# Patient Record
Sex: FEMALE | Race: WHITE | Employment: OTHER | URBAN - METROPOLITAN AREA
[De-identification: names, ages, dates, MRNs, and addresses within clinical notes are randomized per-mention and may not be internally consistent; named-entity substitution may affect disease eponyms.]

---

## 2018-02-09 ENCOUNTER — TRANSFERRED RECORDS (OUTPATIENT)
Dept: HEALTH INFORMATION MANAGEMENT | Facility: CLINIC | Age: 58
End: 2018-02-09

## 2018-04-23 ENCOUNTER — TRANSFERRED RECORDS (OUTPATIENT)
Dept: HEALTH INFORMATION MANAGEMENT | Facility: CLINIC | Age: 58
End: 2018-04-23

## 2018-11-12 ENCOUNTER — RECORDS - HEALTHEAST (OUTPATIENT)
Dept: ADMINISTRATIVE | Facility: OTHER | Age: 58
End: 2018-11-12

## 2018-11-15 ENCOUNTER — HOSPITAL ENCOUNTER (OUTPATIENT)
Dept: ULTRASOUND IMAGING | Facility: HOSPITAL | Age: 58
Discharge: HOME | End: 2018-11-15
Attending: INTERNAL MEDICINE

## 2018-11-15 DIAGNOSIS — N18.30 CHRONIC KIDNEY DISEASE, STAGE III (MODERATE) (H): ICD-10-CM

## 2019-01-21 ENCOUNTER — MEDICAL CORRESPONDENCE (OUTPATIENT)
Dept: HEALTH INFORMATION MANAGEMENT | Facility: CLINIC | Age: 59
End: 2019-01-21

## 2019-01-23 NOTE — TELEPHONE ENCOUNTER
FUTURE VISIT INFORMATION      FUTURE VISIT INFORMATION:    Date: 2/12/19    Time: 1.40p    [place] [facility] [place]  REFERRAL INFORMATION:    Referring provider:  Dr. Ishan Melendrez    Referring providers clinic:  [facility]     Reason for visit/diagnosis  Possible Parkinsons w/ Tremors, Memory loss, issues with Limes disease    RECORDS REQUESTED FROM:       Clinic name Comments Records Status Imaging Status   [facility] Dr. Melendrez  Received    [facility]  Received    [facility]  Received    [facility]  MRI/MRA Head 1/31/19 Received PACS                   1/23/19: External referral from Dr. Melendrez at [facility]. Spoke with patient and I will be sending patient blank ROIs and she will fill those out/return.

## 2019-01-29 ENCOUNTER — DOCUMENTATION ONLY (OUTPATIENT)
Dept: CARE COORDINATION | Facility: CLINIC | Age: 59
End: 2019-01-29

## 2019-01-31 ENCOUNTER — TRANSFERRED RECORDS (OUTPATIENT)
Dept: HEALTH INFORMATION MANAGEMENT | Facility: CLINIC | Age: 59
End: 2019-01-31

## 2019-02-04 SDOH — HEALTH STABILITY: MENTAL HEALTH: HOW OFTEN DO YOU HAVE A DRINK CONTAINING ALCOHOL?: NEVER

## 2019-02-04 NOTE — PROGRESS NOTES
Summary and Recommendations:   IMPRESSION:  1. Parkinsonism  2. Cognitive impairment  3. Depression  4. Anxiety    Alina Zavala is a 58 year old woman who was referred for evaluation of Parkinsonism and cognitive impairment. Her exam is remarkable for cognitive impairment with prominent visuospatial and attention deficits on MoCA and mild left greater than right tremor, bradykinesia and rigidity. Her Demetrius Scan confirms clinical suspicion of Parkinsonism with bilateral striatal dopaminergic deficit. Early cognitive impairment raises concern for Lewy Body Dementia, although she does not have classic fluctuation or accompanying hallucinosis. We discussed further evaluation with neuropsychology testing, but she elected to think about this for now, given the stress of extended cognitive testing. Other possibilities include Parkinson disease with cognitive impairment.     We discussed various treatment options for cognition, motor, and mood symptoms. We elected to focus on cognition now and will continue to work on other areas during subsequent visits. We discussed the benefit of PT and exercise for movement.     PLAN:  - Start donepezil 5 mg AM.   - [facility] and [facility] in [place] for Parkinson disease  - Will discuss mood/anxiety at our next visit - may be better treated on serotonin specific reuptake inhibitor or SNRI  - Discussed various dopaminergic medications including L-dopa and dopamine agonists. Will wait to start a medication at this time.    Follow up in 1 month    Alaina Landaverde MD  Movement Disorders Fellow    Patient seen and discussed with Dr. Hearn    Patient seen and examined by me today. February 12, 2019  I agree with details above from today  February 12, 2019  Over 50% of this 100 minute visit was spent in patient care and care coordination  Ishan Hearn MD  _____________________________________________________________________  PATIENT: Alina Zavala  58 year  old female   : 1960  DAHLIA: 2019    REASON FOR VISIT: Consult requested by - pcp/other    HISTORY OF PRESENT ILLNESS:  Alina Zavala is a 58 year old referred by Dr. Melendrez ([facility]) for evaluation of Parkinsonism and memory loss.      She first noted intermittent tremor of the left pinkie finger four years ago. Around that time she had been undergoing treatment for Lyme disease and felt that tremor may have been related to other problems she was experiencing at the time. This year, she has noticed more trembling - now in both hands and occasionally in her face. The tremor may occur at rest or with movement and is worse on the left. Other motor symptoms include slight impairment in dexterity and handwriting, although she is not bothered by this. She has noticed some difficulty putting on and taking off a coat. She described spasms of the left leg, especially at night. She also has a restless sensation of the the left greater than right leg at night - some nights worse than others. The restlessness is relieved by movement.    She is most concerned with cognitive changes which started around the same time as tremor four years ago. She described problems with concentration, short term memory and spatial awareness. She limits her driving to local areas during the day. She has always struggled with depression and anxiety her entire life, but feels these issues have been more pronounced lately. She has only been tried on Wellbutrin. Sometimes she has insomnia. Denied dream enactment. No anosmia.    There have been changes in her walking. Her  has noticed that her stride has become shorter and she has slowed down. In the last few months she has been stumbling weekly. She has also noticed difficulty responding to other walkers when they abruptly stop or change dircections. It is harder to turn. She fatigues when walking. She has not fallen to the ground. She denied freezing of  gait.     Problems with constipation started 2017, improved with Miralax, but has been better recently without treatment. Noted urinary urgency but does not have to use a pad. Reported lightheadedness in the past, but this resolved when her PCP changed her antihypertensive medication change about 6-8 months ago. Denied dysphagia.     She was evaluated by Dr. Marie at [facility] two months ago. She ordered a DaTScan which was completed at [facility]. Mrs. Zavala's mother has essential tremor and the DatScan was intended to differentiate between ET and PD.     Other previous work up has included MRI brain and MRA head ([facility]). She was told these images were normal, but we do not have them for our review.       Outside records reviewed and revealed -- inserted.      Lyme disease treated - 2015 in [facility]      History obtained from patient and her     MEDICATIONS:    Medications     AM  PM   ASmanex    1   Bupropion wellbutrin SR 200mg 12 hr tablet 1  1   Gabapentin neurontin 100mg 1     Gabapentin neurontin 300mg   1   Hydrochlorothiazide hydrodiuril 12.5mg 1     Lorazepam ativan 1mg      Potassium chloride ER micro K 10 meq      Albuterol  As needed     Calcium 600 mg 1     Vitamin D 1     L Tyrosine 500 mg 1     Magnesium 250 mg 1     Pyrodoxine B-6 1                 14 Review of systems  are negative except for   Patient Active Problem List   Diagnosis     Asthma     Benign essential hypertension     Answers for HPI/ROS submitted by the patient on 2/11/2019   General Symptoms: Yes  Skin Symptoms: No  HENT Symptoms: Yes  EYE SYMPTOMS: No  HEART SYMPTOMS: No  LUNG SYMPTOMS: No  INTESTINAL SYMPTOMS: No  URINARY SYMPTOMS: Yes  GYNECOLOGIC SYMPTOMS: Yes  BREAST SYMPTOMS: No  SKELETAL SYMPTOMS: Yes  BLOOD SYMPTOMS: No  NERVOUS SYSTEM SYMPTOMS: Yes  MENTAL HEALTH SYMPTOMS: Yes  Fever: No  Loss of appetite: No  Weight loss: No  Weight gain: No  Fatigue: Yes  Night sweats:  Yes  Chills: Yes  Increased stress: Yes  Excessive hunger: No  Excessive thirst: No  Feeling hot or cold when others believe the temperature is normal: Yes  Loss of height: No  Post-operative complications: No  Surgical site pain: No  Hallucinations: No  Change in or Loss of Energy: No  Hyperactivity: No  Confusion: No  Ear pain: No  Ear discharge: No  Hearing loss: No  Tinnitus: Yes  Nosebleeds: No  Congestion: Yes  Sinus pain: No  Trouble swallowing: No   Voice hoarseness: No  Mouth sores: No  Sore throat: Yes  Tooth pain: Yes  Gum tenderness: No  Bleeding gums: No  Change in taste: No  Change in sense of smell: No  Dry mouth: Yes  Hearing aid used: No  Neck lump: No  Trouble holding urine or incontinence: Yes  Pain or burning: No  Trouble starting or stopping: No  Increased frequency of urination: No  Blood in urine: No  Decreased frequency of urination: No  Frequent nighttime urination: No  Flank pain: No  Difficulty emptying bladder: No  Back pain: Yes  Muscle aches: Yes  Neck pain: No  Swollen joints: No  Joint pain: No  Bone pain: No  Muscle cramps: Yes  Muscle weakness: Yes  Joint stiffness: Yes  Bone fracture: No  Trouble with coordination: No  Dizziness or trouble with balance: Yes  Fainting or black-out spells: No  Memory loss: Yes  Headache: No  Seizures: No  Speech problems: No  Tingling: No  Tremor: Yes  Weakness: Yes  Difficulty walking: No  Paralysis: No  Numbness: No  Bleeding or spotting between periods: No  Heavy or painful periods: No  Irregular periods: No  Vaginal discharge: No  Hot flashes: No  Vaginal dryness: No  Genital ulcers: No  Reduced libido: Yes  Painful intercourse: Yes  Difficulty with sexual arousal: No  Post-menopausal bleeding: No  Nervous or Anxious: Yes  Depression: Yes  Trouble sleeping: Yes  Trouble thinking or concentrating: Yes  Mood changes: Yes  Panic attacks: No      ALLERGIES: Prednisone; Penicillins; and Sulfa drugs      SURGERIES:   Past Surgical History:   Procedure  "Laterality Date     D & C       PAST MEDICAL HISTORY:  Past Medical History:   Diagnosis Date     Anxiety      Depression      Hyperlipidemia      Hypertension      Lyme disease      Trigeminal neuralgia      SOCIAL HISTORY:  Tobacco Use     Smoking status: Never Smoker     Smokeless tobacco: Never Used   Substance and Sexual Activity     Alcohol use: No     Frequency: Never   Other Topics Concern     Not on file   Social History Narrative    . Lives in [place]. Tejinder Zavala spouse. They have three children. Their names are Eros, Venkat, and Christie. She is a homemaker. Studied library science and theology. She was a  for over 16 years. 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 Adult onset.      Cerebrovascular Disease Father      Hypertension Father      Dementia No family hx of      Parkinsonism No family hx of      Current Outpatient Medications   Medication Sig Dispense Refill     acetaminophen (TYLENOL) 500 MG tablet Take 500-1,000 mg by mouth every 6 hours as needed for mild pain       albuterol (PROAIR HFA/PROVENTIL HFA/VENTOLIN HFA) 108 (90 Base) MCG/ACT inhaler Inhale 2 puffs into the lungs every 6 hours as needed       Ascorbic Acid (VITAMIN C) 500 MG CAPS Take 1 tablet by mouth daily       ASMANEX 120 METERED DOSES 220 MCG/INH inhaler Inhale 2 puffs into the lungs 2 times daily  1     buPROPion (WELLBUTRIN SR) 200 MG 12 hr tablet 200mg tab by mouth twice daily  0     calcium carbonate (CALCIUM CARBONATE) 600 MG tablet Take 1 tablet by mouth daily       Cholecalciferol (VITAMIN D3 " PO) Take 500 mcg by mouth daily       gabapentin (NEURONTIN) 100 MG capsule Take 100 mg by mouth daily  1     gabapentin (NEURONTIN) 300 MG capsule 300mg tab by mouth daily @  0     hydrochlorothiazide (HYDRODIURIL) 12.5 MG tablet 12.5mg tab by mouth daily  1     l-tyrosine 500 MG TABS Take 1,000 mg by mouth daily       magnesium 250 MG tablet Take 1 tablet by mouth daily       Pyridoxine HCl (VITAMIN B-6 PO) Take 100 mg by mouth daily       Ubiquinol (QUNOL COQ10/UBIQUINOL/RELL) 100 MG CAPS Take 2 tablets by mouth At Bedtime         PHYSICAL EXAM:  B/P: Data Unavailable, T: Data Unavailable, P: Data Unavailable, R: Data Unavailable 0 lbs 0 oz  Blood pressure 116/73, pulse 105, SpO2 100 %., There is no height or weight on file to calculate BMI.  NEUROLOGIC:  MENTAL STATUS: Fully alert, attentive and oriented. See MoCA below for details (24/30). She demonstrated prominent impairment in visuospatial and executive tasks (3/5) and attention (4/6). Registration 5/5 and delayed recall 3/5. She performed quite well in tests of language and abstraction. In fact in general conversation she comes across as having a good command of vocabulary.   SPEECH: Slight hypophonia  FACIAL EXPRESSION: Reduced blink rate and lower facial expression    CRANIAL NERVES: Visual fields intact. EOM full with smooth pursuit. No nystagmus. Normal saccades. Facial sensation intact/symmetric. Facial movements symmetric. Palate elevation symmetric, uvula midline. No dysarthria. Tongue protrusion midline.    MOTOR: See UPDRS for details  INVOLUNTARY MOVEMENTS - Intermittent left sided <1cm tremor in pronation/supination at rest. <1cm tremor on the right and 1-2 cm on posture and action on the left upper extremity. No facial tremor. No lower extremity tremor noted.   AGILITY -  Normal movements to slight bradykinesia on the right and mild bradykinesia on the left  TONE - Slight axial rigidity. Slight RUE rigidity, mild left sided rigidity.   STRENGTH  - 5/5 upper and lower extremities    REFLEXES: 3 and symmetric at bilateral triceps, biceps, brachioradialis, patella and Achilles. Spread with cross adductor. No clonus. Toes downgoing.  SENSORY: Intact/symmetric to light touch, temperature and vibratory sensation throughout upper and lower extremities.    COORDINATION: No dysmetria with FNF bilaterally  GAIT: Able to rise from chair with arms crossed over chest. Posture is slightly stooped. Normal base, shortened stride. Arm swing diminished bilaterally. Turns in 3 steps. Takes 2 steps with pull test (normal).     MoCA  Visuospatial/ Executive  3/5  Naming    3/3  Registration   Not scored, registered 5/5  Digit span   2/2  Vigilance   1/1  Calculation   1/3  Repetition   2/2  Fluency   1/1  Abstraction   2/2  Delayed recall   3/5  Orientation   6/6  Total    24/30      MDS-UPDRS Part III   0: Normal -- 1: Slight -- 2: Mild -- 3: Moderate -- 4: Severe     UPDRS Values 2/12/2019   Time: 3:03 PM   Medication Off   R Brain DBS: None   L Brain DBS: None   Speech 1   Facial Expression 1   Rigidity Neck 1   Rigidity RUE 1   Rigidity LUE 2   Rigidity RLE 0   Rigidity LLE 0   Finger Taps R 0   Finger Taps L 1   Hand Mvt R 1   Hand Mvt L 2   Pron-/Supinate R 0   Pron-/Supinate L 2   Toe Tap R 0   Toe Tap L 1   Leg Agility R 0   Leg Agility L 1   Arise From Chair 0   Gait 1   Gait Freezing 0   Postural Stability 0   Posture 1   Global Spont Mvt 1   Postural Tremor RUE 1   Postural Tremor LUE 2   Kinetic Tremor RUE 1   Kinetic Tremor LUE 2   Rest Tremor RUE 0   Rest Tremor LUE 1   Rest Tremor RLE 0   Rest Tremor LLE 0   Rest Tremor Lip/Jaw 0   Rest Tremor Constancy 1   Total Right 4   Total Left 14   Axial Total 6   Total 25       REVIEW OF DATA:  Reviewed Demetrius Scan - bilateral dopaminergic striatal deficit present    Patient Demographics  - 58 y.o. Female; born Julio. 10, 1960     Patient Address Communication Language Race / Ethnicity   [address] [place]  [address] [phone] (Home)  [phone]  [email or web address] English - Spoken (Preferred) White / Not  or      Allergies  Reconcile with Patient's Chart    Active Allergy Reactions Severity Noted Date Comments   Penicillins *Unknown   03/25/2018     Sulfa (Sulfonamide Antibiotics) *Unknown   03/25/2018       Current Medications  Reconcile with Patient's Chart    Prescription Sig. Disp. Refills Start Date End Date Status   LISINOPRIL ORAL   Take by mouth.         Active   BUPROPION HCL (WELLBUTRIN ORAL)   Take by mouth.         Active   SIMVASTATIN ORAL   Take by mouth.         Active   GABAPENTIN ORAL   Take by mouth.         Active     Active Problems    Not on file        Surgical History      Surgery Date Laterality Comments   DILATION AND CURETTAGE           Medical History      Medical History Date Comments   HTN (hypertension)       High cholesterol         Social History      Tobacco Use Types Packs/Day Years Used Date   Never Smoker           Smokeless Tobacco: Never Used           Tobacco Cessation: Counseling Given: No     Alcohol Use Drinks/Week oz/Week Comments   No           Sex Assigned at Birth Date Recorded   Not on file

## 2019-02-06 ENCOUNTER — ANCILLARY PROCEDURE (OUTPATIENT)
Dept: NUCLEAR MEDICINE | Facility: CLINIC | Age: 59
End: 2019-02-06
Payer: COMMERCIAL

## 2019-02-06 DIAGNOSIS — G20.C PARKINSONISM, UNSPECIFIED PARKINSONISM TYPE (H): ICD-10-CM

## 2019-02-06 PROCEDURE — 78607 NM BRAIN IMAGING TOMOGRAPHIC (SPECT) DATSCAN: CPT

## 2019-02-06 PROCEDURE — A9584 IODINE I-123 IOFLUPANE: HCPCS

## 2019-02-06 RX ORDER — GABAPENTIN 300 MG/1
300 CAPSULE ORAL AT BEDTIME
Refills: 0 | COMMUNITY
Start: 2019-02-06 | End: 2020-10-22

## 2019-02-06 RX ORDER — LORAZEPAM 1 MG/1
TABLET ORAL
Refills: 0 | COMMUNITY
Start: 2019-01-28 | End: 2019-02-06

## 2019-02-06 RX ORDER — LORAZEPAM 1 MG/1
TABLET ORAL
Refills: 0 | COMMUNITY
Start: 2019-02-06 | End: 2019-02-11

## 2019-02-06 RX ORDER — BUPROPION HYDROCHLORIDE 200 MG/1
TABLET, EXTENDED RELEASE ORAL
Refills: 0 | COMMUNITY
Start: 2019-02-06 | End: 2021-11-19

## 2019-02-06 RX ORDER — HYDROCHLOROTHIAZIDE 12.5 MG/1
TABLET ORAL
Refills: 1 | COMMUNITY
Start: 2019-02-06 | End: 2020-03-03

## 2019-02-06 RX ORDER — GABAPENTIN 100 MG/1
100 CAPSULE ORAL EVERY MORNING
Refills: 1 | COMMUNITY
Start: 2019-01-08 | End: 2020-10-22

## 2019-02-06 RX ORDER — SIMVASTATIN 10 MG
TABLET ORAL
Refills: 0 | COMMUNITY
Start: 2018-06-20 | End: 2019-02-06

## 2019-02-06 RX ORDER — POTASSIUM CHLORIDE 750 MG/1
CAPSULE, EXTENDED RELEASE ORAL
Refills: 11 | COMMUNITY
Start: 2019-02-06 | End: 2019-02-11

## 2019-02-06 RX ORDER — LISINOPRIL AND HYDROCHLOROTHIAZIDE 12.5; 2 MG/1; MG/1
TABLET ORAL
Refills: 0 | COMMUNITY
Start: 2018-03-17 | End: 2019-02-11

## 2019-02-06 RX ORDER — SIMVASTATIN 10 MG
TABLET ORAL
Refills: 0 | COMMUNITY
Start: 2019-02-06 | End: 2019-02-11

## 2019-02-06 RX ORDER — POTASSIUM CHLORIDE 750 MG/1
CAPSULE, EXTENDED RELEASE ORAL
Refills: 11 | COMMUNITY
Start: 2018-11-14 | End: 2019-02-06

## 2019-02-06 RX ORDER — HYDROCHLOROTHIAZIDE 12.5 MG/1
TABLET ORAL
Refills: 1 | COMMUNITY
Start: 2019-02-01 | End: 2019-02-06

## 2019-02-06 RX ORDER — GABAPENTIN 300 MG/1
CAPSULE ORAL
Refills: 0 | COMMUNITY
Start: 2019-01-23 | End: 2019-02-06

## 2019-02-06 RX ORDER — BUPROPION HYDROCHLORIDE 200 MG/1
TABLET, EXTENDED RELEASE ORAL
Refills: 0 | COMMUNITY
Start: 2019-01-07 | End: 2019-02-06

## 2019-02-08 NOTE — TELEPHONE ENCOUNTER
ROIs received from patient. Faxed to:  1) [facility]  2) [facility]  3) [facility]   4) [facility]

## 2019-02-11 ASSESSMENT — ENCOUNTER SYMPTOMS
HALLUCINATIONS: 0
ALTERED TEMPERATURE REGULATION: 1
DIFFICULTY URINATING: 0
SINUS CONGESTION: 1
STIFFNESS: 1
DEPRESSION: 1
MEMORY LOSS: 1
CHILLS: 1
MUSCLE WEAKNESS: 1
HOARSE VOICE: 0
LOSS OF CONSCIOUSNESS: 0
DYSURIA: 0
DIZZINESS: 1
BACK PAIN: 1
PANIC: 0
HEADACHES: 0
NECK MASS: 0
HEMATURIA: 0
ARTHRALGIAS: 0
DECREASED APPETITE: 0
WEIGHT GAIN: 0
TREMORS: 1
MYALGIAS: 1
DISTURBANCES IN COORDINATION: 0
POLYDIPSIA: 0
WEAKNESS: 1
SPEECH CHANGE: 0
INSOMNIA: 1
TASTE DISTURBANCE: 0
DECREASED LIBIDO: 1
FATIGUE: 1
FLANK PAIN: 0
HOT FLASHES: 0
NUMBNESS: 0
SMELL DISTURBANCE: 0
INCREASED ENERGY: 0
NIGHT SWEATS: 1
JOINT SWELLING: 0
TROUBLE SWALLOWING: 0
WEIGHT LOSS: 0
MUSCLE CRAMPS: 1
SINUS PAIN: 0
DECREASED CONCENTRATION: 1
POLYPHAGIA: 0
TINGLING: 0
NERVOUS/ANXIOUS: 1
SEIZURES: 0
SORE THROAT: 1
NECK PAIN: 0
FEVER: 0
PARALYSIS: 0

## 2019-02-11 NOTE — TELEPHONE ENCOUNTER
Records received from [facility] via fax - 26 pages.    Records received from [facility]. No office notes, just imaging reports from [facility] - 7 pages

## 2019-02-12 ENCOUNTER — OFFICE VISIT (OUTPATIENT)
Dept: NEUROLOGY | Facility: CLINIC | Age: 59
End: 2019-02-12
Payer: COMMERCIAL

## 2019-02-12 ENCOUNTER — PRE VISIT (OUTPATIENT)
Dept: NEUROLOGY | Facility: CLINIC | Age: 59
End: 2019-02-12

## 2019-02-12 VITALS — OXYGEN SATURATION: 100 % | DIASTOLIC BLOOD PRESSURE: 73 MMHG | HEART RATE: 105 BPM | SYSTOLIC BLOOD PRESSURE: 116 MMHG

## 2019-02-12 DIAGNOSIS — G20.A1 PARKINSON DISEASE (H): Primary | ICD-10-CM

## 2019-02-12 DIAGNOSIS — R41.89 COGNITIVE IMPAIRMENT: ICD-10-CM

## 2019-02-12 DIAGNOSIS — R25.1 TREMOR: ICD-10-CM

## 2019-02-12 DIAGNOSIS — F80.0 ARTICULATION DISORDER: ICD-10-CM

## 2019-02-12 RX ORDER — MULTIVIT-MIN/IRON/FOLIC ACID/K 18-600-40
1 CAPSULE ORAL DAILY
COMMUNITY
End: 2019-02-18

## 2019-02-12 RX ORDER — PHENOL 1.4 %
AEROSOL, SPRAY (ML) MUCOUS MEMBRANE
COMMUNITY

## 2019-02-12 RX ORDER — MULTIVITAMIN WITH IRON
1 TABLET ORAL DAILY
COMMUNITY
End: 2020-06-12

## 2019-02-12 RX ORDER — TYROSINE 500 MG
500 TABLET ORAL DAILY
COMMUNITY
End: 2020-10-22

## 2019-02-12 RX ORDER — ACETAMINOPHEN 500 MG
500-1000 TABLET ORAL EVERY 6 HOURS PRN
COMMUNITY
End: 2020-06-12

## 2019-02-12 RX ORDER — ALBUTEROL SULFATE 90 UG/1
2 AEROSOL, METERED RESPIRATORY (INHALATION) EVERY 6 HOURS PRN
COMMUNITY

## 2019-02-12 RX ORDER — DONEPEZIL HYDROCHLORIDE 5 MG/1
5 TABLET, FILM COATED ORAL AT BEDTIME
Qty: 90 TABLET | Refills: 3 | Status: SHIPPED | OUTPATIENT
Start: 2019-02-12 | End: 2019-02-25 | Stop reason: SINTOL

## 2019-02-12 ASSESSMENT — UNIFIED PARKINSONS DISEASE RATING SCALE (UPDRS)
AMPLITUDE_RLE: NORMAL: NO TREMOR.
FINGER_TAPPING_LEFT: SLIGHT: ANY OF THE FOLLOWING: A) THE REGULAR RHYTHM IS BROKEN WITH ONE WITH ONE OR TWO INTERRUPTIONS OR HESITATIONS OF THE MOVEMENT B) SLIGHT SLOWING C) THE AMPLITUDE DECREMENTS NEAR THE END OF THE 10 MOVEMENTS.
TOETAPPING_LEFT: SLIGHT: ANY OF THE FOLLOWING: A) THE REGULAR RHYTHM IS BROKEN WITH ONE WITH ONE OR TWO INTERRUPTIONS OR HESITATIONS OF THE MOVEMENT B) SLIGHT SLOWING C) THE AMPLITUDE DECREMENTS NEAR THE END OF THE 10 MOVEMENTS.
PRONATION_SUPINATION_RIGHT: NORMAL
PRONATION_SUPINATION_LEFT: MILD: ANY OF THE FOLLOWING: A) 3 TO 5 INTERRUPTIONS DURING TAPPING B) MILD SLOWING C) THE AMPLITUDE DECREMENTS MIDWAY IN THE 10-MOVEMENT SEQUENCE
POSTURAL_STABILITY: NORMAL:  RECOVERS WITH ONE OR TWO STEPS.
HANDMOVEMENTS_LEFT: MILD: ANY OF THE FOLLOWING: A) 3 TO 5 INTERRUPTIONS DURING TAPPING B) MILD SLOWING C) THE AMPLITUDE DECREMENTS MIDWAY IN THE 10-MOVEMENT SEQUENCE
RIGIDITY_NECK: SLIGHT: RIGIDITY ONLY DETECTED WITH ACTIVATION MANEUVER.
POSTURE: 1 SLIGHT.  NOT QUITE ERECT BUT COULD BE NORMAL FOR OLDER PERSONS.
SPONTANEITY_OF_MOVEMENT: 1: SLIGHT: SLIGHT GLOBAL SLOWNESS AND POVERTY OF SPONTANEOUS MOVEMENTS.
RIGIDITY_RLE: NORMAL
SPEECH: SLIGHT: LOSS OF MODULATION, DICTION OR VOLUME, BUT STILL ALL WORDS EASY TO UNDERSTAND.
LEG_AGILITY_LEFT: SLIGHT: ANY OF THE FOLLOWING: A) THE REGULAR RHYTHM IS BROKEN WITH ONE WITH ONE OR TWO INTERRUPTIONS OR HESITATIONS OF THE MOVEMENT B) SLIGHT SLOWING C) THE AMPLITUDE DECREMENTS NEAR THE END OF THE 10 MOVEMENTS.
TOTAL_SCORE: 4
AMPLITUDE_LIP_JAW: NORMAL: NO TREMOR.
ARISING_CHAIR: NORMAL: ABLE TO ARISE QUICKLY WITHOUT HESITATION.
AXIAL_SCORE: 6
RIGIDITY_RUE: SLIGHT: RIGIDITY ONLY DETECTED WITH ACTIVATION MANEUVER.
PARKINSONS_MEDS: OFF
TOTAL_SCORE_LEFT: 14
AMPLITUDE_LLE: NORMAL: NO TREMOR.
AMPLITUDE_RUE: NORMAL: NO TREMOR.
FACIAL_EXPRESSION: SLIGHT: MINIMAL MASKED FACIES MANIFESTED ONLY BY DECREASED FREQUENCY OF BLINKING.
FINGER_TAPPING_RIGHT: NORMAL
FREEZING_GAIT: NORMAL
TOTAL_SCORE: 25
LEG_AGILITY_RIGHT: NORMAL
HANDMOVEMENTS_RIGHT: SLIGHT: ANY OF THE FOLLOWING: A) THE REGULAR RHYTHM IS BROKEN WITH ONE WITH ONE OR TWO INTERRUPTIONS OR HESITATIONS OF THE MOVEMENT B) SLIGHT SLOWING C) THE AMPLITUDE DECREMENTS NEAR THE END OF THE 10 MOVEMENTS.
GAIT: SLIGHT: INDEPENDENT WALKING WITH MINOR GAIT IMPAIRMENT.
CONSTANCY_TREMOR_ATREST: SLIGHT: TREMOR AT REST IS PRESENT  25% OF THE ENTIRE EXAMINATION PERIOD.
TOETAPPING_RIGHT: NORMAL
RIGIDITY_LUE: MILD: RIGIDITY DETECTED WITHOUT THE ACTIVATION MANEUVER.  FULL RANGE OF MOTION IS EASILY ACHIEVED.
RIGIDITY_LLE: NORMAL
AMPLITUDE_LUE: SLIGHT: < 1 CM IN MAXIMAL AMPLITUDE.

## 2019-02-12 ASSESSMENT — PAIN SCALES - GENERAL: PAINLEVEL: MILD PAIN (3)

## 2019-02-12 NOTE — LETTER
2/12/2019       RE: Alina Zavala  [address] [place] [id number]     Dear Colleague,    Thank you for referring your patient, Alina Zavala, to the [facility] NEUROLOGY at [facility]. Please see a copy of my visit note below.      Summary and Recommendations:   IMPRESSION:  1. Parkinsonism  2. Cognitive impairment  3. Depression  4. Anxiety    Alina Zavala is a 58 year old woman who was referred for evaluation of Parkinsonism and cognitive impairment. Her exam is remarkable for cognitive impairment with prominent visuospatial and attention deficits on MoCA and mild left greater than right tremor, bradykinesia and rigidity. Her Demetrius Scan confirms clinical suspicion of Parkinsonism with bilateral striatal dopaminergic deficit. Early cognitive impairment raises concern for Lewy Body Dementia, although she does not have classic fluctuation or accompanying hallucinosis. We discussed further evaluation with neuropsychology testing, but she elected to think about this for now, given the stress of extended cognitive testing. Other possibilities include Parkinson disease with cognitive impairment.     We discussed various treatment options for cognition, motor, and mood symptoms. We elected to focus on cognition now and will continue to work on other areas during subsequent visits. We discussed the benefit of PT and exercise for movement.     PLAN:  - Start donepezil 5 mg AM.   - Big and loud therapy at [facility] in [place] for Parkinson disease  - Will discuss mood/anxiety at our next visit - may be better treated on serotonin specific reuptake inhibitor or SNRI  - Discussed various dopaminergic medications including L-dopa and dopamine agonists. Will wait to start a medication at this time.    Follow up in 1 month    Alaina Landaverde MD  Movement Disorders Fellow    Patient seen and discussed with Dr. Hearn    Patient seen and examined by me today. February 12, 2019  I  agree with details above from today  2019  Over 50% of this 100 minute visit was spent in patient care and care coordination  Ishan Hearn MD  _____________________________________________________________________  PATIENT: Alina Zavala  58 year old female   : 1960  DAHLIA: 2019    REASON FOR VISIT: Consult requested by - pcp/other    HISTORY OF PRESENT ILLNESS:  Alina Zavala is a 58 year old referred by Dr. Melendrez ([facility]) for evaluation of Parkinsonism and memory loss.      She first noted intermittent tremor of the left pinkie finger four years ago. Around that time she had been undergoing treatment for Lyme disease and felt that tremor may have been related to other problems she was experiencing at the time. This year, she has noticed more trembling - now in both hands and occasionally in her face. The tremor may occur at rest or with movement and is worse on the left. Other motor symptoms include slight impairment in dexterity and handwriting, although she is not bothered by this. She has noticed some difficulty putting on and taking off a coat. She described spasms of the left leg, especially at night. She also has a restless sensation of the the left greater than right leg at night - some nights worse than others. The restlessness is relieved by movement.    She is most concerned with cognitive changes which started around the same time as tremor four years ago. She described problems with concentration, short term memory and spatial awareness. She limits her driving to local areas during the day. She has always struggled with depression and anxiety her entire life, but feels these issues have been more pronounced lately. She has only been tried on Wellbutrin. Sometimes she has insomnia. Denied dream enactment. No anosmia.    There have been changes in her walking. Her  has noticed that her stride has become shorter and she has slowed  down. In the last few months she has been stumbling weekly. She has also noticed difficulty responding to other walkers when they abruptly stop or change dircections. It is harder to turn. She fatigues when walking. She has not fallen to the ground. She denied freezing of gait.     Problems with constipation started 2017, improved with Miralax, but has been better recently without treatment. Noted urinary urgency but does not have to use a pad. Reported lightheadedness in the past, but this resolved when her PCP changed her antihypertensive medication change about 6-8 months ago. Denied dysphagia.     She was evaluated by Dr. Marie at [facility] two months ago. She ordered a DaTScan which was completed at [facility]. Mrs. Zavala's mother has essential tremor and the DatScan was intended to differentiate between ET and PD.     Other previous work up has included MRI brain and MRA head ([facility]). She was told these images were normal, but we do not have them for our review.       Outside records reviewed and revealed -- inserted.      Lyme disease treated - 2015 in [facility]      History obtained from patient and her     MEDICATIONS:    Medications     AM  PM   ASmanex    1   Bupropion wellbutrin SR 200mg 12 hr tablet 1  1   Gabapentin neurontin 100mg 1     Gabapentin neurontin 300mg   1   Hydrochlorothiazide hydrodiuril 12.5mg 1     Lorazepam ativan 1mg      Potassium chloride ER micro K 10 meq      Albuterol  As needed     Calcium 600 mg 1     Vitamin D 1     L Tyrosine 500 mg 1     Magnesium 250 mg 1     Pyrodoxine B-6 1                 14 Review of systems  are negative except for   Patient Active Problem List   Diagnosis     Asthma     Benign essential hypertension     Answers for HPI/ROS submitted by the patient on 2/11/2019   General Symptoms: Yes  Skin Symptoms: No  HENT Symptoms: Yes  EYE SYMPTOMS: No  HEART SYMPTOMS: No  LUNG SYMPTOMS: No  INTESTINAL SYMPTOMS:  No  URINARY SYMPTOMS: Yes  GYNECOLOGIC SYMPTOMS: Yes  BREAST SYMPTOMS: No  SKELETAL SYMPTOMS: Yes  BLOOD SYMPTOMS: No  NERVOUS SYSTEM SYMPTOMS: Yes  MENTAL HEALTH SYMPTOMS: Yes  Fever: No  Loss of appetite: No  Weight loss: No  Weight gain: No  Fatigue: Yes  Night sweats: Yes  Chills: Yes  Increased stress: Yes  Excessive hunger: No  Excessive thirst: No  Feeling hot or cold when others believe the temperature is normal: Yes  Loss of height: No  Post-operative complications: No  Surgical site pain: No  Hallucinations: No  Change in or Loss of Energy: No  Hyperactivity: No  Confusion: No  Ear pain: No  Ear discharge: No  Hearing loss: No  Tinnitus: Yes  Nosebleeds: No  Congestion: Yes  Sinus pain: No  Trouble swallowing: No   Voice hoarseness: No  Mouth sores: No  Sore throat: Yes  Tooth pain: Yes  Gum tenderness: No  Bleeding gums: No  Change in taste: No  Change in sense of smell: No  Dry mouth: Yes  Hearing aid used: No  Neck lump: No  Trouble holding urine or incontinence: Yes  Pain or burning: No  Trouble starting or stopping: No  Increased frequency of urination: No  Blood in urine: No  Decreased frequency of urination: No  Frequent nighttime urination: No  Flank pain: No  Difficulty emptying bladder: No  Back pain: Yes  Muscle aches: Yes  Neck pain: No  Swollen joints: No  Joint pain: No  Bone pain: No  Muscle cramps: Yes  Muscle weakness: Yes  Joint stiffness: Yes  Bone fracture: No  Trouble with coordination: No  Dizziness or trouble with balance: Yes  Fainting or black-out spells: No  Memory loss: Yes  Headache: No  Seizures: No  Speech problems: No  Tingling: No  Tremor: Yes  Weakness: Yes  Difficulty walking: No  Paralysis: No  Numbness: No  Bleeding or spotting between periods: No  Heavy or painful periods: No  Irregular periods: No  Vaginal discharge: No  Hot flashes: No  Vaginal dryness: No  Genital ulcers: No  Reduced libido: Yes  Painful intercourse: Yes  Difficulty with sexual arousal:  "No  Post-menopausal bleeding: No  Nervous or Anxious: Yes  Depression: Yes  Trouble sleeping: Yes  Trouble thinking or concentrating: Yes  Mood changes: Yes  Panic attacks: No      ALLERGIES: Prednisone; Penicillins; and Sulfa drugs      SURGERIES:   Past Surgical History:   Procedure Laterality Date     D & C       PAST MEDICAL HISTORY:  Past Medical History:   Diagnosis Date     Anxiety      Depression      Hyperlipidemia      Hypertension      Lyme disease      Trigeminal neuralgia      SOCIAL HISTORY:  Tobacco Use     Smoking status: Never Smoker     Smokeless tobacco: Never Used   Substance and Sexual Activity     Alcohol use: No     Frequency: Never   Other Topics Concern     Not on file   Social History Narrative    . Lives in [place]. Tejinder Zavala spouse. They have three children. Their names are Eros, Venkat, and Christie. She is a homemaker. Studied library science and theology. She was a  for over 16 years. 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 Adult onset.      Cerebrovascular Disease Father      Hypertension Father      Dementia No family hx of      Parkinsonism No family hx of      Current Outpatient Medications   Medication Sig Dispense Refill     acetaminophen (TYLENOL) 500 MG tablet Take 500-1,000 mg by mouth every 6 hours as needed for mild pain       albuterol (PROAIR HFA/PROVENTIL HFA/VENTOLIN HFA) 108 (90 Base) MCG/ACT inhaler Inhale 2 puffs into the lungs every 6 hours as needed       Ascorbic Acid (VITAMIN C) 500 MG CAPS Take 1 tablet by mouth daily       ASMANEX 120 " METERED DOSES 220 MCG/INH inhaler Inhale 2 puffs into the lungs 2 times daily  1     buPROPion (WELLBUTRIN SR) 200 MG 12 hr tablet 200mg tab by mouth twice daily  0     calcium carbonate (CALCIUM CARBONATE) 600 MG tablet Take 1 tablet by mouth daily       Cholecalciferol (VITAMIN D3 PO) Take 500 mcg by mouth daily       gabapentin (NEURONTIN) 100 MG capsule Take 100 mg by mouth daily  1     gabapentin (NEURONTIN) 300 MG capsule 300mg tab by mouth daily @  0     hydrochlorothiazide (HYDRODIURIL) 12.5 MG tablet 12.5mg tab by mouth daily  1     l-tyrosine 500 MG TABS Take 1,000 mg by mouth daily       magnesium 250 MG tablet Take 1 tablet by mouth daily       Pyridoxine HCl (VITAMIN B-6 PO) Take 100 mg by mouth daily       Ubiquinol (QUNOL COQ10/UBIQUINOL/RELL) 100 MG CAPS Take 2 tablets by mouth At Bedtime         PHYSICAL EXAM:  B/P: Data Unavailable, T: Data Unavailable, P: Data Unavailable, R: Data Unavailable 0 lbs 0 oz  Blood pressure 116/73, pulse 105, SpO2 100 %., There is no height or weight on file to calculate BMI.  NEUROLOGIC:  MENTAL STATUS: Fully alert, attentive and oriented. See MoCA below for details (24/30). She demonstrated prominent impairment in visuospatial and executive tasks (3/5) and attention (4/6). Registration 5/5 and delayed recall 3/5. She performed quite well in tests of language and abstraction. In fact in general conversation she comes across as having a good command of vocabulary.   SPEECH: Slight hypophonia  FACIAL EXPRESSION: Reduced blink rate and lower facial expression    CRANIAL NERVES: Visual fields intact. EOM full with smooth pursuit. No nystagmus. Normal saccades. Facial sensation intact/symmetric. Facial movements symmetric. Palate elevation symmetric, uvula midline. No dysarthria. Tongue protrusion midline.    MOTOR: See UPDRS for details  INVOLUNTARY MOVEMENTS - Intermittent left sided <1cm tremor in pronation/supination at rest. <1cm tremor on the right and 1-2 cm on  posture and action on the left upper extremity. No facial tremor. No lower extremity tremor noted.   AGILITY -  Normal movements to slight bradykinesia on the right and mild bradykinesia on the left  TONE - Slight axial rigidity. Slight RUE rigidity, mild left sided rigidity.   STRENGTH - 5/5 upper and lower extremities    REFLEXES: 3 and symmetric at bilateral triceps, biceps, brachioradialis, patella and Achilles. Spread with cross adductor. No clonus. Toes downgoing.  SENSORY: Intact/symmetric to light touch, temperature and vibratory sensation throughout upper and lower extremities.    COORDINATION: No dysmetria with FNF bilaterally  GAIT: Able to rise from chair with arms crossed over chest. Posture is slightly stooped. Normal base, shortened stride. Arm swing diminished bilaterally. Turns in 3 steps. Takes 2 steps with pull test (normal).     MoCA  Visuospatial/ Executive  3/5  Naming    3/3  Registration   Not scored, registered 5/5  Digit span   2/2  Vigilance   1/1  Calculation   1/3  Repetition   2/2  Fluency   1/1  Abstraction   2/2  Delayed recall   3/5  Orientation   6/6  Total    24/30      MDS-UPDRS Part III   0: Normal -- 1: Slight -- 2: Mild -- 3: Moderate -- 4: Severe     UPDRS Values 2/12/2019   Time: 3:03 PM   Medication Off   R Brain DBS: None   L Brain DBS: None   Speech 1   Facial Expression 1   Rigidity Neck 1   Rigidity RUE 1   Rigidity LUE 2   Rigidity RLE 0   Rigidity LLE 0   Finger Taps R 0   Finger Taps L 1   Hand Mvt R 1   Hand Mvt L 2   Pron-/Supinate R 0   Pron-/Supinate L 2   Toe Tap R 0   Toe Tap L 1   Leg Agility R 0   Leg Agility L 1   Arise From Chair 0   Gait 1   Gait Freezing 0   Postural Stability 0   Posture 1   Global Spont Mvt 1   Postural Tremor RUE 1   Postural Tremor LUE 2   Kinetic Tremor RUE 1   Kinetic Tremor LUE 2   Rest Tremor RUE 0   Rest Tremor LUE 1   Rest Tremor RLE 0   Rest Tremor LLE 0   Rest Tremor Lip/Jaw 0   Rest Tremor Constancy 1   Total Right 4   Total  Left 14   Axial Total 6   Total 25       REVIEW OF DATA:  Reviewed Demetrius Scan - bilateral dopaminergic striatal deficit present    Patient Demographics  - 58 y.o. Female; born Julio. 10, 1960     Patient Address Communication Language Race / Ethnicity   [address] [place] [address] [phone] (Home)  [phone]  [email or web address] English - Spoken (Preferred) White / Not  or      Allergies  Reconcile with Patient's Chart    Active Allergy Reactions Severity Noted Date Comments   Penicillins *Unknown   03/25/2018     Sulfa (Sulfonamide Antibiotics) *Unknown   03/25/2018       Current Medications  Reconcile with Patient's Chart    Prescription Sig. Disp. Refills Start Date End Date Status   LISINOPRIL ORAL   Take by mouth.         Active   BUPROPION HCL (WELLBUTRIN ORAL)   Take by mouth.         Active   SIMVASTATIN ORAL   Take by mouth.         Active   GABAPENTIN ORAL   Take by mouth.         Active     Active Problems    Not on file        Surgical History      Surgery Date Laterality Comments   DILATION AND CURETTAGE           Medical History      Medical History Date Comments   HTN (hypertension)       High cholesterol         Social History      Tobacco Use Types Packs/Day Years Used Date   Never Smoker           Smokeless Tobacco: Never Used           Tobacco Cessation: Counseling Given: No     Alcohol Use Drinks/Week oz/Week Comments   No           Sex Assigned at Birth Date Recorded   Not on file         Ishan Hearn MD

## 2019-02-12 NOTE — NURSING NOTE
Chief Complaint   Patient presents with     Consult For     P NEW MOVEMENT DISORDER POSSIBLE PARKINSONS       Rossy Polk, EMT

## 2019-02-12 NOTE — PATIENT INSTRUCTIONS
We discussed that your exam and DatScan is suggestive of Parkinsonism. This is a very slowly progressing disease.    We would like to consider neuropsychology testing.     In the meantime you can start donepezil for memory and thinking. Take 5 mg in the morning. You can take it with food.       WEBSITES:  American Parkinson Disease Association:    https://www.apdaparkinson.org    [employer]:        [email or web address]    Our Movement Disorders Nurses:  Alina Woodard

## 2019-02-12 NOTE — Clinical Note
2/12/2019       RE: Alina Zavala  1971 [facility] [address]     Dear Colleague,    Thank you for referring your patient, Alina Zavala, to the [facility] NEUROLOGY at [facility]. Please see a copy of my visit note below.    No notes on file    Again, thank you for allowing me to participate in the care of your patient.      Sincerely,    Ishan Hearn MD

## 2019-02-13 ENCOUNTER — TELEPHONE (OUTPATIENT)
Dept: NEUROLOGY | Facility: CLINIC | Age: 59
End: 2019-02-13

## 2019-02-13 NOTE — TELEPHONE ENCOUNTER
Health Call Center    Phone Message    May a detailed message be left on voicemail: yes    Reason for Call: Medication Question or concern regarding medication   Prescription Clarification  Name of Medication: donepezil (ARICEPT) 5 MG tablet  Prescribing Provider: Dr Hearn   Pharmacy:    What on the order needs clarification? Alina calling because of a discrepancy between what she was told and the directions that are on the prescription for donepezil.  The directions say take in PM, but the appointment notes state take in the AM (which is what her  remembers being said)  If she is supposed to take it at night, when at night should it be taken?  After supper? Or just before bedtime?  The directions also state take with lots of liquid, which concerns her as she doesn't want to be up all night going to the bathroom.  Please call her back to clarify when she is to take this medication          Action Taken: Message routed to:  [facility]

## 2019-02-14 NOTE — TELEPHONE ENCOUNTER
donepezil (ARICEPT) 5 MG tablet 90 tablet 3 2/12/2019 2/12/2020 --   Sig - Route: Take 1 tablet (5 mg) by mouth At Bedtime - Oral     Called and advised Alina that per Dr. Landaverde she can take the above medication in the morning. Alina asked how much water she should drink with the above medication. I informed her that a full 8 oz glass would be sufficient to take.

## 2019-02-15 ENCOUNTER — HEALTH MAINTENANCE LETTER (OUTPATIENT)
Age: 59
End: 2019-02-15

## 2019-02-15 ENCOUNTER — ALLIED HEALTH/NURSE VISIT (OUTPATIENT)
Dept: PHARMACY | Facility: CLINIC | Age: 59
End: 2019-02-15
Payer: COMMERCIAL

## 2019-02-15 DIAGNOSIS — G20.C PARKINSONISM, UNSPECIFIED PARKINSONISM TYPE (H): Primary | ICD-10-CM

## 2019-02-15 DIAGNOSIS — F41.9 ANXIETY: ICD-10-CM

## 2019-02-15 DIAGNOSIS — M54.9 BACK PAIN, UNSPECIFIED BACK LOCATION, UNSPECIFIED BACK PAIN LATERALITY, UNSPECIFIED CHRONICITY: ICD-10-CM

## 2019-02-15 DIAGNOSIS — I10 BENIGN ESSENTIAL HYPERTENSION: ICD-10-CM

## 2019-02-15 DIAGNOSIS — J45.909 UNCOMPLICATED ASTHMA, UNSPECIFIED ASTHMA SEVERITY, UNSPECIFIED WHETHER PERSISTENT: ICD-10-CM

## 2019-02-15 DIAGNOSIS — Z78.9 TAKES DIETARY SUPPLEMENTS: ICD-10-CM

## 2019-02-15 DIAGNOSIS — M85.80 OSTEOPENIA, UNSPECIFIED LOCATION: ICD-10-CM

## 2019-02-15 DIAGNOSIS — F32.A DEPRESSION, UNSPECIFIED DEPRESSION TYPE: ICD-10-CM

## 2019-02-15 DIAGNOSIS — R41.89 COGNITIVE IMPAIRMENT: ICD-10-CM

## 2019-02-15 DIAGNOSIS — G50.0 TRIGEMINAL NEURALGIA: ICD-10-CM

## 2019-02-15 PROCEDURE — 99607 MTMS BY PHARM ADDL 15 MIN: CPT | Performed by: PHARMACIST

## 2019-02-15 PROCEDURE — 99605 MTMS BY PHARM NP 15 MIN: CPT | Performed by: PHARMACIST

## 2019-02-15 RX ORDER — CYCLOBENZAPRINE HCL 10 MG
10 TABLET ORAL PRN
COMMUNITY
End: 2022-05-25 | Stop reason: SINTOL

## 2019-02-15 NOTE — Clinical Note
Patient started taking donepezil in the morning because the directions on the AVS told her to do this but the prescription bottle says PM. Patient called clinic and Yamilet instructed her to take it in the AM. I would recommend PM and patient is confused. Did you intend for it to be in the AM versus PM?

## 2019-02-19 NOTE — PATIENT INSTRUCTIONS
Recommendations from today's MTM visit:                                                    MTM (medication therapy management) is a service provided by a clinical pharmacist designed to help you get the most of out of your medicines.     1. Will discuss with Dr. Landaverde/Dr. Hearn regarding appropriate timing of donepezil; I would recommend evening.  2. Move up second dose of bupropion to dinnertime.  3. Add a second dose of calcium/magnesium at dinner.   4. Stop the following supplements: Vitamin C, CoQ10, Vitamin B6    Next MTM visit: in one year or sooner if needed    To schedule another MTM appointment, please call the clinic directly or you may call the MTM scheduling line at [phone] or toll-free at [phone].     My Clinical Pharmacist's contact information:                                                      It was a pleasure talking with you today!  Please feel free to contact me with any questions or concerns you have.      Wendy Cárdenas, Pharm.D.  Medication Therapy Management Pharmacist  Phone: [phone]    You may receive a survey about the MTM services you received.  I would appreciate your feedback to help me serve you better in the future. Please fill it out and return it when you can. Your comments will be anonymous.

## 2019-02-22 ENCOUNTER — TELEPHONE (OUTPATIENT)
Dept: NEUROLOGY | Facility: CLINIC | Age: 59
End: 2019-02-22

## 2019-02-22 ENCOUNTER — TELEPHONE (OUTPATIENT)
Dept: PHARMACY | Facility: CLINIC | Age: 59
End: 2019-02-22

## 2019-02-22 NOTE — TELEPHONE ENCOUNTER
Assessment: Insomnia, sleeps about 2-3 hours a night since taking donepezil at nighttime, frequent urination (stated she got up to urinate 12 times last night) and urinates more frequently in the day time as well, loss of appetite (No appetite at all), worsened anxiety (states due to lack of sleep), more BMs in the morning (4-5 small BMs in the morning on average since taking the medication)     Sleepy, nauseous, and dizzy when she took it in the morning (took in the morning on the 16th and 17th then took it at bedtime per instructions from Wendy Cárdenas.)    Do you notice any pattern?    Appitite suppression 24/7, increased urination all day and night    Patient is taking:   Disp Refills Start End KAYLAH   donepezil (ARICEPT) 5 MG tablet 90 tablet 3 2/12/2019 2/12/2020 --   Sig - Route: Take 1 tablet (5 mg) by mouth At Bedtime - Oral   - Started on 2/16 (Saturday)    New Medications:   No  Are you taking your medications as directed?   Yes    Are you having any new symptoms such as,   Fever/chills  No   Recent illness  No   Dehydration  States she probably is    Eating OK  No   Hit your head  No   Confusion  No   Hallucinations  No   Urinating OK?  Excessively   Any discomfort while urinating No    I advised Alina that I will discuss with Dr. Hearn and Dr. Landaverde.

## 2019-02-22 NOTE — TELEPHONE ENCOUNTER
Patient called and left voicemail message for MTM pharmacist regarding side effects of a medication. She is requesting a call back.     Jacqueline Gomez.D.  Medication Therapy Management Pharmacist  Phone: [phone]

## 2019-02-22 NOTE — TELEPHONE ENCOUNTER
[facility]    Phone Message    May a detailed message be left on voicemail: yes    Reason for Call: Other: Pt's  Justin is calling stating the Donepezil is causing pt to have Insomnia, frequent urination, loss of appetite.  Justin would like a call back about this please     Action Taken: Message routed to:  [facility] Neuro

## 2019-02-22 NOTE — TELEPHONE ENCOUNTER
Upper thighs ache and this makes it hurt to sit down (happening for months), this has been occurring for months. She takes Flexeril PRN for back pain and wants to know if she can take it for this issue. I advised her to ask her primary care provider since he is prescribing this medication. Alina wants to know if Parkinson's disease is the potential cause of this symptom. I advised her that it typically pain is not but I will discuss this with the team and make them aware of it.    I advised Alina that per Dr. Hearn he would like her to stop taking the donepezil and let us know how she is doing sometime next week.    Alina stated she also remembered she had nose and throat drainage and thinks this may also be a side effect from the medication.    Alina would like to try another medication for her cognition. I advised her that we do not want to make more than one change at a time and that we should focus on stopping the donepezil first and seeing about these symptoms next week. Alina agreed this sounds best.

## 2019-02-22 NOTE — TELEPHONE ENCOUNTER
Ishan Hearn MD   You; Alaina Landaverde MD 35 minutes ago (3:43 PM)     Have her stop the donepezil today   And let us know how it is going

## 2019-02-22 NOTE — TELEPHONE ENCOUNTER
[facility]    Phone Message    May a detailed message be left on voicemail: yes    Reason for Call: Other: Alina calling with another possible side effect of nose and throat drainage.  She also states that her upper thighs ache and hurt making sitting difficult.  Can she take Flexerol or is there something else she should take?  Please call her back to discuss     Action Taken: Message routed to:  [facility]

## 2019-02-22 NOTE — TELEPHONE ENCOUNTER
[facility]    Phone Message    May a detailed message be left on voicemail: no    Reason for Call: Other: Pt's  called back upset they have not been called back regarding her symptoms/reaction to her donepezil medication. Pt reports the insomnia is practically intolerable. Please call them back ASAP to discuss.     Action Taken: Message routed to:  [facility]

## 2019-02-25 NOTE — TELEPHONE ENCOUNTER
"Called patient to check in on her symptoms with stopping donepezil. She states that she has been able to sleep more and her appetite is back. She felt \"spacey\" yesterday but denies any other symptoms. Patient asks about starting a sleep aid, her  bought Sominex (diphenhydramine) but she hasn't tried this yet. She also would like to try a different medication for cognition. I advised that she wait until she is seen in our clinic again on 3/13/19 for other medication changes. We may consider memantine or rivastigmine for cognition. I also encouraged her to avoid diphenhydramine but that melatonin might be an option for her if insomnia persists.    Routing to Dr. Hearn/Dr. Landaverde as STEPHANIE Cárdenas, Pharm.D.  Medication Therapy Management Pharmacist  Phone: [phone]  "

## 2019-03-08 ENCOUNTER — TELEPHONE (OUTPATIENT)
Dept: NEUROLOGY | Facility: CLINIC | Age: 59
End: 2019-03-08

## 2019-03-08 NOTE — TELEPHONE ENCOUNTER
Called patient to invite her to the 3/11 Intro to Parkinson's disease class. She cannot come on Monday but possibly in April (4/8 from 10-12 here at the [facility]). She will send a [employer] message letting me know if they can come then.

## 2019-03-27 ENCOUNTER — OFFICE VISIT (OUTPATIENT)
Dept: NEUROLOGY | Facility: CLINIC | Age: 59
End: 2019-03-27
Payer: COMMERCIAL

## 2019-03-27 VITALS
OXYGEN SATURATION: 99 % | SYSTOLIC BLOOD PRESSURE: 139 MMHG | HEART RATE: 87 BPM | DIASTOLIC BLOOD PRESSURE: 78 MMHG | WEIGHT: 133 LBS

## 2019-03-27 DIAGNOSIS — G20.C PARKINSONISM, UNSPECIFIED PARKINSONISM TYPE (H): Primary | ICD-10-CM

## 2019-03-27 DIAGNOSIS — R41.89 COGNITIVE IMPAIRMENT: ICD-10-CM

## 2019-03-27 ASSESSMENT — PAIN SCALES - GENERAL: PAINLEVEL: MODERATE PAIN (5)

## 2019-03-27 NOTE — LETTER
3/27/2019      RE: Alina Zavala  1971 [facility] [address]       [facility] Neurology  Movement Disorder Clinic    Alina Zavala  YOB: 1960  MRN: [id number]    REASON FOR VISIT: Follow up for Parkinsonism and cognitive impairment    HISTORY OF PRESENT ILLNESS:  Alina Zavala is a 58 year old woman with Parkinsonism and cognitive impairment who presents for follow up.  She was last seen in clinic 2/12/2019, at which time she was started on donepezil.  Unfortunately she was not able to tolerate donepezil due to aching in her thighs.  She was advised to stop donepezil.  Now that she has stopped the medication the aching has subsided.  It is not clear that donepezil provided any benefit for cognition.  In the interim, she was able to enroll and complete Big and Loud therapy in [place].  She enjoyed interactive nature of the therapy and that it held her accountable to work on her exercises.  She noticed an improvement in her voice, balance, and dexterity.  She is going to continue with maintenance therapy.  She is interested in other exercises such as Rock Steady boxing, Knock Out PD and swimming.    She is aware of medications to treat symptoms for Parkinson's disease, but she does not feel that slowness or stiffness currently warrant medications.  She is found Big and Loud therapy very helpful.      MEDICATIONS:  Outpatient Medications Marked as Taking for the 3/27/19 encounter (Office Visit) with  MOVEMENT DISORDER FELLOW   Medication Sig     acetaminophen (TYLENOL) 500 MG tablet Take 500-1,000 mg by mouth every 6 hours as needed for mild pain     albuterol (PROAIR HFA/PROVENTIL HFA/VENTOLIN HFA) 108 (90 Base) MCG/ACT inhaler Inhale 2 puffs into the lungs every 6 hours as needed     ASMANEX 120 METERED DOSES 220 MCG/INH inhaler Inhale 1 puff into the lungs At Bedtime      buPROPion (WELLBUTRIN SR) 200 MG 12 hr tablet 200mg tab by mouth twice daily at 9 am and 9 pm     calcium  carbonate (CALCIUM CARBONATE) 600 MG tablet Take 1 tablet by mouth 2 times daily (with meals)      Cholecalciferol (VITAMIN D3 PO) Take 50 mcg by mouth daily      cyclobenzaprine (FLEXERIL) 10 MG tablet Take 10 mg by mouth as needed (for back pain - very sparingly)     gabapentin (NEURONTIN) 100 MG capsule Take 100 mg by mouth every morning      gabapentin (NEURONTIN) 300 MG capsule Take 300 mg by mouth At Bedtime      hydrochlorothiazide (HYDRODIURIL) 12.5 MG tablet 12.5mg tab by mouth daily     l-tyrosine 500 MG TABS Take 1,000 mg by mouth daily     magnesium 250 MG tablet Take 1 tablet by mouth 2 times daily        ALLERGIES:  Prednisone; Donepezil; Penicillins; and Sulfa drugs     PAST MEDICAL HISTORY:  Medical history reviewed and updated in Epic, no new problems    REVIEW OF SYSTEMS:  12 point review of systems completed. Pertinent positives and negatives above and in HPI    PHYSICAL EXAM:  VITALS: /78 (BP Location: Right arm, Patient Position: Chair, Cuff Size: Adult Regular)   Pulse 87   Wt 60.3 kg (133 lb)   SpO2 99%   GENERAL: Cooperative, no acute distress  HEENT: Normocephalic and nontraumatic, sclera white, moist mucous membranes  CARDIAC: Regular rate and rhythm   RESPIRATORY: Nonlabored breathing       NEUROLOGIC:  MENTAL STATUS: Fully alert, attentive and oriented.  SPEECH: Normal rate, tone and volume.   FACIAL EXPRESSION: Reduced blinking. Normal lower facial expression.     CRANIAL NERVES: Visual fields intact. EOM full with smooth pursuit. No nystagmus. Normal saccades. Facial sensation intact/symmetric. Facial movements symmetric. Palate elevation symmetric, uvula midline. No dysarthria. Tongue protrusion midline.    MOTOR:   INVOLUNTARY MOVEMENTS - No rest tremor observed today but seen during previous exam in E. Slight postural tremor bilaterally. Action tremor mild bilaterally and slightly worse on the LUE.   AGILITY - Normal movements on the right. Slight bradykinesia on the left.    TONE - Slight axial rigidity. Slight right hemibody rigidity, mild on the left.     COORDINATION: No dysmetria with FNF  GAIT: Able to rise from chair with arms crossed over chest. Posture is upright. Normal base, normal stride. Arm swing slightly diminished. Turns in 2 steps. Normal pull test.       IMPRESSION:   Alina Zavala is a 58 year old woman with Parkinsonism and cognitive impairment. After completing Big and Loud therapy symptoms have improved with improved speech, walking speed, balance, and dexterity. She is working to continue the exercises and add other exercise to her routine, such as Knock Out Parkinson's (Boxing class at [employer]). After reading up on medications herself and discussing medications this visit, she would like to hold off on staring anything at this time since symptoms are mild.     We discussed that levodopa is the gold standard treatment. This is a symptomatic treatment to improve function and quality of life. The right time to start the medication is when she feels symptoms are bothersome. Starting levodopa earlier does not impact how long it will be effective or worsen underlying disease.     PLAN:  - Continue Big and Loud maintenance therapy, signed form  - Encouraged her to continue with exercise, preferably aerobic, to optimize brain health. Research also supports remaining cognitively engaged - which can mean volunteering, maintaining hobbies, doing puzzles. She should find the exercise and cognitive engagement/ hobbies she enjoys and will keep up week to week.   - Follow up in six months, sooner if there are concerns. She was provided with our clinic contact information.       Alaina Landaverde MD  Movement Disorders Fellow    Time spent with patient: Greater than 50% of this 40 minute visit was spent in counseling and coordination of care related to the issues detailed above.       MOVEMENT DISORDER FELLOW

## 2019-03-27 NOTE — PATIENT INSTRUCTIONS
Thank you for coming to the [facility] for your neurology visit.     [facility] [place]  Tuesdays, & Thursdays 1:30pm    [facility] [place]    [address] [place] [address]    Contact Flora at [phone] or email [email or web address]    American Parkinson Disease Association Local support/ classes:  [email or web address]

## 2019-03-27 NOTE — NURSING NOTE
Chief Complaint   Patient presents with     RECHECK     [facility] RETURN MOVEMENT DISORDER 1 MO       Rossy Polk, EMT

## 2019-03-27 NOTE — PROGRESS NOTES
[facility] Neurology  Movement Disorder Clinic    Alina Zavala  YOB: 1960  MRN: [id number]    REASON FOR VISIT: Follow up for Parkinsonism and cognitive impairment    HISTORY OF PRESENT ILLNESS:  Alina Zavala is a 58 year old woman with Parkinsonism and cognitive impairment who presents for follow up.  She was last seen in clinic 2/12/2019, at which time she was started on donepezil.  Unfortunately she was not able to tolerate donepezil due to aching in her thighs.  She was advised to stop donepezil.  Now that she has stopped the medication the aching has subsided.  It is not clear that donepezil provided any benefit for cognition.  In the interim, she was able to enroll and complete Big and Loud therapy in [place].  She enjoyed interactive nature of the therapy and that it held her accountable to work on her exercises.  She noticed an improvement in her voice, balance, and dexterity.  She is going to continue with maintenance therapy.  She is interested in other exercises such as Rock Steady boxing, Knock Out PD and swimming.    She is aware of medications to treat symptoms for Parkinson's disease, but she does not feel that slowness or stiffness currently warrant medications.  She is found Big and Loud therapy very helpful.      MEDICATIONS:  Outpatient Medications Marked as Taking for the 3/27/19 encounter (Office Visit) with  MOVEMENT DISORDER FELLOW   Medication Sig     acetaminophen (TYLENOL) 500 MG tablet Take 500-1,000 mg by mouth every 6 hours as needed for mild pain     albuterol (PROAIR HFA/PROVENTIL HFA/VENTOLIN HFA) 108 (90 Base) MCG/ACT inhaler Inhale 2 puffs into the lungs every 6 hours as needed     ASMANEX 120 METERED DOSES 220 MCG/INH inhaler Inhale 1 puff into the lungs At Bedtime      buPROPion (WELLBUTRIN SR) 200 MG 12 hr tablet 200mg tab by mouth twice daily at 9 am and 9 pm     calcium carbonate (CALCIUM CARBONATE) 600 MG tablet Take 1 tablet by mouth 2 times daily (with  meals)      Cholecalciferol (VITAMIN D3 PO) Take 50 mcg by mouth daily      cyclobenzaprine (FLEXERIL) 10 MG tablet Take 10 mg by mouth as needed (for back pain - very sparingly)     gabapentin (NEURONTIN) 100 MG capsule Take 100 mg by mouth every morning      gabapentin (NEURONTIN) 300 MG capsule Take 300 mg by mouth At Bedtime      hydrochlorothiazide (HYDRODIURIL) 12.5 MG tablet 12.5mg tab by mouth daily     l-tyrosine 500 MG TABS Take 1,000 mg by mouth daily     magnesium 250 MG tablet Take 1 tablet by mouth 2 times daily        ALLERGIES:  Prednisone; Donepezil; Penicillins; and Sulfa drugs     PAST MEDICAL HISTORY:  Medical history reviewed and updated in Epic, no new problems    REVIEW OF SYSTEMS:  12 point review of systems completed. Pertinent positives and negatives above and in HPI    PHYSICAL EXAM:  VITALS: /78 (BP Location: Right arm, Patient Position: Chair, Cuff Size: Adult Regular)   Pulse 87   Wt 60.3 kg (133 lb)   SpO2 99%   GENERAL: Cooperative, no acute distress  HEENT: Normocephalic and nontraumatic, sclera white, moist mucous membranes  CARDIAC: Regular rate and rhythm   RESPIRATORY: Nonlabored breathing       NEUROLOGIC:  MENTAL STATUS: Fully alert, attentive and oriented.  SPEECH: Normal rate, tone and volume.   FACIAL EXPRESSION: Reduced blinking. Normal lower facial expression.     CRANIAL NERVES: Visual fields intact. EOM full with smooth pursuit. No nystagmus. Normal saccades. Facial sensation intact/symmetric. Facial movements symmetric. Palate elevation symmetric, uvula midline. No dysarthria. Tongue protrusion midline.    MOTOR:   INVOLUNTARY MOVEMENTS - No rest tremor observed today but seen during previous exam in E. Slight postural tremor bilaterally. Action tremor mild bilaterally and slightly worse on the LUE.   AGILITY - Normal movements on the right. Slight bradykinesia on the left.   TONE - Slight axial rigidity. Slight right hemibody rigidity, mild on the left.      COORDINATION: No dysmetria with FNF  GAIT: Able to rise from chair with arms crossed over chest. Posture is upright. Normal base, normal stride. Arm swing slightly diminished. Turns in 2 steps. Normal pull test.       IMPRESSION:   Alina Zavala is a 58 year old woman with Parkinsonism and cognitive impairment. After completing Big and Loud therapy symptoms have improved with improved speech, walking speed, balance, and dexterity. She is working to continue the exercises and add other exercise to her routine, such as Knock Out Parkinson's (Boxing class at [employer]). After reading up on medications herself and discussing medications this visit, she would like to hold off on staring anything at this time since symptoms are mild.     We discussed that levodopa is the gold standard treatment. This is a symptomatic treatment to improve function and quality of life. The right time to start the medication is when she feels symptoms are bothersome. Starting levodopa earlier does not impact how long it will be effective or worsen underlying disease.     PLAN:  - Continue Big and Loud maintenance therapy, signed form  - Encouraged her to continue with exercise, preferably aerobic, to optimize brain health. Research also supports remaining cognitively engaged - which can mean volunteering, maintaining hobbies, doing puzzles. She should find the exercise and cognitive engagement/ hobbies she enjoys and will keep up week to week.   - Follow up in six months, sooner if there are concerns. She was provided with our clinic contact information.       Alaina Landaverde MD  Movement Disorders Fellow    Time spent with patient: Greater than 50% of this 40 minute visit was spent in counseling and coordination of care related to the issues detailed above.

## 2019-05-16 ENCOUNTER — TELEPHONE (OUTPATIENT)
Dept: NEUROLOGY | Facility: CLINIC | Age: 59
End: 2019-05-16

## 2019-05-16 NOTE — TELEPHONE ENCOUNTER
[facility] Call Center    Phone Message    May a detailed message be left on voicemail: yes    Reason for Call: Other: pt is calling to get Dr. dominguez on Mucuna Pruriens. She would like a call back to discuss.Please call pt back to nydia.     Action Taken: Message routed to:  [facility] neurology

## 2019-05-17 NOTE — TELEPHONE ENCOUNTER
Alina stated she had a question about extract from Mucuna Pruriens that contains naturally occurring dopamine. She would like to know Dr. Landaverde and Dr. Cárdenas's opinion regarding taking this extract. She would also like an opinion on restore gold as she has heard about it but does not know too much about this.    Would prefer a call back versus a Mychart message.

## 2019-05-20 NOTE — TELEPHONE ENCOUNTER
"Informed Alina of the recommendations below. Alina concluded that she will \"save her money and act accordingly.\" She stated she is trying to avoid going on medications but in reality she will probably be going on them in the future. She does not feel needs them at this time.    Per message from Dr. Cárdenas and Dr. Landaverde:    "

## 2019-06-12 ENCOUNTER — TELEPHONE (OUTPATIENT)
Dept: NEUROLOGY | Facility: CLINIC | Age: 59
End: 2019-06-12

## 2019-06-12 NOTE — TELEPHONE ENCOUNTER
[facility] Call Center    Phone Message    May a detailed message be left on voicemail: yes    Reason for Call: Other: Alina calling to request a call back. She would like to know if she can get medications precribed to her. Please call her back to discuss.      Action Taken: Message routed to:  [facility] p[facility] neuro

## 2019-06-12 NOTE — TELEPHONE ENCOUNTER
Situation:  Alina Zavala is a 59 year old female who receives support for Parkinson's disease. Alina calls the clinic today to discuss starting medications for Parkinson's disease.    Background:  Patient is taking:  Not currently taking any movement disorder medications.  -Was on Donepezil but had to come off due to side effects (aschng in thighs    Last saw Dr. Landaverde on 3/27/2019 - medications discussed    Assessment:  Symptoms she would like to treat: More shaky, body pain/stiffness/slowness, fatigue, trouble getting to sleep sometimes (she asked about meletonin versus a stronger medication), will have muscle spasms in her legs once in a while at night, anxiety gets higher quicker, legs feel wobbly when walking at first. Feels balance will be off a little bit when she has to stop suddenly.    -Courage Andrew every week big and loud is going well  -Has a counselor she see's  -Attends Parkinson's disease groups online  -She is hesitant to start medications. Some people have said once the disease progresses the medications do not work any more and people will have to go on stronger medications that cause more side effects.    Education:  1. I informed Alina that the gold standard medication used to treat Parkinson's disease is carbidopa/levodopa. This is best taken 1 hour before or after meals. Some patients feel nauseous on this and may need to take crackers/applesauce with it.    2. I educated Alina that everyone's Parkinson's disease is different for them. Although others have given her there opinions about the medications she likely will not have the same experiences as others, each person is different.    3. I educated Alina to keep moving and exercising. This is one thing that we know that helps slow the progression of Parkinson's disease.    4. I informed Alina that typically our providers do not prescribe a strong sleep medication without trying melatonin first depending on the  patient.    Recommendation/plan:  1. I advised Alina I will send a letter with a breathing exercise     2. I advised Alina I will discuss starting a Parkinson's disease medication with Dr. Landaverde as well as melatonin.    3. Letter placed in the mail to be mailed to Alina regarding a deep breathing exercise for Alina. I informed Alina to practice this exercise for 10 minutes twice a day.

## 2019-06-17 ENCOUNTER — TELEPHONE (OUTPATIENT)
Dept: NEUROLOGY | Facility: CLINIC | Age: 59
End: 2019-06-17

## 2019-06-17 DIAGNOSIS — G20.A1 PARKINSON DISEASE (H): Primary | ICD-10-CM

## 2019-06-17 RX ORDER — CARBIDOPA AND LEVODOPA 25; 100 MG/1; MG/1
TABLET ORAL
Qty: 90 TABLET | Refills: 11 | Status: SHIPPED | OUTPATIENT
Start: 2019-06-17 | End: 2019-08-08

## 2019-06-17 NOTE — TELEPHONE ENCOUNTER
I was able to get in contact with Ms. Zavala.     She was interested in hearing more about carbidopa/levodopa. We discussed that there is no right or  wrong time to start the medication. I asked her to consider how her symptoms are affecting her quality of life. She reported more tremor, stiffness, pain and slowness. She does feel that she is starting to be limited by her symptoms.     She has been reluctant to start carbidopa/levodopa for fear that she may develop side effects sooner.     We discussed that there is no clear evidence that side effects like dyskinesias occur sooner. It is probably the case that dyskinesias occur at the same time in disease despite when levodopa is started. There is no evidence that levodopa causes any toxicity or worsens Parkinson's disease.    She was interested in a trial of carbidopa/levodopa. We discussed side effects such as somnolence, nausea, lightheadedness and rarely hallucinations.     She was instructed to start carbidopa/levodopa 25/100 mg 1/2 tablet tid x1 week then increase to a full tablet. She will take it one hour before or after meals. Ok to take with a light snack if she has nausea.    I will ask the schedulers to make her a follow up appointment with Dr. Hearn in 2 months.     Alaina Landaverde MD  Movement Disorders Fellow

## 2019-06-17 NOTE — TELEPHONE ENCOUNTER
I attempted to call Ms. Zavala. We could certainly start carbidopa/levodopa 25/100 mg tid if she is amenable. I will try to reach her some time tomorrow.     Alaina Landaverde MD  Movement Disorders Fellow

## 2019-07-26 RX ORDER — DONEPEZIL HYDROCHLORIDE 5 MG/1
TABLET, FILM COATED ORAL
Refills: 3 | COMMUNITY
Start: 2019-02-12 | End: 2019-08-08

## 2019-07-26 NOTE — PROGRESS NOTES
Diagnosis/Summary/Recommendations:    PATIENT: Alina Zavala  59 year old female     : 1960    DAHLIA: 2019    MUSIC -     [facility] wal[facility] weekly class     Helped by the sinemet     Folate  (Folic Acid) 2.5 mg (2500mcg)  --- she is taking 400mcg   Pyridoxine (B6)  25 mg  - she is taking 2mg of this in her multivitamin  - she had been on 100mg of b6  Cyanocobalamin (B12) 2 mg === 2000mcg- she is taking 15mcg     ? Maybe increase b12       Medications     AM   PM   Acetaminophen tylenol 500mg      Albuterol proair hfa/proventil hfa/ventolin hfa 108 (90 base)mcg/act inhaler As beded     ASmanex 120 metered doses 220mcg/inh inhaler     1   b complex  1     Bupropion wellbutrin SR 200mg 12 hr tablet 1   1   Calcium carbonate 600mg      Carbidopa/levodopa Sinemet 25/100 1 1 1   Cholecalciferol vitamin D3 50mcg 2000 units  1     Cyclobenzaprine flexeril 10mg As needed     Donepezil aricept 5mg  Not taking     Gabapentin neurontin 100mg 1       Gabapentin neurontin 300mg     1   Hydrochlorothiazide hydrodiuril 12.5mg 1       Lorazepam ativan 1mg         nonformulary digest gold with ATPro      Potassium chloride ER micro K 10 meq  not taking       L Tyrosine 500 mg and 10mg vitamin B6 1       Magnesium 250 mg 1       Pyrodoxine B-6 100mg Not taking       Probiotic 1     Turmeric 300mg and dicalcium phosphate 65mg 1     Vitamin C 500mg  1     Zinc pilonate 30mg  1                  History obtained from patient    Autonomic - whole body sweating  128/85 and heart rate 86    Bowel -using miralax    Bladder issues    Has leg spasms    Gisselle -- Corine Encarnacion PT    CBD oil - discussion     [facility] -     Discussed stationary bike - 80-90 rpm and boxing    Melatonin -     Serotonin    Research focused ultrasound trials    NO change in regimen.     Return back             Coding statement:   Duration of  Services: patient care and care coordination was 25 minutes  Greater than 50% of this visit  was spent in counseling and coordination of care.     Ishan Hearn MD     ______________________________________    Last visit date and details:     DALHIA: February 12, 2019    DAHLIA: 3/27/2019      Answers for HPI/ROS submitted by the patient on 8/8/2019   General Symptoms: Yes  Skin Symptoms: Yes  HENT Symptoms: Yes  EYE SYMPTOMS: Yes  HEART SYMPTOMS: No  LUNG SYMPTOMS: Yes  INTESTINAL SYMPTOMS: Yes  URINARY SYMPTOMS: Yes  GYNECOLOGIC SYMPTOMS: Yes  BREAST SYMPTOMS: No  SKELETAL SYMPTOMS: Yes  BLOOD SYMPTOMS: No  NERVOUS SYSTEM SYMPTOMS: No  MENTAL HEALTH SYMPTOMS: Yes  Fever: No  Loss of appetite: No  Weight loss: No  Weight gain: No  Fatigue: Yes  Night sweats: No  Chills: No  Increased stress: No  Excessive thirst: No  Feeling hot or cold when others believe the temperature is normal: No  Loss of height: No  Post-operative complications: No  Surgical site pain: No  Hallucinations: No  Change in or Loss of Energy: No  Hyperactivity: No  Confusion: No  Changes in hair: No  Changes in moles/birth marks: No  Itching: No  Rashes: No  Changes in nails: No  Acne: No  Hair in places you don't want it: No  Change in facial hair: No  Warts: No  Non-healing sores: No  Scarring: No  Flaking of skin: No  Color changes of hands/feet in cold : No  Sun sensitivity: Yes  Skin thickening: No  Ear pain: No  Ear discharge: No  Hearing loss: No  Tinnitus: Yes  Nosebleeds: No  Congestion: Yes  Sinus pain: No  Trouble swallowing: No  Tooth pain: No  Gum tenderness: No  Bleeding gums: No  Change in taste: No  Change in sense of smell: No  Dry mouth: Yes  Hearing aid used: No  Neck lump: No  Eye pain: No  Vision loss: No  Dry eyes: No  Watery eyes: No  Eye bulging: No  Double vision: No  Flashing of lights: No  Spots: No  Cough: Yes  Sputum or phlegm: Yes  Coughing up blood: No  Difficulty breating or shortness of breath: No  Snoring: No  Wheezing: No  Difficulty breathing on exertion: No  Nighttime Cough: No  Heart burn or indigestion:  No  Nausea: No  Vomiting: No  Abdominal pain: No  Bloating: No  Constipation: No  Diarrhea: No  Blood in stool: No  Black stools: No  Rectal or Anal pain: No  Fecal incontinence: No  Yellowing of skin or eyes: No  Vomit with blood: No  Change in stools: No  Trouble holding urine or incontinence: No  Pain or burning: No  Trouble starting or stopping: No  Increased frequency of urination: No  Blood in urine: No  Decreased frequency of urination: No  Frequent nighttime urination: No  Flank pain: No  Back pain: Yes  Muscle aches: Yes  Neck pain: No  Swollen joints: No  Joint pain: No  Bone pain: No  Muscle cramps: Yes  Muscle weakness: No  Bleeding or spotting between periods: No  Heavy or painful periods: No  Irregular periods: No  Vaginal discharge: No  Hot flashes: No  Vaginal dryness: Yes  Genital ulcers: No  Reduced libido: Yes  Nervous or Anxious: Yes  Depression: Yes  Trouble sleeping: Yes  Trouble thinking or concentrating: No  Mood changes: No  Panic attacks: No        Alina Zavala  YOB: 1960  MRN: [id number]     REASON FOR VISIT: Follow up for Parkinsonism and cognitive impairment     HISTORY OF PRESENT ILLNESS:  Alina Zavala is a 58 year old woman with Parkinsonism and cognitive impairment who presents for follow up.  She was last seen in clinic 2/12/2019, at which time she was started on donepezil.  Unfortunately she was not able to tolerate donepezil due to aching in her thighs.  She was advised to stop donepezil.  Now that she has stopped the medication the aching has subsided.  It is not clear that donepezil provided any benefit for cognition.  In the interim, she was able to enroll and complete [facility] therapy in [place].  She enjoyed interactive nature of the therapy and that it held her accountable to work on her exercises.  She noticed an improvement in her voice, balance, and dexterity.  She is going to continue with maintenance therapy.  She is interested in other  exercises such as [employer] boxing, Knock Out PD and swimming.     She is aware of medications to treat symptoms for Parkinson's disease, but she does not feel that slowness or stiffness currently warrant medications.  She is found [facility] and [facility]oud therapy very helpful.        MEDICATIONS:       Outpatient Medications Marked as Taking for the 3/27/19 encounter (Office Visit) with  MOVEMENT DISORDER FELLOW   Medication Sig     acetaminophen (TYLENOL) 500 MG tablet Take 500-1,000 mg by mouth every 6 hours as needed for mild pain     albuterol (PROAIR HFA/PROVENTIL HFA/VENTOLIN HFA) 108 (90 Base) MCG/ACT inhaler Inhale 2 puffs into the lungs every 6 hours as needed     ASMANEX 120 METERED DOSES 220 MCG/INH inhaler Inhale 1 puff into the lungs At Bedtime      buPROPion (WELLBUTRIN SR) 200 MG 12 hr tablet 200mg tab by mouth twice daily at 9 am and 9 pm     calcium carbonate (CALCIUM CARBONATE) 600 MG tablet Take 1 tablet by mouth 2 times daily (with meals)      Cholecalciferol (VITAMIN D3 PO) Take 50 mcg by mouth daily      cyclobenzaprine (FLEXERIL) 10 MG tablet Take 10 mg by mouth as needed (for back pain - very sparingly)     gabapentin (NEURONTIN) 100 MG capsule Take 100 mg by mouth every morning      gabapentin (NEURONTIN) 300 MG capsule Take 300 mg by mouth At Bedtime      hydrochlorothiazide (HYDRODIURIL) 12.5 MG tablet 12.5mg tab by mouth daily     l-tyrosine 500 MG TABS Take 1,000 mg by mouth daily     magnesium 250 MG tablet Take 1 tablet by mouth 2 times daily          ALLERGIES:  Prednisone; Donepezil; Penicillins; and Sulfa drugs      PAST MEDICAL HISTORY:  Medical history reviewed and updated in Epic, no new problems     REVIEW OF SYSTEMS:  12 point review of systems completed. Pertinent positives and negatives above and in HPI     PHYSICAL EXAM:  VITALS: /78 (BP Location: Right arm, Patient Position: Chair, Cuff Size: Adult Regular)   Pulse 87   Wt 60.3 kg (133 lb)   SpO2 99%   GENERAL:  Cooperative, no acute distress  HEENT: Normocephalic and nontraumatic, sclera white, moist mucous membranes  CARDIAC: Regular rate and rhythm   RESPIRATORY: Nonlabored breathing        NEUROLOGIC:  MENTAL STATUS: Fully alert, attentive and oriented.  SPEECH: Normal rate, tone and volume.   FACIAL EXPRESSION: Reduced blinking. Normal lower facial expression.      CRANIAL NERVES: Visual fields intact. EOM full with smooth pursuit. No nystagmus. Normal saccades. Facial sensation intact/symmetric. Facial movements symmetric. Palate elevation symmetric, uvula midline. No dysarthria. Tongue protrusion midline.     MOTOR:   INVOLUNTARY MOVEMENTS - No rest tremor observed today but seen during previous exam in LUE. Slight postural tremor bilaterally. Action tremor mild bilaterally and slightly worse on the LUE.   AGILITY - Normal movements on the right. Slight bradykinesia on the left.   TONE - Slight axial rigidity. Slight right hemibody rigidity, mild on the left.      COORDINATION: No dysmetria with FNF  GAIT: Able to rise from chair with arms crossed over chest. Posture is upright. Normal base, normal stride. Arm swing slightly diminished. Turns in 2 steps. Normal pull test.         IMPRESSION:   Alina Zavala is a 58 year old woman with Parkinsonism and cognitive impairment. After completing [facility] therapy symptoms have improved with improved speech, walking speed, balance, and dexterity. She is working to continue the exercises and add other exercise to her routine, such as Knock Out Parkinson's (Boxing class at [employer]). After reading up on medications herself and discussing medications this visit, she would like to hold off on staring anything at this time since symptoms are mild.      We discussed that levodopa is the gold standard treatment. This is a symptomatic treatment to improve function and quality of life. The right time to start the medication is when she feels symptoms are bothersome.  Starting levodopa earlier does not impact how long it will be effective or worsen underlying disease.      PLAN:  - Continue [facility] and [facility]oud maintenance therapy, signed form  - Encouraged her to continue with exercise, preferably aerobic, to optimize brain health. Research also supports remaining cognitively engaged - which can mean volunteering, maintaining hobbies, doing puzzles. She should find the exercise and cognitive engagement/ hobbies she enjoys and will keep up week to week.   - Follow up in six months, sooner if there are concerns. She was provided with our clinic contact information.         Alaina Landaverde MD  Movement Disorders Fellow     Time spent with patient: Greater than 50% of this 40 minute visit was spent in counseling and coordination of care related to the issues detailed above.    IMPRESSION:  1. Parkinsonism  2. Cognitive impairment  3. Depression  4. Anxiety     Alina Zavala is a 58 year old woman who was referred for evaluation of Parkinsonism and cognitive impairment. Her exam is remarkable for cognitive impairment with prominent visuospatial and attention deficits on MoCA and mild left greater than right tremor, bradykinesia and rigidity. Her Demetrius Scan confirms clinical suspicion of Parkinsonism with bilateral striatal dopaminergic deficit. Early cognitive impairment raises concern for Lewy Body Dementia, although she does not have classic fluctuation or accompanying hallucinosis. We discussed further evaluation with neuropsychology testing, but she elected to think about this for now, given the stress of extended cognitive testing. Other possibilities include Parkinson disease with cognitive impairment.      We discussed various treatment options for cognition, motor, and mood symptoms. We elected to focus on cognition now and will continue to work on other areas during subsequent visits. We discussed the benefit of PT and exercise for movement.      PLAN:  - Start donepezil 5 mg AM.   -  Big and loud therapy at [facility] in [place] for Parkinson disease  - Will discuss mood/anxiety at our next visit - may be better treated on serotonin specific reuptake inhibitor or SNRI  - Discussed various dopaminergic medications including L-dopa and dopamine agonists. Will wait to start a medication at this time.     Follow up in 1 month     Alaina Landaverde MD  Movement Disorders Fellow     Patient seen and discussed with Dr. Hearn     Patient seen and examined by me today. February 12, 2019  I agree with details above from today  February 12, 2019  Over 50% of this 100 minute visit was spent in patient care and care coordination  Ishan Hearn MD       ______________________________________      Patient was asked about 14 Review of systems including changes in vision (dry eyes, double vision), hearing, heart, lungs, musculoskeletal, depression, anxiety, snoring, RBD, insomnia, urinary frequency, urinary urgency, constipation, swallowing problems, hematological, ID, allergies, skin problems: seborrhea, endocrinological: thyroid, diabetes, cholesterol; balance, weight changes, and other neurological problems and these were not significant at this time except for   Patient Active Problem List   Diagnosis     Asthma     Benign essential hypertension          Allergies   Allergen Reactions     Prednisone Anxiety, Palpitations and Shortness Of Breath     Donepezil      Insomnia, nausea, dizziness/somnolence      Penicillins      Other reaction(s): *Unknown  Other reaction(s): *Unknown     Sulfa Drugs      Other reaction(s): *Unknown  Other reaction(s): *Unknown     Past Surgical History:   Procedure Laterality Date     D & C       Past Medical History:   Diagnosis Date     Anxiety      Depression      Hyperlipidemia      Hypertension      Lyme disease      Trigeminal neuralgia      Social History     Socioeconomic History     Marital status:      Spouse name: Not on file     Number of children: Not on file      "Years of education: Not on file     Highest education level: Not on file   Occupational History     Not on file   Social Needs     Financial resource strain: Not on file     Food insecurity:     Worry: Not on file     Inability: Not on file     Transportation needs:     Medical: Not on file     Non-medical: Not on file   Tobacco Use     Smoking status: Never Smoker     Smokeless tobacco: Never Used   Substance and Sexual Activity     Alcohol use: No     Frequency: Never     Drug use: Not on file     Sexual activity: Not on file   Lifestyle     Physical activity:     Days per week: Not on file     Minutes per session: Not on file     Stress: Not on file   Relationships     Social connections:     Talks on phone: Not on file     Gets together: Not on file     Attends Muslim service: Not on file     Active member of club or organization: Not on file     Attends meetings of clubs or organizations: Not on file     Relationship status: Not on file     Intimate partner violence:     Fear of current or ex partner: Not on file     Emotionally abused: Not on file     Physically abused: Not on file     Forced sexual activity: Not on file   Other Topics Concern     Not on file   Social History Narrative    . Lives in [place]. Tejinder Zavala spouse. They have three children. Their names are Eros, Venkat, and Christie. She is a homemaker. Studied library science and theology. She was a  for over 16 years. 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  Medications and Vitals not listed above were documented in the cart and reviewed by me.     Current Outpatient Medications   Medication Sig Dispense Refill     acetaminophen (TYLENOL) 500 MG tablet Take 500-1,000 mg by mouth every 6 hours as needed for mild pain       albuterol (PROAIR HFA/PROVENTIL HFA/VENTOLIN HFA) 108 (90 Base) MCG/ACT inhaler Inhale 2 puffs into the lungs every 6 hours as needed       ASMANEX 120 METERED DOSES 220 MCG/INH inhaler Inhale 1 puff into the lungs At Bedtime   1     buPROPion (WELLBUTRIN SR) 200 MG 12 hr tablet 200mg tab by mouth twice daily at 9 am and 9 pm  0     calcium carbonate (CALCIUM CARBONATE) 600 MG tablet Take 1 tablet by mouth 2 times daily (with meals)        carbidopa-levodopa (SINEMET)  MG tablet Start 1/2 tablet three times daily for one week, then increase to one tablet three times daily 90 tablet 11     Cholecalciferol (VITAMIN D3 PO) Take 50 mcg by mouth daily        cyclobenzaprine (FLEXERIL) 10 MG tablet Take 10 mg by mouth as needed (for back pain - very sparingly)       donepezil (ARICEPT) 5 MG tablet   3     gabapentin (NEURONTIN) 100 MG capsule Take 100 mg by mouth every morning   1     gabapentin (NEURONTIN) 300 MG capsule Take 300 mg by mouth At Bedtime   0     hydrochlorothiazide (HYDRODIURIL) 12.5 MG tablet 12.5mg tab by mouth daily  1     l-tyrosine 500 MG TABS Take 1,000 mg by mouth daily       magnesium 250 MG tablet Take 1 tablet by mouth 2 times daily            Ishan Hearn MD

## 2019-08-08 ENCOUNTER — OFFICE VISIT (OUTPATIENT)
Dept: NEUROLOGY | Facility: CLINIC | Age: 59
End: 2019-08-08
Payer: COMMERCIAL

## 2019-08-08 VITALS
OXYGEN SATURATION: 100 % | WEIGHT: 136.3 LBS | DIASTOLIC BLOOD PRESSURE: 85 MMHG | SYSTOLIC BLOOD PRESSURE: 128 MMHG | HEART RATE: 86 BPM

## 2019-08-08 DIAGNOSIS — G20.A1 PARKINSON DISEASE (H): Primary | ICD-10-CM

## 2019-08-08 RX ORDER — CHOLECALCIFEROL (VITAMIN D3) 50 MCG
2000 TABLET ORAL DAILY
COMMUNITY
Start: 2019-08-08 | End: 2020-10-22

## 2019-08-08 RX ORDER — ASCORBIC ACID 500 MG
TABLET ORAL
COMMUNITY

## 2019-08-08 RX ORDER — POLYETHYLENE GLYCOL 3350 17 G/17G
POWDER, FOR SOLUTION ORAL
COMMUNITY
Start: 2019-08-08 | End: 2020-03-03

## 2019-08-08 RX ORDER — CARBIDOPA AND LEVODOPA 25; 100 MG/1; MG/1
TABLET ORAL
Qty: 270 TABLET | Refills: 11 | Status: SHIPPED | OUTPATIENT
Start: 2019-08-08 | End: 2020-01-09

## 2019-08-08 ASSESSMENT — ENCOUNTER SYMPTOMS
SINUS PAIN: 0
FLANK PAIN: 0
MUSCLE CRAMPS: 1
DEPRESSION: 1
ALTERED TEMPERATURE REGULATION: 0
SNORES LOUDLY: 0
TROUBLE SWALLOWING: 0
WEIGHT GAIN: 0
COUGH DISTURBING SLEEP: 0
MYALGIAS: 1
BLOATING: 0
EYE WATERING: 0
SINUS CONGESTION: 1
ABDOMINAL PAIN: 0
HEMATURIA: 0
PANIC: 0
CONSTIPATION: 0
BLOOD IN STOOL: 0
RECTAL PAIN: 0
HALLUCINATIONS: 0
ARTHRALGIAS: 0
INCREASED ENERGY: 0
WHEEZING: 0
SHORTNESS OF BREATH: 0
COUGH: 1
DOUBLE VISION: 0
SMELL DISTURBANCE: 0
DECREASED CONCENTRATION: 0
INSOMNIA: 1
FATIGUE: 1
NECK PAIN: 0
POLYDIPSIA: 0
WEIGHT LOSS: 0
EYE PAIN: 0
NERVOUS/ANXIOUS: 1
HEMOPTYSIS: 0
BOWEL INCONTINENCE: 0
CHILLS: 0
DECREASED APPETITE: 0
NECK MASS: 0
NAIL CHANGES: 0
DYSPNEA ON EXERTION: 0
SKIN CHANGES: 0
JOINT SWELLING: 0
NAUSEA: 0
JAUNDICE: 0
BACK PAIN: 1
DYSURIA: 0
NIGHT SWEATS: 0
HEARTBURN: 0
FEVER: 0
SPUTUM PRODUCTION: 1
MUSCLE WEAKNESS: 0
TASTE DISTURBANCE: 0
DECREASED LIBIDO: 1
VOMITING: 0
HOT FLASHES: 0
DIARRHEA: 0
POOR WOUND HEALING: 0

## 2019-08-08 ASSESSMENT — PAIN SCALES - GENERAL: PAINLEVEL: NO PAIN (0)

## 2019-08-08 NOTE — PATIENT INSTRUCTIONS
DAHLIA: aUGUST 8, 2019    MUSIC -     [facility] chris walton weekly class     Helped by the sinemet     Folate  (Folic Acid) 2.5 mg (2500mcg)  --- she is taking 400mcg   Pyridoxine (B6)  25 mg  - she is taking 2mg of this in her multivitamin  - she had been on 100mg of b6  Cyanocobalamin (B12) 2 mg === 2000mcg- she is taking 15mcg     ? Maybe increase b12       Medications     AM   PM   Acetaminophen tylenol 500mg      Albuterol proair hfa/proventil hfa/ventolin hfa 108 (90 base)mcg/act inhaler As beded     ASmanex 120 metered doses 220mcg/inh inhaler     1   b complex  1     Bupropion wellbutrin SR 200mg 12 hr tablet 1   1   Calcium carbonate 600mg      Carbidopa/levodopa Sinemet 25/100 1 1 1   Cholecalciferol vitamin D3 50mcg 2000 units  1     Cyclobenzaprine flexeril 10mg As needed     Donepezil aricept 5mg  Not taking     Gabapentin neurontin 100mg 1       Gabapentin neurontin 300mg     1   Hydrochlorothiazide hydrodiuril 12.5mg 1       Lorazepam ativan 1mg         nonformulary digest gold with ATPro      Potassium chloride ER micro K 10 meq  not taking       L Tyrosine 500 mg and 10mg vitamin B6 1       Magnesium 250 mg 1       Pyrodoxine B-6 100mg Not taking       Probiotic 1     Turmeric 300mg and dicalcium phosphate 65mg 1     Vitamin C 500mg  1     Zinc pilonate 30mg  1                  History obtained from patient    Autonomic - whole body sweating  128/85 and heart rate 86    Bowel -using miralax    Bladder issues    Has leg spasms    Gisselle -- Corine Encarnacion PT    CBD oil - discussion     [facility] -     Discussed stationary bike - 80-90 rpm and boxing    Melatonin -     Serotonin    Research focused ultrasound trials    NO change in regimen.     Return back

## 2019-08-08 NOTE — NURSING NOTE
Chief Complaint   Patient presents with     Parkinson     [facility] RETURN - FOLLOW UP       Scout Dotson, EMT

## 2019-08-08 NOTE — LETTER
2019       RE: Alina Zavala  [address] [facility] [id number]     Dear Colleague,    Thank you for referring your patient, Alina Zavala, to the [facility] NEUROLOGY at [facility]. Please see a copy of my visit note below.    Diagnosis/Summary/Recommendations:    PATIENT: Alina Zavala  59 year old female     : 1960    DAHLIA: 2019    MUSIC -     Courage chris toniaRopatec weekly class     Helped by the sinemet     Folate  (Folic Acid) 2.5 mg (2500mcg)  --- she is taking 400mcg   Pyridoxine (B6)  25 mg  - she is taking 2mg of this in her multivitamin  - she had been on 100mg of b6  Cyanocobalamin (B12) 2 mg === 2000mcg- she is taking 15mcg     ? Maybe increase b12       Medications     AM   PM   Acetaminophen tylenol 500mg      Albuterol proair hfa/proventil hfa/ventolin hfa 108 (90 base)mcg/act inhaler As beded     ASmanex  120 metered doses 220mcg/inh inhaler     1   b complex  1     Bupropion wellbutrin SR 200mg 12 hr tablet 1   1   Calcium carbonate 600mg      Carbidopa/levodopa Sinemet 25/100 1 1 1   Cholecalciferol vitamin D3 50mcg 2000 units  1     Cyclobenzaprine flexeril 10mg As needed     Donepezil aricept 5mg  Not taking     Gabapentin neurontin 100mg 1       Gabapentin neurontin 300mg     1   Hydrochlorothiazide hydrodiuril 12.5mg 1       Lorazepam ativan 1mg         nonformulary digest gold with ATPro      Potassium chloride ER micro K 10 meq  not taking       L Tyrosine 500 mg and 10mg vitamin B6 1       Magnesium 250 mg 1       Pyrodoxine B-6 100mg Not taking       Probiotic 1     Turmeric 300mg and dicalcium phosphate 65mg 1     Vitamin C 500mg  1     Zinc pilonate 30mg  1                  History obtained from patient    Autonomic - whole body sweating  128/85 and heart rate 86    Bowel -using miralax    Bladder issues    Has leg spasms    [place]iss[place] -- Corine Encarnacion PT    CBD oil - discussion     Joining the [facility] -     Discussed  stationary bike - 80-90 rpm and boxing    Melatonin -     Serotonin    Research focused ultrasound trials    NO change in regimen.     Return back             Coding statement:   Duration of  Services: patient care and care coordination was 25 minutes  Greater than 50% of this visit was spent in counseling and coordination of care.     Ishan Hearn MD     ______________________________________    Last visit date and details:     DAHLIA: February 12, 2019    DAHLIA: 3/27/2019      Answers for HPI/ROS submitted by the patient on 8/8/2019   General Symptoms: Yes  Skin Symptoms: Yes  HENT Symptoms: Yes  EYE SYMPTOMS: Yes  HEART SYMPTOMS: No  LUNG SYMPTOMS: Yes  INTESTINAL SYMPTOMS: Yes  URINARY SYMPTOMS: Yes  GYNECOLOGIC SYMPTOMS: Yes  BREAST SYMPTOMS: No  SKELETAL SYMPTOMS: Yes  BLOOD SYMPTOMS: No  NERVOUS SYSTEM SYMPTOMS: No  MENTAL HEALTH SYMPTOMS: Yes  Fever: No  Loss of appetite: No  Weight loss: No  Weight gain: No  Fatigue: Yes  Night sweats: No  Chills: No  Increased stress: No  Excessive thirst: No  Feeling hot or cold when others believe the temperature is normal: No  Loss of height: No  Post-operative complications: No  Surgical site pain: No  Hallucinations: No  Change in or Loss of Energy: No  Hyperactivity: No  Confusion: No  Changes in hair: No  Changes in moles/birth marks: No  Itching: No  Rashes: No  Changes in nails: No  Acne: No  Hair in places you don't want it: No  Change in facial hair: No  Warts: No  Non-healing sores: No  Scarring: No  Flaking of skin: No  Color changes of hands/feet in cold : No  Sun sensitivity: Yes  Skin thickening: No  Ear pain: No  Ear discharge: No  Hearing loss: No  Tinnitus: Yes  Nosebleeds: No  Congestion: Yes  Sinus pain: No  Trouble swallowing: No  Tooth pain: No  Gum tenderness: No  Bleeding gums: No  Change in taste: No  Change in sense of smell: No  Dry mouth: Yes  Hearing aid used: No  Neck lump: No  Eye pain: No  Vision loss: No  Dry eyes: No  Watery eyes: No  Eye  bulging: No  Double vision: No  Flashing of lights: No  Spots: No  Cough: Yes  Sputum or phlegm: Yes  Coughing up blood: No  Difficulty breating or shortness of breath: No  Snoring: No  Wheezing: No  Difficulty breathing on exertion: No  Nighttime Cough: No  Heart burn or indigestion: No  Nausea: No  Vomiting: No  Abdominal pain: No  Bloating: No  Constipation: No  Diarrhea: No  Blood in stool: No  Black stools: No  Rectal or Anal pain: No  Fecal incontinence: No  Yellowing of skin or eyes: No  Vomit with blood: No  Change in stools: No  Trouble holding urine or incontinence: No  Pain or burning: No  Trouble starting or stopping: No  Increased frequency of urination: No  Blood in urine: No  Decreased frequency of urination: No  Frequent nighttime urination: No  Flank pain: No  Back pain: Yes  Muscle aches: Yes  Neck pain: No  Swollen joints: No  Joint pain: No  Bone pain: No  Muscle cramps: Yes  Muscle weakness: No  Bleeding or spotting between periods: No  Heavy or painful periods: No  Irregular periods: No  Vaginal discharge: No  Hot flashes: No  Vaginal dryness: Yes  Genital ulcers: No  Reduced libido: Yes  Nervous or Anxious: Yes  Depression: Yes  Trouble sleeping: Yes  Trouble thinking or concentrating: No  Mood changes: No  Panic attacks: No        Alina Zavala  YOB: 1960  MRN: [id number]     REASON FOR VISIT: Follow up for Parkinsonism and cognitive impairment     HISTORY OF PRESENT ILLNESS:  Alina Zavala is a 58 year old woman with Parkinsonism and cognitive impairment who presents for follow up.  She was last seen in clinic 2/12/2019, at which time she was started on donepezil.  Unfortunately she was not able to tolerate donepezil due to aching in her thighs.  She was advised to stop donepezil.  Now that she has stopped the medication the aching has subsided.  It is not clear that donepezil provided any benefit for cognition.  In the interim, she was able to enroll and complete Big and  Loud therapy in [place].  She enjoyed interactive nature of the therapy and that it held her accountable to work on her exercises.  She noticed an improvement in her voice, balance, and dexterity.  She is going to continue with maintenance therapy.  She is interested in other exercises such as [employer] boxing, Knock Out PD and swimming.     She is aware of medications to treat symptoms for Parkinson's disease, but she does not feel that slowness or stiffness currently warrant medications.  She is found Big and Loud therapy very helpful.        MEDICATIONS:       Outpatient Medications Marked as Taking for the 3/27/19 encounter (Office Visit) with  MOVEMENT DISORDER FELLOW   Medication Sig     acetaminophen (TYLENOL) 500 MG tablet Take 500-1,000 mg by mouth every 6 hours as needed for mild pain     albuterol (PROAIR HFA/PROVENTIL HFA/VENTOLIN HFA) 108 (90 Base) MCG/ACT inhaler Inhale 2 puffs into the lungs every 6 hours as needed     ASMANEX 120 METERED DOSES 220 MCG/INH inhaler Inhale 1 puff into the lungs At Bedtime      buPROPion (WELLBUTRIN SR) 200 MG 12 hr tablet 200mg tab by mouth twice daily at 9 am and 9 pm     calcium carbonate (CALCIUM CARBONATE) 600 MG tablet Take 1 tablet by mouth 2 times daily (with meals)      Cholecalciferol (VITAMIN D3 PO) Take 50 mcg by mouth daily      cyclobenzaprine (FLEXERIL) 10 MG tablet Take 10 mg by mouth as needed (for back pain - very sparingly)     gabapentin (NEURONTIN) 100 MG capsule Take 100 mg by mouth every morning      gabapentin (NEURONTIN) 300 MG capsule Take 300 mg by mouth At Bedtime      hydrochlorothiazide (HYDRODIURIL) 12.5 MG tablet 12.5mg tab by mouth daily     l-tyrosine 500 MG TABS Take 1,000 mg by mouth daily     magnesium 250 MG tablet Take 1 tablet by mouth 2 times daily          ALLERGIES:  Prednisone; Donepezil; Penicillins; and Sulfa drugs      PAST MEDICAL HISTORY:  Medical history reviewed and updated in Epic, no new problems     REVIEW OF  SYSTEMS:  12 point review of systems completed. Pertinent positives and negatives above and in HPI     PHYSICAL EXAM:  VITALS: /78 (BP Location: Right arm, Patient Position: Chair, Cuff Size: Adult Regular)   Pulse 87   Wt 60.3 kg (133 lb)   SpO2 99%   GENERAL: Cooperative, no acute distress  HEENT: Normocephalic and nontraumatic, sclera white, moist mucous membranes  CARDIAC: Regular rate and rhythm   RESPIRATORY: Nonlabored breathing        NEUROLOGIC:  MENTAL STATUS: Fully alert, attentive and oriented.  SPEECH: Normal rate, tone and volume.   FACIAL EXPRESSION: Reduced blinking. Normal lower facial expression.      CRANIAL NERVES: Visual fields intact. EOM full with smooth pursuit. No nystagmus. Normal saccades. Facial sensation intact/symmetric. Facial movements symmetric. Palate elevation symmetric, uvula midline. No dysarthria. Tongue protrusion midline.     MOTOR:   INVOLUNTARY MOVEMENTS - No rest tremor observed today but seen during previous exam in LUE. Slight postural tremor bilaterally. Action tremor mild bilaterally and slightly worse on the LUE.   AGILITY - Normal movements on the right. Slight bradykinesia on the left.   TONE - Slight axial rigidity. Slight right hemibody rigidity, mild on the left.      COORDINATION: No dysmetria with FNF  GAIT: Able to rise from chair with arms crossed over chest. Posture is upright. Normal base, normal stride. Arm swing slightly diminished. Turns in 2 steps. Normal pull test.         IMPRESSION:   Alina Zavala is a 58 year old woman with Parkinsonism and cognitive impairment. After completing Big and Loud therapy symptoms have improved with improved speech, walking speed, balance, and dexterity. She is working to continue the exercises and add other exercise to her routine, such as Knock Out Parkinson's (Boxing class at [employer]). After reading up on medications herself and discussing medications this visit, she would like to hold off on  staring anything at this time since symptoms are mild.      We discussed that levodopa is the gold standard treatment. This is a symptomatic treatment to improve function and quality of life. The right time to start the medication is when she feels symptoms are bothersome. Starting levodopa earlier does not impact how long it will be effective or worsen underlying disease.      PLAN:  - Continue Big and Loud maintenance therapy, signed form  - Encouraged her to continue with exercise, preferably aerobic, to optimize brain health. Research also supports remaining cognitively engaged - which can mean volunteering, maintaining hobbies, doing puzzles. She should find the exercise and cognitive engagement/ hobbies she enjoys and will keep up week to week.   - Follow up in six months, sooner if there are concerns. She was provided with our clinic contact information.         Alaina Landaverde MD  Movement Disorders Fellow     Time spent with patient: Greater than 50% of this 40 minute visit was spent in counseling and coordination of care related to the issues detailed above.    IMPRESSION:  1. Parkinsonism  2. Cognitive impairment  3. Depression  4. Anxiety     Alina Zavala is a 58 year old woman who was referred for evaluation of Parkinsonism and cognitive impairment. Her exam is remarkable for cognitive impairment with prominent visuospatial and attention deficits on MoCA and mild left greater than right tremor, bradykinesia and rigidity. Her Demetrius Scan confirms clinical suspicion of Parkinsonism with bilateral striatal dopaminergic deficit. Early cognitive impairment raises concern for Lewy Body Dementia, although she does not have classic fluctuation or accompanying hallucinosis. We discussed further evaluation with neuropsychology testing, but she elected to think about this for now, given the stress of extended cognitive testing. Other possibilities include Parkinson disease with cognitive impairment.      We discussed  various treatment options for cognition, motor, and mood symptoms. We elected to focus on cognition now and will continue to work on other areas during subsequent visits. We discussed the benefit of PT and exercise for movement.      PLAN:  - Start donepezil 5 mg AM.   - Big and loud therapy at [facility] [place] for Parkinson disease  - Will discuss mood/anxiety at our next visit - may be better treated on serotonin specific reuptake inhibitor or SNRI  - Discussed various dopaminergic medications including L-dopa and dopamine agonists. Will wait to start a medication at this time.     Follow up in 1 month     Alaina Landaverde MD  Movement Disorders Fellow     Patient seen and discussed with Dr. Hearn     Patient seen and examined by me today. February 12, 2019  I agree with details above from today  February 12, 2019  Over 50% of this 100 minute visit was spent in patient care and care coordination  Ishan Hearn MD       ______________________________________      Patient was asked about 14 Review of systems including changes in vision (dry eyes, double vision), hearing, heart, lungs, musculoskeletal, depression, anxiety, snoring, RBD, insomnia, urinary frequency, urinary urgency, constipation, swallowing problems, hematological, ID, allergies, skin problems: seborrhea, endocrinological: thyroid, diabetes, cholesterol; balance, weight changes, and other neurological problems and these were not significant at this time except for   Patient Active Problem List   Diagnosis     Asthma     Benign essential hypertension          Allergies   Allergen Reactions     Prednisone Anxiety, Palpitations and Shortness Of Breath     Donepezil      Insomnia, nausea, dizziness/somnolence      Penicillins      Other reaction(s): *Unknown  Other reaction(s): *Unknown     Sulfa Drugs      Other reaction(s): *Unknown  Other reaction(s): *Unknown     Past Surgical History:   Procedure Laterality Date     D & C       Past Medical  "History:   Diagnosis Date     Anxiety      Depression      Hyperlipidemia      Hypertension      Lyme disease      Trigeminal neuralgia      Social History     Socioeconomic History     Marital status:      Spouse name: Not on file     Number of children: Not on file     Years of education: Not on file     Highest education level: Not on file   Occupational History     Not on file   Social Needs     Financial resource strain: Not on file     Food insecurity:     Worry: Not on file     Inability: Not on file     Transportation needs:     Medical: Not on file     Non-medical: Not on file   Tobacco Use     Smoking status: Never Smoker     Smokeless tobacco: Never Used   Substance and Sexual Activity     Alcohol use: No     Frequency: Never     Drug use: Not on file     Sexual activity: Not on file   Lifestyle     Physical activity:     Days per week: Not on file     Minutes per session: Not on file     Stress: Not on file   Relationships     Social connections:     Talks on phone: Not on file     Gets together: Not on file     Attends Evangelical service: Not on file     Active member of club or organization: Not on file     Attends meetings of clubs or organizations: Not on file     Relationship status: Not on file     Intimate partner violence:     Fear of current or ex partner: Not on file     Emotionally abused: Not on file     Physically abused: Not on file     Forced sexual activity: Not on file   Other Topics Concern     Not on file   Social History Narrative    . Lives in [place]. Tejinder Zavala spouse. They have three children. Their names are Eros, Venkat, and Christie. She is a homemaker. Studied library science and theology. She was a  for over 16 years. 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  Medications and Vitals not listed above were documented in the cart and reviewed by me.     Current Outpatient Medications   Medication Sig Dispense Refill     acetaminophen (TYLENOL) 500 MG tablet Take 500-1,000 mg by mouth every 6 hours as needed for mild pain       albuterol (PROAIR HFA/PROVENTIL HFA/VENTOLIN HFA) 108 (90 Base) MCG/ACT inhaler Inhale 2 puffs into the lungs every 6 hours as needed       ASMANEX 120 METERED DOSES 220 MCG/INH inhaler Inhale 1 puff into the lungs At Bedtime   1     buPROPion (WELLBUTRIN SR) 200 MG 12 hr tablet 200mg tab by mouth twice daily at 9 am and 9 pm  0     calcium carbonate (CALCIUM CARBONATE) 600 MG tablet Take 1 tablet by mouth 2 times daily (with meals)        carbidopa-levodopa (SINEMET)  MG tablet Start 1/2 tablet three times daily for one week, then increase to one tablet three times daily 90 tablet 11     Cholecalciferol (VITAMIN D3 PO) Take 50 mcg by mouth daily        cyclobenzaprine (FLEXERIL) 10 MG tablet Take 10 mg by mouth as needed (for back pain - very sparingly)       donepezil (ARICEPT) 5 MG tablet   3     gabapentin (NEURONTIN) 100 MG capsule Take 100 mg by mouth every morning   1     gabapentin (NEURONTIN) 300 MG capsule Take 300 mg by mouth At Bedtime   0     hydrochlorothiazide (HYDRODIURIL) 12.5 MG tablet 12.5mg tab by mouth daily  1     l-tyrosine 500 MG TABS Take 1,000 mg by mouth daily       magnesium 250 MG tablet Take 1 tablet by mouth 2 times daily            Ishan Hearn MD

## 2019-10-16 ENCOUNTER — TELEPHONE (OUTPATIENT)
Dept: NEUROLOGY | Facility: CLINIC | Age: 59
End: 2019-10-16

## 2019-10-16 NOTE — TELEPHONE ENCOUNTER
I informed Alina that there is not much research done on CBD oil and we cannot advise patients on this due to that. If she would like to try this instead of Wellbutrin she should discuss this with her PCP.

## 2019-10-16 NOTE — TELEPHONE ENCOUNTER
[facility] Call Center    Phone Message    May a detailed message be left on voicemail: yes    Reason for Call: Medication Question or concern regarding medication   Prescription Clarification  Name of Medication: buPROPion (WELLBUTRIN SR) 200 MG 12 hr tablet  Prescribing Provider: Dr. Hearn   What on the order needs clarification? Patient would like to try using CBD oil instead. Please call pt back to discuss.          Action Taken: Message routed to:  [facility] neuro

## 2019-11-07 ENCOUNTER — TELEPHONE (OUTPATIENT)
Dept: NEUROLOGY | Facility: CLINIC | Age: 59
End: 2019-11-07

## 2019-11-07 NOTE — TELEPHONE ENCOUNTER
[facility] Call Center    Phone Message    May a detailed message be left on voicemail: yes    Reason for Call: Pt called and stated that pt's pcp started pt on a generic med, Zoloft. Pt is concern that taking Zoloft with other meds will cause issues. Pt would like to speak with Dr. Hearn or a nurse due to a possible drug interaction. Please call back pt. Thanks.    Action Taken: Message routed to:  [facility] Neuro

## 2019-11-07 NOTE — TELEPHONE ENCOUNTER
Reviewed the patient's current medications. It would be fine for her to take sertraline (Zoloft) with her other medications. Cyclobenzaprine + sertraline can increase risk of QT prolongation, but the patient reportedly takes cyclobenzaprine very sparingly.     Wendy Cárdenas, Pharm.D.  Medication Therapy Management Pharmacist  Phone: [phone]

## 2019-11-08 NOTE — TELEPHONE ENCOUNTER
I informed Alina of Wendy Cárdenas's note below.  Alina states she takes 10 mg of Flexeril at night 50% of the time.    She does not recall what her dosage of Zoloft is but reports she is on the lowest dose (maybe 50 mg) and her PCP will increase it if needed.  Alina asked when her next appointment is.    Plan/recommendation:  1. If you start having palpitations, dizziness contact your primary care provider right away.    2. Tell your primary care provider how often you are taking Flexeril. Ask him about the risk of QT prolongation. You are on a low dose of zoloft and do not take flexeril everyday. The chance of this occurring could be very minimal.    3. Your next appointment is on 1/9/2020 at 2:40 PM

## 2019-12-13 ENCOUNTER — MYC MEDICAL ADVICE (OUTPATIENT)
Dept: NEUROLOGY | Facility: CLINIC | Age: 59
End: 2019-12-13

## 2019-12-13 DIAGNOSIS — G20.C PARKINSONISM, UNSPECIFIED PARKINSONISM TYPE (H): Primary | ICD-10-CM

## 2019-12-13 RX ORDER — PROPRANOLOL HYDROCHLORIDE 60 MG/1
TABLET ORAL
COMMUNITY
Start: 2019-12-13 | End: 2020-01-09

## 2019-12-13 NOTE — TELEPHONE ENCOUNTER
"Situation:  Alina Zavala is a 59 year old female who receives support for Parkinson's disease. Alina calls today with concerns for constipation and Diarrhea.    Background:    Patient is taking:  carbidopa-levodopa (SINEMET)  MG tablet 270 tablet 11 2019  No   Si/100 tablets by mouth 3 times per day @ 830am/9am, 4pm and midnight     polyethylene glycol (MIRALAX/GLYCOLAX) packet   2019  --   Si cap of miralax by mouth daily as needed   - Takes when needed due to new diarrhea issue    New Medications:   -Propranolol 60 mg daily - started 1 month ago  -Sertraline 50 mg daily started 2 months ago  Prescribed by her primary    Assessment:  She has been going between constipation and diarrhea for 3 weeks. She reports her first non-motor symptom was constipation. She read in an article that 15% of Parkinson's disease patients have both constipation and diarrhea. She has also noticed an incred in gas. The smell more pungent than usual when she has diarrhea/ gas. She dos have Abdominal discomfort on and off.  Color of stools: anywhere from light to medium brown brown.  Had a colonoscopy 2 years ago and this was okay.    She has also noticed increase dizziness when she stands up sometimes but also she will be walking and will get lightheaded randomly throughout the day. This has been going on for about 1 month. Her dizzy spells are not daily but occur 3+ times a week and will make her go \"whoa\"  She does not take her blood pressure at home. She reports the last time she saw her primary they did see some orthostatic hypotension.    Drinks on average 40-60 ounces of fluids a day (\"wild guess\"). She reports not being a big fan of water but tries to stay hydrated.    Her blood pressure at her last visit with Dr. Hearn on 2019 was 128/85    Education:  1. Propranolol as well as carbidopa/levodopa can lower your blood pressure. Propranolol can also lower your heart rate. This may be what's causing your " dizzy spells.  2. It is pretty rare that we see people go between constipation and diarrhea in the same day or a day or 2 apart.    Plan/recommendation:  1. It seems like this started when you were started on propranolol. Call your primary doctor and let him know that since you started this you have had increased issues with your bowels and dizziness. He will advise you further about this medication.  2. It would be a good idea to check your blood pressure at home. You can get a blood pressure cuff at [facility] or [facility] pharmacy.  3. Plan to follow-up with Dr. Hearn as usual on 1/9/2020.

## 2019-12-20 RX ORDER — PROPRANOLOL HCL 60 MG
60 CAPSULE, EXTENDED RELEASE 24HR ORAL DAILY
Refills: 2 | COMMUNITY
Start: 2019-12-09 | End: 2020-06-12

## 2019-12-20 NOTE — PROGRESS NOTES
"Diagnosis/Summary/Recommendations:    PATIENT: Alina Zavala  59 year old female     : 1960    DAHLIA: 2020    Gisselle -- Corine Encarnacion PT  Working with Zuleika at [facility]    She has been reading about a variety of trials.     [facility] \"[facility]\" about [facility].     Went to her PCP about left lateral thigh discomfort and has had can go up to her groin.   Her A1c was 5.1    She has not had the shingrix vaccine    Taking more gabapentin for leg pain  She is also taking flexeril     May go up on vitamin D3 and using 5000 unit per day or 10,000 per week    Aware of food and sinemet dosing.     Medications     830/9am 4pm midnight   Acetaminophen tylenol 500mg  1   1    Albuterol proair hfa/proventil hfa/ventolin hfa 108 (90 base)mcg/act inhaler As needed        ASmanex 120 metered doses 220mcg/inh inhaler     1 puff at bedtime   b complex  1       Bupropion wellbutrin SR 200mg 12 hr tablet 1   1   Calcium carbonate 600mg  1       Carbidopa/levodopa Sinemet 25/100 1 1 1   Cholecalciferol vitamin D3 50mcg 2000 units  1       Cyclobenzaprine flexeril 10mg As needed       Gabapentin neurontin 100mg 1       Gabapentin neurontin 300mg     1   Hydrochlorothiazide hydrodiuril 12.5mg Not taking       L Tyrosine 500 mg and 10mg vitamin B6 ? Not sure if taking       Magnesium 250 mg 1       Mometasone fuorate asmanex   At night   nonformulary digest gold May go off this     Polyethylene glycol miralax glycolazx As needed     Probiotic variable       Propranolol ER inderal LA 60 mg 24 hr capsule 1     Sertraline zoloft 50mg 1     Turmeric 300mg and dicalcium phosphate 65mg 1       Valacyclovir valtrex 1000mg 7 days      Vitamin C 500mg  1       Zinc pilonate 30mg  Variable use                    History obtained from patient    Plan  Manage left leg pain - m eralgia paresthetica /shingles/back problem  May consider mri and see general neurology/physiology in [place] [facility] and emg " studies    Consider shingrix vaccine    May want to increase the sinemet from 1 tab 3/day to 1.5 tabs 3/day    Had seen Wendy 2/2019    Return back       Coding statement:   Duration of  Services: patient care and care coordination was 25 minutes  Greater than 50% of this visit was spent in counseling and coordination of care.     Ishan Hearn MD     ______________________________________    Last visit date and details:      DAHLIA: aUGUST 8, 2019     MUSIC -      Courage chris stillgina weekly class      Helped by the sinemet      Folate  (Folic Acid)    2.5 mg (2500mcg)  --- she is taking 400mcg   Pyridoxine      (B6)                 25 mg  - she is taking 2mg of this in her multivitamin  - she had been on 100mg of b6  Cyanocobalamin       (B12)   2 mg === 2000mcg- she is taking 15mcg      ? Maybe increase b12         Medications     AM   PM   Acetaminophen tylenol 500mg         Albuterol proair hfa/proventil hfa/ventolin hfa 108 (90 base)mcg/act inhaler As beded       ASmanex 120 metered doses 220mcg/inh inhaler     1   b complex  1       Bupropion wellbutrin SR 200mg 12 hr tablet 1   1   Calcium carbonate 600mg         Carbidopa/levodopa Sinemet 25/100 1 1 1   Cholecalciferol vitamin D3 50mcg 2000 units  1       Cyclobenzaprine flexeril 10mg As needed       Donepezil aricept 5mg  Not taking       Gabapentin neurontin 100mg 1       Gabapentin neurontin 300mg     1   Hydrochlorothiazide hydrodiuril 12.5mg 1       Lorazepam ativan 1mg         nonformulary digest gold with ATPro         Potassium chloride ER micro K 10 meq  not taking       L Tyrosine 500 mg and 10mg vitamin B6 1       Magnesium 250 mg 1       Pyrodoxine B-6 100mg Not taking       Probiotic 1       Turmeric 300mg and dicalcium phosphate 65mg 1       Vitamin C 500mg  1       Zinc pilonate 30mg  1                    History obtained from patient     Autonomic - whole body sweating  128/85 and heart rate 86     Bowel -using miralax     Bladder  issues     Has leg spasms     Gisselle -- Corine Encarnacion PT     CBD oil - discussion      [facility] -      Discussed stationary bike - 80-90 rpm and boxing     Melatonin -      Serotonin     Research focused ultrasound trials     NO change in regimen.      Return back                 ______________________________________      Patient was asked about 14 Review of systems including changes in vision (dry eyes, double vision), hearing, heart, lungs, musculoskeletal, depression, anxiety, snoring, RBD, insomnia, urinary frequency, urinary urgency, constipation, swallowing problems, hematological, ID, allergies, skin problems: seborrhea, endocrinological: thyroid, diabetes, cholesterol; balance, weight changes, and other neurological problems and these were not significant at this time except for   Patient Active Problem List   Diagnosis     Asthma     Benign essential hypertension          Allergies   Allergen Reactions     Prednisone Anxiety, Palpitations and Shortness Of Breath     Donepezil      Insomnia, nausea, dizziness/somnolence      Penicillins      Other reaction(s): *Unknown  Other reaction(s): *Unknown     Sulfa Drugs      Other reaction(s): *Unknown  Other reaction(s): *Unknown     Past Surgical History:   Procedure Laterality Date     D & C       Past Medical History:   Diagnosis Date     Anxiety      Depression      Hyperlipidemia      Hypertension      Lyme disease      Trigeminal neuralgia      Social History     Socioeconomic History     Marital status:      Spouse name: Not on file     Number of children: Not on file     Years of education: Not on file     Highest education level: Not on file   Occupational History     Not on file   Social Needs     Financial resource strain: Not on file     Food insecurity:     Worry: Not on file     Inability: Not on file     Transportation needs:     Medical: Not on file     Non-medical: Not on file   Tobacco Use     Smoking status: Never Smoker      "Smokeless tobacco: Never Used   Substance and Sexual Activity     Alcohol use: No     Frequency: Never     Drug use: Not on file     Sexual activity: Not on file   Lifestyle     Physical activity:     Days per week: Not on file     Minutes per session: Not on file     Stress: Not on file   Relationships     Social connections:     Talks on phone: Not on file     Gets together: Not on file     Attends [employer] service: Not on file     Active member of club or organization: Not on file     Attends meetings of clubs or organizations: Not on file     Relationship status: Not on file     Intimate partner violence:     Fear of current or ex partner: Not on file     Emotionally abused: Not on file     Physically abused: Not on file     Forced sexual activity: Not on file   Other Topics Concern     Not on file   Social History Narrative    . Lives in [place]. Tejinder Zavala spouse. They have three children. Their names are Eros, Venkat, and Christie. She is a homemaker. Studied library science and theology. She was a  for over 16 years. 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  Medications and Vitals not listed above were documented in the cart and reviewed by me.     Current Outpatient Medications   Medication Sig Dispense Refill     acetaminophen (TYLENOL) 500 MG tablet Take 500-1,000 mg by mouth every 6 hours as needed for mild pain       " albuterol (PROAIR HFA/PROVENTIL HFA/VENTOLIN HFA) 108 (90 Base) MCG/ACT inhaler Inhale 2 puffs into the lungs every 6 hours as needed       ASMANEX 120 METERED DOSES 220 MCG/INH inhaler Inhale 1 puff into the lungs At Bedtime   1     B Complex-C (VITAMIN B COMPLEX W/VITAMIN C) TABS tablet Take 1 tablet by mouth daily       buPROPion (WELLBUTRIN SR) 200 MG 12 hr tablet 200mg tab by mouth twice daily at 9 am and 9 pm  0     calcium carbonate (CALCIUM CARBONATE) 600 MG tablet Take 1 tablet by mouth daily        carbidopa-levodopa (SINEMET)  MG tablet 25/100 tablets by mouth 3 times per day @ 830am/9am, 4pm and midnight 270 tablet 11     Cholecalciferol (VITAMIN D3) 2000 units TABS Take 2,000 Units by mouth daily       cyclobenzaprine (FLEXERIL) 10 MG tablet Take 10 mg by mouth as needed (for back pain - very sparingly)       gabapentin (NEURONTIN) 100 MG capsule Take 100 mg by mouth every morning   1     gabapentin (NEURONTIN) 300 MG capsule Take 300 mg by mouth At Bedtime   0     hydrochlorothiazide (HYDRODIURIL) 12.5 MG tablet 12.5mg tab by mouth daily  1     l-tyrosine 500 MG TABS Take 500 mg by mouth daily        magnesium 250 MG tablet Take 1 tablet by mouth daily        NONFORMULARY Digest Gold with ATPro  -Amylase  -Protease  -Glucoamylase  -ATPro  -Alpha Galactosidase  -Cellulase  -Lipase  -Lactase  -Beta Glucanase  -Maltase  -Xylanase  -Invertase  -Pectinase  -Hemicellulase       polyethylene glycol (MIRALAX/GLYCOLAX) packet 1 cap of miralax by mouth daily as needed       Probiotic Product (PROBIOTIC PO) Take 1 capsule by mouth daily       propranolol (INDERAL) 60 MG tablet Uncertain if 60mg short acting or long acting propranolol daily       propranolol ER (INDERAL LA) 60 MG 24 hr capsule TK 1 C PO QD  2     sertraline (ZOLOFT) 50 MG tablet Take 1 tablet (50 mg) by mouth daily       TURMERIC PO Take 300 mg by mouth daily       vitamin C (ASCORBIC ACID) 500 MG tablet Take 500 mg by mouth daily       ZINC  PICOLINATE PO Take 30 mg by mouth daily           Ishan Hearn MD

## 2020-01-09 ENCOUNTER — OFFICE VISIT (OUTPATIENT)
Dept: NEUROLOGY | Facility: CLINIC | Age: 60
End: 2020-01-09
Payer: COMMERCIAL

## 2020-01-09 VITALS
HEIGHT: 66 IN | SYSTOLIC BLOOD PRESSURE: 152 MMHG | HEART RATE: 67 BPM | WEIGHT: 143.2 LBS | RESPIRATION RATE: 16 BRPM | DIASTOLIC BLOOD PRESSURE: 82 MMHG | OXYGEN SATURATION: 100 % | BODY MASS INDEX: 23.01 KG/M2

## 2020-01-09 DIAGNOSIS — G20.A1 PARKINSON DISEASE (H): Primary | ICD-10-CM

## 2020-01-09 RX ORDER — CARBIDOPA AND LEVODOPA 25; 100 MG/1; MG/1
TABLET ORAL
Qty: 405 TABLET | Refills: 11 | Status: SHIPPED | OUTPATIENT
Start: 2020-01-09 | End: 2020-02-19

## 2020-01-09 RX ORDER — VALACYCLOVIR HYDROCHLORIDE 1 G/1
TABLET, FILM COATED ORAL
COMMUNITY
Start: 2020-01-06 | End: 2020-03-03

## 2020-01-09 ASSESSMENT — PATIENT HEALTH QUESTIONNAIRE - PHQ9: SUM OF ALL RESPONSES TO PHQ QUESTIONS 1-9: 6

## 2020-01-09 ASSESSMENT — PAIN SCALES - GENERAL: PAINLEVEL: EXTREME PAIN (9)

## 2020-01-09 ASSESSMENT — MIFFLIN-ST. JEOR: SCORE: 1233.36

## 2020-01-09 NOTE — NURSING NOTE
Chief Complaint   Patient presents with     RECHECK     [facility] RETURN MOVEMENT DISORDER 5 MO F/U     Nehal Robertson CMA

## 2020-01-09 NOTE — LETTER
"2020       RE: Alina Zavala   [facility] [address]     Dear Colleague,    Thank you for referring your patient, Alina Zavala, to the [facility] NEUROLOGY at [facility]. Please see a copy of my visit note below.    Diagnosis/Summary/Recommendations:    PATIENT: Alina Zavala  59 year old female     : 1960    DAHLIA: 2020    Gisselle -- Corine Encarnacion PT  Working with Zuleika at [employer]    She has been reading about a variety of trials.     [facility] and \"[facility]\" about going [facility].     Went to her PCP about left lateral thigh discomfort and has had can go up to her groin.   Her A1c was 5.1    She has not had the shingrix vaccine    Taking more gabapentin for leg pain  She is also taking flexeril     May go up on vitamin D3 and using 5000 unit per day or 10,000 per week    Aware of food and sinemet dosing.     Medications     830/9am 4pm midnight   Acetaminophen tylenol 500mg  1   1    Albuterol proair hfa/proventil hfa/ventolin hfa 108 (90 base)mcg/act inhaler As needed        ASmanex 120 metered doses 220mcg/inh inhaler     1 puff at bedtime   b complex  1       Bupropion wellbutrin SR 200mg 12 hr tablet 1   1   Calcium carbonate 600mg  1       Carbidopa/levodopa Sinemet 25/100 1 1 1   Cholecalciferol vitamin D3 50mcg 2000 units  1       Cyclobenzaprine flexeril 10mg As needed       Gabapentin neurontin 100mg 1       Gabapentin neurontin 300mg     1   Hydrochlorothiazide hydrodiuril 12.5mg Not taking       L Tyrosine 500 mg and 10mg vitamin B6 ? Not sure if taking       Magnesium 250 mg 1       Mometasone fuorate asmanex   At night   nonformulary digest gold May go off this     Polyethylene glycol miralax glycolazx As needed     Probiotic variable       Propranolol ER inderal LA 60 mg 24 hr capsule 1     Sertraline zoloft 50mg 1     Turmeric 300mg and dicalcium phosphate 65mg 1       Valacyclovir valtrex 1000mg 7 days    "   Vitamin C 500mg  1       Zinc pilonate 30mg  Variable use                    History obtained from patient    Plan  Manage left leg pain - m eralgia paresthetica /shingles/back problem  May consider mri and see general neurology/physiology in [facility] and emg studies    Consider shingrix vaccine    May want to increase the sinemet from 1 tab 3/day to 1.5 tabs 3/day    Had seen Wendy 2/2019    Return back       Coding statement:   Duration of  Services: patient care and care coordination was 25 minutes  Greater than 50% of this visit was spent in counseling and coordination of care.     Ishan Hearn MD     ______________________________________    Last visit date and details:      DAHLIA: aUGUST 8, 2019     MUSIC -      Courage chris walton weekly class      Helped by the sinemet      Folate  (Folic Acid)    2.5 mg (2500mcg)  --- she is taking 400mcg   Pyridoxine      (B6)                 25 mg  - she is taking 2mg of this in her multivitamin  - she had been on 100mg of b6  Cyanocobalamin       (B12)   2 mg === 2000mcg- she is taking 15mcg      ? Maybe increase b12         Medications     AM   PM   Acetaminophen tylenol 500mg         Albuterol proair hfa/proventil hfa/ventolin hfa 108 (90 base)mcg/act inhaler As beded       ASmanex 120 metered doses 220mcg/inh inhaler     1   b complex  1       Bupropion wellbutrin SR 200mg 12 hr tablet 1   1   Calcium carbonate 600mg         Carbidopa/levodopa Sinemet 25/100 1 1 1   Cholecalciferol vitamin D3 50mcg 2000 units  1       Cyclobenzaprine flexeril 10mg As needed       Donepezil aricept 5mg  Not taking       Gabapentin neurontin 100mg 1       Gabapentin neurontin 300mg     1   Hydrochlorothiazide hydrodiuril 12.5mg 1       Lorazepam ativan 1mg         nonformulary digest gold with ATPro         Potassium chloride ER micro K 10 meq  not taking       L Tyrosine 500 mg and 10mg vitamin B6 1       Magnesium 250 mg 1       Pyrodoxine B-6 100mg Not taking        Probiotic 1       Turmeric 300mg and dicalcium phosphate 65mg 1       Vitamin C 500mg  1       Zinc pilonate 30mg  1                    History obtained from patient     Autonomic - whole body sweating  128/85 and heart rate 86     Bowel -using miralax     Bladder issues     Has leg spasms     Gisselle -- Corine Encarnacion PT     CBD oil - discussion      [facility] -      Discussed stationary bike - 80-90 rpm and boxing     Melatonin -      Serotonin     Research focused ultrasound trials     NO change in regimen.      Return back                 ______________________________________      Patient was asked about 14 Review of systems including changes in vision (dry eyes, double vision), hearing, heart, lungs, musculoskeletal, depression, anxiety, snoring, RBD, insomnia, urinary frequency, urinary urgency, constipation, swallowing problems, hematological, ID, allergies, skin problems: seborrhea, endocrinological: thyroid, diabetes, cholesterol; balance, weight changes, and other neurological problems and these were not significant at this time except for   Patient Active Problem List   Diagnosis     Asthma     Benign essential hypertension          Allergies   Allergen Reactions     Prednisone Anxiety, Palpitations and Shortness Of Breath     Donepezil      Insomnia, nausea, dizziness/somnolence      Penicillins      Other reaction(s): *Unknown  Other reaction(s): *Unknown     Sulfa Drugs      Other reaction(s): *Unknown  Other reaction(s): *Unknown     Past Surgical History:   Procedure Laterality Date     D & C       Past Medical History:   Diagnosis Date     Anxiety      Depression      Hyperlipidemia      Hypertension      Lyme disease      Trigeminal neuralgia      Social History     Socioeconomic History     Marital status:      Spouse name: Not on file     Number of children: Not on file     Years of education: Not on file     Highest education level: Not on file   Occupational History     Not on  "file   Social Needs     Financial resource strain: Not on file     Food insecurity:     Worry: Not on file     Inability: Not on file     Transportation needs:     Medical: Not on file     Non-medical: Not on file   Tobacco Use     Smoking status: Never Smoker     Smokeless tobacco: Never Used   Substance and Sexual Activity     Alcohol use: No     Frequency: Never     Drug use: Not on file     Sexual activity: Not on file   Lifestyle     Physical activity:     Days per week: Not on file     Minutes per session: Not on file     Stress: Not on file   Relationships     Social connections:     Talks on phone: Not on file     Gets together: Not on file     Attends Ten[employer] service: Not on file     Active member of club or organization: Not on file     Attends meetings of clubs or organizations: Not on file     Relationship status: Not on file     Intimate partner violence:     Fear of current or ex partner: Not on file     Emotionally abused: Not on file     Physically abused: Not on file     Forced sexual activity: Not on file   Other Topics Concern     Not on file   Social History Narrative    . Lives in [place]. Tejinder Zavala spouse. They have three children. Their names are Eros, Venkat, and Christie. She is a homemaker. Studied library science and theology. She was a  for over 16 years. 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  Medications and Vitals not listed above were documented in the cart and reviewed by me.     Current Outpatient Medications   Medication Sig Dispense Refill     acetaminophen (TYLENOL) 500 MG tablet Take 500-1,000 mg by mouth every 6 hours as needed for mild pain       albuterol (PROAIR HFA/PROVENTIL HFA/VENTOLIN HFA) 108 (90 Base) MCG/ACT inhaler Inhale 2 puffs into the lungs every 6 hours as needed       ASMANEX 120 METERED DOSES 220 MCG/INH inhaler Inhale 1 puff into the lungs At Bedtime   1     B Complex-C (VITAMIN B COMPLEX W/VITAMIN C) TABS tablet Take 1 tablet by mouth daily       buPROPion (WELLBUTRIN SR) 200 MG 12 hr tablet 200mg tab by mouth twice daily at 9 am and 9 pm  0     calcium carbonate (CALCIUM CARBONATE) 600 MG tablet Take 1 tablet by mouth daily        carbidopa-levodopa (SINEMET)  MG tablet 25/100 tablets by mouth 3 times per day @ 830am/9am, 4pm and midnight 270 tablet 11     Cholecalciferol (VITAMIN D3) 2000 units TABS Take 2,000 Units by mouth daily       cyclobenzaprine (FLEXERIL) 10 MG tablet Take 10 mg by mouth as needed (for back pain - very sparingly)       gabapentin (NEURONTIN) 100 MG capsule Take 100 mg by mouth every morning   1     gabapentin (NEURONTIN) 300 MG capsule Take 300 mg by mouth At Bedtime   0     hydrochlorothiazide (HYDRODIURIL) 12.5 MG tablet 12.5mg tab by mouth daily  1     l-tyrosine 500 MG TABS Take 500 mg by mouth daily        magnesium 250 MG tablet Take 1 tablet by mouth daily        NONFORMULARY Digest Gold with ATPro  -Amylase  -Protease  -Glucoamylase  -ATPro  -Alpha Galactosidase  -Cellulase  -Lipase  -Lactase  -Beta Glucanase  -Maltase  -Xylanase  -Invertase  -Pectinase  -Hemicellulase       polyethylene glycol (MIRALAX/GLYCOLAX) packet 1 cap of miralax by mouth daily as needed       Probiotic Product (PROBIOTIC PO) Take 1 capsule by mouth daily       propranolol (INDERAL) 60 MG tablet Uncertain if 60mg short acting or long acting propranolol  daily       propranolol ER (INDERAL LA) 60 MG 24 hr capsule TK 1 C PO QD  2     sertraline (ZOLOFT) 50 MG tablet Take 1 tablet (50 mg) by mouth daily       TURMERIC PO Take 300 mg by mouth daily       vitamin C (ASCORBIC ACID) 500 MG tablet Take 500 mg by mouth daily       ZINC PICOLINATE PO Take 30 mg by mouth daily           Ishan Hearn MD

## 2020-01-09 NOTE — PATIENT INSTRUCTIONS
Medications     830/9am 4pm midnight   Acetaminophen tylenol 500mg  1   1    Albuterol proair hfa/proventil hfa/ventolin hfa 108 (90 base)mcg/act inhaler As needed        ASmanex 120 metered doses 220mcg/inh inhaler     1 puff at bedtime   b complex  1       Bupropion wellbutrin SR 200mg 12 hr tablet 1   1   Calcium carbonate 600mg  1       Carbidopa/levodopa Sinemet 25/100 1 1 1   Cholecalciferol vitamin D3 50mcg 2000 units  1       Cyclobenzaprine flexeril 10mg As needed       Gabapentin neurontin 100mg 1       Gabapentin neurontin 300mg     1   Hydrochlorothiazide hydrodiuril 12.5mg Not taking       L Tyrosine 500 mg and 10mg vitamin B6 ? Not sure if taking       Magnesium 250 mg 1       Mometasone fuorate asmanex   At night   nonformulary digest gold May go off this     Polyethylene glycol miralax glycolazx As needed     Probiotic variable       Propranolol ER inderal LA 60 mg 24 hr capsule 1     Sertraline zoloft 50mg 1     Turmeric 300mg and dicalcium phosphate 65mg 1       Valacyclovir valtrex 1000mg 7 days      Vitamin C 500mg  1       Zinc pilonate 30mg  Variable use                    History obtained from patient    Plan  Manage left leg pain - m eralgia paresthetica /shingles/back problem  May consider mri and see general neurology/physiology in [place] [facility] and emg studies    Consider shingrix vaccine    May want to increase the sinemet from 1 tab 3/day to 1.5 tabs 3/day    Had seen Wendy 2/2019    Return back

## 2020-01-14 ENCOUNTER — TRANSFERRED RECORDS (OUTPATIENT)
Dept: HEALTH INFORMATION MANAGEMENT | Facility: CLINIC | Age: 60
End: 2020-01-14

## 2020-02-19 ENCOUNTER — TELEPHONE (OUTPATIENT)
Dept: NEUROLOGY | Facility: CLINIC | Age: 60
End: 2020-02-19

## 2020-02-19 DIAGNOSIS — G20.A1 PARKINSON DISEASE (H): ICD-10-CM

## 2020-02-19 RX ORDER — CARBIDOPA AND LEVODOPA 25; 100 MG/1; MG/1
TABLET ORAL
Qty: 405 TABLET | Refills: 11 | COMMUNITY
End: 2020-10-22

## 2020-03-03 ENCOUNTER — ALLIED HEALTH/NURSE VISIT (OUTPATIENT)
Dept: PHARMACY | Facility: CLINIC | Age: 60
End: 2020-03-03
Payer: COMMERCIAL

## 2020-03-03 DIAGNOSIS — F41.9 ANXIETY: ICD-10-CM

## 2020-03-03 DIAGNOSIS — M54.9 BACK PAIN, UNSPECIFIED BACK LOCATION, UNSPECIFIED BACK PAIN LATERALITY, UNSPECIFIED CHRONICITY: ICD-10-CM

## 2020-03-03 DIAGNOSIS — K59.00 CONSTIPATION, UNSPECIFIED CONSTIPATION TYPE: ICD-10-CM

## 2020-03-03 DIAGNOSIS — J45.909 UNCOMPLICATED ASTHMA, UNSPECIFIED ASTHMA SEVERITY, UNSPECIFIED WHETHER PERSISTENT: ICD-10-CM

## 2020-03-03 DIAGNOSIS — F32.A DEPRESSION, UNSPECIFIED DEPRESSION TYPE: ICD-10-CM

## 2020-03-03 DIAGNOSIS — M85.80 OSTEOPENIA, UNSPECIFIED LOCATION: ICD-10-CM

## 2020-03-03 DIAGNOSIS — I10 BENIGN ESSENTIAL HYPERTENSION: ICD-10-CM

## 2020-03-03 DIAGNOSIS — G20.C PARKINSONISM, UNSPECIFIED PARKINSONISM TYPE (H): Primary | ICD-10-CM

## 2020-03-03 DIAGNOSIS — Z78.9 TAKES DIETARY SUPPLEMENTS: ICD-10-CM

## 2020-03-03 DIAGNOSIS — G50.0 TRIGEMINAL NEURALGIA: ICD-10-CM

## 2020-03-03 PROCEDURE — 99605 MTMS BY PHARM NP 15 MIN: CPT | Performed by: PHARMACIST

## 2020-03-03 PROCEDURE — 99607 MTMS BY PHARM ADDL 15 MIN: CPT | Performed by: PHARMACIST

## 2020-03-03 RX ORDER — UBIDECARENONE 100 MG
100 CAPSULE ORAL DAILY
COMMUNITY
End: 2020-10-22

## 2020-03-03 NOTE — PROGRESS NOTES
MTM ENCOUNTER  SUBJECTIVE/OBJECTIVE:                Alina Zavala is a 59 year old female called for a follow-up visit.  She was referred to me from Dr. Hearn.     Chief Complaint: Follow up from MT visit on 2/15/19   Tobacco:  reports that she has never smoked. She has never used smokeless tobacco.  Alcohol: not currently using    Medication Adherence/Access: no issues reported    Parkinson's Disease:  Current medications include: Carbidopa-levodopa  mg 1 tablet 4 times/day at 6-7 am, 12 pm, 6 pm, and 12 am (about 6 hours apart) and propranolol ER 60 mg daily (primarily for blood pressure). She is also now taking Smarty Pants Adult probiotic complete 2 chewable tablets daily for the past few weeks - she started it based on a study she read about Parkinson's disease and wants to know if this is a good idea for her to be on. Also wondering about ambraxol - whether it is available in the [place] because there was a study in Parkinson's as well. She has been on the higher dose of levodopa for the past 4 weeks because she was experiencing some wearing off and thinks it has been helping but she is wondering if she should change the timing at all. She is having some foot dystonia/muscle cramping/restless leg symptoms in bed at night when she is falling asleep most days but not every day. She has a weighted blanket. She also gets some pins and needles/nerve pain in legs/feet at night as well. Sometimes walking is painful - she is doing regular exercises to help with this. Tremor is currently minimal unless she misses a dose of levodopa. She is wondering if it is okay to take an afternoon nap when she is sleepy. Additionally, she is wondering if there is anything she should know about coronavirus and PD.     Anxiety/depression: Currently taking bupropion  mg twice daily and sertraline 50 mg daily. Sertraline was recently added for anxiety - it has reportedly helped a lot. She has been on bupropion for many years  "and is wondering if it is necessary still; she tried going off of it many years ago and had to go back on. She is also wondering about CBD oil and if there is interaction with antidepressants.      Trigeminal neuralgia: Currently taking 100 mg of gabapentin in the morning and 300 mg at night.  States this medication has been stable for her symptoms and no concerns.     Hypertension: Current medications include propranolol LA 60 mg daily.  States that this was recently switched from hydrochlorothiazide.  She reportedly had a BP of 124/67 in urgent care recently.     BP Readings from Last 3 Encounters:   01/09/20 (!) 152/82   08/08/19 128/85   03/27/19 139/78     Asthma: Current asthma medications: Asthmanex 1 puff once nightly; albuterol inhaler has been used very rarely - only once this winter.     Back pain: Currently taking acetaminophen 1000 mg twice daily.  She also takes cyclobenzaprine 10 mg PRN, about a few times per week. She feels more \"drugged\" when she takes it during the day - tries to only take it at night. She has been avoiding ibuprofen.     Osteopenia: Current therapy includes: calcium 600 mg daily and Vitamin D supplements: 2000 units (125 mcg). Pt is not experiencing side effects.  Pt is getting the following sources of dietary calcium: cheese  Last vitamin D level: unknown  DEXA History: April 2018, lowest T score was -1.2  Risk factors: post-menopausal    Supplements/vitamins: Currently taking CoQ10 100 mg daily, Vitamin B/vitamin C complex, l-tyrosine 500 mg daily, magnesium 250 mg daily, turmeric 300 mg daily, vitamin C 500 mg daily, zinc 30 mg as needed. She reads about these supplements a lot and would like to stay on them.    Constipation: Not taking Miralax any longer. Constipation has improved - now she is having some looser stools.     ASSESSMENT:                Medication Adherence: excellent, no issues identified    Parkinson's Disease:  Needs improvement. Patient may benefit from " taking her last dose of Levodopa earlier in the evening to help with her evening leg symptoms. It is possible that the 3rd dose is wearing off and causing the cramping/restless sensation. We discussed that the doses do not need to be perfectly 6 hours apart and that 4-5 hours may be more appropriate for her. Additionally, the patient may benefit from taking gabapentin earlier in the evening to help with restless leg and possible neuropathy symptoms. We also discussed that a short afternoon nap can be helpful for regaining energy. Regarding coronavirus (or any illness for that matter), I advised that the patient obtain the [facility] preparation kit.      Anxiety/depression:  Appears stable. Discussed that it may be reasonable for her to try going off bupropion in the future and that she could taper off of it over about 4 weeks or so. If she does choose to try CBD, we would need to monitor the efficacy of the antidepressants as they can be affected by the same enzymes that metabolize CBD.     Trigeminal neuralgia: Stable.      Hypertension: Appears stable. BP was improved per patient report recently.    Asthma: Stable.      Back pain: Needs improvement.  Because the patient seems to have some side effects of cyclobenzaprine I advised that she try taking just half a tablet (5 mg) rather than a full tablet when she needs a dose.     Osteopenia: Needs improvement.  Given the patient's minimal intake of calcium in her diet, she may benefit from taking 2 doses of calcium per day.    Supplements/vitamins: Stable.     Constipation: Resolved.      PLAN:                  1.  Try taking carbidopa-levodopa with 4 to 5 hours between doses (ie: 6 am, 11 am, 4 pm, and 8-9 pm).  Continue to take the medication 1 hour before protein meals or 2 hours after protein meals.  2.  Okay to take a short nap in the afternoon (ie: 20-30 minutes).   3.  Try taking just half a tablet of cyclobenzaprine (5 mg) and see if this is as  effective as a full tablet while minimizing side effects.   4. Try moving up the evening gabapentin to earlier in the evening (ie: 8-9 pm) as this may also help with the restless leg symptoms and nerve related pain.  5.  Increase calcium supplementation to 600 mg twice a day.  6. You can order an Aware in Care kit from the [employer] for hospital preparation: https://[email or web address]/Living-with-[email or web address]/Resources-and-Support/Patient-Safety-Kit     I spent 50 minutes with this patient today. All changes were made via collaborative practice agreement with Dr. Hearn. A copy of the visit note was provided to the patient's referring provider.     Will follow up in 1 year or sooner if needed.    The patient was sent via [employer] a summary of these recommendations.     Wendy Cárdenas, Pharm.D.  Medication Therapy Management Pharmacist  Phone: [phone]

## 2020-03-03 NOTE — PATIENT INSTRUCTIONS
Recommendations from today's MTM visit:                                                      1.  Try taking carbidopa-levodopa with 4 to 5 hours between doses (ie: 6 am, 11 am, 4 pm, and 8-9 pm).  Continue to take the medication 1 hour before protein meals or 2 hours after protein meals.  2.  Okay to take a short nap in the afternoon (ie: 20-30 minutes).   3.  Try taking just half a tablet of cyclobenzaprine (5 mg) and see if this is as effective as a full tablet while minimizing side effects.   4. Try moving up the evening gabapentin to earlier in the evening (ie: 8-9 pm) as this may also help with the restless leg symptoms and nerve related pain.  5.  Increase calcium supplementation to 600 mg twice a day.  6. You can order an Aware in Care kit from the [employer] for hospital preparation: [email or web address]/Living-[email or web address]/Resources-and-Support/Patient-Safety-Kit     It was great to speak with you today.  I value your experience and would be very thankful for your time with providing feedback on our clinic survey. You may receive a survey via email or text message in the next few days.     Next MTM visit: 1 year or sooner if needed    To schedule another MTM appointment, please call the clinic directly or you may call the MTM scheduling line at [phone] or toll-free at [phone].     My Clinical Pharmacist's contact information:                                                      It was a pleasure talking with you today!  Please feel free to contact me with any questions or concerns you have.      Wendy Cárdenas, Pharm.D.  Medication Therapy Management Pharmacist  Phone: [phone]

## 2020-03-11 ENCOUNTER — HEALTH MAINTENANCE LETTER (OUTPATIENT)
Age: 60
End: 2020-03-11

## 2020-03-27 ENCOUNTER — TELEPHONE (OUTPATIENT)
Dept: NEUROLOGY | Facility: CLINIC | Age: 60
End: 2020-03-27

## 2020-03-27 NOTE — TELEPHONE ENCOUNTER
[facility] Call Center    Phone Message    May a detailed message be left on voicemail: yes     Reason for Call: Other: pt calling because she has questions in regards to her blood pressure and the medicat ion that she is on. Pt states that when she stands up her blood pressure shoots down which is causing her to be dizzy and light headed. Please call the pt back to discuss.      Action Taken: Message routed to:  [facility] neurology    Travel Screening: Not Applicable

## 2020-03-30 NOTE — TELEPHONE ENCOUNTER
Situation:  Alina Zavala is a 59 year old female who receives support for Parkinson's disease. Alina calls today with concerns for orthostatic hypotension.    Background:    3/3/2020 - Office visit with Wendy Cárdenas, PharmD:  Carbidopa/levodopa  1 tab 4x daily to be taken every 4-5 hours    Patient is taking:  carbidopa-levodopa  MG PO tablet  405 tablet  11  2020   No    Sig - Route: Take 1 tablet by mouth 4 times daily  - Oral    Started this dosage 2020. Takes at 4-5 AM, 9-10 AM, 3-4 PM, 10-11 PM    propranolol ER (INDERAL LA) 60 MG 24 hr capsule   2  2019   --    Sig - Route: Take 60 mg by mouth daily  - Oral    -Takes 10 mg when her sitting systolic BP is over 140 and after an hour or 2 will take another 10 mg if it does not lower much. She is not taking the extended release capsules. She takes up to 40 mg in 1 day when her SITTING BP is high.    Assessment:  Alina stated she has a history of on and off orthostatic hypotension and being dizzy upon standing for years. Often in the afternoon/evening her blood pressures are higher, up to as high as 170's/90's.  She did not start taking her blood pressure until 10 days ago when her primary recommended she monitor it. She reports she will get dizzy and almost lose her vision and hearing after showering. She admitted to taking hot showers. When she gets on her tip toes to reach things she also has a sense of dizziness, vision, and hearing loss. She does sit or lie down when she gets symptomatic.  She reports not knowing when exactly to take her propranolol and how much. She knows her systolic being over 140 is high but is unsure when the diastolic is too high or low and if she should take propranolol for it. She denies falling. She discussed in detail that her morning pressures seem to be normal or low and her afternoon/evening pressures seem to be when they get high.    This morning:  Sitting /86  Standin/62 (waiting 1-2  minutes to take it after standing)    1. Does the blood pressure drop after meals? - She is not sure, she will start to monitor this.    Education:  Techniques to treat orthostatic hypotension:  ear compression stockings  Elevate legs when sitting   Increase fluids and salt  Eat small meals 5 times a day   Avoid hot shower.      When your systolic (bottom number) is below 90 this is considered low. Low blood pressure is typically not worrisome unless you are symptomatic. If the top number drops more than 20 mmHg when standing this is a sign of orthostatic hypotension.  Diastolic (bottom number) is considered high if over 90. Typically 80's,70's, and 60's are okay. If you see a drop of 10 or more mmHg in the diastolic when standing this shows there is an issue with orthostatic blood pressure.    Plan/recommendation:  1. Alina will ask her primary about cardiologists in her area regarding her blood pressure. She will let us know if she would like to be seen at the [facility] after speaking to him.    2. Alina will call her primary Dr. Costa and ask for parameters for when to take her propranolol.    3. Alina will start taking her blood pressure when lying down, before she gets up in the morning.    4. Alina will send her blood pressure record to us on [employer]. Once I receive this I will pass this information onto the team.    35 minute phone call

## 2020-04-29 ENCOUNTER — TRANSFERRED RECORDS (OUTPATIENT)
Dept: HEALTH INFORMATION MANAGEMENT | Facility: CLINIC | Age: 60
End: 2020-04-29

## 2020-05-05 ENCOUNTER — TRANSFERRED RECORDS (OUTPATIENT)
Dept: HEALTH INFORMATION MANAGEMENT | Facility: CLINIC | Age: 60
End: 2020-05-05

## 2020-05-05 LAB — EJECTION FRACTION: NORMAL %

## 2020-05-07 LAB
CREAT SERPL-MCNC: 13 MG/DL (ref 7–25)
GFR SERPL CREATININE-BSD FRML MDRD: 53 ML/MIN/1.73M2
GLUCOSE SERPL-MCNC: 69 MG/DL (ref 65–99)
POTASSIUM SERPL-SCNC: 3.8 MMOL/L (ref 3.5–5.3)

## 2020-05-25 RX ORDER — PROPRANOLOL HYDROCHLORIDE 10 MG/1
10 TABLET ORAL 3 TIMES DAILY
COMMUNITY
Start: 2020-04-09 | End: 2021-02-12

## 2020-05-25 RX ORDER — HYDROCHLOROTHIAZIDE 12.5 MG/1
TABLET ORAL
Refills: 1 | COMMUNITY
Start: 2019-08-02 | End: 2020-06-12

## 2020-05-26 NOTE — PROGRESS NOTES
"Alina Zavala is a 60 year old female who is being evaluated via a billable video visit.      The patient has been notified of following:     \"This video visit will be conducted via a call between you and your physician/provider. We have found that certain health care needs can be provided without the need for an in-person physical exam.  This service lets us provide the care you need with a video conversation.  If a prescription is necessary we can send it directly to your pharmacy.  If lab work is needed we can place an order for that and you can then stop by our lab to have the test done at a later time.    Video visits are billed at different rates depending on your insurance coverage.  Please reach out to your insurance provider with any questions.    If during the course of the call the physician/provider feels a video visit is not appropriate, you will not be charged for this service.\"    Patient has given verbal consent for Video visit? Yes    Will anyone else be joining your video visit? No        Video-Visit Details    Type of service:  Video Visit    Video Start Time: 0  Video End Time: 0    Originating Location (pt. Location): Home    Distant Location (provider location):  [facility] NEUROLOGY     Platform used for Video Visit: Other: tbd    Ishan Hearn MD          VIDEO VISIT    Date of Visit: June 12, 2020  Name: Alian Zavala  Date of Birth 1960  [facility] [address]  [phone] ()  [phone] (H)  [email or web address]  Has mychart  No proxy    Tejinder Zavala  [address]    Venkat Cooney and Marissa.     Assessment:  (G20) Parkinson disease (H)  (primary encounter diagnosis)  POTS  - propranolol 10mg 3/day  Left trigeminal neuralgia  Prior history of lyme disease  Sweating episodes, not able to take hot shower.       OSI therapy  [facility] Exercise groups - support group -  May be s tarting up at the end of summer  Has problems opening things  Doing her own loud " exercises  She does not have a cycling system    She is being careful about covid19    She has some ongoing leg spasms at bedtime or night time that is like restless legs and it may go away.     She has to walk around in a [place] to manage these symptoms. She has various coping mechanisms    Started on propranolol 10mg 3/day [facility]    POTS - was told to increase water intake and salt intake.     Left trigeminal neuralgia    Prior history of lyme disease.     Was told she has POTS    Had carotid ultrasound and echocardiogram that was good.     Has some dizziness and loses sight and hearing when standing and has periods when this is present  She has a chart of blood pressure readings. She has had drops in her blood pressure and her pulse usually would increase. This would correlate with her dizziness.     She has had some  Urinary urgency at times.     She has had constipation in the past and it is intermittent and has had intermittent diarrhea and has had variable bowel function.     Depression/anxiety stable    Medications     4-530am 10am 430-5pm 10pm   Acetaminophen tylenol 500mg   2   2   Albuterol proair hfa/proventil hfa/ventolin hfa 108 (90 base)mcg/act inhaler  As needed       ASmanex 120 metered doses 220mcg/inh inhaler      1 puff    b complex     1    Bupropion wellbutrin SR 200mg 12 hr tablet  1  1    Calcium carbonate 600mg     1    Carbidopa/levodopa Sinemet 25/100 1 1 1 1   Cholecalciferol vitamin D3 50mcg 2000 units   1      Coenzyme Q10 100mg    1   Cyclobenzaprine flexeril 10mg  As needed      Gabapentin neurontin 100mg  1      Gabapentin neurontin 300mg     1    L Tyrosine 500 mg and 10mg vitamin B6  1      Magnesium oxide antacid 500mg  1     Mometasone fumorate asmanex     See above   Probiotic  variable      Propranolol inderal 10mg  1 1 1   Sertraline zoloft 50mg  1      Turmeric 300mg and dicalcium phosphate 65mg  1      Vitamin C 500mg   1                       "     Plan:    They don't need orders for therapy for OSI    Proxy access granted to her      Continue to monitor tingling in feet. Make sure you have a routine fasting blood sugar for diabetes.    Talked about bowel issues and the use of senokot, miralax, questran, etc. If needed.     At this point will not change her parkinson medications.     Return in 6 months.       I have reviewed the note as documented above.  This accurately captures the substance of my conversation with the patient.  Patient contact time  25  minutes. Over 50% of this visit was spent in patient care and care coordination.     Visit time 4pm - 428pm    Ishan Hearn MD      ------------------------------------------------------------------------------------------------------------------------------------------------------------------------    Video-Visit Details    The patient has been notified of following:     \"After a review of the patient s situation, this visit was changed from an in-person visit to a video visit to reduce the risk of COVID-19 exposure.   The patient is being evaluated via a billable video visit.\"    \"This video visit will be conducted via a call between you and your physician/provider. We have found that certain health care needs can be provided without the need for an in-person physical exam.  This service lets us provide the care you need with a video conversation.  If a prescription is necessary we can send it directly to your pharmacy.  If lab work is needed we can place an order for that and you can then stop by our lab to have the test done at a later time.    If during the course of the call the physician/provider feels a video visit is not appropriate, you will not be charged for this service.\"     Patient has given verbal consent for Video visit? Yes    Patient would like the video invitation sent by:     Type of service:  Video Visit    Video Start Time:     Video End Time (time video stopped): "     Duration:  minutes - see above    Originating Location (pt. Location):     Distant Location (provider location):  [facility] NEUROLOGY     Mode of Communication:  Video Conference via [employer] Mobile App (and if not possible then doximity)      Ishan Hearn MD      --------------------------------------------------------------------------------------------------------------    Alina Zavala is a 59 year old female who is being evaluated via a billable video visit.      Charts reviewed  Consult from  Images reviewed        I have reviewed and updated the patient's Past Medical History, Social History, Family History and Medication List.    ALLERGIES  Prednisone; Donepezil; Penicillins; and Sulfa drugs    Lasts visit details if there was a last visit:         Medications                                                                                                                                                                                                              14 Review of systems  are negative except for   Patient Active Problem List   Diagnosis     Asthma     Benign essential hypertension        Allergies   Allergen Reactions     Prednisone Anxiety, Palpitations and Shortness Of Breath     Donepezil      Insomnia, nausea, dizziness/somnolence      Penicillins      Unknown     Sulfa Drugs      Unknown     Past Surgical History:   Procedure Laterality Date     D & C       Past Medical History:   Diagnosis Date     Anxiety      Depression      Hyperlipidemia      Hypertension      Lyme disease      Trigeminal neuralgia      Social History     Socioeconomic History     Marital status:      Spouse name: Not on file     Number of children: Not on file     Years of education: Not on file     Highest education level: Not on file   Occupational History     Not on file   Social Needs     Financial resource strain: Not on file     Food insecurity     Worry: Not on file     Inability: Not on file      "Transportation needs     Medical: Not on file     Non-medical: Not on file   Tobacco Use     Smoking status: Never Smoker     Smokeless tobacco: Never Used   Substance and Sexual Activity     Alcohol use: No     Frequency: Never     Drug use: Not on file     Sexual activity: Not on file   Lifestyle     Physical activity     Days per week: Not on file     Minutes per session: Not on file     Stress: Not on file   Relationships     Social connections     Talks on phone: Not on file     Gets together: Not on file     Attends Anglican service: Not on file     Active member of club or organization: Not on file     Attends meetings of clubs or organizations: Not on file     Relationship status: Not on file     Intimate partner violence     Fear of current or ex partner: Not on file     Emotionally abused: Not on file     Physically abused: Not on file     Forced sexual activity: Not on file   Other Topics Concern     Not on file   Social History Narrative    . Lives in [place]. Tejinder Zavala spouse. They have three children. Their names are Eros, Venkat, and Christie. She is a homemaker. Studied library science and theology. She was a  for over 16 years. 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 Adult onset.      Cerebrovascular Disease Father      Hypertension Father      Dementia No family hx of      Parkinsonism No family hx of      Current Outpatient Medications   Medication Sig Dispense Refill     acetaminophen (TYLENOL) 500 MG tablet Take 500-1,000 mg " by mouth every 6 hours as needed for mild pain       albuterol (PROAIR HFA/PROVENTIL HFA/VENTOLIN HFA) 108 (90 Base) MCG/ACT inhaler Inhale 2 puffs into the lungs every 6 hours as needed       ASMANEX 120 METERED DOSES 220 MCG/INH inhaler Inhale 1 puff into the lungs At Bedtime   1     B Complex-C (VITAMIN B COMPLEX W/VITAMIN C) TABS tablet Take 1 tablet by mouth daily       buPROPion (WELLBUTRIN SR) 200 MG 12 hr tablet 200mg tab by mouth twice daily at 9 am and 9 pm  0     calcium carbonate (CALCIUM CARBONATE) 600 MG tablet Take 1 tablet by mouth daily        carbidopa-levodopa  MG PO tablet Take 1 tablet by mouth 4 times daily  405 tablet 11     Cholecalciferol (VITAMIN D3) 2000 units TABS Take 2,000 Units by mouth daily       co-enzyme Q-10 100 MG CAPS capsule Take 100 mg by mouth daily       cyclobenzaprine (FLEXERIL) 10 MG tablet Take 10 mg by mouth as needed (for back pain - very sparingly)       gabapentin (NEURONTIN) 100 MG capsule Take 100 mg by mouth every morning   1     gabapentin (NEURONTIN) 300 MG capsule Take 300 mg by mouth At Bedtime   0     hydrochlorothiazide (HYDRODIURIL) 12.5 MG tablet TK 1 T PO QD  1     l-tyrosine 500 MG TABS Take 500 mg by mouth daily        magnesium 250 MG tablet Take 1 tablet by mouth daily        Probiotic Product (PROBIOTIC PO) Take 2 capsules by mouth daily (Smarty Pants Adult Probiotic Complete)       propranolol (INDERAL) 10 MG tablet        propranolol ER (INDERAL LA) 60 MG 24 hr capsule Take 60 mg by mouth daily   2     sertraline (ZOLOFT) 50 MG tablet Take 1 tablet (50 mg) by mouth daily       TURMERIC PO Take 300 mg by mouth daily       vitamin C (ASCORBIC ACID) 500 MG tablet Take 500 mg by mouth daily       ZINC PICOLINATE PO 30mg tab by mouth Variably taking this

## 2020-06-12 ENCOUNTER — VIRTUAL VISIT (OUTPATIENT)
Dept: NEUROLOGY | Facility: CLINIC | Age: 60
End: 2020-06-12
Payer: COMMERCIAL

## 2020-06-12 DIAGNOSIS — G20.A1 PARKINSON DISEASE (H): Primary | ICD-10-CM

## 2020-06-12 PROBLEM — M51.369 DDD (DEGENERATIVE DISC DISEASE), LUMBAR: Status: ACTIVE | Noted: 2020-04-29

## 2020-06-12 PROBLEM — M85.80 OSTEOPENIA: Status: ACTIVE | Noted: 2020-04-29

## 2020-06-12 PROBLEM — I95.9 HYPOTENSION: Status: ACTIVE | Noted: 2020-04-29

## 2020-06-12 PROBLEM — F32.A DEPRESSION: Status: ACTIVE | Noted: 2020-04-29

## 2020-06-12 PROBLEM — G90.A POTS (POSTURAL ORTHOSTATIC TACHYCARDIA SYNDROME): Status: ACTIVE | Noted: 2020-04-29

## 2020-06-12 PROBLEM — F41.9 ANXIETY: Status: ACTIVE | Noted: 2020-04-29

## 2020-06-12 PROBLEM — R09.89 LABILE BLOOD PRESSURE: Status: ACTIVE | Noted: 2020-04-29

## 2020-06-12 PROBLEM — R42 DIZZINESS: Status: ACTIVE | Noted: 2020-04-29

## 2020-06-12 PROBLEM — R55 NEAR SYNCOPE: Status: ACTIVE | Noted: 2020-04-29

## 2020-06-12 PROBLEM — D12.6 ADENOMA OF COLON: Status: ACTIVE | Noted: 2020-04-29

## 2020-06-12 PROBLEM — G50.0 TRIGEMINAL NEURALGIA: Status: ACTIVE | Noted: 2020-04-29

## 2020-06-12 RX ORDER — ACETAMINOPHEN 500 MG
1000 TABLET ORAL 2 TIMES DAILY
COMMUNITY
Start: 2020-06-12 | End: 2020-10-22

## 2020-06-12 RX ORDER — ASCORBIC ACID 125 MG
1 TABLET,CHEWABLE ORAL
COMMUNITY
End: 2020-10-22

## 2020-06-12 RX ORDER — CALCIUM CARBONATE/VITAMIN D3 600 MG-10
TABLET ORAL
COMMUNITY
End: 2020-06-12

## 2020-06-12 NOTE — LETTER
"[facility]  [facility] NEUROLOGY  [address] [id number] [phone]            2020          Dear Alex Proxy Patient,    We received a request to activate you as a proxy for another patient of [facility] or [facility]. In order to do so, we need to activate your [employer] account as well.    Your access code is: [unfilled]       Please access the [employer] website:  -  Eagle: [email or web address]  -  My Single [email or web address].    Below the ID and password fields, select the \"Sign Up Now\" as New User.  You will be prompted to enter the access code listed above as well as additional personal information.  Please follow the directions carefully when creating your username and password.    Once your account is activated, you can access the proxy accounts under \"Shared Medical Records\".    If you allow your access code to , or if you have any questions please call [employer] support at [phone] during normal clinic hours.     Sincerely,        [employer] Customer Service    "

## 2020-06-12 NOTE — PATIENT INSTRUCTIONS
:  (G20) Parkinson disease (H)  (primary encounter diagnosis)  POTS  - propranolol 10mg 3/day  Left trigeminal neuralgia  Prior history of lyme disease  Sweating episodes, not able to take hot shower.       OSI therapy  [employer] groups - support group -  May be s tarting up at the end of summer  Has problems opening things  Doing her own loud exercises  She does not have a cycling system    She is being careful about covid19    She has some ongoing leg spasms at bedtime or night time that is like restless legs and it may go away.     She has to walk around in a [place] to manage these symptoms. She has various coping mechanisms    Started on propranolol 10mg 3/day [facility]    POTS - was told to increase water intake and salt intake.     Left trigeminal neuralgia    Prior history of lyme disease.     Was told she has POTS    Had carotid ultrasound and echocardiogram that was good.     Has some dizziness and loses sight and hearing when standing and has periods when this is present  She has a chart of blood pressure readings. She has had drops in her blood pressure and her pulse usually would increase. This would correlate with her dizziness.     She has had some  Urinary urgency at times.     She has had constipation in the past and it is intermittent and has had intermittent diarrhea and has had variable bowel function.     Depression/anxiety stable    Medications     4-530am 10am 430-5pm 10pm   Acetaminophen tylenol 500mg   2   2   Albuterol proair hfa/proventil hfa/ventolin hfa 108 (90 base)mcg/act inhaler  As needed       ASmanex 120 metered doses 220mcg/inh inhaler      1 puff    b complex     1    Bupropion wellbutrin SR 200mg 12 hr tablet  1  1    Calcium carbonate 600mg     1    Carbidopa/levodopa Sinemet 25/100 1 1 1 1   Cholecalciferol vitamin D3 50mcg 2000 units   1      Coenzyme Q10 100mg    1   Cyclobenzaprine flexeril 10mg  As needed      Gabapentin neurontin 100mg   1      Gabapentin neurontin 300mg     1    L Tyrosine 500 mg and 10mg vitamin B6  1      Magnesium oxide antacid 500mg  1     Mometasone fumorate asmanex     See above   Probiotic  variable      Propranolol inderal 10mg  1 1 1   Sertraline zoloft 50mg  1      Turmeric 300mg and dicalcium phosphate 65mg  1      Vitamin C 500mg   1                           Plan:    They don't need orders for therapy for OSI    Proxy access granted to her      Continue to monitor tingling in feet. Make sure you have a routine fasting blood sugar for diabetes.    Talked about bowel issues and the use of senokot, miralax, questran, etc. If needed.     At this point will not change her parkinson medications.     Return in 6 months.

## 2020-06-12 NOTE — LETTER
"6/12/2020       RE: Alina Zavala  1971 [facility] [address]     Dear Colleague,    Thank you for referring your patient, Alina Zavala, to the [facility]UROLOG[facility] at [facility]. Please see a copy of my visit note below.    Alina Zavala is a 60 year old female who is being evaluated via a billable video visit.      The patient has been notified of following:     \"This video visit will be conducted via a call between you and your physician/provider. We have found that certain health care needs can be provided without the need for an in-person physical exam.  This service lets us provide the care you need with a video conversation.  If a prescription is necessary we can send it directly to your pharmacy.  If lab work is needed we can place an order for that and you can then stop by our lab to have the test done at a later time.    Video visits are billed at different rates depending on your insurance coverage.  Please reach out to your insurance provider with any questions.    If during the course of the call the physician/provider feels a video visit is not appropriate, you will not be charged for this service.\"    Patient has given verbal consent for Video visit? Yes    Will anyone else be joining your video visit? No        Video-Visit Details    Type of service:  Video Visit    Video Start Time: 0  Video End Time: 0    Originating Location (pt. Location): Home    Distant Location (provider location):  [facility] NEUROLOGY     Platform used for Video Visit: Other: angel Hearn MD          VIDEO VISIT    Date of Visit: June 12, 2020  Name: Alina Zavala  Date of Birth 1960  [facility] [address]  [phone] ()  [phone] (H)  [email or web address]  Has mychart  No proxy    Tejinder Zavala  [address]    Venkat Cooney and Marissa.     Assessment:  (G20) Parkinson disease (H)  (primary encounter diagnosis)  POTS  - propranolol 10mg " 3/day  Left trigeminal neuralgia  Prior history of lyme disease  Sweating episodes, not able to take hot shower.       OSI therapy  [facility]  [facility] Exercise groups - support group -  May be s tarting up at the end of summer  Has problems opening things  Doing her own loud exercises  She does not have a cycling system    She is being careful about covid19    She has some ongoing leg spasms at bedtime or night time that is like restless legs and it may go away.     She has to walk around in a [place] to manage these symptoms. She has various coping mechanisms    Started on propranolol 10mg 3/day dr ross [facility] cardiology    POTS - was told to increase water intake and salt intake.     Left trigeminal neuralgia    Prior history of lyme disease.     Was told she has POTS    Had carotid ultrasound and echocardiogram that was good.     Has some dizziness and loses sight and hearing when standing and has periods when this is present  She has a chart of blood pressure readings. She has had drops in her blood pressure and her pulse usually would increase. This would correlate with her dizziness.     She has had some  Urinary urgency at times.     She has had constipation in the past and it is intermittent and has had intermittent diarrhea and has had variable bowel function.     Depression/anxiety stable    Medications     4-530am 10am 430-5pm 10pm   Acetaminophen tylenol 500mg   2   2   Albuterol proair hfa/proventil hfa/ventolin hfa 108 (90 base)mcg/act inhaler  As needed       ASmanex 120 metered doses 220mcg/inh inhaler      1 puff    b complex     1    Bupropion wellbutrin SR 200mg 12 hr tablet  1  1    Calcium carbonate 600mg     1    Carbidopa/levodopa Sinemet 25/100 1 1 1 1   Cholecalciferol vitamin D3 50mcg 2000 units   1      Coenzyme Q10 100mg    1   Cyclobenzaprine flexeril 10mg  As needed      Gabapentin neurontin 100mg  1      Gabapentin neurontin 300mg     1    L Tyrosine 500 mg and 10mg vitamin B6  1     "  Magnesium oxide antacid 500mg  1     Mometasone fumorate asmanex     See above   Probiotic  variable      Propranolol inderal 10mg  1 1 1   Sertraline zoloft 50mg  1      Turmeric 300mg and dicalcium phosphate 65mg  1      Vitamin C 500mg   1                           Plan:    They don't need orders for therapy for OSI    Proxy access granted to her      Continue to monitor tingling in feet. Make sure you have a routine fasting blood sugar for diabetes.    Talked about bowel issues and the use of senokot, miralax, questran, etc. If needed.     At this point will not change her parkinson medications.     Return in 6 months.       I have reviewed the note as documented above.  This accurately captures the substance of my conversation with the patient.  Patient contact time  25  minutes. Over 50% of this visit was spent in patient care and care coordination.     Visit time 4pm - 428pm    Ishan Hearn MD      ------------------------------------------------------------------------------------------------------------------------------------------------------------------------    Video-Visit Details    The patient has been notified of following:     \"After a review of the patient s situation, this visit was changed from an in-person visit to a video visit to reduce the risk of COVID-19 exposure.   The patient is being evaluated via a billable video visit.\"    \"This video visit will be conducted via a call between you and your physician/provider. We have found that certain health care needs can be provided without the need for an in-person physical exam.  This service lets us provide the care you need with a video conversation.  If a prescription is necessary we can send it directly to your pharmacy.  If lab work is needed we can place an order for that and you can then stop by our lab to have the test done at a later time.    If during the course of the call the physician/provider feels a video visit is not " "appropriate, you will not be charged for this service.\"     Patient has given verbal consent for Video visit? Yes    Patient would like the video invitation sent by:     Type of service:  Video Visit    Video Start Time:     Video End Time (time video stopped):     Duration:  minutes - see above    Originating Location (pt. Location):     Distant Location (provider location):  [facility] NEUROLOGY     Mode of Communication:  Video Conference via [employer] (and if not possible then doximity)      Ishan Hearn MD      --------------------------------------------------------------------------------------------------------------    Alina Zavala is a 59 year old female who is being evaluated via a billable video visit.      Charts reviewed  Consult from  Images reviewed        I have reviewed and updated the patient's Past Medical History, Social History, Family History and Medication List.    ALLERGIES  Prednisone; Donepezil; Penicillins; and Sulfa drugs    Lasts visit details if there was a last visit:         Medications                                                                                                                                                                                                              14 Review of systems  are negative except for   Patient Active Problem List   Diagnosis     Asthma     Benign essential hypertension        Allergies   Allergen Reactions     Prednisone Anxiety, Palpitations and Shortness Of Breath     Donepezil      Insomnia, nausea, dizziness/somnolence      Penicillins      Unknown     Sulfa Drugs      Unknown     Past Surgical History:   Procedure Laterality Date     D & C       Past Medical History:   Diagnosis Date     Anxiety      Depression      Hyperlipidemia      Hypertension      Lyme disease      Trigeminal neuralgia      Social History     Socioeconomic History     Marital status:      Spouse name: Not on file     Number of children: Not on file " "    Years of education: Not on file     Highest education level: Not on file   Occupational History     Not on file   Social Needs     Financial resource strain: Not on file     Food insecurity     Worry: Not on file     Inability: Not on file     Transportation needs     Medical: Not on file     Non-medical: Not on file   Tobacco Use     Smoking status: Never Smoker     Smokeless tobacco: Never Used   Substance and Sexual Activity     Alcohol use: No     Frequency: Never     Drug use: Not on file     Sexual activity: Not on file   Lifestyle     Physical activity     Days per week: Not on file     Minutes per session: Not on file     Stress: Not on file   Relationships     Social connections     Talks on phone: Not on file     Gets together: Not on file     Attends [employer] service: Not on file     Active member of club or organization: Not on file     Attends meetings of clubs or organizations: Not on file     Relationship status: Not on file     Intimate partner violence     Fear of current or ex partner: Not on file     Emotionally abused: Not on file     Physically abused: Not on file     Forced sexual activity: Not on file   Other Topics Concern     Not on file   Social History Narrative    . Lives in [place]. Tejinder Zavala spouse. They have three children. Their names are Eros, Venkat, and Christie. She is a homemaker. Studied library science and theology. She was a  for over 16 years. 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 Adult onset.      " Cerebrovascular Disease Father      Hypertension Father      Dementia No family hx of      Parkinsonism No family hx of      Current Outpatient Medications   Medication Sig Dispense Refill     acetaminophen (TYLENOL) 500 MG tablet Take 500-1,000 mg by mouth every 6 hours as needed for mild pain       albuterol (PROAIR HFA/PROVENTIL HFA/VENTOLIN HFA) 108 (90 Base) MCG/ACT inhaler Inhale 2 puffs into the lungs every 6 hours as needed       ASMANEX 120 METERED DOSES 220 MCG/INH inhaler Inhale 1 puff into the lungs At Bedtime   1     B Complex-C (VITAMIN B COMPLEX W/VITAMIN C) TABS tablet Take 1 tablet by mouth daily       buPROPion (WELLBUTRIN SR) 200 MG 12 hr tablet 200mg tab by mouth twice daily at 9 am and 9 pm  0     calcium carbonate (CALCIUM CARBONATE) 600 MG tablet Take 1 tablet by mouth daily        carbidopa-levodopa  MG PO tablet Take 1 tablet by mouth 4 times daily  405 tablet 11     Cholecalciferol (VITAMIN D3) 2000 units TABS Take 2,000 Units by mouth daily       co-enzyme Q-10 100 MG CAPS capsule Take 100 mg by mouth daily       cyclobenzaprine (FLEXERIL) 10 MG tablet Take 10 mg by mouth as needed (for back pain - very sparingly)       gabapentin (NEURONTIN) 100 MG capsule Take 100 mg by mouth every morning   1     gabapentin (NEURONTIN) 300 MG capsule Take 300 mg by mouth At Bedtime   0     hydrochlorothiazide (HYDRODIURIL) 12.5 MG tablet TK 1 T PO QD  1     l-tyrosine 500 MG TABS Take 500 mg by mouth daily        magnesium 250 MG tablet Take 1 tablet by mouth daily        Probiotic Product (PROBIOTIC PO) Take 2 capsules by mouth daily (Smarty Pants Adult Probiotic Complete)       propranolol (INDERAL) 10 MG tablet        propranolol ER (INDERAL LA) 60 MG 24 hr capsule Take 60 mg by mouth daily   2     sertraline (ZOLOFT) 50 MG tablet Take 1 tablet (50 mg) by mouth daily       TURMERIC PO Take 300 mg by mouth daily       vitamin C (ASCORBIC ACID) 500 MG tablet Take 500 mg by mouth daily       ZINC  PICOLINATE PO 30mg tab by mouth Variably taking this         Again, thank you for allowing me to participate in the care of your patient.      Sincerely,    Ishan Hearn MD

## 2020-09-23 ENCOUNTER — TRANSFERRED RECORDS (OUTPATIENT)
Dept: HEALTH INFORMATION MANAGEMENT | Facility: CLINIC | Age: 60
End: 2020-09-23

## 2020-09-23 LAB
ALT SERPL-CCNC: 1.19 MIU/L (ref 0.4–4.5)
AST SERPL-CCNC: 14 U/L (ref 10–35)
CREAT SERPL-MCNC: 1.12 MG/DL (ref 0.5–0.99)
GFR SERPL CREATININE-BSD FRML MDRD: 53 ML/MIN/1.73M2
GLUCOSE SERPL-MCNC: 79 MG/DL (ref 65–99)
POTASSIUM SERPL-SCNC: 4.2 MMOL/L (ref 3.5–5.3)
TSH SERPL-ACNC: 1.19 MIU/L (ref 0.4–4.5)

## 2020-09-29 ENCOUNTER — TELEPHONE (OUTPATIENT)
Dept: NEUROLOGY | Facility: CLINIC | Age: 60
End: 2020-09-29

## 2020-09-29 NOTE — TELEPHONE ENCOUNTER
[facility]    Phone Message    May a detailed message be left on voicemail: yes     Reason for Call: Other: Alina calling to request a call back to discuss her blood pressure being really low. She is not taking propranolol as prescribed due to her blood pressure being so low.  When she does take it it is usually take it at night, however she did not take any yesterday. Please call her at your earliest convenience to discuss.     Action Taken: Message routed to:  [facility] ([facility]    Travel Screening: Not Applicable

## 2020-09-30 ENCOUNTER — TELEPHONE (OUTPATIENT)
Dept: NEUROLOGY | Facility: CLINIC | Age: 60
End: 2020-09-30

## 2020-09-30 NOTE — TELEPHONE ENCOUNTER
Pt saw a cardiologist 3 months ago in [place]. Her cardiologist described her symptoms as POTS. She was encouraged to drink more water, eat more salt, wear compression socks. Compression socks did not work well for her. She had PCP appointment last week and her blood pressure was okay.     Her BP will go low when she stands sometimes 40 points. More recently she has noticed it will stay low even when active. Her BP readings over last few days have been 70-90/40-60's. She will get very dizzy, have dull headaches sometimes.     She has been taking the propranolol 3xday for blood pressure and tremor management. Over past few days she has stopped taking the propranolol. She noticed yesterday after not taking propranolol for 36 hours her BP was 127/80's and she took one dose. She took it again this morning. I recommended she call her cardiologist.     She is wondering if Dr. Hearn has any recommendations for her. Should she be seen sooner than 12/15? I will update Dr. Hearn and see what he advises for pt.      Eleni CHINO

## 2020-09-30 NOTE — TELEPHONE ENCOUNTER
I called Alina to let her know Dr. Hearn also recommends she follow up with Cardiology. She said they had just called her back and she has a follow up next week.     Eleni CHINO

## 2020-10-08 ENCOUNTER — TRANSFERRED RECORDS (OUTPATIENT)
Dept: HEALTH INFORMATION MANAGEMENT | Facility: CLINIC | Age: 60
End: 2020-10-08

## 2020-10-20 ENCOUNTER — TELEPHONE (OUTPATIENT)
Dept: NEUROLOGY | Facility: CLINIC | Age: 60
End: 2020-10-20

## 2020-10-20 RX ORDER — SERTRALINE HYDROCHLORIDE 100 MG/1
TABLET, FILM COATED ORAL
COMMUNITY
Start: 2020-09-23 | End: 2020-10-20

## 2020-10-20 RX ORDER — SERTRALINE HYDROCHLORIDE 100 MG/1
TABLET, FILM COATED ORAL
COMMUNITY
Start: 2020-10-20 | End: 2021-11-19

## 2020-10-20 RX ORDER — FLUDROCORTISONE ACETATE 0.1 MG/1
TABLET ORAL
COMMUNITY
Start: 2020-10-08 | End: 2020-10-20

## 2020-10-20 RX ORDER — FLUDROCORTISONE ACETATE 0.1 MG/1
TABLET ORAL
COMMUNITY
Start: 2020-10-20 | End: 2020-10-22

## 2020-10-20 RX ORDER — PROPRANOLOL HCL 60 MG
CAPSULE, EXTENDED RELEASE 24HR ORAL
COMMUNITY
Start: 2020-02-29 | End: 2020-10-20

## 2020-10-20 RX ORDER — BUPROPION HYDROCHLORIDE 300 MG/1
TABLET ORAL
COMMUNITY
Start: 2020-10-20 | End: 2021-01-29

## 2020-10-20 RX ORDER — PROPRANOLOL HCL 60 MG
CAPSULE, EXTENDED RELEASE 24HR ORAL
COMMUNITY
Start: 2020-10-20 | End: 2020-10-22

## 2020-10-20 RX ORDER — BUPROPION HYDROCHLORIDE 300 MG/1
TABLET ORAL
COMMUNITY
Start: 2020-09-23 | End: 2020-10-20

## 2020-10-20 NOTE — TELEPHONE ENCOUNTER
Health Call Center    Phone Message    May a detailed message be left on voicemail: yes     Reason for Call: Symptoms or Concerns     If patient has red-flag symptoms, warm transfer to triage line    Current symptom or concern: pt is starting to get headachey, moods are going down, more fatigued, feeling more inside tremors than outside tremors, feet are moving aound.    Symptoms have been present for:  Not sure  Has patient previously been seen for this? Yes    By : Dr. Ishan Hearn    Date: 6/12/20    Are there any new or worsening symptoms? Yes: pt has upcoming appt on 12/15/20. Pt was hoping to be seen much sooner. Pt is asking if she should medicate differently. Please call pt back. Thank you.      Action Taken: Message routed to:  [facility] ([facility]):  Neurology    Travel Screening: Not Applicable                                                                       Dr. Smith notified that patient's blood pressure continues to be elevated despite Hydralazine given but is at his pre-operative baseline.  MD states it is okay to discharge patient home but to inform him of symptoms of elevated blood pressure and to follow up with his primary physician regarding this.  Patient instructed to go to nearest Emergency Room if he experienced shortness of breath, chest pain, changes in vision, severe headache, dizziness, or fatigue.  Instructions written on discharge instructions.

## 2020-10-20 NOTE — TELEPHONE ENCOUNTER
Health Call Center    Phone Message    May a detailed message be left on voicemail: yes     Reason for Call: Other: pt requesting Nurse Alina Marti to call pt back, please call # [phone].  pt is asking for assistance regarding the [facility] Hernandez in [place], [employer].  Does the provider need to writer an order or  Script to approve this for the pt?  Please have Nurse Fuller call this pt., thank you.    Action Taken: Message routed to:  [facility] ([facility]):  Neurology    Travel Screening: Not Applicable

## 2020-10-20 NOTE — PROGRESS NOTES
See other note    Date of Visit: October 22, 2020  Name: Alina Zavala  Date of Birth 1960  [address] [place] [id number]  [phone] (M)  [phone] (H)  [email or web address]  Has mychart  No proxy     eTjinder Zavala  [phone]     Eros, Venkat, and Christie.      Assessment:  (G20) Parkinson disease (H)  (primary encounter diagnosis)  Carbidopa/levodopa Sinemet 25/100 4/day at 3:30 - 5:30 am, 10 am, 4-5 pm, and 10-11 pm.    POTS    - propranolol 10mg 3/day; water and salt intake  Propranolol ER Inderal LA 60mg 24 hr capsule  Fludrocortisone florinef 0.1mg    Urinary urgency    Constipation and diarrhea  Magnesium oxide  Probiotic    Depression/anxiety   Bupropion Wellbutrin SR 200mg 12 hr tablet  Bupropion wellbutrin XL 300mg 24 hr tablet  Sertraline zoloft 50mg  Sertraline zoloft 100mg    Left trigeminal neuralgia  Prior history of lyme disease  Sweating episodes, not able to take hot shower.     Breathing  Albuterol proair  Asmanex  Mometasone asmanex    Endo  Calcium carbonate  Cholecalciferol vitamin D3 2000 units    Gabapentin neurontin 100mg  Gabapentin neurontin 300mg     Turmeric 300mg  Vitamin C ascorbic acid 500mg    Medications     330-530am 10am 4-5pm 10-11pm   Acetaminophen tylenol 500mg    2    2   Albuterol proair hfa/proventil hfa/ventolin hfa 108 (90 base)mcg/act inhaler   As needed        ASmanex 120 metered doses 220mcg/inh inhaler        1 puff    b complex        1    Bupropion wellbutrin XL 300mg 24 hr tablet       Bupropion wellbutrin SR 200mg 12 hr tablet   1   1    Calcium carbonate 600mg       1    Carbidopa/levodopa Sinemet 25/100 1 1 1 1   Cholecalciferol vitamin D3 50mcg 2000 units    1       Coenzyme Q10 100mg       1   Cyclobenzaprine flexeril 10mg   As needed       Gabapentin neurontin 100mg   1       Gabapentin neurontin 300mg       1    L Tyrosine 500 mg and 10mg vitamin B6   1       Magnesium oxide antacid 500mg   1       Mometasone fumorate  "asmanex       See above   Probiotic   variable       Propranolol inderal 10mg   1 1 1   Sertraline zoloft 50mg or 100mg   1       Turmeric 300mg and dicalcium phosphate 65mg   1       Vitamin C 500mg    1                                      Plan:      I have reviewed the note as documented above.  This accurately captures the substance of my conversation with the patient.  Patient contact time  0  minutes. Over 50% of this visit was spent in patient care and care coordination.     Ishan Hearn MD      ------------------------------------------------------------------------------------------------------------------------------------------------------------------------    Video-Visit Details    The patient has been notified of following:     \"After a review of the patient s situation, this visit was changed from an in-person visit to a video visit to reduce the risk of COVID-19 exposure.   The patient is being evaluated via a billable video visit.\"    \"This video visit will be conducted via a call between you and your physician/provider. We have found that certain health care needs can be provided without the need for an in-person physical exam.  This service lets us provide the care you need with a video conversation.  If a prescription is necessary we can send it directly to your pharmacy.  If lab work is needed we can place an order for that and you can then stop by our lab to have the test done at a later time.    If during the course of the call the physician/provider feels a video visit is not appropriate, you will not be charged for this service.\"     Patient has given verbal consent for Video visit? Yes    Patient would like the video invitation sent by:     Type of service:  Video Visit    Video Start Time:     Video End Time (time video stopped):     Duration:  minutes - see above    Originating Location (pt. Location):     Distant Location (provider location):  [facility]"     Mode of Communication:  Video Conference via [email or web address] (and if not possible then doximity)      Ishan Hearn MD      --------------------------------------------------------------------------------------------------------------    Alina Zavala is a 60 year old female who is being evaluated via a billable video visit.      Charts reviewed  Consult from  Images reviewed        I have reviewed and updated the patient's Past Medical History, Social History, Family History and Medication List.    ALLERGIES  Prednisone, Donepezil, Penicillins, and Sulfa drugs    Lasts visit details if there was a last visit:         Medications                                                                                                                                                                                                              14 Review of systems  are negative except for   Patient Active Problem List   Diagnosis     Asthma     Benign essential hypertension     Adenoma of colon     Anxiety     DDD (degenerative disc disease), lumbar     Depression     Dizziness     Labile blood pressure     Hypotension     Near syncope     Osteopenia     Parkinson disease (H)     POTS (postural orthostatic tachycardia syndrome)     Trigeminal neuralgia        Allergies   Allergen Reactions     Prednisone Anxiety, Palpitations and Shortness Of Breath     Donepezil      Insomnia, nausea, dizziness/somnolence      Penicillins      Unknown     Sulfa Drugs      Unknown     Past Surgical History:   Procedure Laterality Date     D & C       Past Medical History:   Diagnosis Date     Anxiety      Depression      Hyperlipidemia      Hypertension      Lyme disease      Trigeminal neuralgia      Social History     Socioeconomic History     Marital status:      Spouse name: Not on file     Number of children: Not on file     Years of education: Not on file     Highest education level: Not on file   Occupational History     Not on  "file   Social Needs     Financial resource strain: Not on file     Food insecurity     Worry: Not on file     Inability: Not on file     Transportation needs     Medical: Not on file     Non-medical: Not on file   Tobacco Use     Smoking status: Never Smoker     Smokeless tobacco: Never Used   Substance and Sexual Activity     Alcohol use: No     Frequency: Never     Drug use: Not on file     Sexual activity: Not on file   Lifestyle     Physical activity     Days per week: Not on file     Minutes per session: Not on file     Stress: Not on file   Relationships     Social connections     Talks on phone: Not on file     Gets together: Not on file     Attends [employer] service: Not on file     Active member of club or organization: Not on file     Attends meetings of clubs or organizations: Not on file     Relationship status: Not on file     Intimate partner violence     Fear of current or ex partner: Not on file     Emotionally abused: Not on file     Physically abused: Not on file     Forced sexual activity: Not on file   Other Topics Concern     Not on file   Social History Narrative    . Lives in [place]. Tejinder Zavala spouse. They have three children. Their names are Eros, Venkat, and Christie. She is a homemaker. Studied library science and theology. She was a  for over 16 years. 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 Adult onset.      Cerebrovascular Disease Father      Hypertension Father      Dementia No family hx of      Parkinsonism No family " hx of      Current Outpatient Medications   Medication Sig Dispense Refill     acetaminophen (TYLENOL) 500 MG tablet Take 2 tablets (1,000 mg) by mouth 2 times daily       albuterol (PROAIR HFA/PROVENTIL HFA/VENTOLIN HFA) 108 (90 Base) MCG/ACT inhaler Inhale 2 puffs into the lungs every 6 hours as needed       ASMANEX 120 METERED DOSES 220 MCG/INH inhaler Inhale 1 puff into the lungs At Bedtime   1     B Complex-C (VITAMIN B COMPLEX W/VITAMIN C) TABS tablet Take 1 tablet by mouth daily       buPROPion (WELLBUTRIN SR) 200 MG 12 hr tablet 200mg tab by mouth twice daily at 9 am and 9 pm  0     calcium carbonate (CALCIUM CARBONATE) 600 MG tablet Take 1 tablet by mouth At Bedtime        carbidopa-levodopa  MG PO tablet 25/100 tab by mouth 4/day @ 4-530am, 10am, 430-5pm, and 10pm 405 tablet 11     Cholecalciferol (VITAMIN D3) 2000 units TABS Take 2,000 Units by mouth daily       co-enzyme Q-10 100 MG CAPS capsule Take 100 mg by mouth daily       cyclobenzaprine (FLEXERIL) 10 MG tablet Take 10 mg by mouth as needed (for back pain - very sparingly)       gabapentin (NEURONTIN) 100 MG capsule Take 100 mg by mouth every morning   1     gabapentin (NEURONTIN) 300 MG capsule Take 300 mg by mouth At Bedtime   0     l-tyrosine 500 MG TABS Take 500 mg by mouth daily        Magnesium Oxide, Antacid, 500 MG CAPS Take 1 tablet by mouth       mometasone (ASMANEX, 120 METERED DOSES,) 220 MCG/INH inhaler Inhale 1 puff into the lungs daily        Probiotic Product (PROBIOTIC PO) Take 2 capsules by mouth daily (Smart[employer] Pants Adult Probiotic Complete)       propranolol (INDERAL) 10 MG tablet Take 10 mg by mouth 3 times daily        sertraline (ZOLOFT) 50 MG tablet Take 1 tablet (50 mg) by mouth daily       TURMERIC PO Take 300 mg by mouth daily as needed        vitamin C (ASCORBIC ACID) 500 MG tablet Take 500 mg by mouth daily

## 2020-10-20 NOTE — TELEPHONE ENCOUNTER
"Last office visit note from Dr. Hearn 6/12/20. I see she is taking Wellbutrin at 10 am and 10 pm. Has been on it since she was age  43. 10 years ago, tried to get off but could not cope.  She has not yet had a fasting blood sugar.     9/30/20 note Eleni iWlson RN   Her BP will go low when she stands sometimes 40 points. More recently she has noticed it will stay low even when active. Her BP readings over last few days have been 70-90/40-60's. She will get very dizzy, have dull headaches sometimes.   --------------------------------------------  She has been headachy recently,  says she is \"cognitively\" down. Least of her worries.     Her most pressing concern is what she describes as \"cramping\" of her right or left leg into both feet. Happens at any time but more at night when she is going to lay down for bed 10-11 pm. She has coping methods for this - shower, rubbing, shower, walk, flexeril.    She takes Carbidopa/levodopa  mg 1 tab at 3:30 - 5:30 am, 10 am, 4-5 pm, and 10-11 pm.    Dr. Wilson Do = cardiologist  - Elio  She recently began taking Florinef 0.1 mg qam     Recommended fluid intake and challenged her to reframe many self defeating thoughts. \"Your body believes whatever you say\".    I explained that Dr. Hearn will want to discuss her symptoms in relation to when she takes her carbidopa/levodopa so we know if she is experiencing wearing off symptoms. I asked her to pay close attention so she can accurately report this on Thursday 10/22 when I scheduled her for return video.    Patient verbalized understanding and agreement with plan of care.     24 minute call       "

## 2020-10-21 DIAGNOSIS — G20.A1 PARKINSON DISEASE (H): Primary | ICD-10-CM

## 2020-10-21 DIAGNOSIS — F80.0 ARTICULATION DISORDER: ICD-10-CM

## 2020-10-22 ENCOUNTER — OFFICE VISIT (OUTPATIENT)
Dept: NEUROLOGY | Facility: CLINIC | Age: 60
End: 2020-10-22

## 2020-10-22 ENCOUNTER — VIRTUAL VISIT (OUTPATIENT)
Dept: NEUROLOGY | Facility: CLINIC | Age: 60
End: 2020-10-22
Payer: COMMERCIAL

## 2020-10-22 DIAGNOSIS — G20.A1 PARKINSON DISEASE (H): Primary | ICD-10-CM

## 2020-10-22 PROCEDURE — 99207 PR NO CHARGE LOS: CPT | Performed by: PSYCHIATRY & NEUROLOGY

## 2020-10-22 PROCEDURE — 99214 OFFICE O/P EST MOD 30 MIN: CPT | Mod: 95 | Performed by: PSYCHIATRY & NEUROLOGY

## 2020-10-22 RX ORDER — CHOLECALCIFEROL (VITAMIN D3) 50 MCG
TABLET ORAL
COMMUNITY
Start: 2020-10-22 | End: 2021-01-29

## 2020-10-22 RX ORDER — UBIDECARENONE 100 MG
CAPSULE ORAL
COMMUNITY
Start: 2020-10-22

## 2020-10-22 RX ORDER — ASCORBIC ACID 125 MG
1 TABLET,CHEWABLE ORAL 2 TIMES DAILY
COMMUNITY
Start: 2020-10-22 | End: 2021-08-17

## 2020-10-22 RX ORDER — CARBIDOPA AND LEVODOPA 25; 100 MG/1; MG/1
TABLET ORAL
Qty: 540 TABLET | Refills: 11 | Status: SHIPPED | OUTPATIENT
Start: 2020-10-22 | End: 2021-04-29

## 2020-10-22 RX ORDER — ACETAMINOPHEN 500 MG
TABLET ORAL
COMMUNITY
Start: 2020-10-22

## 2020-10-22 RX ORDER — FLUDROCORTISONE ACETATE 0.1 MG/1
TABLET ORAL
COMMUNITY
Start: 2020-10-22 | End: 2021-02-12

## 2020-10-22 RX ORDER — TYROSINE 500 MG
TABLET ORAL
COMMUNITY
Start: 2020-10-22 | End: 2021-01-29

## 2020-10-22 RX ORDER — SERTRALINE HYDROCHLORIDE 25 MG/1
25 TABLET, FILM COATED ORAL DAILY
COMMUNITY
End: 2020-10-22

## 2020-10-22 RX ORDER — GABAPENTIN 100 MG/1
CAPSULE ORAL
Qty: 90 CAPSULE | Refills: 3 | COMMUNITY
Start: 2020-10-22 | End: 2021-01-29

## 2020-10-22 RX ORDER — GABAPENTIN 300 MG/1
CAPSULE ORAL
Qty: 90 CAPSULE | Refills: 3 | COMMUNITY
Start: 2020-10-22 | End: 2021-01-29

## 2020-10-22 NOTE — LETTER
10/22/2020       RE: Alina Zavala  1971 [facility] [address]     Dear Colleague,    Thank you for referring your patient, Alina Zavala, to the [facility] NEUROLOG[facility] [place] at [facility]. Please see a copy of my visit note below.        VIDEO VISIT - amwell and had to use doximity as video did not work on amwell  She has an iphone.     Date of Visit: October 22, 2020  Name: Alina Zavala  Date of Birth 1960  [address] [place] [id number]  [phone] (M)  [phone] (H)  [email or web address]  Has mychart  No proxy     Tejinder Zavala  [phone]     Venkat Cooney, and Christie.       Assessment:  (G20) Parkinson disease (H)  (primary encounter diagnosis)  Carbidopa/levodopa Sinemet 25/100 4/day at 3:30 - 5:30 am, 10 am, 4-5 pm, and 10-11 pm.    Has some wearing off during the night 330/4am    She has some RLS symptoms - rubs behind her knees and gets up and walks around  Using flexeril. [place]ian spring soap in socks in the past. Ice packs on the backs of her knees  Usually has one or the other leg.     Trigeminal neuralgia and RLS  Gabapentin neurontin 100mg @10am   Gabapentin neurontin 300mg @11pm     POTS    - propranolol 10mg 3/day; water and salt intake  Fludrocortisone florinef 0.1mg     Urinary urgency    Respiratory issues  Albuterol proair  Asmanex  Mometasone asmanex    Constipation and diarrhea  Magnesium oxide  Probiotic 2 capsules     Depression/anxiety - varies depending on motor fluctuations with headache, etc.   Bupropion Wellbutrin SR 200mg 12 hr tablet; will switch to Bupropion wellbutrin XL 300mg 24 hr tablet  Sertraline zoloft 50mgx 2 tabs and will switch to Sertraline zoloft 100mg     Left trigeminal neuralgia  Prior history of lyme disease  Sweating episodes, not able to take hot shower.      Endo  Calcium carbonate  Cholecalciferol vitamin D3 2000 units     Turmeric 300mg - varies  Vitamin C ascorbic  acid 500mg daily     Medications     330-530am 10am 4-5pm 10-11pm   Acetaminophen tylenol 500mg    2    2   Albuterol proair hfa/proventil hfa/ventolin hfa 108 (90 base)mcg/act inhaler   As needed        ASmanex 120 metered doses 220mcg/inh inhaler        1 puff    b complex   1        Bupropion wellbutrin XL 300mg 24 hr tablet Not yet switched          Bupropion wellbutrin SR 200mg 12 hr tablet   1   1    Calcium carbonate 600mg       1    Carbidopa/levodopa Sinemet 25/100 1 1 1 1   Cholecalciferol vitamin D3 50mcg 2000 units    1       Coenzyme Q10 100mg       1   Cyclobenzaprine flexeril 10mg   As needed       Fludrocortisone florinef 0.1mg  1     Gabapentin neurontin 100mg   1       Gabapentin neurontin 300mg       1    L Tyrosine 500 mg and 10mg vitamin B6   1       Magnesium oxide antacid 500mg   1   1    Mometasone fumorate asmanex       See above   Probiotic   2       Propranolol inderal 10mg   1 1 1   Propranolol ER Inderal LA 60mg 24 hr capsule Not taking      Sertraline zoloft 50mg  2     Sertraline zoloft 100mg   1       Turmeric 300mg and dicalcium phosphate 65mg   As needed       Vitamin C 500mg    1                                  Plan:  Big and Loud Therapy ordered.     For RLS - may consider taking the 300mg gabapentin dose sooner before 11pm if RLS symptoms begin around 10pm    Other options are to adjust the sinemet up from 1 tab 4/day to 1.5 tabs 4/day  She will try this.     She is on gabapentin (neurontin) 100mg @ 10am and may consider 100mg @4/5pm, and moving time of 300mg earlier @ 8-10pm    May need return to see Garett Cárdenas     Return to see me 1/8/2021 appointment is already set with me.     Talked about sierra Cárdenas support group.  She will consider bicycling/new step regimen  She was doing big and loud exercises.  Play piano daily  Does word search daily    I have reviewed the note as documented above.  This accurately captures the substance of my conversation with the  "patient.  Patient contact time  25  minutes. Over 50% of this visit was spent in patient care and care coordination.     Time of visit: 210pm-235pm    Ishan Hearn MD      ------------------------------------------------------------------------------------------------------------------------------------------------------------------------    Video-Visit Details    The patient has been notified of following:     \"After a review of the patient s situation, this visit was changed from an in-person visit to a video visit to reduce the risk of COVID-19 exposure.   The patient is being evaluated via a billable video visit.\"    \"This video visit will be conducted via a call between you and your physician/provider. We have found that certain health care needs can be provided without the need for an in-person physical exam.  This service lets us provide the care you need with a video conversation.  If a prescription is necessary we can send it directly to your pharmacy.  If lab work is needed we can place an order for that and you can then stop by our lab to have the test done at a later time.    If during the course of the call the physician/provider feels a video visit is not appropriate, you will not be charged for this service.\"     Patient has given verbal consent for Video visit? Yes    Patient would like the video invitation sent by:     Type of service:  Video Visit    Video Start Time:     Video End Time (time video stopped):     Duration:  minutes - see above    Originating Location (pt. Location):     Distant Location (provider location):  [facility] NEUROLOGY [facility]     Mode of Communication:  Video Conference via [employer] (and if not possible then [facility])      Ishan Hearn MD      --------------------------------------------------------------------------------------------------------------    Alina Awadrita is a 60 year old female who is being evaluated via a billable video visit.  "     Charts reviewed  Consult from  Images reviewed        I have reviewed and updated the patient's Past Medical History, Social History, Family History and Medication List.    ALLERGIES  Prednisone, Donepezil, Penicillins, and Sulfa drugs    Lasts visit details if there was a last visit:         Medications                                                                                                                                                                                                              14 Review of systems  are negative except for   Patient Active Problem List   Diagnosis     Asthma     Benign essential hypertension     Adenoma of colon     Anxiety     DDD (degenerative disc disease), lumbar     Depression     Dizziness     Labile blood pressure     Hypotension     Near syncope     Osteopenia     Parkinson disease (H)     POTS (postural orthostatic tachycardia syndrome)     Trigeminal neuralgia        Allergies   Allergen Reactions     Prednisone Anxiety, Palpitations and Shortness Of Breath     Donepezil      Insomnia, nausea, dizziness/somnolence      Penicillins      Unknown     Sulfa Drugs      Unknown     Past Surgical History:   Procedure Laterality Date     D & C       Past Medical History:   Diagnosis Date     Anxiety      Depression      Hyperlipidemia      Hypertension      Lyme disease      Trigeminal neuralgia      Social History     Socioeconomic History     Marital status:      Spouse name: Not on file     Number of children: Not on file     Years of education: Not on file     Highest education level: Not on file   Occupational History     Not on file   Social Needs     Financial resource strain: Not on file     Food insecurity     Worry: Not on file     Inability: Not on file     Transportation needs     Medical: Not on file     Non-medical: Not on file   Tobacco Use     Smoking status: Never Smoker     Smokeless tobacco: Never Used   Substance and Sexual Activity      "Alcohol use: No     Frequency: Never     Drug use: Not on file     Sexual activity: Not on file   Lifestyle     Physical activity     Days per week: Not on file     Minutes per session: Not on file     Stress: Not on file   Relationships     Social connections     Talks on phone: Not on file     Gets together: Not on file     Attends Hindu service: Not on file     Active member of club or organization: Not on file     Attends meetings of clubs or organizations: Not on file     Relationship status: Not on file     Intimate partner violence     Fear of current or ex partner: Not on file     Emotionally abused: Not on file     Physically abused: Not on file     Forced sexual activity: Not on file   Other Topics Concern     Not on file   Social History Narrative    . Lives in [place]. Tejinder Zavala spouse. They have three children. Their names are Eros, Venkat, and Christie. She is a homemaker. Studied library science and theology. She was a  for over 16 years. 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 Adult onset.      Cerebrovascular Disease Father      Hypertension Father      Dementia No family hx of      Parkinsonism No family hx of      Current Outpatient Medications   Medication Sig Dispense Refill     acetaminophen (TYLENOL) 500 MG tablet Take 2 tablets (1,000 mg) by mouth 2 times daily       albuterol (PROAIR HFA/PROVENTIL HFA/VENTOLIN HFA) 108 (90 Base) MCG/ACT inhaler Inhale 2 puffs into the lungs every 6 hours as needed       ASMANEX 120 " METERED DOSES 220 MCG/INH inhaler Inhale 1 puff into the lungs At Bedtime   1     B Complex-C (VITAMIN B COMPLEX W/VITAMIN C) TABS tablet Take 1 tablet by mouth daily       buPROPion (WELLBUTRIN SR) 200 MG 12 hr tablet 200mg tab by mouth twice daily at 9 am and 9 pm  0     buPROPion (WELLBUTRIN XL) 300 MG 24 hr tablet 300mg tab by mouth daily       calcium carbonate (CALCIUM CARBONATE) 600 MG tablet Take 1 tablet by mouth At Bedtime        carbidopa-levodopa  MG PO tablet 25/100 tab by mouth 4/day @ 4-530am, 10am, 430-5pm, and 10pm 405 tablet 11     Cholecalciferol (VITAMIN D3) 2000 units TABS Take 2,000 Units by mouth daily       co-enzyme Q-10 100 MG CAPS capsule Take 100 mg by mouth daily       cyclobenzaprine (FLEXERIL) 10 MG tablet Take 10 mg by mouth as needed (for back pain - very sparingly)       fludrocortisone (FLORINEF) 0.1 MG tablet 0.1mg tab by mouth daily       gabapentin (NEURONTIN) 100 MG capsule Take 100 mg by mouth every morning   1     gabapentin (NEURONTIN) 300 MG capsule Take 300 mg by mouth At Bedtime   0     l-tyrosine 500 MG TABS Take 500 mg by mouth daily        Magnesium Oxide, Antacid, 500 MG CAPS Take 1 tablet by mouth       mometasone (ASMANEX, 120 METERED DOSES,) 220 MCG/INH inhaler Inhale 1 puff into the lungs daily        Probiotic Product (PROBIOTIC PO) Take 2 capsules by mouth daily (Smarty Pants Adult Probiotic Complete)       propranolol (INDERAL) 10 MG tablet Take 10 mg by mouth 3 times daily        propranolol ER (INDERAL LA) 60 MG 24 hr capsule 60mg long acting propranolol by mouth daily       sertraline (ZOLOFT) 100 MG tablet 100mg tab by mouth daily       sertraline (ZOLOFT) 50 MG tablet Take 1 tablet (50 mg) by mouth daily       TURMERIC PO Take 300 mg by mouth daily as needed        vitamin C (ASCORBIC ACID) 500 MG tablet Take 500 mg by mouth daily                         Alina Zavala is a 60 year old female who is being evaluated via a billable video visit.   "    The patient has been notified of following:     \"This video visit will be conducted via a call between you and your physician/provider. We have found that certain health care needs can be provided without the need for an in-person physical exam.  This service lets us provide the care you need with a video conversation.  If a prescription is necessary we can send it directly to your pharmacy.  If lab work is needed we can place an order for that and you can then stop by our lab to have the test done at a later time.    Video visits are billed at different rates depending on your insurance coverage.  Please reach out to your insurance provider with any questions.    If during the course of the call the physician/provider feels a video visit is not appropriate, you will not be charged for this service.\"    Patient has given verbal consent for Video visit? yes  How would you like to obtain your AVS? mychart  If you are dropped from the video visit, the video invite should be resent to: cell  Will anyone else be joining your video visit? no        Video-Visit Details    Type of service:  Video Visit        Distant Location (provider location):  [facility] NEUROLOGY [facility]IN[facility] [place]     Platform used for Video Visit: danika Crespo, EMT            Again, thank you for allowing me to participate in the care of your patient.      Sincerely,    Ishan Hearn MD      "

## 2020-10-22 NOTE — PROGRESS NOTES
VIDEO VISIT - danika and had to use doximity as video did not work on danika  She has an iphone.     Date of Visit: October 22, 2020  Name: Alina Zavala  Date of Birth 1960  1971 [facility] [address]  [phone] (M)  [phone] (H)  [email or web address]  Has mychart  No proxy     Tejinder Zavala  [phone]     Venkat Cooney, and Christie.       Assessment:  (G20) Parkinson disease (H)  (primary encounter diagnosis)  Carbidopa/levodopa Sinemet 25/100 4/day at 3:30 - 5:30 am, 10 am, 4-5 pm, and 10-11 pm.    Has some wearing off during the night 330/4am    She has some RLS symptoms - rubs behind her knees and gets up and walks around  Using flexeril. Jamaican spring soap in socks in the past. Ice packs on the backs of her knees  Usually has one or the other leg.     Trigeminal neuralgia and RLS  Gabapentin neurontin 100mg @10am   Gabapentin neurontin 300mg @11pm     POTS    - propranolol 10mg 3/day; water and salt intake  Fludrocortisone florinef 0.1mg     Urinary urgency    Respiratory issues  Albuterol proair  Asmanex  Mometasone asmanex    Constipation and diarrhea  Magnesium oxide  Probiotic 2 capsules     Depression/anxiety - varies depending on motor fluctuations with headache, etc.   Bupropion Wellbutrin SR 200mg 12 hr tablet; will switch to Bupropion wellbutrin XL 300mg 24 hr tablet  Sertraline zoloft 50mgx 2 tabs and will switch to Sertraline zoloft 100mg     Left trigeminal neuralgia  Prior history of lyme disease  Sweating episodes, not able to take hot shower.      Endo  Calcium carbonate  Cholecalciferol vitamin D3 2000 units     Turmeric 300mg - varies  Vitamin C ascorbic acid 500mg daily     Medications     330-530am 10am 4-5pm 10-11pm   Acetaminophen tylenol 500mg    2    2   Albuterol proair hfa/proventil hfa/ventolin hfa 108 (90 base)mcg/act inhaler   As needed        ASmanex 120 metered doses 220mcg/inh inhaler        1 puff    b complex   1        Bupropion  wellbutrin XL 300mg 24 hr tablet Not yet switched          Bupropion wellbutrin SR 200mg 12 hr tablet   1   1    Calcium carbonate 600mg       1    Carbidopa/levodopa Sinemet 25/100 1 1 1 1   Cholecalciferol vitamin D3 50mcg 2000 units    1       Coenzyme Q10 100mg       1   Cyclobenzaprine flexeril 10mg   As needed       Fludrocortisone florinef 0.1mg  1     Gabapentin neurontin 100mg   1       Gabapentin neurontin 300mg       1    L Tyrosine 500 mg and 10mg vitamin B6   1       Magnesium oxide antacid 500mg   1   1    Mometasone fumorate asmanex       See above   Probiotic   2       Propranolol inderal 10mg   1 1 1   Propranolol ER Inderal LA 60mg 24 hr capsule Not taking      Sertraline zoloft 50mg  2     Sertraline zoloft 100mg   1       Turmeric 300mg and dicalcium phosphate 65mg   As needed       Vitamin C 500mg    1                                  Plan:  Big and Loud Therapy ordered.     For RLS - may consider taking the 300mg gabapentin dose sooner before 11pm if RLS symptoms begin around 10pm    Other options are to adjust the sinemet up from 1 tab 4/day to 1.5 tabs 4/day  She will try this.     She is on gabapentin (neurontin) 100mg @ 10am and may consider 100mg @4/5pm, and moving time of 300mg earlier @ 8-10pm    May need return to see Garett Cárdenas     Return to see me 1/8/2021 appointment is already set with me.     Talked about sierra Cárdenas support group.  She will consider bicycling/new step regimen  She was doing big and loud exercises.  Play piano daily  Does word search daily    I have reviewed the note as documented above.  This accurately captures the substance of my conversation with the patient.  Patient contact time  25  minutes. Over 50% of this visit was spent in patient care and care coordination.     Time of visit: 210pm-235pm    Ishan Hearn  "MD      ------------------------------------------------------------------------------------------------------------------------------------------------------------------------    Video-Visit Details    The patient has been notified of following:     \"After a review of the patient s situation, this visit was changed from an in-person visit to a video visit to reduce the risk of COVID-19 exposure.   The patient is being evaluated via a billable video visit.\"    \"This video visit will be conducted via a call between you and your physician/provider. We have found that certain health care needs can be provided without the need for an in-person physical exam.  This service lets us provide the care you need with a video conversation.  If a prescription is necessary we can send it directly to your pharmacy.  If lab work is needed we can place an order for that and you can then stop by our lab to have the test done at a later time.    If during the course of the call the physician/provider feels a video visit is not appropriate, you will not be charged for this service.\"     Patient has given verbal consent for Video visit? Yes    Patient would like the video invitation sent by:     Type of service:  Video Visit    Video Start Time:     Video End Time (time video stopped):     Duration:  minutes - see above    Originating Location (pt. Location):     Distant Location (provider location):  [facility] NEUROLOGY [facility]     Mode of Communication:  Video Conference via [employer] (and if not possible then doximity)      Ishan Hearn MD      --------------------------------------------------------------------------------------------------------------    Alina Zavlaa is a 60 year old female who is being evaluated via a billable video visit.      Charts reviewed  Consult from  Images reviewed        I have reviewed and updated the patient's Past Medical History, Social History, Family History and Medication " List.    ALLERGIES  Prednisone, Donepezil, Penicillins, and Sulfa drugs    Lasts visit details if there was a last visit:         Medications                                                                                                                                                                                                              14 Review of systems  are negative except for   Patient Active Problem List   Diagnosis     Asthma     Benign essential hypertension     Adenoma of colon     Anxiety     DDD (degenerative disc disease), lumbar     Depression     Dizziness     Labile blood pressure     Hypotension     Near syncope     Osteopenia     Parkinson disease (H)     POTS (postural orthostatic tachycardia syndrome)     Trigeminal neuralgia        Allergies   Allergen Reactions     Prednisone Anxiety, Palpitations and Shortness Of Breath     Donepezil      Insomnia, nausea, dizziness/somnolence      Penicillins      Unknown     Sulfa Drugs      Unknown     Past Surgical History:   Procedure Laterality Date     D & C       Past Medical History:   Diagnosis Date     Anxiety      Depression      Hyperlipidemia      Hypertension      Lyme disease      Trigeminal neuralgia      Social History     Socioeconomic History     Marital status:      Spouse name: Not on file     Number of children: Not on file     Years of education: Not on file     Highest education level: Not on file   Occupational History     Not on file   Social Needs     Financial resource strain: Not on file     Food insecurity     Worry: Not on file     Inability: Not on file     Transportation needs     Medical: Not on file     Non-medical: Not on file   Tobacco Use     Smoking status: Never Smoker     Smokeless tobacco: Never Used   Substance and Sexual Activity     Alcohol use: No     Frequency: Never     Drug use: Not on file     Sexual activity: Not on file   Lifestyle     Physical activity     Days per week: Not on file      "Minutes per session: Not on file     Stress: Not on file   Relationships     Social connections     Talks on phone: Not on file     Gets together: Not on file     Attends Mandaeism service: Not on file     Active member of club or organization: Not on file     Attends meetings of clubs or organizations: Not on file     Relationship status: Not on file     Intimate partner violence     Fear of current or ex partner: Not on file     Emotionally abused: Not on file     Physically abused: Not on file     Forced sexual activity: Not on file   Other Topics Concern     Not on file   Social History Narrative    . Lives in [place]. Tejinder Zavala spouse. They have three children. Their names are Eros, Venkat, and Christie. She is a homemaker. Studied library science and theology. She was a  for over 16 years. 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 Adult onset.      Cerebrovascular Disease Father      Hypertension Father      Dementia No family hx of      Parkinsonism No family hx of      Current Outpatient Medications   Medication Sig Dispense Refill     acetaminophen (TYLENOL) 500 MG tablet Take 2 tablets (1,000 mg) by mouth 2 times daily       albuterol (PROAIR HFA/PROVENTIL HFA/VENTOLIN HFA) 108 (90 Base) MCG/ACT inhaler Inhale 2 puffs into the lungs every 6 hours as needed       ASMANEX 120 METERED DOSES 220 MCG/INH inhaler Inhale 1 puff into the lungs At Bedtime   1     B Complex-C (VITAMIN B COMPLEX W/VITAMIN C) TABS tablet Take 1 tablet by mouth daily   "     buPROPion (WELLBUTRIN SR) 200 MG 12 hr tablet 200mg tab by mouth twice daily at 9 am and 9 pm  0     buPROPion (WELLBUTRIN XL) 300 MG 24 hr tablet 300mg tab by mouth daily       calcium carbonate (CALCIUM CARBONATE) 600 MG tablet Take 1 tablet by mouth At Bedtime        carbidopa-levodopa  MG PO tablet 25/100 tab by mouth 4/day @ 4-530am, 10am, 430-5pm, and 10pm 405 tablet 11     Cholecalciferol (VITAMIN D3) 2000 units TABS Take 2,000 Units by mouth daily       co-enzyme Q-10 100 MG CAPS capsule Take 100 mg by mouth daily       cyclobenzaprine (FLEXERIL) 10 MG tablet Take 10 mg by mouth as needed (for back pain - very sparingly)       fludrocortisone (FLORINEF) 0.1 MG tablet 0.1mg tab by mouth daily       gabapentin (NEURONTIN) 100 MG capsule Take 100 mg by mouth every morning   1     gabapentin (NEURONTIN) 300 MG capsule Take 300 mg by mouth At Bedtime   0     l-tyrosine 500 MG TABS Take 500 mg by mouth daily        Magnesium Oxide, Antacid, 500 MG CAPS Take 1 tablet by mouth       mometasone (ASMANEX, 120 METERED DOSES,) 220 MCG/INH inhaler Inhale 1 puff into the lungs daily        Probiotic Product (PROBIOTIC PO) Take 2 capsules by mouth daily (Smarty Pants Adult Probiotic Complete)       propranolol (INDERAL) 10 MG tablet Take 10 mg by mouth 3 times daily        propranolol ER (INDERAL LA) 60 MG 24 hr capsule 60mg long acting propranolol by mouth daily       sertraline (ZOLOFT) 100 MG tablet 100mg tab by mouth daily       sertraline (ZOLOFT) 50 MG tablet Take 1 tablet (50 mg) by mouth daily       TURMERIC PO Take 300 mg by mouth daily as needed        vitamin C (ASCORBIC ACID) 500 MG tablet Take 500 mg by mouth daily

## 2020-10-22 NOTE — PATIENT INSTRUCTIONS
Assessment:  (G20) Parkinson disease (H)  (primary encounter diagnosis)  Carbidopa/levodopa Sinemet 25/100 4/day at 3:30 - 5:30 am, 10 am, 4-5 pm, and 10-11 pm.    Has some wearing off during the night 330/4am    She has some RLS symptoms - rubs behind her knees and gets up and walks around  Using flexeril. Isis spring soap in socks in the past. Ice packs on the backs of her knees  Usually has one or the other leg.     Trigeminal neuralgia and RLS  Gabapentin neurontin 100mg @10am   Gabapentin neurontin 300mg @11pm     POTS    - propranolol 10mg 3/day; water and salt intake  Fludrocortisone florinef 0.1mg     Urinary urgency    Respiratory issues  Albuterol proair  Asmanex  Mometasone asmanex    Constipation and diarrhea  Magnesium oxide  Probiotic 2 capsules     Depression/anxiety - varies depending on motor fluctuations with headache, etc.   Bupropion Wellbutrin SR 200mg 12 hr tablet; will switch to Bupropion wellbutrin XL 300mg 24 hr tablet  Sertraline zoloft 50mgx 2 tabs and will switch to Sertraline zoloft 100mg     Left trigeminal neuralgia  Prior history of lyme disease  Sweating episodes, not able to take hot shower.      Endo  Calcium carbonate  Cholecalciferol vitamin D3 2000 units     Turmeric 300mg - varies  Vitamin C ascorbic acid 500mg daily     Medications     330-530am 10am 4-5pm 10-11pm   Acetaminophen tylenol 500mg    2    2   Albuterol proair hfa/proventil hfa/ventolin hfa 108 (90 base)mcg/act inhaler   As needed        ASmanex 120 metered doses 220mcg/inh inhaler        1 puff    b complex   1        Bupropion wellbutrin XL 300mg 24 hr tablet Not yet switched          Bupropion wellbutrin SR 200mg 12 hr tablet   1   1    Calcium carbonate 600mg       1    Carbidopa/levodopa Sinemet 25/100 1 1 1 1   Cholecalciferol vitamin D3 50mcg 2000 units    1       Coenzyme Q10 100mg       1   Cyclobenzaprine flexeril 10mg   As needed       Fludrocortisone florinef 0.1mg  1     Gabapentin neurontin 100mg   1        Gabapentin neurontin 300mg       1    L Tyrosine 500 mg and 10mg vitamin B6   1       Magnesium oxide antacid 500mg   1   1    Mometasone fumorate asmanex       See above   Probiotic   2       Propranolol inderal 10mg   1 1 1   Propranolol ER Inderal LA 60mg 24 hr capsule Not taking      Sertraline zoloft 50mg  2     Sertraline zoloft 100mg   1       Turmeric 300mg and dicalcium phosphate 65mg   As needed       Vitamin C 500mg    1                                  Plan:  Big and Loud Therapy ordered.     For RLS - may consider taking the 300mg gabapentin dose sooner before 11pm if RLS symptoms begin around 10pm    Other options are to adjust the sinemet up from 1 tab 4/day to 1.5 tabs 4/day  She will try this.     She is on gabapentin (neurontin) 100mg @ 10am and may consider 100mg @4/5pm, and moving time of 300mg earlier @ 8-10pm    May need return to see Garett Cárdenas     Return to see me 1/8/2021 appointment is already set with me.     Talked about iserra Cárdenas support group.  She will consider bicycling/new step regimen  She was doing big and loud exercises.  Play piano daily  Does word search daily

## 2020-10-22 NOTE — PROGRESS NOTES
"Alina Zavala is a 60 year old female who is being evaluated via a billable video visit.      The patient has been notified of following:     \"This video visit will be conducted via a call between you and your physician/provider. We have found that certain health care needs can be provided without the need for an in-person physical exam.  This service lets us provide the care you need with a video conversation.  If a prescription is necessary we can send it directly to your pharmacy.  If lab work is needed we can place an order for that and you can then stop by our lab to have the test done at a later time.    Video visits are billed at different rates depending on your insurance coverage.  Please reach out to your insurance provider with any questions.    If during the course of the call the physician/provider feels a video visit is not appropriate, you will not be charged for this service.\"    Patient has given verbal consent for Video visit? yes  How would you like to obtain your AVS? mychart  If you are dropped from the video visit, the video invite should be resent to: cell  Will anyone else be joining your video visit? no        Video-Visit Details    Type of service:  Video Visit        Distant Location (provider location):  [facility] NEUROLOGY CLINIC [place]     Platform used for Video Visit: danika Crespo, EMT        "

## 2020-10-23 ENCOUNTER — TRANSFERRED RECORDS (OUTPATIENT)
Dept: HEALTH INFORMATION MANAGEMENT | Facility: CLINIC | Age: 60
End: 2020-10-23

## 2021-01-03 ENCOUNTER — HEALTH MAINTENANCE LETTER (OUTPATIENT)
Age: 61
End: 2021-01-03

## 2021-01-28 ENCOUNTER — DOCUMENTATION ONLY (OUTPATIENT)
Dept: CARE COORDINATION | Facility: CLINIC | Age: 61
End: 2021-01-28

## 2021-01-28 RX ORDER — MIDODRINE HYDROCHLORIDE 2.5 MG/1
TABLET ORAL
COMMUNITY
Start: 2021-01-25 | End: 2021-11-19

## 2021-01-28 RX ORDER — PROPRANOLOL HYDROCHLORIDE 10 MG/1
5 TABLET ORAL 2 TIMES DAILY
COMMUNITY
Start: 2021-01-21 | End: 2021-04-29

## 2021-01-29 ENCOUNTER — OFFICE VISIT (OUTPATIENT)
Dept: NEUROLOGY | Facility: CLINIC | Age: 61
End: 2021-01-29

## 2021-01-29 VITALS — WEIGHT: 150.7 LBS | OXYGEN SATURATION: 97 % | BODY MASS INDEX: 24.7 KG/M2 | RESPIRATION RATE: 16 BRPM

## 2021-01-29 DIAGNOSIS — G90.3 NEUROGENIC ORTHOSTATIC HYPOTENSION (H): ICD-10-CM

## 2021-01-29 DIAGNOSIS — F41.9 ANXIETY: ICD-10-CM

## 2021-01-29 DIAGNOSIS — G20.A1 PARKINSON DISEASE (H): Primary | ICD-10-CM

## 2021-01-29 PROCEDURE — 99215 OFFICE O/P EST HI 40 MIN: CPT | Performed by: PSYCHIATRY & NEUROLOGY

## 2021-01-29 RX ORDER — GABAPENTIN 100 MG/1
CAPSULE ORAL
Qty: 90 CAPSULE | Refills: 3 | COMMUNITY
Start: 2021-01-29 | End: 2021-11-19

## 2021-01-29 RX ORDER — GABAPENTIN 300 MG/1
CAPSULE ORAL
Qty: 90 CAPSULE | Refills: 3 | COMMUNITY
Start: 2021-01-29 | End: 2021-11-19

## 2021-01-29 RX ORDER — MIDODRINE HYDROCHLORIDE 2.5 MG/1
2.5 TABLET ORAL 2 TIMES DAILY
COMMUNITY
End: 2021-01-29

## 2021-01-29 NOTE — PATIENT INSTRUCTIONS
Assessment:  G20) Parkinson disease (H)  (primary encounter diagnosis)  Carbidopa/levodopa Sinemet 25/100 1.5 tabs 4/day at 3:30 - 5:30 am, 10 am, 4-5 pm, and 10-11 pm.  She has some dyskinesias  She has some wearing off.   She has foot and leg spasms that respond to sinemet  Spasms behind her knees are troublesome     Big and Loud Therapy ordered in the past  Improved in her evaluation at [facility]    Cognitive Function  Has to focus on one thing at a time  Has a ustep   Does piano playing daily  Word finding daily  Was able to make a roast dinner and peeling potatoes - trying to keep this skill  Reading less articles on parkinson - too much  Sometimes look at support group information.     For RLS -  Gabapentin neurontin 100mg @10am and sometimes @10/11pm extra dose   Gabapentin neurontin 300mg @10/11pm     may consider taking second dose of gabapentin earlier than 10/11pm    POTS    Has had low blood pressure 80/40 and 60/40  She thinks that the propranolol helps he tremor but worried   Fludrocortisone florinef 0.1mg - still taking - was going to go off.   Midodrine 2.5mg 2/day - has not yet started   Propranolol 10mg 2/day - on this now  Propranolol 10mg 3/day - was on this in the past.   water and salt intake  Propranolol ER Inderal LA 60mg 24 hr capsule - not taking      Urinary urgency     Sleep   Gets up to take her first dose of medication @330/530am and then goes back to bed; possibly till 930am    Constipation and diarrhea  Magnesium oxide 500mg twice daily  Probiotic twice daily      Depression/anxiety   Bupropion Wellbutrin SR 200mg 12 hr tablet twice daily  Sertraline zoloft 100mg daily  Work stress - spouse unemployed - masters   Starting a new business  Adult son living with them  Adult daughter moving  Back to the [facility]  covid19 stress  Parents are not vaccinated.     Left trigeminal neuralgia  Prior history of lyme disease  Sweating episodes, not able to take hot  shower.      Breathing  Albuterol proair as needed   Asmanex at night   Mometasone asmanex - as above     Endo  Calcium carbonate at night   Cholecalciferol vitamin D3 1000 units     Acetaminophen tylenol 500mg scheduled.   Coenzyme Q10 daily   Cyclobenzaprine flexeril as needed 1/2 tab  Turmeric 300mg  Vitamin C ascorbic acid 500mg      Medications     330-530am 10am 4-5pm 10-11pm   Acetaminophen tylenol 500mg    2    2   Albuterol proair hfa/proventil hfa/ventolin hfa 108 (90 base)mcg/act inhaler   As needed        Asmanex 120 metered doses 220mcg/inh inhaler        1 puff    b complex        1    Bupropion wellbutrin XL 300mg 24 hr tablet  not using         Bupropion wellbutrin SR 200mg 12 hr tablet   1   1    Calcium carbonate 600mg       1    Carbidopa/levodopa Sinemet 25/100 1.5 1.5 1.5 1.5   Cholecalciferol vitamin D3 25mcg 1000 units    1       Coenzyme Q10 100mg       1   Cyclobenzaprine flexeril 10mg   As needed       Fludrocortisone florinef 0.1mg  1     Gabapentin neurontin 100mg   1       Gabapentin neurontin 300mg       1    L Tyrosine 500 mg and 10mg vitamin B6   Not taking       Magnesium oxide antacid 500mg   1   1    Midodrine proamatine 2.5mg 1 1     Mometasone fumorate asmanex See above      See above   Probiotic 1     1    Propranolol inderal 10mg  1  1   Propranolol inderal 10mg   Was on this     Sertraline zoloft 100mg  1     Sertraline zoloft 50mg Not using      Turmeric 300mg and dicalcium phosphate 65mg   Not taking       Vitamin C 500mg    1                                    Plan:    Ongoing blood pressure issues - should be transitioning from florinef to proamatine (MIDODRINE)  May consider cutting her fludrocortisone from 1 tab to 1/2 tablet  She may start her midodrine 2.5mg @930/10a and 4/5pm  Will have to  Have ongoing blood pressure monitoring.  Discussed having pharmacist Wendy Cárdenas help review this as well has have her new cardiologist updated on this note  Dr. Mera  Mayelin    Discussed covid19 vaccination    Return visit     Discussion about wearing off and management  1. Probably cannot be on a mao b inhibitor like rasagiline because of her antidepressant regimen  2. Consider add on dopamine agonist but may affect blood pressure more than just fiddling with sinemet  3. Sinemet timing options or use of comt inhibitor or rytary.   4. Amantadine (symmetrel) consideration    Stress  Psychology referral - that can be virtual   She is getting support so may not need this. But here are some numbers I needed    [facility]  [address] [place] [address]      Veronika Phillips, Ph.D., LP  [phone]    Adeline Noe,Ph.D.,LP  [phone] (inpatient)    Ilya Yo,Ph.D.,  [phone]    Katy Martinez, Ph.D.  [phone]    Vanessa Munoz, Ph.D.,   [phone]    Nixon Wilkinson, Ph.D., [facility]  [phone]    Юлия Spencer,Ph.D.,   [phone]

## 2021-01-29 NOTE — LETTER
2021       RE: Alina Zavala   [facility] [address]     Dear Colleague,    Thank you for referring your patient, Alina Zavala, to the [facility] NEUROLOGY CLINIC [place] at [facility]. Please see a copy of my visit note below.      Diagnosis/Summary/Recommendations:    PATIENT: Alina Zavala  60 year old female     : 1960    DAHLIA:  [facility] [address]   [email or web address]  [phone] (M)   [phone] (H)   Tejinder Miller  [phone]     Venkat Cooney, and Christie.      PCP is Dr. HESHAM Melendrez  CArdiologist Dr. Reyna Dick  Eye doctor  Dentist  chiropractor      Assessment:  G20) Parkinson disease (H)  (primary encounter diagnosis)  Carbidopa/levodopa Sinemet 25/100 1.5 tabs 4/day at 3:30 - 5:30 am, 10 am, 4-5 pm, and 10-11 pm.  She has some dyskinesias  She has some wearing off.   She has foot and leg spasms that respond to sinemet  Spasms behind her knees are troublesome     Big and Loud Therapy ordered in the past  Improved in her evaluation at [facility]    Cognitive Function  Has to focus on one thing at a time  Has a ustep   Does piano playing daily  Word finding daily  Was able to make a roast dinner and peeling potatoes - trying to keep this skill  Reading less articles on parkinson - too much  Sometimes look at support group information.     For RLS -  Gabapentin neurontin 100mg @10am and sometimes @10/11pm extra dose   Gabapentin neurontin 300mg @10/11pm     may consider taking second dose of gabapentin earlier than 1011pm    POTS    Has had low blood pressure 80/40 and 60/40  She thinks that the propranolol helps he tremor but worried   Fludrocortisone florinef 0.1mg - still taking - was going to go off.   Midodrine 2.5mg 2/day - has not yet started   Propranolol 10mg 2/day - on this now  Propranolol 10mg 3/day - was on this in the past.   water and salt  intake  Propranolol ER Inderal LA 60mg 24 hr capsule - not taking      Urinary urgency     Sleep   Gets up to take her first dose of medication @330/530am and then goes back to bed; possibly till 930am    Constipation and diarrhea  Magnesium oxide 500mg twice daily  Probiotic twice daily      Depression/anxiety   Bupropion Wellbutrin SR 200mg 12 hr tablet twice daily  Sertraline zoloft 100mg daily  Work stress - spouse unemployed - masters   Starting a new business  Adult son living with them  Adult daughter moving  Back to the [place] [id number] stress  Parents are not vaccinated.     Left trigeminal neuralgia  Prior history of lyme disease  Sweating episodes, not able to take hot shower.      Breathing  Albuterol proair as needed   Asmanex at night   Mometasone asmanex - as above     Endo  Calcium carbonate at night   Cholecalciferol vitamin D3 1000 units     Acetaminophen tylenol 500mg scheduled.   Coenzyme Q10 daily   Cyclobenzaprine flexeril as needed 1/2 tab  Turmeric 300mg  Vitamin C ascorbic acid 500mg      Medications     330-530am 10am 4-5pm 10-11pm   Acetaminophen tylenol 500mg    2    2   Albuterol proair hfa/proventil hfa/ventolin hfa 108 (90 base)mcg/act inhaler   As needed        Asmanex 120 metered doses 220mcg/inh inhaler        1 puff    b complex        1    Bupropion wellbutrin XL 300mg 24 hr tablet  not using         Bupropion wellbutrin SR 200mg 12 hr tablet   1   1    Calcium carbonate 600mg       1    Carbidopa/levodopa Sinemet 25/100 1.5 1.5 1.5 1.5   Cholecalciferol vitamin D3 25mcg 1000 units    1       Coenzyme Q10 100mg       1   Cyclobenzaprine flexeril 10mg   As needed       Fludrocortisone florinef 0.1mg  1     Gabapentin neurontin 100mg   1       Gabapentin neurontin 300mg       1    L Tyrosine 500 mg and 10mg vitamin B6   Not taking       Magnesium oxide antacid 500mg   1   1    Midodrine proamatine 2.5mg 1 1     Mometasone fumorate asmanex See above      See above    Probiotic 1     1    Propranolol inderal 10mg  1  1   Propranolol inderal 10mg   Was on this     Sertraline zoloft 100mg  1     Sertraline zoloft 50mg Not using      Turmeric 300mg and dicalcium phosphate 65mg   Not taking       Vitamin C 500mg    1                                    Plan:    Ongoing blood pressure issues - should be transitioning from florinef to proamatine (MIDODRINE)  May consider cutting her fludrocortisone from 1 tab to 1/2 tablet  She may start her midodrine 2.5mg @930/10a and 4/5pm  Will have to  Have ongoing blood pressure monitoring.  Discussed having pharmacist Wendy Cárdenas help review this as well has have her new cardiologist updated on this note  Dr. Samaria Dick    Discussed covid19 vaccination    Return visit     Discussion about wearing off and management  1. Probably cannot be on a mao b inhibitor like rasagiline because of her antidepressant regimen  2. Consider add on dopamine agonist but may affect blood pressure more than just fiddling with sinemet  3. Sinemet timing options or use of comt inhibitor or rytary.   4. Amantadine (symmetrel) consideration    Stress  Psychology referral - that can be virtual   She is getting support so may not need this. But here are some numbers I needed    [facility]  [address] [place] [address]      Veronika Phillips, Ph.D.,   [phone]    Adeline Noe,Ph.D.,LP  [phone] (inpatient)    Ilya Yo,Ph.D.,LP  [phone]    Katy Martinez, Ph.D.  [phone]    Vanessa Munoz, Ph.D., LP  [phone]    Nixon Wilkinson, Ph.D., ABPP, LP  [phone]    Юлия Spencer,Ph.D., LP  [phone]        Coding statement:   Medical Decision Making:  #  Chronic progressive medical conditions addressed  Yes BP  Review and/or interpretation of unique test or documentation from a provider outside of neurology  No    Independent historian provided additional details  Yes = spouseI  Prescription drug  management and review of potential side effects and/or monitoring for side effects  Yes    Health impacted by social determinants of health  No     I have reviewed the note as documented above.  This accurately captures the substance of my conversation with the patient and total time spent preparing for visit, executing visit and completing visit on the day of the visit:  40 minutes.      Ishan Hearn MD     ______________________________________    Last visit date and details:             ______________________________________      Patient was asked about 14 Review of systems including changes in vision (dry eyes, double vision), hearing, heart, lungs, musculoskeletal, depression, anxiety, snoring, RBD, insomnia, urinary frequency, urinary urgency, constipation, swallowing problems, hematological, ID, allergies, skin problems: seborrhea, endocrinological: thyroid, diabetes, cholesterol; balance, weight changes, and other neurological problems and these were not significant at this time except for   Patient Active Problem List   Diagnosis     Asthma     Benign essential hypertension     Adenoma of colon     Anxiety     DDD (degenerative disc disease), lumbar     Depression     Dizziness     Labile blood pressure     Hypotension     Near syncope     Osteopenia     Parkinson disease (H)     POTS (postural orthostatic tachycardia syndrome)     Trigeminal neuralgia          Allergies   Allergen Reactions     Prednisone Anxiety, Palpitations and Shortness Of Breath     Donepezil      Insomnia, nausea, dizziness/somnolence      Penicillins      Unknown     Sulfa Drugs      Unknown     Past Surgical History:   Procedure Laterality Date     D & C       Past Medical History:   Diagnosis Date     Anxiety      Depression      Hyperlipidemia      Hypertension      Lyme disease      Trigeminal neuralgia      Social History     Socioeconomic History     Marital status:      Spouse name: Not on file     Number of children:  "Not on file     Years of education: Not on file     Highest education level: Not on file   Occupational History     Not on file   Social Needs     Financial resource strain: Not on file     Food insecurity     Worry: Not on file     Inability: Not on file     Transportation needs     Medical: Not on file     Non-medical: Not on file   Tobacco Use     Smoking status: Never Smoker     Smokeless tobacco: Never Used   Substance and Sexual Activity     Alcohol use: No     Frequency: Never     Drug use: Not on file     Sexual activity: Not on file   Lifestyle     Physical activity     Days per week: Not on file     Minutes per session: Not on file     Stress: Not on file   Relationships     Social connections     Talks on phone: Not on file     Gets together: Not on file     Attends Uatsdin service: Not on file     Active member of club or organization: Not on file     Attends meetings of clubs or organizations: Not on file     Relationship status: Not on file     Intimate partner violence     Fear of current or ex partner: Not on file     Emotionally abused: Not on file     Physically abused: Not on file     Forced sexual activity: Not on file   Other Topics Concern     Not on file   Social History Narrative    . Lives in [place]. Tejinder Zavala spouse. They have three children. Their names are Eros, Venkat, and Christie. She is a homemaker. Studied library science and theology. She was a  for over 16 years. 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  Medications and Vitals not listed above were documented in the cart and reviewed by me.     Current Outpatient Medications   Medication Sig Dispense Refill     acetaminophen (TYLENOL) 500 MG tablet 2 x 500mg tab by mouth twice daily @ 10am and 11pm  = 4/day       albuterol (PROAIR HFA/PROVENTIL HFA/VENTOLIN HFA) 108 (90 Base) MCG/ACT inhaler Inhale 2 puffs into the lungs every 6 hours as needed       ASMANEX 120 METERED DOSES 220 MCG/INH inhaler Inhale 1 puff into the lungs At Bedtime   1     B Complex-C (VITAMIN B COMPLEX W/VITAMIN C) TABS tablet Take 1 tablet by mouth daily       buPROPion (WELLBUTRIN SR) 200 MG 12 hr tablet 200mg tab by mouth twice daily at 9 am and 9 pm  0     buPROPion (WELLBUTRIN XL) 300 MG 24 hr tablet 300mg tab by mouth daily       calcium carbonate (CALCIUM CARBONATE) 600 MG tablet Take 1 tablet by mouth At Bedtime        carbidopa-levodopa (SINEMET)  MG tablet May increase to 1.5 x 25/100 tab by mouth 4/day @ 4-530am, 10am, 430-5pm, and 10pm = 6/day 540 tablet 11     Cholecalciferol (VITAMIN D3) 50 MCG (2000 UT) TABS 2000 units by mouth daily @ 10am       co-enzyme Q-10 100 MG CAPS capsule 100mg capsule by mouth every evening at 11pm       cyclobenzaprine (FLEXERIL) 10 MG tablet Take 10 mg by mouth as needed (for back pain - very sparingly)       fludrocortisone (FLORINEF) 0.1 MG tablet 0.1mg tab by mouth daily @ 10am       gabapentin (NEURONTIN) 100 MG capsule 100mg capsule by mouth daily @ 10 AM 90 capsule 3     gabapentin (NEURONTIN) 300 MG capsule 300mg capsule by mouth nightly @ 11pm 90 capsule 3     l-tyrosine 500 MG TABS L-tyrosine 500mg by mouth daily       Magnesium Oxide, Antacid, 500 MG CAPS Take 1 tablet by mouth 2 times daily @10am and 11pm       mometasone (ASMANEX, 120 METERED DOSES,) 220 MCG/INH inhaler Inhale 1 puff into the lungs At Bedtime        Probiotic Product (PROBIOTIC PO) Take 2 capsules by mouth daily (Smarty Pants Adult Probiotic Complete)       propranolol (INDERAL) 10 MG tablet Take 10 mg by mouth 3 times daily        sertraline (ZOLOFT) 100 MG tablet 100mg tab by mouth daily       sertraline (ZOLOFT) 50 MG tablet 2 x 50mg tab by mouth daily       Sharps Container (SHARPS-A-GATOR LOCKING BRACKET) MISC Per Dr. Do wear compression stockings daily. Level of 20-30 mmHg thigh high. Take off one hour 2-3 times daily.       Sharps Container (SHARPS-A-GATOR LOCKING BRACKET) MISC As directed. Per Dr. Do use compression stockings 20-30 mmHg. Remove for one hour 2-3 times daily       TURMERIC PO Take 300 mg by mouth daily as needed        vitamin C (ASCORBIC ACID) 500 MG tablet Take 500 mg by mouth daily           Medications                                                                                                                                                  Ishan Hearn MD

## 2021-01-29 NOTE — NURSING NOTE
Chief Complaint   Patient presents with     RECHECK     [facility] RETURN MOVEMENT DISORDER     Jair Daniels

## 2021-02-12 ENCOUNTER — VIRTUAL VISIT (OUTPATIENT)
Dept: PHARMACY | Facility: CLINIC | Age: 61
End: 2021-02-12
Payer: COMMERCIAL

## 2021-02-12 ENCOUNTER — TELEPHONE (OUTPATIENT)
Dept: PHARMACY | Facility: CLINIC | Age: 61
End: 2021-02-12

## 2021-02-12 DIAGNOSIS — F41.9 ANXIETY: ICD-10-CM

## 2021-02-12 DIAGNOSIS — F32.A DEPRESSION, UNSPECIFIED DEPRESSION TYPE: ICD-10-CM

## 2021-02-12 DIAGNOSIS — Z78.9 TAKES DIETARY SUPPLEMENTS: ICD-10-CM

## 2021-02-12 DIAGNOSIS — I95.1 ORTHOSTATIC HYPOTENSION: ICD-10-CM

## 2021-02-12 DIAGNOSIS — G20.C PARKINSONISM, UNSPECIFIED PARKINSONISM TYPE (H): Primary | ICD-10-CM

## 2021-02-12 DIAGNOSIS — J45.909 UNCOMPLICATED ASTHMA, UNSPECIFIED ASTHMA SEVERITY, UNSPECIFIED WHETHER PERSISTENT: ICD-10-CM

## 2021-02-12 DIAGNOSIS — G50.0 TRIGEMINAL NEURALGIA: ICD-10-CM

## 2021-02-12 PROCEDURE — 99607 MTMS BY PHARM ADDL 15 MIN: CPT | Mod: TEL | Performed by: PHARMACIST

## 2021-02-12 PROCEDURE — 99605 MTMS BY PHARM NP 15 MIN: CPT | Mod: TEL | Performed by: PHARMACIST

## 2021-02-12 NOTE — PROGRESS NOTES
Medication Therapy Management (MTM) Encounter    ASSESSMENT:                            Medication Adherence/Access: No issues identified    Parkinson's Disease: Discussed with the patient regarding the importance of taking levodopa on a scheduled regimen and that we will keep her times the same for now with 4 times per day dosing but if she continues to have wearing off towards the end of the dosing may need to switch to a 5 times per day regimen.  For today, we will focus on management of her orthostatic hypotension with a few changes in the timing of her blood pressure medications.    Orthostatic hypotension: We discussed that the patient's blood pressure is likely to be lowest at the beginning of the day and after meals so we will shift the timing of her midodrine to morning and midday.  Propranolol will be given later in the day and will be continued at a low dose for her tremor.    Anxiety/depression: stable    Trigeminal neuralgia: Stable    Asthma: Stable    Vitamins/supplements: Stable    PLAN:                            1. I think you will start to notice more consistency in your blood pressure and how you feel if you can take your medications on a more regular schedule. I have proposed a new regimen below and have included meal times in the chart for you to follow. Please let me know if any of these times do not seem reasonable in your day-to-day schedule and I can help shift the times to make it work better for you. Please set an alarm on your phone for the times during the day when you will be taking your medication.   2. We will keep the dose of carbidopa-levodopa the same for now but discussed that next time if this isn't working well and you're still having wearing off towards the end of the dose we may dose carbidopa-levodopa 5 times instead of 4 times/day.  3. Keep monitoring your blood pressure. We are going to have you take midodrine 30-60 minutes before your first two meals of the day to help  with drop in blood pressure with meals. We may need to add a 3rd dose of midodrine before dinner if blood pressure is still low at that time but we want to avoid taking midodrine within 4 hours of bedtime. Please also do not nap within 1-2 hours of taking midodrine to avoid supine hypertension.       9 am (wake up) 9:30 am- 10 am (breakfast) 12 pm  12:30-1 pm (lunch) 3 pm 5:30-6:30 pm (dinner) 9 pm 11 pm (bedtime) ~ 3 am   Carbidopa-  levodopa    mg 1.5    1.5  1.5  1.5   Midodrine 2.5 mg 1  1         Propranolol 10 mg   Half-tab     Half-tab     Gabapentin 100 mg  1          Gabapentin 300 mg       1     Tylenol 500 mg  2     2     Bupropion  mg   1     1     Sertraline 100 mg  1          Vitamin B complex  1          Calcium Carbonate 500 mg        1    Vitamin D3  1000 units  1          CoQ10 100 mg         1    Magnesium oxide 500 mg   1      1    Vitamin C 500 mg   1          Probiotic   1     1     Asmanex inhaler        1 puff        Follow-up: 3/22/21 at 1 pm - I will call you!    SUBJECTIVE/OBJECTIVE:                          Alina Zavala is a 60 year old female called for a follow-up visit. She was referred to me from Dr. Hearn.  Today's visit is a follow-up MTM visit from 3/3/20     Reason for visit: carbidopa-levodopa wearing off and orthostatic hypotension issues     Allergies/ADRs: Reviewed in chart  Tobacco: She reports that she has never smoked. She has never used smokeless tobacco.  Alcohol: not currently using  Past Medical History: Reviewed in chart    Medication Adherence/Access: patient admits she is not on a very regimented schedule for taking her medications. She does try to use an iPhone alarm for medication administration times but but would like guidance on what times would be best for her.     Parkinson's Disease:  Current medications include: propranolol 5 mg twice daily and carbidopa-levodopa  mg 1.5 tablets 4 times/day, at about 10-11 am, 4-5 pm, 10-10:30 pm,  "3:30-4:30 am. Patient has found that the medication is most effective for her when it is spaced about 6 hours apart. When the medication wears off she starts to experience numbness on bottom of her lips/bottom of toes, cramping in her feet and behind her knees (\"this drives me crazy!\"). Patient endorses numbness but no pain in her legs/back. The muscle cramping can come on 4.5 hours after a dose at times and rarely will the medication last a full 6 hours. Propranolol was started for tremor initially and she has noticed more tremor with the dose of propranolol decreased over time. She does Big and Loud exercises but if her blood pressure is low she can't complete the exercises. Patient admits her sleep schedule varies a lot and she would like to be more disciplined about going to bedtime at 11 pm.     Meal times are as follows:  Breakfast - 9:30-10 am  Lunch - 12:45-1 pm   Dinner - 5:30-6 pm     Orthostatic hypotension: Currently taking midodrine 2.5 mg twice daily (morning and late afternoon). She has stopped fludrocortisone. She is seeing a cardiologist for the blood pressure issues which have gotten worse. Propranolol has been decreased and seems blood pressure is somewhat improved now. Typically systolic blood pressure is 80s-100 and diastolic is 50s-70s. Lowest she's had was 60/43 - cannot function. If systolic blood pressure is in the low 90s she will feel dizzy and she feels better with anything higher. Historically her blood pressure is highest in the evenings but not always. Pulse is generally around 95. When she is feeling dizzy she will eat salty foods, drink fluids, and lie down. if she wakes up and blood pressure is low she will wait to take propranolol and midodrine. She does note that blood pressure seems to drop after meals, especially with lunch and dinner.    Anxiety/depression: Currently taking bupropion  mg twice daily and sertraline 100 mg daily (morning). no concerns noted today - feels " mood is stable.    PHQ 1/9/2020   PHQ-9 Total Score 6   Q9: Thoughts of better off dead/self-harm past 2 weeks Not at all     Trigeminal neuralgia: Currently taking 100 mg of gabapentin in the morning and 300 mg at night (one hour before bedtime).  States this medication has been stable for her symptoms and no concerns.    Asthma: Current asthma medications: Asthmanex 1 puff once nightly; albuterol inhaler has been used very rarely and no concerns today.    Vitamins/supplements: As follows. No concerns today.    Vitamin B complex    Calcium carbonate     Vitamin D3    Coq10    Magnesium oxide    Vitamin C    Probiotic     Today's Vitals: There were no vitals taken for this visit.  ----------------    I spent 55 minutes with this patient today. All changes were made via collaborative practice agreement with Dr. Hearn. A copy of the visit note was provided to the patient's referring provider.    The patient was sent via [employer] a summary of these recommendations.     Wendy Cárdenas, Pharm.D.  Medication Therapy Management Pharmacist  Phone: [phone]    Telemedicine Visit Details  Type of service:  Telephone visit  Start Time: 12:41 PM  End Time: 1:36 PM  Originating Location (patient location): Home  Distant Location (provider location):  [facility] [place]      Medication Therapy Recommendations  Hypotension    Current Medication: midodrine (PROAMATINE) 2.5 MG tablet   Rationale: Does not understand instructions - Adherence - Adherence   Recommendation: Provide Education - midodrine 2.5 MG tablet - take 1 tablet at 9 am and 12 pm   Status: Patient Agreed - Adherence/Education

## 2021-02-12 NOTE — TELEPHONE ENCOUNTER
[facility] Call Center    Phone Message    May a detailed message be left on voicemail: yes     Reason for Call: Other: Patient returning a call from Wendy Avi for appointment today. Patient states that she hung up on accident and keeps getting Wendy's voicemail. Patient states that her  usually helps with the video visit but is not able to help so patient is wanting to do a telephone visit instead.     Please advise and call patient back at your earliest convenience     Action Taken: Other: [facility] NEUROLOGY    Travel Screening: Not Applicable

## 2021-02-12 NOTE — TELEPHONE ENCOUNTER
Visit in progress.    Wendy Cárdenas, Pharm.D.  Medication Therapy Management Pharmacist  Phone: [phone]

## 2021-02-12 NOTE — PATIENT INSTRUCTIONS
Recommendations from today's MTM visit:                                                      1. I think you will start to notice more consistency in your blood pressure and how you feel if you can take your medications on a more regular schedule. I have proposed a new regimen below and have included meal times in the chart for you to follow. Please let me know if any of these times do not seem reasonable in your day-to-day schedule and I can help shift the times to make it work better for you. Please set an alarm on your phone for the times during the day when you will be taking your medication.     2. We will keep the dose of carbidopa-levodopa the same for now but discussed that next time if this isn't working well and you're still having wearing off towards the end of the dose we may dose carbidopa-levodopa 5 times instead of 4 times/day.    3. Keep monitoring your blood pressure. We are going to have you take midodrine 30-60 minutes before your first two meals of the day to help with drop in blood pressure with meals. We may need to add a 3rd dose of midodrine before dinner if blood pressure is still low at that time but we want to avoid taking midodrine within 4 hours of bedtime. Please also do not nap within 1-2 hours of taking midodrine to avoid supine hypertension.       9 am (wake up) 9:30 am- 10 am (breakfast) 12 pm  12:30-1 pm (lunch) 3 pm 5:30-6:30 pm (dinner) 9 pm 11 pm (bedtime) ~ 3 am   Carbidopa-  levodopa    mg 1.5    1.5  1.5  1.5   Midodrine 2.5 mg 1  1         Propranolol 10 mg   Half-tab     Half-tab     Gabapentin 100 mg  1          Gabapentin 300 mg       1     Tylenol 500 mg  2     2     Bupropion  mg   1     1     Sertraline 100 mg  1          Vitamin B complex  1          Calcium Carbonate 500 mg        1    Vitamin D3  1000 units  1          CoQ10 100 mg         1    Magnesium oxide 500 mg   1      1    Vitamin C 500 mg   1          Probiotic   1     1     Asmanex inhaler         1 susie          It was great to speak with you today.  I value your experience and would be very thankful for your time with providing feedback on our clinic survey. You may receive a survey via email or text message in the next few days.     Next MTM visit: 3/22/21 at 1 pm - I will call you!    To schedule another MTM appointment, please call the clinic directly or you may call the MTM scheduling line at [phone] or toll-free at [phone].     My Clinical Pharmacist's contact information:                                                      It was a pleasure talking with you today!  Please feel free to contact me with any questions or concerns you have.      Wendy Cárdenas, Pharm.D.  Medication Therapy Management Pharmacist  Phone: [phone]

## 2021-03-22 ENCOUNTER — VIRTUAL VISIT (OUTPATIENT)
Dept: PHARMACY | Facility: CLINIC | Age: 61
End: 2021-03-22
Payer: COMMERCIAL

## 2021-03-22 DIAGNOSIS — I95.1 ORTHOSTATIC HYPOTENSION: ICD-10-CM

## 2021-03-22 DIAGNOSIS — G20.C PARKINSONISM, UNSPECIFIED PARKINSONISM TYPE (H): Primary | ICD-10-CM

## 2021-03-22 PROCEDURE — 99607 MTMS BY PHARM ADDL 15 MIN: CPT | Mod: TEL | Performed by: PHARMACIST

## 2021-03-22 PROCEDURE — 99606 MTMS BY PHARM EST 15 MIN: CPT | Mod: TEL | Performed by: PHARMACIST

## 2021-03-22 NOTE — PROGRESS NOTES
Medication Therapy Management (MTM) Encounter    ASSESSMENT:                            Medication Adherence/Access: Advised use of pill boxes to help med adherence and to follow the chart provided by me    Parkinson's Disease:   It is unclear how the patient's timing of medication is affecting her low blood pressure so I am not comfortable with increasing the carbidopa-levodopa at this time.  We need to focus on having some improvement in the patient's medication and mealtime schedules before we can adjust the dose of her PD medications to better manage her tremor.    Orthostatic hypotension: Patient is doing a wonderful job of monitoring her vital signs but it is difficult to assess how the medications are affecting her blood pressure throughout the day given the randomness of her times of taking medications.  We will have her try to maintain a more regular schedule of medication administration and mealtimes and we will revisit the blood pressure issue at her next visit.    PLAN:                            1. You are doing excellent with your blood pressure monitoring with the chart your  made.   2. In order for us to adjust the dosage of your Parkinson's disease medications or blood pressure medications, it is very important that you start following the chart we created with your medication schedule/meal times (see below).     9 am (wake up) 9:30 am- 10 am (breakfast) 12 pm  12:30-1 pm (lunch) 3 pm 5:30-6:30 pm (dinner) 9 pm 11 pm (bedtime) ~ 3 am   Carbidopa-  levodopa    mg 1.5       1.5   1.5   1.5   Midodrine 2.5 mg 1   1               Propranolol 10 mg    Half-tab         Half-tab       Gabapentin 100 mg   1                 Gabapentin 300 mg             1       Tylenol 500 mg   2         2       Bupropion  mg    1         1       Sertraline 100 mg   1                 Vitamin B complex   1                 Calcium Carbonate 500 mg               1     Vitamin D3  1000 units   1                  CoQ10 100 mg                1     Magnesium oxide 500 mg    1           1     Vitamin C 500 mg    1                 Probiotic    1         1       Asmanex inhaler               1 puff           Follow-up: 4/12/21 at 1 pm - I will call you!    SUBJECTIVE/OBJECTIVE:                          Alina Zavala is a 60 year old female called for a follow-up visit. She was referred to me from Dr. Hearn.  Today's visit is a follow-up MT visit from 2/12/21     Reason for visit: follow up on blood pressure issues.    Allergies/ADRs: Reviewed in chart  Tobacco: She reports that she has never smoked. She has never used smokeless tobacco.  Alcohol: not currently using  Past Medical History: Reviewed in chart    Medication Adherence/Access: patient admits she has not implemented the new medication schedule/meal schedule that I made for her last month. She mis-placed the chart. Her  did create her a chart for tracking her vital signs which she has been doing consistently but she has not been taking medications consistently.     Parkinson's Disease:  Current medications include: propranolol 5 mg twice daily and carbidopa-levodopa  mg 1.5 tablets 4 times/day, at about 10-11 am, 4-5 pm, 10-10:30 pm, 3:30-4:30 am. She has not really changed the timing of carbidopa-levodopa per our last discussion. She does find that her tremor is worse with the lower dose of propranolol but would prefer to not increase the dose as it causes low blood pressure. She is wondering if carbidopa-levodopa needs to be increased.    Orthostatic hypotension: Currently taking midodrine 2.5 mg twice daily (morning and late afternoon). She has not been taking midodrine before meals as previously discussed. She has been tracking blood pressure and pulse which fluctuates - along with the timing of her medication times. She does notice that her blood pressure is lowest after meals. When her blood pressure is low she feels light-headed/dizzy, dull  headache, slurring her words, feels tired.     Today 12:34 pm: 71/52, 104  Yesterday mid-day 120/85, 84    Yesterday 6:48 pm 141/89    Previous blood pressure readings/pulse over the last month:  1:01 pm: 103/69, 69  2:20 pm: 75/51, 105  10:20 am: 74/52, 101 (after eating)  6:22 pm: 138/90, 82  12 pm: 103/69, 107  4:15 pm: 143/90, 83  4:40 pm: 120/74, 99   10:05 pm: 112/97, 80  12:15 pm: 98/75, 102  1:50 pm: 139/86, 74   2:06 pm: 79/57, 114    Today's Vitals: There were no vitals taken for this visit.  ----------------    I spent 27 minutes with this patient today. All changes were made via collaborative practice agreement with Dr. Hearn. A copy of the visit note was provided to the patient's referring provider.    The patient was sent via [employer] a summary of these recommendations.     Wendy Cárdenas, Pharm.D.  Medication Therapy Management Pharmacist  Phone: [phone]    Telemedicine Visit Details  Type of service:  Telephone visit  Start Time: 1:02 PM  End Time: 1:29 PM  Originating Location (patient location): Home  Distant Location (provider location):  [facility] [place] [facility]      Medication Therapy Recommendations  Hypotension    Current Medication: midodrine (PROAMATINE) 2.5 MG tablet   Rationale: Does not understand instructions - Adherence - Adherence   Recommendation: Provide Education - midodrine 2.5 MG tablet - take midodrine 30 minutes before meals   Status: Patient Agreed - Adherence/Education

## 2021-03-22 NOTE — PATIENT INSTRUCTIONS
Recommendations from today's MTM visit:                                                      1. You are doing excellent with your blood pressure monitoring with the chart your  made.   2. In order for us to adjust the dosage of your Parkinson's disease medications or blood pressure medications, it is very important that you start following the chart we created with your medication schedule/meal times (see below).     9 am (wake up) 9:30 am- 10 am (breakfast) 12 pm  12:30-1 pm (lunch) 3 pm 5:30-6:30 pm (dinner) 9 pm 11 pm (bedtime) ~ 3 am   Carbidopa-  levodopa    mg 1.5       1.5   1.5   1.5   Midodrine 2.5 mg 1   1               Propranolol 10 mg    Half-tab         Half-tab       Gabapentin 100 mg   1                 Gabapentin 300 mg             1       Tylenol 500 mg   2         2       Bupropion  mg    1         1       Sertraline 100 mg   1                 Vitamin B complex   1                 Calcium Carbonate 500 mg               1     Vitamin D3  1000 units   1                 CoQ10 100 mg                1     Magnesium oxide 500 mg    1           1     Vitamin C 500 mg    1                 Probiotic    1         1       Asmanex inhaler               1 puff         It was great to speak with you today.  I value your experience and would be very thankful for your time with providing feedback on our clinic survey. You may receive a survey via email or text message in the next few days.     Next MTM visit: 4/12/21 at 1 pm - I will call you!    To schedule another MTM appointment, please call the clinic directly or you may call the MTM scheduling line at [phone] or toll-free at [phone].     My Clinical Pharmacist's contact information:                                                      It was a pleasure talking with you today!  Please feel free to contact me with any questions or concerns you have.      Wendy Cárdenas, Pharm.D.  Medication Therapy Management Pharmacist  Phone:  [phone]

## 2021-04-12 ENCOUNTER — VIRTUAL VISIT (OUTPATIENT)
Dept: PHARMACY | Facility: CLINIC | Age: 61
End: 2021-04-12
Payer: COMMERCIAL

## 2021-04-12 DIAGNOSIS — G20.C PARKINSONISM, UNSPECIFIED PARKINSONISM TYPE (H): Primary | ICD-10-CM

## 2021-04-12 DIAGNOSIS — I95.1 ORTHOSTATIC HYPOTENSION: ICD-10-CM

## 2021-04-12 PROCEDURE — 99607 MTMS BY PHARM ADDL 15 MIN: CPT | Mod: TEL | Performed by: PHARMACIST

## 2021-04-12 PROCEDURE — 99606 MTMS BY PHARM EST 15 MIN: CPT | Mod: TEL | Performed by: PHARMACIST

## 2021-04-12 NOTE — Clinical Note
Another dietician referral request... she was wondering if she could meet with someone to learn how to eat smaller meals for her hypotension issues after eating. I found a dietician in our health-system near her in [place] so I gave her the name but can you place the referral?

## 2021-04-12 NOTE — PROGRESS NOTES
Medication Therapy Management (MTM) Encounter    ASSESSMENT:                            Medication Adherence/Access: See below for considerations    Parkinson's Disease: again encouraged patient to follow the medication chart as best as possible for improved efficacy of her Parkinson's disease medications. If midodrine can better regulate her blood pressure, we may be able to consider increasing propranolol or carbidopa-levodopa in the future.      Orthostatic hypotension: seems to be improving; again encouraged patient to take midodrine 30 minutes BEFORE meals to help with the post-meal decrease in blood pressure that predictably occurs for her. Patient is interested in eating smaller meals throughout the day to improve her blood pressure fluctuations and is requesting a referral to a local dietician.     PLAN:                            1. No change to the dosage of your medications, but please try to take midodrine about 30 minutes BEFORE meals to prevent the drop in blood pressure that seems to occur after you eat.  2. If you are interested in seeing a dietician to discuss how to eat smaller meals more frequently throughout the day, there is one near you in [place]: Meena Gooden , MS, RD, LD in [place], [place]. Dr. Hearn will place a referral for you to meet with her, if you'd like.  3. Future visit: may consider increasing propranolol or carbidopa-levodopa if blood pressure is improved.     Current medication schedule:    9 am (wake up) 9:30 am- 10 am (breakfast) 12 pm  12:30-1 pm (lunch) 3 pm 5 pm 5:30-6:30 pm (dinner) 9 pm 11 pm (bedtime) ~ 3 am   Carbidopa-  levodopa    mg 1.5       1.5    1.5   1.5   Midodrine 2.5 mg 2   1     1           Propranolol 10 mg    Half-tab          Half-  tab       Gabapentin 100 mg   1                  Gabapentin 300 mg              1       Tylenol 500 mg   2          2       Bupropion  mg    1          1       Sertraline 100 mg   1                  Vitamin B  complex   1                  Calcium Carbonate 500 mg                1     Vitamin D3  1000 units   1                  CoQ10 100 mg                 1     Magnesium oxide 500 mg    1            1     Vitamin C 500 mg    1                  Probiotic    1          1       Asmanex inhaler                1 puff        Follow-up: 5/17/21 at 3:30 pm - by phone; hopefully your  can join as well!    SUBJECTIVE/OBJECTIVE:                          Alina Zavala is a 60 year old female called for a follow-up visit. She was referred to me from Dr. Hearn.  Today's visit is a follow-up MTM visit from 3/22/21     Reason for visit: discuss Parkinson's disease and hypotension     Allergies/ADRs: Reviewed in chart  Tobacco: She reports that she has never smoked. She has never used smokeless tobacco.  Alcohol: not currently using  Past Medical History: Reviewed in chart - patient also mentioned she had a tooth extracted this week and is still recovering    Medication Adherence/Access: Patient states she is trying to be more regimented with her medication schedule and meal schedule and she is using the chart I made her during our last visit.     Parkinson's Disease:  Current medications include: propranolol 5 mg twice daily and carbidopa-levodopa  mg 1.5 tablets 4 times/day. Patient states that her tremor has been worse lately since propranolol was decreased. She also mentioned she is noticing more dyskinesia lately. Although she is trying to be more regimented with her medication regimen, patient admits that she sometimes sleeps late which throws her whole day off.     Orthostatic hypotension: Currently taking midodrine 5 mg at 9 am, 2.5 mg at 12 pm, and 2.5 mg at 5 pm. Patient states that since cardiology increased midodrine her blood pressure has been better and she feels better. However, she has not been regular about taking midodrine before/during meals and typically takes midodrine after a meal which is when her  blood pressure tends to drop; she is wondering if she could meet with a dietician to discuss how to eat smaller meals throughout the day as cardiology recommended this to her. She is taking her blood pressure multiple times per day and still has lowest blood pressure after meals - may be 70s systolic, 50s diastolic. Higher blood pressure readings lately have been 110s systolic, 70s diastolic and pulse seems to range from 80s-occasionally 100.     21 blood pressure lo pm midodrine dose  2:43 pm 74/54, 79  3:53 pm 69/51, 75  6 pm midodrine dose  6:10 pm 98/79, 71  6:27 pm 79/67, 73      Today's Vitals: There were no vitals taken for this visit.  ----------------    I spent 32 minutes with this patient today. All changes were made via collaborative practice agreement with Dr. Hearn. A copy of the visit note was provided to the patient's referring provider.    The patient was sent via [employer] a summary of these recommendations.     Wendy Cárdenas, Pharm.D.  Medication Therapy Management Pharmacist  Phone: [phone]    Telemedicine Visit Details  Type of service:  Telephone visit  Start Time: 1:01 PM  End Time: 1:33 PM  Originating Location (patient location): Home  Distant Location (provider location):  [facility]      Medication Therapy Recommendations  Hypotension    Current Medication: midodrine (PROAMATINE) 2.5 MG tablet   Rationale: Does not understand instructions - Adherence - Adherence   Recommendation: Provide Education - midodrine 2.5 MG tablet - take 30 minutes before meals   Status: Patient Agreed - Adherence/Education

## 2021-04-13 DIAGNOSIS — G20.A1 PARKINSON DISEASE (H): Primary | ICD-10-CM

## 2021-04-13 NOTE — PATIENT INSTRUCTIONS
Recommendations from today's MTM visit:                                                      1. No change to the dosage of your medications, but please try to take midodrine about 30 minutes BEFORE meals to prevent the drop in blood pressure that seems to occur after you eat.  2. If you are interested in seeing a dietician to discuss how to eat smaller meals more frequently throughout the day, there is one near you in [place]: Meena Gooden , MS, RD, LD in [place], [place]. Dr. Hearn will place a referral for you to meet with her, if you'd like.  3. Future visit: may consider increasing propranolol or carbidopa-levodopa if blood pressure is improved.     Current medication schedule:    9 am (wake up) 9:30 am- 10 am (breakfast) 12 pm  12:30-1 pm (lunch) 3 pm 5 pm 5:30-6:30 pm (dinner) 9 pm 11 pm (bedtime) ~ 3 am   Carbidopa-  levodopa    mg 1.5       1.5    1.5   1.5   Midodrine 2.5 mg 2   1     1           Propranolol 10 mg    Half-tab          Half-  tab       Gabapentin 100 mg   1                  Gabapentin 300 mg              1       Tylenol 500 mg   2          2       Bupropion  mg    1          1       Sertraline 100 mg   1                  Vitamin B complex   1                  Calcium Carbonate 500 mg                1     Vitamin D3  1000 units   1                  CoQ10 100 mg                 1     Magnesium oxide 500 mg    1            1     Vitamin C 500 mg    1                  Probiotic    1          1       Asmanex inhaler                1 puff        Next MTM visit: 5/17/21 at 3:30 pm - by phone; hopefully your  can join as well!    It was great to speak with you today.  I value your experience and would be very thankful for your time with providing feedback on our clinic survey. You may receive a survey via email or text message in the next few days.     To schedule another MTM appointment, please call the clinic directly or you may call the MTM scheduling line at [phone] or  toll-free at [phone].     My Clinical Pharmacist's contact information:                                                      Please feel free to contact me with any questions or concerns you have.      Wendy Cárdenas, Pharm.D.  Medication Therapy Management Pharmacist  Phone: [phone]

## 2021-04-14 NOTE — PROGRESS NOTES
VIDEO VISIT    Date of Visit: April 29, 2021  Name: Alina Zavala  Date of Birth 1960  1971 [facility] [address]   [email or web address]  [phone] (M) -iphone  [phone] (H) -not an iphone  Tejinder Miller  [phone]     Eros, Venkat, and Christie.       PCP is Dr. HESHAM Melendrez  CArdiologist Dr. Reyna Dick  Eye doctor  Dentist  Chiropractor    valentín Melendrez     Assessment:  (G20) Parkinson disease (H)  (primary encounter diagnosis)  Carbidopa/levodopa Sinemet 25/100 1.5 tabs 4/day at 3:30 - 5:30 am, 10 am, 4-5 pm, and 10-11 pm.  She has some dyskinesias  She has some wearing off. At 4-5 hours gets tingling of her toes/fingers  Legs - cramping, clawing  More common in later afternoon.   Can be painful  Ice helps    -- most likely she has dystonic cramping that is off related     Gait/Med related complications/Falls     Exercise/Therapy     For RLS -  Gabapentin neurontin 100mg @10am and sometimes @8/11pm extra dose   Gabapentin neurontin 300mg @8/11pm     may consider taking second dose of gabapentin earlier than 10/11pm    Cognitive/Driving   No issues  Driving short  Distances and not on freeway.    Mood  Counselling?  Bupropion wellbutrin SR 200mg 12 hr tablet twice daily  Sertraline zoloft 100mg  Fatigue  Overwhelmed at time  Had social event that was fun but stressful    Hallucinations/delusions    Sleep   Gets up to take her first dose of medication @330/530am and then goes back to bed; possibly till 930am  Variable time of wakening up.      Bladder   Urinary urgency  Variable nocturia depending on hydration, etc.   Discussed timing of water intake    GI/Constipation/GERD   Magnesium oxide 500mg twice daily  Probiotic twice daily smarty pants  Doing well    ENDO   Calcium carbonate at night   Cholecalciferol vitamin D3 1000 units    Cardio/heart  POTS    Has had low blood pressure 80/40 and 60/40  She thinks that the propranolol helps her tremor but worried    Fludrocortisone florinef 0.1mg - not taking  Midodrine 2.5mg = 2@9am, 1 @ 12pm and 1 @ 5pm  Propranolol 10mg 1/2 of 10mg twice daily at 930/10am and 8/9pm    water and salt intake    Dr. Reyna Dick    Vision   No vision changes    Heme   Not on aspirin    Other:    Left trigeminal neuralgia  Prior history of lyme disease  Sweating episodes, not able to take hot shower.     Breathing  Albuterol proair as needed   Asmanex 120 metered doses 220mcg/inh inhaler at night   Mometasone asmanex - as above    Acetaminophen tylenol 500mg scheduled.   b complex   Coenzyme Q10 daily   Cyclobenzaprine flexeril as needed 1/2 tab  Gabapentin neurontin 100mg mid day and at night as needed  Gabapentin neurontin 300mg at night   Vitamin C ascorbic acid 500mg       Medications     330-530am 10am 4-5pm 10-11pm   Acetaminophen tylenol 500mg    2    2   Albuterol proair hfa/proventil hfa/ventolin hfa 108 (90 base)mcg/act inhaler   As needed        Asmanex 120 metered doses 220mcg/inh inhaler        1 puff    b complex        1    Bupropion wellbutrin SR 200mg 12 hr tablet   1   1    Calcium carbonate 600mg       1    Carbidopa/levodopa Sinemet 25/100 1.5 1.5 1.5 1.5   Cholecalciferol vitamin D3 25mcg 1000 units    1       Coenzyme Q10 100mg       1   Cyclobenzaprine flexeril 10mg   As needed       Fludrocortisone florinef 0.1mg   Not using       Gabapentin neurontin 100mg   1   1 as needed    Gabapentin neurontin 300mg       1    Magnesium oxide antacid 500mg   1   1 (variable)   Midodrine proamatine 2.5mg 2 1 1      Mometasone fumorate asmanex See above      At night   Probiotic smarty pants 1      1    Propranolol inderal 10mg   1/2   1/2   Sertraline zoloft 100mg   1       Vitamin C 500mg    1                                  Plan:    Would not use rasagiline (azilect) or selegiline (Eldepryl) as an add on due to risk of drug interaction with the antidepressants    Consider as needed sinemet for off periods.     Consider  amantadine but would need renal (kidney) function results before starting amantadine as it is cleared by the kidney so would need this information before starting it at 100mg once or twice daily. A rx was put in chart but not sent to her pharmacy. Discussed risks of medication - primarily the splotchy skin but all sorts of issues can occur with this medication.     AMANTADINE (SYMMETREL )  Description and Uses  Amantadine is a medication used to prevent viral illnesses such as the flu and to help lessen the symptoms of Parkinson.s disease (PD). Amantadine is considered a symptomatic treatment of PD.  At the beginning stages of PD, Amantadine may slightly improve slowness, stiffness and tremor. As PD advances some individuals develop abnormal involuntary wriggling movements (dyskinesias) that are brought out by certain antiparkinsonian medications such as levodopa/carbidopa (Sinemet ). Amantadine appears to lessen these dyskinesias. It is unclear if Amantadine slows or prevents the development of Parkinson.s disease by affecting the survival of dopamine producing brain cells.    Dosage  ?Amantadine can be given in a capsule, tablet or liquid form.  100 mg capsules are administered 1 to 3 times per day; however, larger or smaller doses can be used depending on the patient.    Expectations/Effects  You can expect mild improvement of Parkinson.s symptoms in the first few years  of the condition.? Reduction in dyskinesias if used later in the course of the condition.  Amantadine may be ineffective for dyskinesias in up to one-third of patients.    Side Effects  ?Inability to fall or remain asleep (insomnia)  ?Sleepiness during the day  ?Difficulty concentrating   Red or purple skin blotches, often on the legs called  livedo reticularis  ?Swelling of the feet  ?Anxiety  ?Dizziness  ?Urinary retention or frequency  ?Hallucinations  Blurred vision (due to corneal edema)  ?If you have any concerns at all, please call your  "doctor.  Do not stop your medication without talking to your doctor  or a neurology resident on call.    Medical Decision Making:  #  Chronic progressive medical conditions addressed  Blood pressure  Review and/or interpretation of unique test or documentation from a provider outside of neurology no   Independent historian provided additional details  no   Prescription drug management and review of potential side effects and/or monitoring for side effects  yes   Health impacted by social determinants of health  no    I have reviewed the note as documented above.  This accurately captures the substance of my conversation with the patient and total time spent preparing for visit, executing visit and completing visit on the day of the visit:  30 minutes.     Start time of video:   End of video: 144pm    Ishan Hearn MD      ------------------------------------------------------------------------------------------------------------------------------------------------------------------------    Video-Visit Details    The patient has been notified of following:     \"After a review of the patient s situation, this visit was changed from an in-person visit to a video visit to reduce the risk of COVID-19 exposure.   The patient is being evaluated via a billable video visit.\"    \"This video visit will be conducted via a call between you and your physician/provider. We have found that certain health care needs can be provided without the need for an in-person physical exam.  This service lets us provide the care you need with a video conversation.  If a prescription is necessary we can send it directly to your pharmacy.  If lab work is needed we can place an order for that and you can then stop by our lab to have the test done at a later time.    If during the course of the call the physician/provider feels a video visit is not appropriate, you will not be charged for this service.\"     Patient has given verbal consent for Video " visit? Yes    Patient would like the video invitation sent by:     Type of service:  Video Visit    Video Start Time:     Video End Time (time video stopped):     Duration:  minutes - see above    Originating Location (pt. Location):     Distant Location (provider location):  [facility] [place]     Mode of Communication:  Video Conference via [email or web address] (and if not possible then doximity)      Ishan Hearn MD      --------------------------------------------------------------------------------------------------------------    Alina Zavala is a 60 year old female who is being evaluated via a billable video visit.      Charts reviewed  Consult from  Images reviewed        I have reviewed and updated the patient's Past Medical History, Social History, Family History and Medication List.    ALLERGIES  Prednisone, Donepezil, Penicillins, Prednisone, and Sulfa drugs    Lasts visit details if there was a last visit:       14 Review of systems  are negative except for   Patient Active Problem List   Diagnosis     Asthma     Benign essential hypertension     Adenoma of colon     Anxiety     DDD (degenerative disc disease), lumbar     Depression     Dizziness     Labile blood pressure     Hypotension     Near syncope     Osteopenia     Parkinson disease (H)     POTS (postural orthostatic tachycardia syndrome)     Trigeminal neuralgia        Allergies   Allergen Reactions     Prednisone Anxiety, Palpitations and Shortness Of Breath     Donepezil      Insomnia, nausea, dizziness/somnolence      Penicillins      Unknown     Prednisone      Other reaction(s): *Unknown     Sulfa Drugs      Unknown     Past Surgical History:   Procedure Laterality Date     D & C       Past Medical History:   Diagnosis Date     Anxiety      Depression      Hyperlipidemia      Hypertension      Lyme disease      Trigeminal neuralgia      Social History     Socioeconomic History     Marital status:      Spouse  "name: Not on file     Number of children: Not on file     Years of education: Not on file     Highest education level: Not on file   Occupational History     Not on file   Social Needs     Financial resource strain: Not on file     Food insecurity     Worry: Not on file     Inability: Not on file     Transportation needs     Medical: Not on file     Non-medical: Not on file   Tobacco Use     Smoking status: Never Smoker     Smokeless tobacco: Never Used   Substance and Sexual Activity     Alcohol use: No     Frequency: Never     Drug use: Not on file     Sexual activity: Not on file   Lifestyle     Physical activity     Days per week: Not on file     Minutes per session: Not on file     Stress: Not on file   Relationships     Social connections     Talks on phone: Not on file     Gets together: Not on file     Attends Sabianism service: Not on file     Active member of club or organization: Not on file     Attends meetings of clubs or organizations: Not on file     Relationship status: Not on file     Intimate partner violence     Fear of current or ex partner: Not on file     Emotionally abused: Not on file     Physically abused: Not on file     Forced sexual activity: Not on file   Other Topics Concern     Not on file   Social History Narrative    . Lives in [place]. Tejinder Zavala spouse. They have three children. Their names are Eros, Venkat, and Christie. She is a homemaker. Studied library science and theology. She was a  for over 16 years. 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 Adult onset.      Cerebrovascular Disease Father      Hypertension Father      Dementia No family hx of      Parkinsonism No family hx of      Current Outpatient Medications   Medication Sig Dispense Refill     acetaminophen (TYLENOL) 500 MG tablet 2 x 500mg tab by mouth twice daily @ 10am and 11pm  = 4/day       albuterol (PROAIR HFA/PROVENTIL HFA/VENTOLIN HFA) 108 (90 Base) MCG/ACT inhaler Inhale 2 puffs into the lungs every 6 hours as needed       B Complex-C (VITAMIN B COMPLEX W/VITAMIN C) TABS tablet Take 1 tablet by mouth daily       buPROPion (WELLBUTRIN SR) 200 MG 12 hr tablet 200mg tab by mouth twice daily at 9 am and 9 pm  0     calcium carbonate (CALCIUM CARBONATE) 600 MG tablet Take 1 tablet by mouth At Bedtime        carbidopa-levodopa (SINEMET)  MG tablet May increase to 1.5 x 25/100 tab by mouth 4/day @ 4-530am, 10am, 430-5pm, and 10pm = 6/day 540 tablet 11     cholecalciferol 25 MCG (1000 UT) TABS 1000 units (25 mcg) by mouth daily       co-enzyme Q-10 100 MG CAPS capsule 100mg capsule by mouth every evening at 11pm       cyclobenzaprine (FLEXERIL) 10 MG tablet Take 10 mg by mouth as needed (for back pain - very sparingly)       gabapentin (NEURONTIN) 100 MG capsule 100mg capsule by mouth daily @ 10 AM and second dose as needed at night 90 capsule 3     gabapentin (NEURONTIN) 300 MG capsule 300mg capsule by mouth nightly @ 8-11pm 90 capsule 3     Magnesium Oxide, Antacid, 500 MG CAPS Take 1 tablet by mouth 2 times daily @10am and 11pm       midodrine (PROAMATINE) 2.5 MG tablet Take 2 tablets (5 mg) at 9 am, 1 tablet (2.5 mg) at 12 pm, and 1 tablet (2.5 mg) at 5 pm       mometasone (ASMANEX, 120 METERED DOSES,) 220 MCG/INH inhaler Inhale 1 puff into the lungs At Bedtime       NONFORMULARY Per Dr. Do wear compression stockings daily. Level of 20-30 mmHg thigh high. Take off one hour 2-3 times daily.       probiotic CAPS 1 capsule by mouth twice daily (Della  Pants Adult Probiotic Complete)       propranolol (INDERAL) 10 MG tablet Take 5 mg by mouth 2 times daily        sertraline (ZOLOFT) 100 MG tablet 100mg tab by mouth daily       vitamin C (ASCORBIC ACID) 500 MG tablet Take 500 mg by mouth daily           Medications

## 2021-04-25 ENCOUNTER — HEALTH MAINTENANCE LETTER (OUTPATIENT)
Age: 61
End: 2021-04-25

## 2021-04-27 RX ORDER — PROPRANOLOL HYDROCHLORIDE 10 MG/1
TABLET ORAL
COMMUNITY
Start: 2021-04-26 | End: 2021-04-29

## 2021-04-29 ENCOUNTER — VIRTUAL VISIT (OUTPATIENT)
Dept: NEUROLOGY | Facility: CLINIC | Age: 61
End: 2021-04-29
Payer: COMMERCIAL

## 2021-04-29 DIAGNOSIS — G20.A1 PARKINSON DISEASE (H): Primary | ICD-10-CM

## 2021-04-29 PROCEDURE — 99214 OFFICE O/P EST MOD 30 MIN: CPT | Mod: 95 | Performed by: PSYCHIATRY & NEUROLOGY

## 2021-04-29 RX ORDER — AMANTADINE HYDROCHLORIDE 100 MG/1
CAPSULE, GELATIN COATED ORAL
Qty: 60 CAPSULE | Refills: 11 | COMMUNITY
End: 2021-11-19

## 2021-04-29 RX ORDER — PROPRANOLOL HYDROCHLORIDE 10 MG/1
TABLET ORAL
COMMUNITY
Start: 2021-04-29 | End: 2021-11-19

## 2021-04-29 RX ORDER — CARBIDOPA AND LEVODOPA 25; 100 MG/1; MG/1
TABLET ORAL
Qty: 540 TABLET | Refills: 11 | COMMUNITY
Start: 2021-04-29 | End: 2021-04-29

## 2021-04-29 RX ORDER — CARBIDOPA AND LEVODOPA 25; 100 MG/1; MG/1
TABLET ORAL
Qty: 630 TABLET | Refills: 11 | Status: SHIPPED | OUTPATIENT
Start: 2021-04-29 | End: 2021-11-19

## 2021-04-29 NOTE — PROGRESS NOTES
Alina is a 60 year old who is being evaluated via a billable video visit.      Unable to contact    How would you like to obtain your AVS?   If the video visit is dropped, the invitation should be resent by:   Will anyone else be joining your video visit?       Video Start Time:   Video-Visit Details    Type of service:  Video Visit    Video End Time:    Originating Location (pt. Location):     Distant Location (provider location):  [facility] [place]     Platform used for Video Visit:       Chief Complaint   Patient presents with     Parkinson      Checo Lyles

## 2021-04-29 NOTE — LETTER
4/29/2021       RE: Alina Zavala  1971 [facility] [place] [address]     Dear Colleague,    Thank you for referring your patient, Alina Zavala, to the [facility] NEUROLOGY [facility]INIC [place] at [facility]. Please see a copy of my visit note below.      VIDEO VISIT    Date of Visit: April 29, 2021  Name: Alina Zavala  Date of Birth 1960  1971 [facility] [address]   [email or web address]  [phone] (M) -iphone  [phone] (H) -not an iphone  Tejinder Miller  [phone]     Venkat Cooney, and Christie.       PCP is Dr. HESHAM Melendrez  CArdiologist Dr. Reyna Dick  Eye doctor  Dentist  Chiropractor    valentín Melendrez     Assessment:  (G20) Parkinson disease (H)  (primary encounter diagnosis)  Carbidopa/levodopa Sinemet 25/100 1.5 tabs 4/day at 3:30 - 5:30 am, 10 am, 4-5 pm, and 10-11 pm.  She has some dyskinesias  She has some wearing off. At 4-5 hours gets tingling of her toes/fingers  Legs - cramping, clawing  More common in later afternoon.   Can be painful  Ice helps    -- most likely she has dystonic cramping that is off related     Gait/Med related complications/Falls     Exercise/Therapy     For RLS -  Gabapentin neurontin 100mg @10am and sometimes @8/11pm extra dose   Gabapentin neurontin 300mg @8/11pm     may consider taking second dose of gabapentin earlier than 10/11pm    Cognitive/Driving   No issues  Driving short  Distances and not on freeway.    Mood  Counselling?  Bupropion wellbutrin SR 200mg 12 hr tablet twice daily  Sertraline zoloft 100mg  Fatigue  Overwhelmed at time  Had social event that was fun but stressful    Hallucinations/delusions    Sleep   Gets up to take her first dose of medication @330/530am and then goes back to bed; possibly till 930am  Variable time of wakening up.      Bladder   Urinary urgency  Variable nocturia depending on hydration, etc.   Discussed timing of water  intake    GI/Constipation/GERD   Magnesium oxide 500mg twice daily  Probiotic twice daily smarty pants  Doing well    ENDO   Calcium carbonate at night   Cholecalciferol vitamin D3 1000 units    Cardio/heart  POTS    Has had low blood pressure 80/40 and 60/40  She thinks that the propranolol helps her tremor but worried   Fludrocortisone florinef 0.1mg - not taking  Midodrine 2.5mg = 2@9am, 1 @ 12pm and 1 @ 5pm  Propranolol 10mg 1/2 of 10mg twice daily at 930/10am and 8/9pm    water and salt intake    Dr. Reyna Dick    Vision   No vision changes    Heme   Not on aspirin    Other:    Left trigeminal neuralgia  Prior history of lyme disease  Sweating episodes, not able to take hot shower.     Breathing  Albuterol proair as needed   Asmanex 120 metered doses 220mcg/inh inhaler at night   Mometasone asmanex - as above    Acetaminophen tylenol 500mg scheduled.   b complex   Coenzyme Q10 daily   Cyclobenzaprine flexeril as needed 1/2 tab  Gabapentin neurontin 100mg mid day and at night as needed  Gabapentin neurontin 300mg at night   Vitamin C ascorbic acid 500mg       Medications     330-530am 10am 4-5pm 10-11pm   Acetaminophen tylenol 500mg    2    2   Albuterol proair hfa/proventil hfa/ventolin hfa 108 (90 base)mcg/act inhaler   As needed        Asmanex 120 metered doses 220mcg/inh inhaler        1 puff    b complex        1    Bupropion wellbutrin SR 200mg 12 hr tablet   1   1    Calcium carbonate 600mg       1    Carbidopa/levodopa Sinemet 25/100 1.5 1.5 1.5 1.5   Cholecalciferol vitamin D3 25mcg 1000 units    1       Coenzyme Q10 100mg       1   Cyclobenzaprine flexeril 10mg   As needed       Fludrocortisone florinef 0.1mg   Not using       Gabapentin neurontin 100mg   1   1 as needed    Gabapentin neurontin 300mg       1    Magnesium oxide antacid 500mg   1   1 (variable)   Midodrine proamatine 2.5mg 2 1 1      Mometasone fumorate asmanex See above      At night   Probiotic smarty pants 1      1    Propranolol  inderal 10mg   1/2   1/2   Sertraline zoloft 100mg   1       Vitamin C 500mg    1                                  Plan:    Would not use rasagiline (azilect) or selegiline (Eldepryl) as an add on due to risk of drug interaction with the antidepressants    Consider as needed sinemet for off periods.     Consider amantadine but would need renal (kidney) function results before starting amantadine as it is cleared by the kidney so would need this information before starting it at 100mg once or twice daily. A rx was put in chart but not sent to her pharmacy. Discussed risks of medication - primarily the splotchy skin but all sorts of issues can occur with this medication.     AMANTADINE (SYMMETREL )  Description and Uses  Amantadine is a medication used to prevent viral illnesses such as the flu and to help lessen the symptoms of Parkinson.s disease (PD). Amantadine is considered a symptomatic treatment of PD.  At the beginning stages of PD, Amantadine may slightly improve slowness, stiffness and tremor. As PD advances some individuals develop abnormal involuntary wriggling movements (dyskinesias) that are brought out by certain antiparkinsonian medications such as levodopa/carbidopa (Sinemet ). Amantadine appears to lessen these dyskinesias. It is unclear if Amantadine slows or prevents the development of Parkinson.s disease by affecting the survival of dopamine producing brain cells.    Dosage  ?Amantadine can be given in a capsule, tablet or liquid form.  100 mg capsules are administered 1 to 3 times per day; however, larger or smaller doses can be used depending on the patient.    Expectations/Effects  You can expect mild improvement of Parkinson.s symptoms in the first few years  of the condition.? Reduction in dyskinesias if used later in the course of the condition.  Amantadine may be ineffective for dyskinesias in up to one-third of patients.    Side Effects  ?Inability to fall or remain asleep  "(insomnia)  ?Sleepiness during the day  ?Difficulty concentrating   Red or purple skin blotches, often on the legs called  livedo reticularis  ?Swelling of the feet  ?Anxiety  ?Dizziness  ?Urinary retention or frequency  ?Hallucinations  Blurred vision (due to corneal edema)  ?If you have any concerns at all, please call your doctor.  Do not stop your medication without talking to your doctor  or a neurology resident on call.    Medical Decision Making:  #  Chronic progressive medical conditions addressed  Blood pressure  Review and/or interpretation of unique test or documentation from a provider outside of neurology no   Independent historian provided additional details  no   Prescription drug management and review of potential side effects and/or monitoring for side effects  yes   Health impacted by social determinants of health  no    I have reviewed the note as documented above.  This accurately captures the substance of my conversation with the patient and total time spent preparing for visit, executing visit and completing visit on the day of the visit:  30 minutes.     Start time of video:   End of video: 144pm    Ishan Hearn MD      ------------------------------------------------------------------------------------------------------------------------------------------------------------------------    Video-Visit Details    The patient has been notified of following:     \"After a review of the patient s situation, this visit was changed from an in-person visit to a video visit to reduce the risk of COVID-19 exposure.   The patient is being evaluated via a billable video visit.\"    \"This video visit will be conducted via a call between you and your physician/provider. We have found that certain health care needs can be provided without the need for an in-person physical exam.  This service lets us provide the care you need with a video conversation.  If a prescription is necessary we can send it directly to " "your pharmacy.  If lab work is needed we can place an order for that and you can then stop by our lab to have the test done at a later time.    If during the course of the call the physician/provider feels a video visit is not appropriate, you will not be charged for this service.\"     Patient has given verbal consent for Video visit? Yes    Patient would like the video invitation sent by:     Type of service:  Video Visit    Video Start Time:     Video End Time (time video stopped):     Duration:  minutes - see above    Originating Location (pt. Location):     Distant Location (provider location):  [facility] NEUROLOGY [facility] [place]     Mode of Communication:  Video Conference via [facility] (and if not possible then doximity)      Ishan Hearn MD      --------------------------------------------------------------------------------------------------------------    Alina Zavala is a 60 year old female who is being evaluated via a billable video visit.      Charts reviewed  Consult from  Images reviewed        I have reviewed and updated the patient's Past Medical History, Social History, Family History and Medication List.    ALLERGIES  Prednisone, Donepezil, Penicillins, Prednisone, and Sulfa drugs    Lasts visit details if there was a last visit:       14 Review of systems  are negative except for   Patient Active Problem List   Diagnosis     Asthma     Benign essential hypertension     Adenoma of colon     Anxiety     DDD (degenerative disc disease), lumbar     Depression     Dizziness     Labile blood pressure     Hypotension     Near syncope     Osteopenia     Parkinson disease (H)     POTS (postural orthostatic tachycardia syndrome)     Trigeminal neuralgia        Allergies   Allergen Reactions     Prednisone Anxiety, Palpitations and Shortness Of Breath     Donepezil      Insomnia, nausea, dizziness/somnolence      Penicillins      Unknown     Prednisone      Other reaction(s): *Unknown     " Sulfa Drugs      Unknown     Past Surgical History:   Procedure Laterality Date     D & C       Past Medical History:   Diagnosis Date     Anxiety      Depression      Hyperlipidemia      Hypertension      Lyme disease      Trigeminal neuralgia      Social History     Socioeconomic History     Marital status:      Spouse name: Not on file     Number of children: Not on file     Years of education: Not on file     Highest education level: Not on file   Occupational History     Not on file   Social Needs     Financial resource strain: Not on file     Food insecurity     Worry: Not on file     Inability: Not on file     Transportation needs     Medical: Not on file     Non-medical: Not on file   Tobacco Use     Smoking status: Never Smoker     Smokeless tobacco: Never Used   Substance and Sexual Activity     Alcohol use: No     Frequency: Never     Drug use: Not on file     Sexual activity: Not on file   Lifestyle     Physical activity     Days per week: Not on file     Minutes per session: Not on file     Stress: Not on file   Relationships     Social connections     Talks on phone: Not on file     Gets together: Not on file     Attends Pentec[employer]al service: Not on file     Active member of club or organization: Not on file     Attends meetings of clubs or organizations: Not on file     Relationship status: Not on file     Intimate partner violence     Fear of current or ex partner: Not on file     Emotionally abused: Not on file     Physically abused: Not on file     Forced sexual activity: Not on file   Other Topics Concern     Not on file   Social History Narrative    . Lives in [place]. Tejinder Zavala spouse. They have three children. Their names are Eros, Venkat, and Christie. She is a homemaker. Studied library science and theology. She was a  for over 16 years. 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 Adult onset.      Cerebrovascular Disease Father      Hypertension Father      Dementia No family hx of      Parkinsonism No family hx of      Current Outpatient Medications   Medication Sig Dispense Refill     acetaminophen (TYLENOL) 500 MG tablet 2 x 500mg tab by mouth twice daily @ 10am and 11pm  = 4/day       albuterol (PROAIR HFA/PROVENTIL HFA/VENTOLIN HFA) 108 (90 Base) MCG/ACT inhaler Inhale 2 puffs into the lungs every 6 hours as needed       B Complex-C (VITAMIN B COMPLEX W/VITAMIN C) TABS tablet Take 1 tablet by mouth daily       buPROPion (WELLBUTRIN SR) 200 MG 12 hr tablet 200mg tab by mouth twice daily at 9 am and 9 pm  0     calcium carbonate (CALCIUM CARBONATE) 600 MG tablet Take 1 tablet by mouth At Bedtime        carbidopa-levodopa (SINEMET)  MG tablet May increase to 1.5 x 25/100 tab by mouth 4/day @ 4-530am, 10am, 430-5pm, and 10pm = 6/day 540 tablet 11     cholecalciferol 25 MCG (1000 UT) TABS 1000 units (25 mcg) by mouth daily       co-enzyme Q-10 100 MG CAPS capsule 100mg capsule by mouth every evening at 11pm       cyclobenzaprine (FLEXERIL) 10 MG tablet Take 10 mg by mouth as needed (for back pain - very sparingly)       gabapentin (NEURONTIN) 100 MG capsule 100mg capsule by mouth daily @ 10 AM and second dose as needed at night 90 capsule 3     gabapentin (NEURONTIN) 300 MG capsule 300mg capsule by mouth nightly @ 8-11pm 90 capsule 3     Magnesium Oxide, Antacid, 500 MG CAPS Take 1 tablet by mouth 2 times daily @10am and 11pm       midodrine (PROAMATINE) 2.5 MG tablet Take 2 tablets (5 mg) at 9 am, 1 tablet (2.5 mg) at 12 pm, " and 1 tablet (2.5 mg) at 5 pm       mometasone (ASMANEX, 120 METERED DOSES,) 220 MCG/INH inhaler Inhale 1 puff into the lungs At Bedtime       NONFORMULARY Per Dr. Do wear compression stockings daily. Level of 20-30 mmHg thigh high. Take off one hour 2-3 times daily.       probiotic CAPS 1 capsule by mouth twice daily (Smarty Pants Adult Probiotic Complete)       propranolol (INDERAL) 10 MG tablet Take 5 mg by mouth 2 times daily        sertraline (ZOLOFT) 100 MG tablet 100mg tab by mouth daily       vitamin C (ASCORBIC ACID) 500 MG tablet Take 500 mg by mouth daily           Medications                                                                                                                                                                                                                    Alina is a 60 year old who is being evaluated via a billable video visit.      Unable to contact    How would you like to obtain your AVS?   If the video visit is dropped, the invitation should be resent by:   Will anyone else be joining your video visit?       Video Start Time:   Video-Visit Details    Type of service:  Video Visit    Video End Time:    Originating Location (pt. Location):     Distant Location (provider location):  [facility] NEUROLOGY [facility] [place]     Platform used for Video Visit:       Chief Complaint   Patient presents with     Parkinson      Checo Llyes      Again, thank you for allowing me to participate in the care of your patient.      Sincerely,    Ishan Hearn MD

## 2021-04-29 NOTE — PATIENT INSTRUCTIONS
Assessment:  (G20) Parkinson disease (H)  (primary encounter diagnosis)  Carbidopa/levodopa Sinemet 25/100 1.5 tabs 4/day at 3:30 - 5:30 am, 10 am, 4-5 pm, and 10-11 pm.  She has some dyskinesias  She has some wearing off. At 4-5 hours gets tingling of her toes/fingers  Legs - cramping, clawing  More common in later afternoon.   Can be painful  Ice helps    -- most likely she has dystonic cramping that is off related     Gait/Med related complications/Falls     Exercise/Therapy     For RLS -  Gabapentin neurontin 100mg @10am and sometimes @8/11pm extra dose   Gabapentin neurontin 300mg @8/11pm     may consider taking second dose of gabapentin earlier than 10/11pm    Cognitive/Driving   No issues  Driving short  Distances and not on freeway.    Mood  Counselling?  Bupropion wellbutrin SR 200mg 12 hr tablet twice daily  Sertraline zoloft 100mg  Fatigue  Overwhelmed at time  Had social event that was fun but stressful    Hallucinations/delusions    Sleep   Gets up to take her first dose of medication @330/530am and then goes back to bed; possibly till 930am  Variable time of wakening up.      Bladder   Urinary urgency  Variable nocturia depending on hydration, etc.   Discussed timing of water intake    GI/Constipation/GERD   Magnesium oxide 500mg twice daily  Probiotic twice daily smarty pants  Doing well    ENDO   Calcium carbonate at night   Cholecalciferol vitamin D3 1000 units    Cardio/heart  POTS    Has had low blood pressure 80/40 and 60/40  She thinks that the propranolol helps her tremor but worried   Fludrocortisone florinef 0.1mg - not taking  Midodrine 2.5mg = 2@9am, 1 @ 12pm and 1 @ 5pm  Propranolol 10mg 1/2 of 10mg twice daily at 930/10am and 8/9pm    water and salt intake    Dr. Reyna Dick    Vision   No vision changes    Heme   Not on aspirin    Other:    Left trigeminal neuralgia  Prior history of lyme disease  Sweating episodes, not able to take hot shower.     Breathing  Albuterol proair as  needed   Asmanex 120 metered doses 220mcg/inh inhaler at night   Mometasone asmanex - as above    Acetaminophen tylenol 500mg scheduled.   b complex   Coenzyme Q10 daily   Cyclobenzaprine flexeril as needed 1/2 tab  Gabapentin neurontin 100mg mid day and at night as needed  Gabapentin neurontin 300mg at night   Vitamin C ascorbic acid 500mg       Medications     330-530am 10am 4-5pm 10-11pm   Acetaminophen tylenol 500mg    2    2   Albuterol proair hfa/proventil hfa/ventolin hfa 108 (90 base)mcg/act inhaler   As needed        Asmanex 120 metered doses 220mcg/inh inhaler        1 puff    b complex        1    Bupropion wellbutrin SR 200mg 12 hr tablet   1   1    Calcium carbonate 600mg       1    Carbidopa/levodopa Sinemet 25/100 1.5 1.5 1.5 1.5   Cholecalciferol vitamin D3 25mcg 1000 units    1       Coenzyme Q10 100mg       1   Cyclobenzaprine flexeril 10mg   As needed       Fludrocortisone florinef 0.1mg   Not using       Gabapentin neurontin 100mg   1   1 as needed    Gabapentin neurontin 300mg       1    Magnesium oxide antacid 500mg   1   1 (variable)   Midodrine proamatine 2.5mg 2 1 1      Mometasone fumorate asmanex See above      At night   Probiotic smarty pants 1      1    Propranolol inderal 10mg   1/2   1/2   Sertraline zoloft 100mg   1       Vitamin C 500mg    1                                  Plan:    Would not use rasagiline (azilect) or selegiline (Eldepryl) as an add on due to risk of drug interaction with the antidepressants    Consider as needed sinemet for off periods.     Consider amantadine but would need renal (kidney) function results before starting amantadine as it is cleared by the kidney so would need this information before starting it at 100mg once or twice daily. A rx was put in chart but not sent to her pharmacy. Discussed risks of medication - primarily the splotchy skin but all sorts of issues can occur with this medication.     AMANTADINE (SYMMETREL )  Description and  Uses  Amantadine is a medication used to prevent viral illnesses such as the flu and to help lessen the symptoms of Parkinson.s disease (PD). Amantadine is considered a symptomatic treatment of PD.  At the beginning stages of PD, Amantadine may slightly improve slowness, stiffness and tremor. As PD advances some individuals develop abnormal involuntary wriggling movements (dyskinesias) that are brought out by certain antiparkinsonian medications such as levodopa/carbidopa (Sinemet ). Amantadine appears to lessen these dyskinesias. It is unclear if Amantadine slows or prevents the development of Parkinson.s disease by affecting the survival of dopamine producing brain cells.    Dosage   Amantadine can be given in a capsule, tablet or liquid form.  100 mg capsules are administered 1 to 3 times per day; however, larger or smaller doses can be used depending on the patient.    Expectations/Effects  You can expect mild improvement of Parkinson.s symptoms in the first few years  of the condition.  Reduction in dyskinesias if used later in the course of the condition.  Amantadine may be ineffective for dyskinesias in up to one-third of patients.    Side Effects   Inability to fall or remain asleep (insomnia)   Sleepiness during the day   Difficulty concentrating   Red or purple skin blotches, often on the legs called  livedo reticularis   Swelling of the feet   Anxiety   Dizziness   Urinary retention or frequency   Hallucinations  Blurred vision (due to corneal edema)   If you have any concerns at all, please call your doctor.  Do not stop your medication without talking to your doctor  or a neurology resident on call.

## 2021-05-10 ENCOUNTER — TELEPHONE (OUTPATIENT)
Dept: NEUROLOGY | Facility: CLINIC | Age: 61
End: 2021-05-10

## 2021-05-10 NOTE — TELEPHONE ENCOUNTER
Patient was called and fax number was given to patient to give to clinic to request her records to be sent to neurology clinic

## 2021-05-12 ENCOUNTER — DOCUMENTATION ONLY (OUTPATIENT)
Dept: NEUROLOGY | Facility: CLINIC | Age: 61
End: 2021-05-12

## 2021-05-12 NOTE — PROGRESS NOTES
Received records from [facility] family practice  Records Date of service: 12/19-5/21  Copy has been sent to scanning and emailed to provider email

## 2021-05-17 ENCOUNTER — VIRTUAL VISIT (OUTPATIENT)
Dept: PHARMACY | Facility: CLINIC | Age: 61
End: 2021-05-17
Payer: COMMERCIAL

## 2021-05-17 DIAGNOSIS — I95.1 ORTHOSTATIC HYPOTENSION: ICD-10-CM

## 2021-05-17 DIAGNOSIS — G20.C PARKINSONISM, UNSPECIFIED PARKINSONISM TYPE (H): Primary | ICD-10-CM

## 2021-05-17 PROCEDURE — 99606 MTMS BY PHARM EST 15 MIN: CPT | Performed by: PHARMACIST

## 2021-05-17 PROCEDURE — 99607 MTMS BY PHARM ADDL 15 MIN: CPT | Performed by: PHARMACIST

## 2021-05-17 NOTE — PATIENT INSTRUCTIONS
Recommendations from today's MTM visit:                                                      1. Try to take midodrine 30 minutes BEFORE meals.  2. We discussed electrolyte-rich drinks - I personally like Nuun brand tablets.  3. Here is the medication schedule:     9 am (wake up) 9:30 am- 10 am (breakfast) 12 pm  12:30-1 pm (lunch) 3 pm 5 pm 5:30-6:30 pm (dinner) 9 pm 11 pm (bedtime) ~ 3 am   Carbidopa-  levodopa    mg 1.5       1.5     1.5   1.5   Midodrine 2.5 mg 2   1     1           Propranolol 10 mg    Half-tab           Half-  tab       Gabapentin 100 mg   1                   Gabapentin 300 mg               1       Tylenol 500 mg   2           2       Bupropion  mg    1           1       Sertraline 100 mg   1                   Vitamin B complex   1                   Calcium Carbonate 500 mg                 1     Vitamin D3  1000 units   1                   CoQ10 100 mg                  1     Magnesium oxide 500 mg    1             1     Vitamin C 500 mg    1                   Probiotic    1           1       Asmanex inhaler                 1 puff           Follow-up: Return in 5 weeks (on 6/21/2021) for Medication Therapy Management (telephone visit at 3:30 pm).    It was great to speak with you today.  I value your experience and would be very thankful for your time with providing feedback on our clinic survey. You may receive a survey via email or text message in the next few days.     To schedule another MTM appointment, please call the clinic directly or you may call the MTM scheduling line at [phone] or toll-free at [phone].     My Clinical Pharmacist's contact information:                                                      Please feel free to contact me with any questions or concerns you have.      Wendy Cárdenas, Pharm.D.  Medication Therapy Management Pharmacist  Phone: [phone]

## 2021-05-17 NOTE — PROGRESS NOTES
"Medication Therapy Management (MTM) Encounter    ASSESSMENT:                            Medication Adherence/Access: No issues identified    Parkinson's Disease:  appears stable    Orthostatic hypotension: discussed electrolyte replacement to improve low blood pressures, especially during hypotensive episodes. Also, reinforced that midodrine should be taken 30 minutes before meals to decrease the likelihood of hypotensive episodes after meals.    PLAN:                            1. Try to take midodrine 30 minutes BEFORE meals.  2. We discussed electrolyte-rich drinks - I personally like Nuun brand tablets.  3. No change to medication regimen today.    Follow-up: Return in 5 weeks (on 6/21/2021) for Medication Therapy Management (telephone visit at 3:30 pm).    SUBJECTIVE/OBJECTIVE:                          Alina Zavala is a 60 year old female called for a follow-up visit. She was referred to me from Dr. Hearn. Patient was accompanied by , Tejinder. Today's visit is a follow-up MTM visit from 4/12/21     Reason for visit: follow up on Parkinson's disease and orthostatic hypotension.    Allergies/ADRs: Reviewed in chart  Tobacco: She reports that she has never smoked. She has never used smokeless tobacco.  Alcohol: not currently using  Past Medical History: Reviewed in chart    Medication Adherence/Access: no issues reported    Parkinson's Disease:  Current medications include: propranolol 5 mg twice daily and carbidopa-levodopa  mg 1.5 tablets 4 times/day (about 6 hours apart). Carbidopa-levodopa seems to wear off at 4-5 hours but at times can last 6-7 hours. She may get some cramping at night in her foot and leg but this has been tolerable. She thinks she may have some dyskinesia but it is not bothersome.     Orthostatic hypotension: Currently taking midodrine 5 mg in the morning, 2.5 mg mid-day, and 2.5 mg in the evening. She reports that her blood pressure has been \"much better.\"  The last 3 days she " had some low blood pressures but otherwise typically ranging in 80s-100s/50s-70s mm Hg. When her blood pressure does get low, she feels she can recover more quickly from an episode.  states that he is encouraging her to increase her salt intake and he is wondering if there is anything he can put in a fridge by her bed to help when her blood pressure gets too low.     Yesterday 4:35 am: 79/55 and pulse 84  Yesterday 2:01 pm (after lunch): 68/50 and pulse 85 -   Yesterday 3 pm: 89/63 and pulse 83  (midodrine given at 9:48 am, 2 pm, and 8 pm - not given before meals as instructed)    Today's Vitals: There were no vitals taken for this visit.  ----------------    I spent 38 minutes with this patient today. All changes were made via collaborative practice agreement with Dr. Hearn. A copy of the visit note was provided to the patient's referring provider.    The patient was sent via [employer] a summary of these recommendations.     Wendy Cárdenas, Pharm.D.  Medication Therapy Management Pharmacist  Phone: [phone]    Telemedicine Visit Details  Type of service:  Telephone visit  Start Time: 3:32 PM  End Time: 4:10 PM  Originating Location (patient location): Home  Distant Location (provider location):  [facility]        Medication Therapy Recommendations  Hypotension    Current Medication: midodrine (PROAMATINE) 2.5 MG tablet   Rationale: Does not understand instructions - Adherence - Adherence   Recommendation: Provide Education - midodrine 2.5 MG tablet - take 30 minutes before meals   Status: Patient Agreed - Adherence/Education

## 2021-05-24 ENCOUNTER — RECORDS - HEALTHEAST (OUTPATIENT)
Dept: ADMINISTRATIVE | Facility: CLINIC | Age: 61
End: 2021-05-24

## 2021-05-26 ENCOUNTER — RECORDS - HEALTHEAST (OUTPATIENT)
Dept: ADMINISTRATIVE | Facility: CLINIC | Age: 61
End: 2021-05-26

## 2021-05-30 ENCOUNTER — RECORDS - HEALTHEAST (OUTPATIENT)
Dept: ADMINISTRATIVE | Facility: CLINIC | Age: 61
End: 2021-05-30

## 2021-06-21 ENCOUNTER — VIRTUAL VISIT (OUTPATIENT)
Dept: PHARMACY | Facility: CLINIC | Age: 61
End: 2021-06-21
Payer: COMMERCIAL

## 2021-06-21 DIAGNOSIS — I95.1 ORTHOSTATIC HYPOTENSION: ICD-10-CM

## 2021-06-21 DIAGNOSIS — G20.C PARKINSONISM, UNSPECIFIED PARKINSONISM TYPE (H): Primary | ICD-10-CM

## 2021-06-21 PROCEDURE — 99606 MTMS BY PHARM EST 15 MIN: CPT | Performed by: PHARMACIST

## 2021-06-21 NOTE — PATIENT INSTRUCTIONS
Recommendations from today's MTM visit:                                                      1. No medication changes recommended at this time    2. Continue to follow established medication schedule as best as you can    Follow-up: Return in about 1 year (around 6/21/2022) for Medication Therapy Management.    It was great to speak with you today.  I value your experience and would be very thankful for your time with providing feedback on our clinic survey. You may receive a survey via email or text message in the next few days.     To schedule another MTM appointment, please call the clinic directly or you may call the MTM scheduling line at [phone] or toll-free at [phone].     My Clinical Pharmacist's contact information:                                                      Please feel free to contact me with any questions or concerns you have.      Wendy Cárdenas, Pharm.D.  Medication Therapy Management Pharmacist  Phone: [phone]

## 2021-06-21 NOTE — PROGRESS NOTES
"Medication Therapy Management (MTM) Encounter    ASSESSMENT:                            Medication Adherence/Access: improving     Parkinson's Disease:  stable     Orthostatic hypotension: improving     PLAN:                            1. No medication changes recommended at this time  2. Continue to follow established medication schedule as best as you can    Follow-up: Return in about 1 year (around 6/21/2022) for Medication Therapy Management.    SUBJECTIVE/OBJECTIVE:                          Alina Zavala is a 61 year old female called for a follow-up visit. She was referred to me from Dr. Hearn.  Today's visit is a follow-up MTM visit from 5/17/21     Reason for visit: follow up on blood pressure.    Allergies/ADRs: Reviewed in chart  Tobacco: She reports that she has never smoked. She has never used smokeless tobacco.  Alcohol: not currently using  Activity: taking care of her new puppy  Past Medical History: Reviewed in chart    Medication Adherence/Access: trying to stick to the medication schedule I gave her     Parkinson's Disease:  Current medications include: propranolol 5 mg twice daily and carbidopa-levodopa  mg 1.5 tablets 4 times/day (about 6 hours apart). No new concerns today. Reports her medications are \"going well.\" Her cardiologist suggesting stopping the morning dose of propranolol but patient has chosen not to stop it as her tremor is much worse without propranolol.      Orthostatic hypotension: Currently taking midodrine 5 mg in the morning, 2.5 mg mid-day, and 2.5 mg in the evening. She is working on taking the medication more consistently, 30 minutes before meals. Blood pressure is reportedly \"better\" but still up and down. Blood pressure readings ranging from 80s-100s/50s-70s most of the time with a few blood pressure readings <60 systolic and a few readings >100 systolic. Pulse is typically 70s-100. Overall she feels she can manage her blood pressure fluctuations more and she can " recover more quickly.     Today's Vitals: There were no vitals taken for this visit.  ----------------    I spent 15 minutes with this patient today. All changes were made via collaborative practice agreement with Dr. Hearn. A copy of the visit note was provided to the patient's referring provider.    The patient was sent via [employer] a summary of these recommendations.     Wendy Cárdenas, Pharm.D.  Medication Therapy Management Pharmacist  Phone: [phone]    Telemedicine Visit Details  Type of service:  Telephone visit  Start Time: 3:35 PM  End Time: 3:50 PM  Originating Location (patient location): Home  Distant Location (provider location):  [facility]

## 2021-07-13 ENCOUNTER — RECORDS - HEALTHEAST (OUTPATIENT)
Dept: ADMINISTRATIVE | Facility: CLINIC | Age: 61
End: 2021-07-13

## 2021-08-10 NOTE — PROGRESS NOTES
VIDEO VISIT    Date of Visit: August 17, 2021  Name: Alina Zavala  Date of Birth 1960  [address] [facility] [address]   [email or web address]  [phone] (M) -iphone  [phone] (H) -not an iphone  Tejinder HARP  [phone]     Eros, Venkat, and Christie.       PCP is Dr. HESHAM Melendrez  CArdiologist Dr. Reyna Dick  Eye doctor  Dentist  Chiropractor     [email or web address]    Assessment:  (G20) Parkinson disease (H)  (primary encounter diagnosis)    Carbidopa/levodopa Sinemet 25/100 1.5 tabs 4/day at 3:30 - 5:30 am, 10 am, 4-5 pm, and 10-11 pm.  She has some wearing off - has problems getting out of chairs  The medications last  She has some dyskinesias  She has some wearing off. At 4-5 hours gets tingling of her toes/fingers  Legs - cramping, clawing - behind her knees  More common in later afternoon.   Has to lay down in the car if troublesome cramping.  Can be painful  Ice helps     Gait/Med related complications/Falls   No falls     Exercise/Therapy   She is outside with her dog a few times a day  She plays with a plastic bottle with her new puppy   Filled up the [employer] pool.      They have a puppy Taty 5 months old 44 lbs - yellow lab and red wright lab    For RLS -  Gabapentin neurontin 100mg @10am and sometimes @8/11pm extra dose   Gabapentin neurontin 300mg @8/11pm     may consider taking second dose of gabapentin earlier than 10/11pm     Cognitive/Driving   No issues  Driving short  Distances and not on freeway.     Mood  Counselling?  Bupropion wellbutrin SR 200mg 12 hr tablet twice daily  Sertraline zoloft 100mg  Fatigue  Overwhelmed at time  Had social event that was fun but stressful     Hallucinations/delusions     Sleep   Gets up to take her first dose of medication @330/530am and then goes back to bed; possibly till 930am  Variable time of wakening up.       Bladder   Urinary urgency  Variable nocturia depending on hydration, etc.   Discussed  timing of water intake    GI/Constipation/GERD   Magnesium oxide 500mg twice daily  Probiotic twice daily smarty pants  Doing well     ENDO   Calcium carbonate at night   Cholecalciferol vitamin D3 1000 units     Cardio/heart  POTS    Has had low blood pressure 80/40 and 60/40  She thinks that the propranolol helps her tremor but worried   Fludrocortisone florinef 0.1mg - not taking  Midodrine 2.5mg = 2@9am, 1 @ 12pm and 1 @ 5pm  Propranolol 10mg 1/2 of 10mg twice daily at 930/10am and 8/9pm    Cardiology  Kimber,pan Constantino, Marlena Dick, Reyan - cardiologist    water and salt intake     Dr. Reyna Dick     Vision   No vision changes     Heme   Not on aspirin     Other:     Left trigeminal neuralgia  Prior history of lyme disease  Sweating episodes, not able to take hot shower.      Breathing  Albuterol proair as needed   Asmanex 120 metered doses 220mcg/inh inhaler at night   Mometasone asmanex - as above     Acetaminophen tylenol 500mg scheduled.   b complex   Coenzyme Q10 daily   Cyclobenzaprine flexeril as needed 1/2 tab  Gabapentin neurontin 100mg mid day and at night as needed  Gabapentin neurontin 300mg at night   Vitamin C ascorbic acid 500mg         Medications     4/430a 10am 4p 10p   Acetaminophen tylenol 500mg    2    2   Albuterol proair hfa/proventil hfa/ventolin hfa 108 (90 base)mcg/act inhaler   As needed        Amantadine symmetrel 100mg  Not started      b complex        1    Bupropion wellbutrin SR 200mg 12 hr tablet   1   1    Calcium carbonate 600mg       1    Carbidopa/levodopaSinemet 25/100 1.5 1.5 1.5 1.5   Cholecalciferol vitamin D3 25mcg 1000 units    1       Coenzyme Q10 100mg       1   Cyclobenzaprine flexeril 10mg   As needed       Gabapentin neurontin 100mg   1   1   Gabapentin neurontin 300mg       1    Magnesium oxide antacid 500mg   1      Midodrine proamatine 2.5mg 2 1 1      Mometasone fumorate asmanex 220 mcg/inh       At night   Probiotic smarty pants 1      1   "  Propranolol inderal 10mg   1/2   1/2   Sertraline zoloft 100mg   1       Vitamin C 500mg    1                             Plan:    OPTIONS for wearing off/cramping options.     Simplest is to take extra 1/2 tablet when more active  - booster dose.     Other options are below:    Because she is taking sertraline (zoloft) and wellbutrin (bupropion) she cannot take selegiline (eldepryl) or rasagiline (Azilect). Not sure if can take safinamide (Xadago) - its another monoamine oxidase inhibitor and may have an interaction with antidepressants and is very expensive.    She has not been on amantadine (symmetrel)  100mg once or twice daily     She has not been on a dopamine agonist which do carry a risk of impulse control issues (gambling, sh opping, eating, etc.) but would opt for a low dose with the sinemet if we used one.     We also consider rytary 4 capsules of 95mg dose by mouth 3 times per day     Medical Decision Making:  #  Chronic progressive medical conditions addressed  Parkinson and   Review and/or interpretation of unique test or documentation from a provider outside of neurology no   Independent historian provided additional details  Yes - spouse   Prescription drug management and review of potential side effects and/or monitoring for side effects  yes   Health impacted by social determinants of health  no    I have reviewed the note as documented above.  This accurately captures the substance of my conversation with the patient and total time spent preparing for visit, executing visit and completing visit on the day of the visit:  30 minutes.     Start of video: 4pm  End of video 427pm    Ishan Hearn MD      ------------------------------------------------------------------------------------------------------------------------------------------------------------------------    Video-Visit Details    The patient has been notified of following:     \"After a review of the patient s situation, this visit was " "changed from an in-person visit to a video visit to reduce the risk of COVID-19 exposure.   The patient is being evaluated via a billable video visit.\"    \"This video visit will be conducted via a call between you and your physician/provider. We have found that certain health care needs can be provided without the need for an in-person physical exam.  This service lets us provide the care you need with a video conversation.  If a prescription is necessary we can send it directly to your pharmacy.  If lab work is needed we can place an order for that and you can then stop by our lab to have the test done at a later time.    If during the course of the call the physician/provider feels a video visit is not appropriate, you will not be charged for this service.\"     Patient has given verbal consent for Video visit? Yes    Patient would like the video invitation sent by:     Type of service:  Video Visit    Video Start Time:     Video End Time (time video stopped):     Duration:  minutes - see above    Originating Location (pt. Location):     Distant Location (provider location):  [facility] [place]     Mode of Communication:  Video Conference via [employer] (and if not possible then doximity)      Ishan Hearn MD      --------------------------------------------------------------------------------------------------------------    Alina Zavala is a 61 year old female who is being evaluated via a billable video visit.      Charts reviewed  Consult from  Images reviewed        I have reviewed and updated the patient's Past Medical History, Social History, Family History and Medication List.    ALLERGIES  Prednisone, Donepezil, Penicillins, Prednisone, and Sulfa drugs    Lasts visit details if there was a last visit:       14 Review of systems  are negative except for   Patient Active Problem List   Diagnosis     Asthma     Benign essential hypertension     Adenoma of colon     Anxiety     " "DDD (degenerative disc disease), lumbar     Depression     Dizziness     Labile blood pressure     Hypotension     Near syncope     Osteopenia     Parkinson disease (H)     POTS (postural orthostatic tachycardia syndrome)     Trigeminal neuralgia        Allergies   Allergen Reactions     Prednisone Anxiety, Palpitations and Shortness Of Breath     Donepezil      Insomnia, nausea, dizziness/somnolence      Penicillins      Unknown     Prednisone      Other reaction(s): *Unknown     Sulfa Drugs      Unknown     Past Surgical History:   Procedure Laterality Date     D & C       Past Medical History:   Diagnosis Date     Anxiety      Depression      Hyperlipidemia      Hypertension      Lyme disease      Trigeminal neuralgia      Social History     Socioeconomic History     Marital status:      Spouse name: Not on file     Number of children: Not on file     Years of education: Not on file     Highest education level: Not on file   Occupational History     Not on file   Tobacco Use     Smoking status: Never Smoker     Smokeless tobacco: Never Used   Substance and Sexual Activity     Alcohol use: No     Drug use: Not on file     Sexual activity: Not on file   Other Topics Concern     Not on file   Social History Narrative    . Lives in [place]. Tejinder Zavala spouse. They have three children. Their names are Eros, Venkat, and Christie. She is a homemaker. Studied library science and theology. She was a  for over 16 years. 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[place], focusing [place] - can see on [employer] \"[place].\"        [email or web address]     Social Determinants of Health     Financial Resource Strain:      Difficulty of Paying Living Expenses:    Food Insecurity:      Worried About Running Out of Food in the Last Year:      Ran Out of Food in " the Last Year:    Transportation Needs:      Lack of Transportation (Medical):      Lack of Transportation (Non-Medical):    Physical Activity:      Days of Exercise per Week:      Minutes of Exercise per Session:    Stress:      Feeling of Stress :    Social Connections:      Frequency of Communication with Friends and Family:      Frequency of Social Gatherings with Friends and Family:      Attends [employer] Services:      Active Member of Clubs or Organizations:      Attends Club or Organization Meetings:      Marital Status:    Intimate Partner Violence:      Fear of Current or Ex-Partner:      Emotionally Abused:      Physically Abused:      Sexually Abused:      Family History   Problem Relation Age of Onset     Tremor Mother      Polymyalgia rheumatica Mother      Arthritis Mother      Hypertension Mother      Muscular Dystrophy Other         Multiple uncles and one aunt. Adult onset.      Cerebrovascular Disease Father      Hypertension Father      Dementia No family hx of      Parkinsonism No family hx of      Current Outpatient Medications   Medication Sig Dispense Refill     acetaminophen (TYLENOL) 500 MG tablet 2 x 500mg tab by mouth twice daily @ 10am and 11pm  = 4/day       albuterol (PROAIR HFA/PROVENTIL HFA/VENTOLIN HFA) 108 (90 Base) MCG/ACT inhaler Inhale 2 puffs into the lungs every 6 hours as needed       amantadine (SYMMETREL) 100 MG capsule Take 1 capsule (100 mg) by mouth 2 times daily 60 capsule 11     B Complex-C (VITAMIN B COMPLEX W/VITAMIN C) TABS tablet Take 1 tablet by mouth daily       buPROPion (WELLBUTRIN SR) 200 MG 12 hr tablet 200mg tab by mouth twice daily at 9 am and 9 pm  0     calcium carbonate (CALCIUM CARBONATE) 600 MG tablet Take 1 tablet by mouth At Bedtime        carbidopa-levodopa (SINEMET)  MG tablet 1.5 x 25/100 tab by mouth 4/day @ 4-530am, 10am, 430-5pm, and 10pm and 1 as needed = 7/day 630 tablet 11     cholecalciferol 25 MCG (1000 UT) TABS 1000 units (25 mcg)  by mouth daily       co-enzyme Q-10 100 MG CAPS capsule 100mg capsule by mouth every evening at 11pm       cyclobenzaprine (FLEXERIL) 10 MG tablet Take 10 mg by mouth as needed (for back pain - very sparingly)       gabapentin (NEURONTIN) 100 MG capsule 100mg capsule by mouth daily @ 10 AM and second dose as needed at night 90 capsule 3     gabapentin (NEURONTIN) 300 MG capsule 300mg capsule by mouth nightly @ 8-11pm 90 capsule 3     Magnesium Oxide, Antacid, 500 MG CAPS Take 1 tablet by mouth 2 times daily @10am and 11pm       midodrine (PROAMATINE) 2.5 MG tablet Take 2 tablets (5 mg) at 9 am, 1 tablet (2.5 mg) at 12 pm, and 1 tablet (2.5 mg) at 5 pm       mometasone (ASMANEX, 120 METERED DOSES,) 220 MCG/INH inhaler Inhale 1 puff into the lungs At Bedtime       NONFORMULARY Per Dr. Do wear compression stockings daily. Level of 20-30 mmHg thigh high. Take off one hour 2-3 times daily.       probiotic CAPS 1 capsule by mouth twice daily (Smarty Pants Adult Probiotic Complete)       propranolol (INDERAL) 10 MG tablet 1/2 of 10mg tab by mouth twice daily @ 930/10am and 8/9pm       sertraline (ZOLOFT) 100 MG tablet 100mg tab by mouth daily       vitamin C (ASCORBIC ACID) 500 MG tablet Take 500 mg by mouth daily           Medications

## 2021-08-17 ENCOUNTER — VIRTUAL VISIT (OUTPATIENT)
Dept: NEUROLOGY | Facility: CLINIC | Age: 61
End: 2021-08-17
Payer: COMMERCIAL

## 2021-08-17 DIAGNOSIS — G20.A1 PARKINSON DISEASE (H): Primary | ICD-10-CM

## 2021-08-17 PROCEDURE — 99214 OFFICE O/P EST MOD 30 MIN: CPT | Mod: 95 | Performed by: PSYCHIATRY & NEUROLOGY

## 2021-08-17 RX ORDER — ASCORBIC ACID 125 MG
TABLET,CHEWABLE ORAL
COMMUNITY
Start: 2021-08-17 | End: 2023-02-17

## 2021-08-17 NOTE — PATIENT INSTRUCTIONS
(G20) Parkinson disease (H)  (primary encounter diagnosis)    Carbidopa/levodopa Sinemet 25/100 1.5 tabs 4/day at 3:30 - 5:30 am, 10 am, 4-5 pm, and 10-11 pm.  She has some wearing off - has problems getting out of chairs  The medications last  She has some dyskinesias  She has some wearing off. At 4-5 hours gets tingling of her toes/fingers  Legs - cramping, clawing - behind her knees  More common in later afternoon.   Has to lay down in the car if troublesome cramping.  Can be painful  Ice helps     Gait/Med related complications/Falls   No falls     Exercise/Therapy   She is outside with her dog a few times a day  She plays with a plastic bottle with her new puppy   Filled up the [employer] pool.      They have a puppy Taty 5 months old 44 lbs - yellow lab and red wright lab    For RLS -  Gabapentin neurontin 100mg @10am and sometimes @8/11pm extra dose   Gabapentin neurontin 300mg @8/11pm     may consider taking second dose of gabapentin earlier than 10/11pm     Cognitive/Driving   No issues  Driving short  Distances and not on freeway.     Mood  Counselling?  Bupropion wellbutrin SR 200mg 12 hr tablet twice daily  Sertraline zoloft 100mg  Fatigue  Overwhelmed at time  Had social event that was fun but stressful     Hallucinations/delusions     Sleep   Gets up to take her first dose of medication @330/530am and then goes back to bed; possibly till 930am  Variable time of wakening up.       Bladder   Urinary urgency  Variable nocturia depending on hydration, etc.   Discussed timing of water intake    GI/Constipation/GERD   Magnesium oxide 500mg twice daily  Probiotic twice daily smarty pants  Doing well     ENDO   Calcium carbonate at night   Cholecalciferol vitamin D3 1000 units     Cardio/heart  POTS    Has had low blood pressure 80/40 and 60/40  She thinks that the propranolol helps her tremor but worried   Fludrocortisone florinef 0.1mg - not taking  Midodrine 2.5mg = 2@9am, 1 @ 12pm and 1 @ 5pm  Propranolol  10mg 1/2 of 10mg twice daily at 930/10am and 8/9pm    Cardiology  Kimber,pan Contsantino, Marlena Dick, Reyna - cardiologist    water and salt intake     Dr. Reyna Dick     Vision   No vision changes     Heme   Not on aspirin     Other:     Left trigeminal neuralgia  Prior history of lyme disease  Sweating episodes, not able to take hot shower.      Breathing  Albuterol proair as needed   Asmanex 120 metered doses 220mcg/inh inhaler at night   Mometasone asmanex - as above     Acetaminophen tylenol 500mg scheduled.   b complex   Coenzyme Q10 daily   Cyclobenzaprine flexeril as needed 1/2 tab  Gabapentin neurontin 100mg mid day and at night as needed  Gabapentin neurontin 300mg at night   Vitamin C ascorbic acid 500mg         Medications     4/430a 10am 4p 10p   Acetaminophen tylenol 500mg    2    2   Albuterol proair hfa/proventil hfa/ventolin hfa 108 (90 base)mcg/act inhaler   As needed        Amantadine symmetrel 100mg  Not started      b complex        1    Bupropion wellbutrin SR 200mg 12 hr tablet   1   1    Calcium carbonate 600mg       1    Carbidopa/levodopaSinemet 25/100 1.5 1.5 1.5 1.5   Cholecalciferol vitamin D3 25mcg 1000 units    1       Coenzyme Q10 100mg       1   Cyclobenzaprine flexeril 10mg   As needed       Gabapentin neurontin 100mg   1   1   Gabapentin neurontin 300mg       1    Magnesium oxide antacid 500mg   1      Midodrine proamatine 2.5mg 2 1 1      Mometasone fumorate asmanex 220 mcg/inh       At night   Probiotic smarty pants 1      1    Propranolol inderal 10mg   1/2   1/2   Sertraline zoloft 100mg   1       Vitamin C 500mg    1                             Plan:    OPTIONS for wearing off/cramping options.     Simplest is to take extra 1/2 tablet when more active  - booster dose.     Other options are below:    Because she is taking sertraline (zoloft) and wellbutrin (bupropion) she cannot take selegiline (eldepryl) or rasagiline (Azilect). Not sure if can take safinamide (Xadago) -  its another monoamine oxidase inhibitor and may have an interaction with antidepressants and is very expensive.    She has not been on amantadine (symmetrel)  100mg once or twice daily     She has not been on a dopamine agonist which do carry a risk of impulse control issues (gambling, sh opping, eating, etc.) but would opt for a low dose with the sinemet if we used one.     We also consider rytary 4 capsules of 95mg dose by mouth 3 times per day

## 2021-08-17 NOTE — PROGRESS NOTES
Alina is a 61 year old who is being evaluated via a billable video visit.      How would you like to obtain your AVS? MyChart  If the video visit is dropped, the invitation should be resent by: Send to e-mail at: [email or web address]  Will anyone else be joining your video visit? No      Video Start Time:   Video-Visit Details    Type of service:  Video Visit    Video End Time:    Originating Location (pt. Location): Home    Distant Location (provider location):  [facility] NEUROLOGY CLINIC [place]     Platform used for Video Visit: [employer]

## 2021-08-17 NOTE — LETTER
8/17/2021       RE: Alina Zavala  1971 [facility] [place] [place] [address]     Dear Colleague,    Thank you for referring your patient, Alina Zavala, to the [facility]UROLOG[facility] [place] at [facility]. Please see a copy of my visit note below.      VIDEO VISIT    Date of Visit: August 17, 2021  Name: Alina Zavala  Date of Birth 1960  1971 [facility] [address]   [email or web address]  [phone] (M) -iphone  [phone] (H) -not an iphone  Tejinder HARP  [phone]     Venkat Cooney, and Christie.       PCP is Dr. HESHAM Melendrez  CArdiologist Dr. Reyna Dick  Eye doctor  Dentist  Chiropractor     [email or web address]    Assessment:  (G20) Parkinson disease (H)  (primary encounter diagnosis)    Carbidopa/levodopa Sinemet 25/100 1.5 tabs 4/day at 3:30 - 5:30 am, 10 am, 4-5 pm, and 10-11 pm.  She has some wearing off - has problems getting out of chairs  The medications last  She has some dyskinesias  She has some wearing off. At 4-5 hours gets tingling of her toes/fingers  Legs - cramping, clawing - behind her knees  More common in later afternoon.   Has to lay down in the car if troublesome cramping.  Can be painful  Ice helps     Gait/Med related complications/Falls   No falls     Exercise/Therapy   She is outside with her dog a few times a day  She plays with a plastic bottle with her new puppy   Filled up the [employer] pool.      They have a puppy Taty 5 months old 44 lbs - yellow lab and red wright lab    For RLS -  Gabapentin neurontin 100mg @10am and sometimes @8/11pm extra dose   Gabapentin neurontin 300mg @8/11pm     may consider taking second dose of gabapentin earlier than 10/11pm     Cognitive/Driving   No issues  Driving short  Distances and not on freeway.     Mood  Counselling?  Bupropion wellbutrin SR 200mg 12 hr tablet twice daily  Sertraline zoloft 100mg  Fatigue  Overwhelmed at  time  Had social event that was fun but stressful     Hallucinations/delusions     Sleep   Gets up to take her first dose of medication @330/530am and then goes back to bed; possibly till 930am  Variable time of wakening up.       Bladder   Urinary urgency  Variable nocturia depending on hydration, etc.   Discussed timing of water intake    GI/Constipation/GERD   Magnesium oxide 500mg twice daily  Probiotic twice daily smarty pants  Doing well     ENDO   Calcium carbonate at night   Cholecalciferol vitamin D3 1000 units     Cardio/heart  POTS    Has had low blood pressure 80/40 and 60/40  She thinks that the propranolol helps her tremor but worried   Fludrocortisone florinef 0.1mg - not taking  Midodrine 2.5mg = 2@9am, 1 @ 12pm and 1 @ 5pm  Propranolol 10mg 1/2 of 10mg twice daily at 930/10am and 8/9pm    Cardiology  Kimber,pan Constantino, Marlena Dick, Reyna - cardiologist    water and salt intake     Dr. eRyna Dick     Vision   No vision changes     Heme   Not on aspirin     Other:     Left trigeminal neuralgia  Prior history of lyme disease  Sweating episodes, not able to take hot shower.      Breathing  Albuterol proair as needed   Asmanex 120 metered doses 220mcg/inh inhaler at night   Mometasone asmanex - as above     Acetaminophen tylenol 500mg scheduled.   b complex   Coenzyme Q10 daily   Cyclobenzaprine flexeril as needed 1/2 tab  Gabapentin neurontin 100mg mid day and at night as needed  Gabapentin neurontin 300mg at night   Vitamin C ascorbic acid 500mg         Medications     4/430a 10am 4p 10p   Acetaminophen tylenol 500mg    2    2   Albuterol proair hfa/proventil hfa/ventolin hfa 108 (90 base)mcg/act inhaler   As needed        Amantadine symmetrel 100mg  Not started      b complex        1    Bupropion wellbutrin SR 200mg 12 hr tablet   1   1    Calcium carbonate 600mg       1    Carbidopa/levodopaSinemet 25/100 1.5 1.5 1.5 1.5   Cholecalciferol vitamin D3 25mcg 1000 units    1       Coenzyme Q10  100mg       1   Cyclobenzaprine flexeril 10mg   As needed       Gabapentin neurontin 100mg   1   1   Gabapentin neurontin 300mg       1    Magnesium oxide antacid 500mg   1      Midodrine proamatine 2.5mg 2 1 1      Mometasone fumorate asmanex 220 mcg/inh       At night   Probiotic smarty pants 1      1    Propranolol inderal 10mg   1/2   1/2   Sertraline zoloft 100mg   1       Vitamin C 500mg    1                             Plan:    OPTIONS for wearing off/cramping options.     Simplest is to take extra 1/2 tablet when more active  - booster dose.     Other options are below:    Because she is taking sertraline (zoloft) and wellbutrin (bupropion) she cannot take selegiline (eldepryl) or rasagiline (Azilect). Not sure if can take safinamide (Xadago) - its another monoamine oxidase inhibitor and may have an interaction with antidepressants and is very expensive.    She has not been on amantadine (symmetrel)  100mg once or twice daily     She has not been on a dopamine agonist which do carry a risk of impulse control issues (gambling, sh opping, eating, etc.) but would opt for a low dose with the sinemet if we used one.     We also consider rytary 4 capsules of 95mg dose by mouth 3 times per day     Medical Decision Making:  #  Chronic progressive medical conditions addressed  Parkinson and   Review and/or interpretation of unique test or documentation from a provider outside of neurology no   Independent historian provided additional details  Yes - spouse   Prescription drug management and review of potential side effects and/or monitoring for side effects  yes   Health impacted by social determinants of health  no    I have reviewed the note as documented above.  This accurately captures the substance of my conversation with the patient and total time spent preparing for visit, executing visit and completing visit on the day of the visit:  30 minutes.     Start of video: 4pm  End of video 427pm    Ishan Hearn  "MD      ------------------------------------------------------------------------------------------------------------------------------------------------------------------------    Video-Visit Details    The patient has been notified of following:     \"After a review of the patient s situation, this visit was changed from an in-person visit to a video visit to reduce the risk of COVID-19 exposure.   The patient is being evaluated via a billable video visit.\"    \"This video visit will be conducted via a call between you and your physician/provider. We have found that certain health care needs can be provided without the need for an in-person physical exam.  This service lets us provide the care you need with a video conversation.  If a prescription is necessary we can send it directly to your pharmacy.  If lab work is needed we can place an order for that and you can then stop by our lab to have the test done at a later time.    If during the course of the call the physician/provider feels a video visit is not appropriate, you will not be charged for this service.\"     Patient has given verbal consent for Video visit? Yes    Patient would like the video invitation sent by:     Type of service:  Video Visit    Video Start Time:     Video End Time (time video stopped):     Duration:  minutes - see above    Originating Location (pt. Location):     Distant Location (provider location):  [facility] NEUROLOGY [facility]     Mode of Communication:  Video Conference via [employer] (and if not possible then doximity)      Ishan Hearn MD      --------------------------------------------------------------------------------------------------------------    Alina Zavala is a 61 year old female who is being evaluated via a billable video visit.      Charts reviewed  Consult from  Images reviewed        I have reviewed and updated the patient's Past Medical History, Social History, Family History and Medication " List.    ALLERGIES  Prednisone, Donepezil, Penicillins, Prednisone, and Sulfa drugs    Lasts visit details if there was a last visit:       14 Review of systems  are negative except for   Patient Active Problem List   Diagnosis     Asthma     Benign essential hypertension     Adenoma of colon     Anxiety     DDD (degenerative disc disease), lumbar     Depression     Dizziness     Labile blood pressure     Hypotension     Near syncope     Osteopenia     Parkinson disease (H)     POTS (postural orthostatic tachycardia syndrome)     Trigeminal neuralgia        Allergies   Allergen Reactions     Prednisone Anxiety, Palpitations and Shortness Of Breath     Donepezil      Insomnia, nausea, dizziness/somnolence      Penicillins      Unknown     Prednisone      Other reaction(s): *Unknown     Sulfa Drugs      Unknown     Past Surgical History:   Procedure Laterality Date     D & C       Past Medical History:   Diagnosis Date     Anxiety      Depression      Hyperlipidemia      Hypertension      Lyme disease      Trigeminal neuralgia      Social History     Socioeconomic History     Marital status:      Spouse name: Not on file     Number of children: Not on file     Years of education: Not on file     Highest education level: Not on file   Occupational History     Not on file   Tobacco Use     Smoking status: Never Smoker     Smokeless tobacco: Never Used   Substance and Sexual Activity     Alcohol use: No     Drug use: Not on file     Sexual activity: Not on file   Other Topics Concern     Not on file   Social History Narrative    . Lives in [place]. Tejinder Zavala spouse. They have three children. Their names are Eros, Venkat, and Christie. She is a homemaker. Studied library science and theology. She was a  for over 16 years. 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[place], focusing on castro -  "can see on [employer] \"Castro and Backroads.\"        [email or web address]     Social Determinants of Health     Financial Resource Strain:      Difficulty of Paying Living Expenses:    Food Insecurity:      Worried About Running Out of Food in the Last Year:      Ran Out of Food in the Last Year:    Transportation Needs:      Lack of Transportation (Medical):      Lack of Transportation (Non-Medical):    Physical Activity:      Days of Exercise per Week:      Minutes of Exercise per Session:    Stress:      Feeling of Stress :    Social Connections:      Frequency of Communication with Friends and Family:      Frequency of Social Gatherings with Friends and Family:      Attends [employer] Services:      Active Member of Clubs or Organizations:      Attends Club or Organization Meetings:      Marital Status:    Intimate Partner Violence:      Fear of Current or Ex-Partner:      Emotionally Abused:      Physically Abused:      Sexually Abused:      Family History   Problem Relation Age of Onset     Tremor Mother      Polymyalgia rheumatica Mother      Arthritis Mother      Hypertension Mother      Muscular Dystrophy Other         Multiple uncles and one aunt. Adult onset.      Cerebrovascular Disease Father      Hypertension Father      Dementia No family hx of      Parkinsonism No family hx of      Current Outpatient Medications   Medication Sig Dispense Refill     acetaminophen (TYLENOL) 500 MG tablet 2 x 500mg tab by mouth twice daily @ 10am and 11pm  = 4/day       albuterol (PROAIR HFA/PROVENTIL HFA/VENTOLIN HFA) 108 (90 Base) MCG/ACT inhaler Inhale 2 puffs into the lungs every 6 hours as needed       amantadine (SYMMETREL) 100 MG capsule Take 1 capsule (100 mg) by mouth 2 times daily 60 capsule 11     B Complex-C (VITAMIN B COMPLEX W/VITAMIN C) TABS tablet Take 1 tablet by mouth daily       buPROPion (WELLBUTRIN SR) 200 MG 12 hr tablet 200mg tab by mouth twice daily at 9 am and 9 pm  0 "     calcium carbonate (CALCIUM CARBONATE) 600 MG tablet Take 1 tablet by mouth At Bedtime        carbidopa-levodopa (SINEMET)  MG tablet 1.5 x 25/100 tab by mouth 4/day @ 4-530am, 10am, 430-5pm, and 10pm and 1 as needed = 7/day 630 tablet 11     cholecalciferol 25 MCG (1000 UT) TABS 1000 units (25 mcg) by mouth daily       co-enzyme Q-10 100 MG CAPS capsule 100mg capsule by mouth every evening at 11pm       cyclobenzaprine (FLEXERIL) 10 MG tablet Take 10 mg by mouth as needed (for back pain - very sparingly)       gabapentin (NEURONTIN) 100 MG capsule 100mg capsule by mouth daily @ 10 AM and second dose as needed at night 90 capsule 3     gabapentin (NEURONTIN) 300 MG capsule 300mg capsule by mouth nightly @ 8-11pm 90 capsule 3     Magnesium Oxide, Antacid, 500 MG CAPS Take 1 tablet by mouth 2 times daily @10am and 11pm       midodrine (PROAMATINE) 2.5 MG tablet Take 2 tablets (5 mg) at 9 am, 1 tablet (2.5 mg) at 12 pm, and 1 tablet (2.5 mg) at 5 pm       mometasone (ASMANEX, 120 METERED DOSES,) 220 MCG/INH inhaler Inhale 1 puff into the lungs At Bedtime       NONFORMULARY Per Dr. Do wear compression stockings daily. Level of 20-30 mmHg thigh high. Take off one hour 2-3 times daily.       probiotic CAPS 1 capsule by mouth twice daily (Smarty Pants Adult Probiotic Complete)       propranolol (INDERAL) 10 MG tablet 1/2 of 10mg tab by mouth twice daily @ 930/10am and 8/9pm       sertraline (ZOLOFT) 100 MG tablet 100mg tab by mouth daily       vitamin C (ASCORBIC ACID) 500 MG tablet Take 500 mg by mouth daily           Medications                                                                                                                                                                                                                    Alina is a 61 year old who is being evaluated via a billable video visit.      How would you like to obtain your AVS? MyChart  If the video visit is dropped, the invitation  should be resent by: Send to e-mail at: [email or web address]  Will anyone else be joining your video visit? No      Video Start Time:   Video-Visit Details    Type of service:  Video Visit    Video End Time:    Originating Location (pt. Location): Home    Distant Location (provider location):  [facility]     Platform used for Video Visit: [employer]        Again, thank you for allowing me to participate in the care of your patient.      Sincerely,    Ishan Hearn MD

## 2021-08-20 ENCOUNTER — VIRTUAL VISIT (OUTPATIENT)
Dept: PHARMACY | Facility: CLINIC | Age: 61
End: 2021-08-20
Attending: PSYCHIATRY & NEUROLOGY
Payer: COMMERCIAL

## 2021-08-20 DIAGNOSIS — I95.1 ORTHOSTATIC HYPOTENSION: ICD-10-CM

## 2021-08-20 DIAGNOSIS — G20.C PARKINSONISM, UNSPECIFIED PARKINSONISM TYPE (H): Primary | ICD-10-CM

## 2021-08-20 PROCEDURE — 99606 MTMS BY PHARM EST 15 MIN: CPT | Performed by: PHARMACIST

## 2021-08-20 PROCEDURE — 99607 MTMS BY PHARM ADDL 15 MIN: CPT | Performed by: PHARMACIST

## 2021-08-20 NOTE — PROGRESS NOTES
Alina is a 61 year old who is being evaluated via a billable video visit.      How would you like to obtain your AVS? MyChart  If the video visit is dropped, the invitation should be resent by: [phone]      Video Start Time: 2:32 PM  Video-Visit Details    Type of service:  Video Visit    Video End Time:3:01 PM    Originating Location (pt. Location): Home    Distant Location (provider location):  [facility] NEUROLOGY CLINIC     Platform used for Video Visit: [employer]

## 2021-08-20 NOTE — PROGRESS NOTES
Medication Therapy Management (MTM) Encounter    ASSESSMENT:                            Medication Adherence/Access: No issues identified    Parkinson's Disease:  discussed the use of the PRN carbidopa-levodopa for muscle cramping and she agrees with trying this     Orthostatic hypotension: seems to be improving overall. Discussed that we will not be able to prevent all hypotensive episodes but as long as she can manage the episodes when they occur and she isn't passing out or falling, we will continue the plan as-is.    PLAN:                            1. As discussed with Dr. Hearn, you may take up to 2 half-tablet doses of carbidopa-levodopa as needed for breakthrough Parkinson's disease symptoms like muscle cramping  2  2. Your blood pressure seems to be improved overall! Keep up the great work. If you find that you're having more very low blood pressures, please contact our office.     Follow-up: Return in about 1 year (around 8/20/2022) for Medication Therapy Management.      SUBJECTIVE/OBJECTIVE:                          Alina Zavala is a 61 year old female contacted via secure video for a follow-up visit. She was referred to me from Dr. Hearn.  Today's visit is a follow-up MTM visit from 6/21/21     Reason for visit: follow up on medications.    Allergies/ADRs: Reviewed in chart  Past Medical History: Reviewed in chart  Tobacco: She reports that she has never smoked. She has never used smokeless tobacco.  Alcohol: not currently using    Medication Adherence/Access: no issues reported    Parkinson's Disease:  Current medications include: propranolol 5 mg twice daily and carbidopa-levodopa  mg 1.5 tablets 4 times/day (about 6 hours apart) at about 4 am, 10 am, 4 pm, 10 pm. per recent visit with Dr. Hearn, she may try taking extra half-tablet of carbidopa-levodopa, especially in mid-late afternoon and then maybe at bedtime. This is to help with muscle cramping that occurs unpredictably. She does tend to  "have more cramping when she is on long car rides and may try taking the medication then.      Orthostatic hypotension: Currently taking midodrine 5 mg in the morning, 2.5 mg mid-day, and 2.5 mg in the evening. Blood pressure readings ranging from 80s-110s/50s-80s most of the time with a few low blood pressure readings with systolic in the 70s and highest was 165/105 and this was an anomaly. Pulse is typically upper 60s-80s. Overall she feels she can manage her blood pressure fluctuations more and she can recover more quickly. She can tell if she is \"crashing\" and is cognizant if she just ate; if she gets more active her blood pressure decreases.     Vitals for 8/19/21:  11 pm - 86/62  10:46 pm - 99/62, 78  4:15 pm - 111/66, 69  2 pm - 124/86, 69  8:59 am - 87/69, 81   8:47 am - 114/81, 81  4:30 am - 95/65, 75    Today's Vitals: There were no vitals taken for this visit.  ----------------    I spent 29 minutes with this patient today. All changes were made via collaborative practice agreement with Dr. Hearn. A copy of the visit note was provided to the patient's primary care provider.    The patient was sent via [employer] a summary of these recommendations.     Wendy Cárdenas, Pharm.D.  Medication Therapy Management Pharmacist  Phone: [phone]    Telemedicine Visit Details  Type of service:  Video Conference via [employer]  Start Time: 2:32 PM  End Time: 3:01 PM  Originating Location (patient location): Home  Distant Location (provider location):  [facility] NEUROLOGY CLINIC     Medication Therapy Recommendations  No medication therapy recommendations to display       "

## 2021-08-23 NOTE — TELEPHONE ENCOUNTER
[facility] Call Center    Phone Message    May a detailed message be left on voicemail: yes     Reason for Call: Other: Alina calling to request a call back to Dr. Melendrez at [facility] to discuss what records are needed to be faxed over. Their number is [phone].     Action Taken: Message routed to:  [facility] ([facility]    Travel Screening: Not Applicable                                                                       none

## 2021-08-24 NOTE — PATIENT INSTRUCTIONS
Recommendations from today's MTM visit:                                                      1. As discussed with Dr. Hearn, you may take up to 2 half-tablet doses of carbidopa-levodopa as needed for breakthrough Parkinson's disease symptoms like muscle cramping    2. Your blood pressure seems to be improved overall! Keep up the great work. If you find that you're having more very low blood pressures, please contact our office.     Follow-up: Return in about 1 year (around 8/20/2022) for Medication Therapy Management.    It was great to speak with you today.  I value your experience and would be very thankful for your time with providing feedback on our clinic survey. You may receive a survey via email or text message in the next few days.     To schedule another MTM appointment, please call the clinic directly or you may call the MTM scheduling line at [phone] or toll-free at [phone].     My Clinical Pharmacist's contact information:                                                      Please feel free to contact me with any questions or concerns you have.      Wendy Cárdenas, Pharm.D.  Medication Therapy Management Pharmacist  [facility]

## 2021-10-10 ENCOUNTER — HEALTH MAINTENANCE LETTER (OUTPATIENT)
Age: 61
End: 2021-10-10

## 2021-11-11 ENCOUNTER — TELEPHONE (OUTPATIENT)
Dept: NEUROLOGY | Facility: CLINIC | Age: 61
End: 2021-11-11
Payer: COMMERCIAL

## 2021-11-11 NOTE — TELEPHONE ENCOUNTER
[facility] Call Center    Phone Message    May a detailed message be left on voicemail: yes     Reason for Call: Other: Patient is calling to see if her appointment on 11/19/21 at 330 with Dr. Hearn can be converted to a in clinic visit- Please review and advise.      Ok to LVM.     Action Taken: Other: NEUROLOGY    Travel Screening: Not Applicable

## 2021-11-11 NOTE — TELEPHONE ENCOUNTER
She has had three virtuals in a row and just thought she should be seen in person. Writer explained that Dr. Hearn does not have in person clinic on Friday afternoons.    Son confirmed covid positive on Monday.  Alina's 1st test yesterday was negative.

## 2021-11-18 RX ORDER — MIDODRINE HYDROCHLORIDE 5 MG/1
5 TABLET ORAL
COMMUNITY
Start: 2021-11-15 | End: 2021-11-19

## 2021-11-19 ENCOUNTER — VIRTUAL VISIT (OUTPATIENT)
Dept: NEUROLOGY | Facility: CLINIC | Age: 61
End: 2021-11-19
Payer: COMMERCIAL

## 2021-11-19 DIAGNOSIS — G20.A1 PARKINSON DISEASE (H): Primary | ICD-10-CM

## 2021-11-19 PROCEDURE — 99215 OFFICE O/P EST HI 40 MIN: CPT | Mod: 95 | Performed by: PSYCHIATRY & NEUROLOGY

## 2021-11-19 RX ORDER — BUPROPION HYDROCHLORIDE 200 MG/1
TABLET, EXTENDED RELEASE ORAL
Refills: 0 | COMMUNITY
Start: 2021-11-19

## 2021-11-19 RX ORDER — MIDODRINE HYDROCHLORIDE 5 MG/1
TABLET ORAL
Qty: 270 TABLET | Refills: 3 | COMMUNITY
Start: 2021-11-19 | End: 2022-03-21

## 2021-11-19 RX ORDER — SERTRALINE HYDROCHLORIDE 100 MG/1
TABLET, FILM COATED ORAL
Qty: 90 TABLET | Refills: 3 | COMMUNITY
Start: 2021-11-19

## 2021-11-19 RX ORDER — CARBIDOPA AND LEVODOPA 25; 100 MG/1; MG/1
TABLET ORAL
Qty: 630 TABLET | Refills: 11 | COMMUNITY
Start: 2021-11-19 | End: 2022-04-22

## 2021-11-19 RX ORDER — PROPRANOLOL HYDROCHLORIDE 10 MG/1
TABLET ORAL
Qty: 180 TABLET | Refills: 3 | COMMUNITY
Start: 2021-11-19

## 2021-11-19 RX ORDER — MIDODRINE HYDROCHLORIDE 2.5 MG/1
TABLET ORAL
Qty: 540 TABLET | Refills: 3 | COMMUNITY
Start: 2021-11-19 | End: 2021-11-19

## 2021-11-19 RX ORDER — GABAPENTIN 300 MG/1
300 CAPSULE ORAL EVERY EVENING
Qty: 90 CAPSULE | Refills: 3 | COMMUNITY
Start: 2021-11-19 | End: 2024-08-29

## 2021-11-19 RX ORDER — GABAPENTIN 100 MG/1
CAPSULE ORAL
Qty: 180 CAPSULE | Refills: 3 | COMMUNITY
Start: 2021-11-19 | End: 2024-08-29

## 2021-11-19 NOTE — LETTER
2021       RE: Alina Zavala   [facility] [place] [address]     Dear Colleague,    Thank you for referring your patient, Alina Zavala, to the [facility] NEUROLOGY CLINIC [place] [facility]. Please see a copy of my visit note below.        Diagnosis/Summary/Recommendations:    PATIENT: Alina Zavala  61 year old female     : 1960    DAHLIA:  [facility] [address]   [email or web address]  [phone] (M) -iphone  [phone] (H) -not an iphone  Tejinder HARP  [phone]     Venkat Cooney and Marissa.       PCP is Dr. HESHAM Melendrez  CArdiologist Dr. Reyna Dick  Eye doctor  Dentist  Chiropractor     [email or web address]       Other options are below:     Because she is taking sertraline (zoloft) and wellbutrin (bupropion) she cannot take selegiline (eldepryl) or rasagiline (Azilect). Not sure if can take safinamide (Xadago) - its another monoamine oxidase inhibitor and may have an interaction with antidepressants and is very expensive.     She has not been on amantadine (symmetrel)  100mg once or twice daily      She has not been on a dopamine agonist which do carry a risk of impulse control issues (gambling, sh opping, eating, etc.) but would opt for a low dose with the sinemet if we used one.      We also consider rytary 4 capsules of 95mg dose by mouth 3 times per day     Assessment:    (G20) Parkinson disease (H)  (primary encounter diagnosis)    Carbidopa/levodopa Sinemet 25/100 1.5 tabs 4/day at 4am, 10a, 4/5p, 10pm  Review of diagnosis    Parkinson    Avoidance of dopamine blockers   Not taking    Motor complication review   Wearing off  She has some dyskinesias  She has some wearing off. At 4-5 hours gets tingling of her toes/fingers  Legs - cramping, clawing - behind her knees  Wearing off afternoon; has to be attentive to afternoon dosing    Review of  Impulse control disorders   denies    Review of surgical or medication options   reviewed    Gait/Balance/Falls   Missed a step carrying something in both hands    Exercise/Therapy performed/offered      Getting up at 4 or 5am and have to let dogs in and out and is run down   Taty - yellow lab and red [employer] 65 #   Cat   Other dog     Doing big and loud every day    For RLS -  Gabapentin neurontin 100mg @10am and sometimes @8/11pm extra dose   Gabapentin neurontin 300mg @8/11pm     may consider taking second dose of gabapentin earlier than 10/11pm    Cognitive/Driving   Short distances only; not of freeway  Cognitive problems - recalling names takes longer  Problems with spelling    Mood   Counselling - working with counsellor   Bupropion wellbutrin SR 200mg 12 hr tablet twice daily  Sertraline zoloft 100mg daily    fatigue  Emotional   Missing family for thanksgiving  Mother with c diff and is 85 and frail   with covid     Problems handling the cold  Needs to get full spectrum light     Hallucinations/delusions     Sleep   Gets up to take her first dose of medication @330/530am and then goes back to bed; possibly till 930am  Variable time of wakening up.      Sleep deprived with dogs and affects pill cycle     Bladder   Urinary urgency  Variable nocturia depending on hydration, etc.   Discussed timing of water intake    GI/Constipation/GERD   Magnesium oxide 500mg twice daily at 10am and 10pm  Probiotic twice daily smarty pants at 10am and 10pm   Doing well     ENDO   Calcium carbonate at night at 10pm  Cholecalciferol vitamin D3 1000 units at 10am     Cardio/heart  POTS    Blood pressure is better with medication change  She thinks that the propranolol helps her tremor but worried   Fludrocortisone florinef 0.1mg - not taking  Midodrine proamatine  2.5mg = 2 tabs 3/day = switched to 5mg tab  Midodrine proamatine 5mg 3/day   Propranolol 10mg 1/2 of 10mg twice daily at 10am and  10pm     Cardiology  Kimber,pan Constantino, Reyna Saucedo - cardiologist     water and salt intake     Dr. Reyna Dick     Vision   No vision changes  Less contrast sensitivity  Tree vs person     Heme   Not on aspirin     Other:     Left trigeminal neuralgia  Prior history of lyme disease  Sweating episodes, not able to take hot shower.      Breathing  Albuterol proair as needed   Asmanex 120 metered doses 220mcg/inh inhaler at night   Mometasone asmanex - as above     Acetaminophen tylenol 500mg scheduled.   b complex   Coenzyme Q10 daily   Cyclobenzaprine flexeril as needed 1/2 tab  Gabapentin neurontin 100mg twice daily   Gabapentin neurontin 300mg at night   Vitamin C ascorbic acid 500mg      has had covid19  She tested negative  He is getting an antiviral  Son and daughter had it  She has had both vaccinations.  She is wondering about covid booster      Medications     4/430a 9a 10am 1p 4p 6p 8p 10p   Acetaminophen tylenol 500mg     2       2   Albuterol proair hfa/proventil hfa/ventolin hfa 108 (90 base) mcg/act inhaler    prn           b complex            1    Bupropion wellbutrin SR 200mg 12 hr tablet    1      1    Calcium carbonate 600mg           1    Carbidopa/levodopa Sinemet 25/100 1.5  1.5  1.5   1.5   Cholecalciferol vitamin D3 25mcg 1000 units     1          Coenzyme Q10 100mg           1   Cyclobenzaprine flexeril 10mg    prn          Gabapentin neurontin 100mg    1      1   Gabapentin neurontin 300mg           1    Magnesium oxide antacid 500mg    1          Midodrine proamatine 5mg  1  1  1     Mometasone fumorate asmanex 220 mcg/inh           dose   Probiotic smarty pants 1          1    Propranolol inderal 10mg    1/2     1/2    Sertraline zoloft 100mg    1          Vitamin C 500mg     1                                               Plan:    Return visit with Wendy Caro to review medications and issues    Table reflects her medication schedule above    Not sure about  medication changes yet.     Return in 6 months        Coding statement:   Medical Decision Making:  #  Chronic progressive medical conditions addressed  Parkinson, fatigue,   Review and/or interpretation of unique test or documentation from a provider outside of neurology no   Independent historian provided additional details  yes  Prescription drug management and review of potential side effects and/or monitoring for side effects  yes   Health impacted by social determinants of health  no    I have reviewed the note as documented above.  This accurately captures the substance of my conversation with the patient and total time spent preparing for visit, executing visit and completing visit on the day of the visit:  40 minutes.     Video time face to face: 348pm - 430pm     Ishan Hearn MD     ______________________________________    Last visit date and details:             ______________________________________      Patient was asked about 14 Review of systems including changes in vision (dry eyes, double vision), hearing, heart, lungs, musculoskeletal, depression, anxiety, snoring, RBD, insomnia, urinary frequency, urinary urgency, constipation, swallowing problems, hematological, ID, allergies, skin problems: seborrhea, endocrinological: thyroid, diabetes, cholesterol; balance, weight changes, and other neurological problems and these were not significant at this time except for   Patient Active Problem List   Diagnosis     Asthma     Benign essential hypertension     Adenoma of colon     Anxiety     DDD (degenerative disc disease), lumbar     Depression     Dizziness     Labile blood pressure     Hypotension     Near syncope     Osteopenia     Parkinson disease (H)     POTS (postural orthostatic tachycardia syndrome)     Trigeminal neuralgia          Allergies   Allergen Reactions     Prednisone Anxiety, Palpitations and Shortness Of Breath     Donepezil      Insomnia, nausea, dizziness/somnolence      Penicillins  "     Unknown     Prednisone      Other reaction(s): *Unknown     Sulfa Drugs      Unknown     Past Surgical History:   Procedure Laterality Date     D & C       Past Medical History:   Diagnosis Date     Anxiety      Depression      Hyperlipidemia      Hypertension      Lyme disease      Trigeminal neuralgia      Social History     Socioeconomic History     Marital status:      Spouse name: Not on file     Number of children: Not on file     Years of education: Not on file     Highest education level: Not on file   Occupational History     Not on file   Tobacco Use     Smoking status: Never Smoker     Smokeless tobacco: Never Used   Substance and Sexual Activity     Alcohol use: No     Drug use: Not on file     Sexual activity: Not on file   Other Topics Concern     Not on file   Social History Narrative    . Lives in [place]. Tejinder Zavala spouse. They have three children. Their names are Eros, Venkat, and Christie. She is a homemaker. Studied library science and theology. She was a  for over 16 years. 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[place], focusing on castro - can see on [employer] \"Castro and Backroads.\"        [email or web address]     Social Determinants of Health     Financial Resource Strain:      Difficulty of Paying Living Expenses:    Food Insecurity:      Worried About Running Out of Food in the Last Year:      Ran Out of Food in the Last Year:    Transportation Needs:      Lack of Transportation (Medical):      Lack of Transportation (Non-Medical):    Physical Activity:      Days of Exercise per Week:      Minutes of Exercise per Session:    Stress:      Feeling of Stress :    Social Connections:      Frequency of Communication with Friends and Family:      Frequency of Social Gatherings with Friends and Family:      Attends [employer] " Services:      Active Member of Clubs or Organizations:      Attends Club or Organization Meetings:      Marital Status:    Intimate Partner Violence:      Fear of Current or Ex-Partner:      Emotionally Abused:      Physically Abused:      Sexually Abused:        Drug and lactation database from the [place] National Library of Medicine:  http://toxnet.nlm.nih.gov/cgi-bin/sis/htmlgen?LACT      B/P: Data Unavailable, T: Data Unavailable, P: Data Unavailable, R: Data Unavailable 0 lbs 0 oz  There were no vitals taken for this visit., There is no height or weight on file to calculate BMI.  Medications and Vitals not listed above were documented in the cart and reviewed by me.     Current Outpatient Medications   Medication Sig Dispense Refill     acetaminophen (TYLENOL) 500 MG tablet 2 x 500mg tab by mouth twice daily @ 10am and 11pm  = 4/day       albuterol (PROAIR HFA/PROVENTIL HFA/VENTOLIN HFA) 108 (90 Base) MCG/ACT inhaler Inhale 2 puffs into the lungs every 6 hours as needed       amantadine (SYMMETREL) 100 MG capsule Take 1 capsule (100 mg) by mouth 2 times daily 60 capsule 11     B Complex-C (VITAMIN B COMPLEX W/VITAMIN C) TABS tablet Take 1 tablet by mouth daily       buPROPion (WELLBUTRIN SR) 200 MG 12 hr tablet 200mg tab by mouth twice daily at 9 am and 9 pm  0     calcium carbonate (CALCIUM CARBONATE) 600 MG tablet Take 1 tablet by mouth At Bedtime        carbidopa-levodopa (SINEMET)  MG tablet 1.5 x 25/100 tab by mouth 4/day @ 4-530am, 10am, 430-5pm, and 10pm and 1 as needed = 7/day 630 tablet 11     cholecalciferol 25 MCG (1000 UT) TABS 1000 units (25 mcg) by mouth daily       co-enzyme Q-10 100 MG CAPS capsule 100mg capsule by mouth every evening at 11pm       cyclobenzaprine (FLEXERIL) 10 MG tablet Take 10 mg by mouth as needed (for back pain - very sparingly)       gabapentin (NEURONTIN) 100 MG capsule 100mg capsule by mouth daily @ 10 AM and second dose as needed at night 90 capsule 3      gabapentin (NEURONTIN) 300 MG capsule 300mg capsule by mouth nightly @ 8-11pm 90 capsule 3     Magnesium Oxide, Antacid, 500 MG CAPS 500mg tab by mouth daily       midodrine (PROAMATINE) 2.5 MG tablet Take 2 tablets (5 mg) at 9 am, 1 tablet (2.5 mg) at 12 pm, and 1 tablet (2.5 mg) at 5 pm       mometasone (ASMANEX, 120 METERED DOSES,) 220 MCG/INH inhaler Inhale 1 puff into the lungs At Bedtime       NONFORMULARY Per Dr. Do wear compression stockings daily. Level of 20-30 mmHg thigh high. Take off one hour 2-3 times daily.       probiotic CAPS 1 capsule by mouth twice daily (Smarty Pants Adult Probiotic Complete)       propranolol (INDERAL) 10 MG tablet 1/2 of 10mg tab by mouth twice daily @ 930/10am and 8/9pm       sertraline (ZOLOFT) 100 MG tablet 100mg tab by mouth daily       vitamin C (ASCORBIC ACID) 500 MG tablet Take 500 mg by mouth daily           Medications                                                                                                                                                  Ishan Hearn MD

## 2021-11-19 NOTE — PATIENT INSTRUCTIONS
Assessment:    (G20) Parkinson disease (H)  (primary encounter diagnosis)    Carbidopa/levodopa Sinemet 25/100 1.5 tabs 4/day at 4am, 10a, 4/5p, 10pm  Review of diagnosis    Parkinson    Avoidance of dopamine blockers   Not taking    Motor complication review   Wearing off  She has some dyskinesias  She has some wearing off. At 4-5 hours gets tingling of her toes/fingers  Legs - cramping, clawing - behind her knees  Wearing off afternoon; has to be attentive to afternoon dosing    Review of Impulse control disorders   denies    Review of surgical or medication options   reviewed    Gait/Balance/Falls   Missed a step carrying something in both hands    Exercise/Therapy performed/offered      Getting up at 4 or 5am and have to let dogs in and out and is run down   Taty - yellow lab and [employer] 65 #   Cat   Other dog     Doing big and loud every day    For RLS -  Gabapentin neurontin 100mg @10am and sometimes @8/11pm extra dose   Gabapentin neurontin 300mg @8/11pm     may consider taking second dose of gabapentin earlier than 10/11pm    Cognitive/Driving   Short distances only; not of freeway  Cognitive problems - recalling names takes longer  Problems with spelling    Mood   Counselling - working with counsellor   Bupropion wellbutrin SR 200mg 12 hr tablet twice daily  Sertraline zoloft 100mg daily    fatigue  Emotional   Missing family for thanksgiving  Mother with c diff and is 85 and frail   with covid     Problems handling the cold  Needs to get full spectrum light     Hallucinations/delusions     Sleep   Gets up to take her first dose of medication @330/530am and then goes back to bed; possibly till 930am  Variable time of wakening up.      Sleep deprived with dogs and affects pill cycle     Bladder   Urinary urgency  Variable nocturia depending on hydration, etc.   Discussed timing of water intake    GI/Constipation/GERD   Magnesium oxide 500mg twice daily at 10am and 10pm  Probiotic twice  daily smarty pants at 10am and 10pm   Doing well     ENDO   Calcium carbonate at night at 10pm  Cholecalciferol vitamin D3 1000 units at 10am     Cardio/heart  POTS    Blood pressure is better with medication change  She thinks that the propranolol helps her tremor but worried   Fludrocortisone florinef 0.1mg - not taking  Midodrine proamatine  2.5mg = 2 tabs 3/day = switched to 5mg tab  Midodrine proamatine 5mg 3/day   Propranolol 10mg 1/2 of 10mg twice daily at 10am and 10pm     Cardiology  Kimber,pan Constantino, Marlena Dick, Reyna - cardiologist     water and salt intake     Dr. Reyna Dick     Vision   No vision changes  Less contrast sensitivity  Tree vs person     Heme   Not on aspirin     Other:     Left trigeminal neuralgia  Prior history of lyme disease  Sweating episodes, not able to take hot shower.      Breathing  Albuterol proair as needed   Asmanex 120 metered doses 220mcg/inh inhaler at night   Mometasone asmanex - as above     Acetaminophen tylenol 500mg scheduled.   b complex   Coenzyme Q10 daily   Cyclobenzaprine flexeril as needed 1/2 tab  Gabapentin neurontin 100mg twice daily   Gabapentin neurontin 300mg at night   Vitamin C ascorbic acid 500mg      has had covid19  She tested negative  He is getting an antiviral  Son and daughter had it  She has had both vaccinations.  She is wondering about covid booster      Medications     4/430a 9a 10am 1p 4p 6p 8p 10p   Acetaminophen tylenol 500mg     2       2   Albuterol proair hfa/proventil hfa/ventolin hfa 108 (90 base) mcg/act inhaler    prn           b complex            1    Bupropion wellbutrin SR 200mg 12 hr tablet    1      1    Calcium carbonate 600mg           1    Carbidopa/levodopa Sinemet 25/100 1.5  1.5  1.5   1.5   Cholecalciferol vitamin D3 25mcg 1000 units     1          Coenzyme Q10 100mg           1   Cyclobenzaprine flexeril 10mg    prn          Gabapentin neurontin 100mg    1      1   Gabapentin neurontin 300mg           1     Magnesium oxide antacid 500mg    1          Midodrine proamatine 5mg  1  1  1     Mometasone fumorate asmanex 220 mcg/inh           dose   Probiotic smarty pants 1          1    Propranolol inderal 10mg    1/2     1/2    Sertraline zoloft 100mg    1          Vitamin C 500mg     1                                               Plan:      Return visit with Wendy Caro to review medications and issues    Table reflects her medication schedule above    Not sure about medication changes yet.     Return in 6 months

## 2021-11-19 NOTE — PROGRESS NOTES
Alina is a 61 year old who is being evaluated via a billable video visit.      How would you like to obtain your AVS? [employer]  If the video visit is dropped, the invitation should be resent by: Other e-mail: [employer]  Will anyone else be joining your video visit? No      Video Start Time:   Video-Visit Details    Type of service:  Video Visit    Video End Time:    Originating Location (pt. Location):     Distant Location (provider location):  [facility] NEUROLOGY [facility]     Platform used for Video Visit:

## 2021-11-23 ENCOUNTER — VIRTUAL VISIT (OUTPATIENT)
Dept: PHARMACY | Facility: CLINIC | Age: 61
End: 2021-11-23
Attending: PSYCHIATRY & NEUROLOGY
Payer: COMMERCIAL

## 2021-11-23 DIAGNOSIS — I95.1 ORTHOSTATIC HYPOTENSION: ICD-10-CM

## 2021-11-23 DIAGNOSIS — F32.A DEPRESSION, UNSPECIFIED DEPRESSION TYPE: ICD-10-CM

## 2021-11-23 DIAGNOSIS — F41.9 ANXIETY: ICD-10-CM

## 2021-11-23 DIAGNOSIS — G20.C PARKINSONISM, UNSPECIFIED PARKINSONISM TYPE (H): Primary | ICD-10-CM

## 2021-11-23 PROCEDURE — 99606 MTMS BY PHARM EST 15 MIN: CPT | Performed by: PHARMACIST

## 2021-11-23 PROCEDURE — 99607 MTMS BY PHARM ADDL 15 MIN: CPT | Performed by: PHARMACIST

## 2021-11-23 NOTE — PROGRESS NOTES
Medication Therapy Management (MTM) Encounter    ASSESSMENT:                            Medication Adherence/Access: No issues identified    Parkinson's Disease:  improving- will continue monitoring blood pressure with higher dose of carbidopa-levodopa     Orthostatic hypotension: improved    Depression/anxiety: will obtain LAKESHIA-7 and PHQ-9 for current objective measure of anxiety/depression      PLAN:                            1. Continue midodrine 5 mg 3 times/day and propranolol 5 mg 2 times/day     2. Attached are 2 questionnaires for you to fill out regarding anxiety and depression    3. Okay to try the increased dose of carbidopa-levodopa: 1.5 tabs - 2 tabs - 2 tabs - 1.5 tabs but keep tabs on your blood pressure to make sure this doesn't lower your blood pressure    Follow-up: 1/25/22 at 11:30 am by phone      SUBJECTIVE/OBJECTIVE:                          Alina Zavala is a 61 year old female called for a follow-up visit. She was referred to me from Dr. Hearn.  Today's visit is a follow-up MTM visit from 8/20/21     Reason for visit: follow up on medications.    Allergies/ADRs: Reviewed in chart  Past Medical History: Reviewed in chart  Tobacco: She reports that she has never smoked. She has never used smokeless tobacco.  Alcohol: not currently using    Medication Adherence/Access: no issues reported    Parkinson's Disease:  Current medications include: propranolol 5 mg twice daily and carbidopa-levodopa  mg 1.5 tablets at 4 am, 2 tablets at 10 am, 2 tablets at 4 pm, and 1.5 tablets at 10 pm. Patient states that the higher dose of carbidopa-levodopa has been working well, no new concerns reported.     Orthostatic hypotension: Currently taking midodrine 5 mg 3 times/day. This is a dose increase recently. Since increasing the dose of midodrine her systolic blood pressure has been more consistently in the 100-110 range. When she stands her blood pressure is usually 70s/40s-50s. Today at the clinic her  blood pressure was 168/85 which is high for her.     Depression/anxiety:  Current medications include: sertraline 100 mg daily and bupropion  mg twice daily. Patient states she has been working with Annie Coffey (therapist) for years - very helpful. She does not have a psychiatrist. She is planning to get a SAD lamp for this winter. Patient is wondering how we can measure her depression/anxiety.   PHQ-9 SCORE 1/9/2020   PHQ-9 Total Score 6       Today's Vitals: There were no vitals taken for this visit.  ----------------    I spent 30 minutes with this patient today. All changes were made via collaborative practice agreement with Dr. Hearn. A copy of the visit note was provided to the patient's referring provider.    The patient was sent via [employer] a summary of these recommendations.     Wendy Caro, Pharm.D.  Medication Therapy Management Pharmacist  [facility]    Telemedicine Visit Details  Type of service:  Telephone visit  Start Time: 11:30 AM  End Time: 12:00 PM  Originating Location (patient location): Home  Distant Location (provider location):  [facility] NEUROLOGY CLINIC     Medication Therapy Recommendations  No medication therapy recommendations to display

## 2021-11-23 NOTE — PATIENT INSTRUCTIONS
Recommendations from today's MTM visit:                                                      1. Continue midodrine 5 mg 3 times/day and propranolol 5 mg 2 times/day     2. Attached are 2 questionnaires for you to fill out regarding anxiety and depression    3. Okay to try the increased dose of carbidopa-levodopa: 1.5 tabs - 2 tabs - 2 tabs - 1.5 tabs but keep tabs on your blood pressure to make sure this doesn't lower your blood pressure    Follow-up: 1/25/22 at 11:30 am by phone    It was great to speak with you today.  I value your experience and would be very thankful for your time with providing feedback on our clinic survey. You may receive a survey via email or text message in the next few days.     To schedule another MTM appointment, please call the clinic directly or you may call the MTM scheduling line at [phone] or toll-free at [phone].     My Clinical Pharmacist's contact information:                                                      Please feel free to contact me with any questions or concerns you have.      Wendy Caro, Pharm.D.  Medication Therapy Management Pharmacist  [facility]

## 2021-11-26 ENCOUNTER — PATIENT OUTREACH (OUTPATIENT)
Dept: NEUROLOGY | Facility: CLINIC | Age: 61
End: 2021-11-26
Payer: COMMERCIAL

## 2021-11-26 NOTE — PROGRESS NOTES
"Situation:  Alina Zavala is a 61 year old female who receives support for Parkinson's disease. Dr. Hearn requested I call Alina to check in with her on her mood.    Background:  - has COVID19  -Elderly parents  -Chronic illness  -Has a counselor  -Buproprion  mg BID  -Sertraline 100 mg daily    Assessment:  She is has had 3 tests and all negative for COVID. Her husbands last quarantine day is today. She is currently shopping at [employer] but insisted she wanted to talk now rather than later. It is hard for her to determine her current mood. She knows she has anxiety and depression and situational stresses. Her son was suppose to live with her temporarily and has been there for 15 months with no sign of moving. This is stressful for her and her , she finds her son is self focused. She was not able to see her parents in person for Thanksgiving which was hard but she was able to see them over video.She meets with her counselor Annie who she has see's weekly. She has seen her for 20 years and feels she is very helpful. She has been helping with her issues with her son.    Exercise: Does big and loud therapy daily. She has a Parkinson's disease yoga CD that she has not \"got around to doing it\"  Socialization: Talks to friends and keeps in touch, feels she could reach out and have more get together's, sister calls her almost daily  Self-soothe - She use to play Piano and do word finding puzzles but has not done this in a while. She is not sure why. She denies her Parkinson's disease or other symptoms interfere ing with these activities. She loved to play erasto music on the piano.    We discussed exhaustion/fatigue being apart of Parkinson's disease and other aggravating factors such as stress and depression. Routine exercise/your yoga CD may help.  Low blood pressure - advised to try drinking a V8 in the morning to help keep her blood pressure up during the day.    Plan/recommendation:  1. Alina " will add yoga to her exercise regimen. We discussed adding it in by first switching off every other day between big and loud exercises and Yoga if time is a constraint. We always say we will do things tomorrow but taking action today is the best thing you can do for yourself when setting goals. Set small goals first.  2. Alina will continue to be social with friends and family. She will reach out to friends more via phone call or text to start with. COVID is currently high so get together's may not be a thing that can be done or planned. Encouraged her to start with small social goals first. She will buy a SAD light as well.  3. Alina will find time to play the Piano and do word findng puzzles as she enjoys these activities. Encouraged her to do these as a great treatment for her mood.    Focus on Goals to Change Behavior  The first step is to make plans and set goals for activities. Emotions can take control when we feel depressed or anxious. Instead, let your behavior --your activities--guide you. Think strategically about increasing your involvement in meaningful activities --avoiding being busy for the sake of it. Goals should be small and realistic.    26 minute phone call

## 2021-11-26 NOTE — PROGRESS NOTES
[facility] Call Center    Phone Message    May a detailed message be left on voicemail: yes     Reason for Call: Other: patient returning call. no clue why the call was made to the patient as no notation made in chart as to the reason. please call patient back.     Action Taken: Message routed to:  [facility]    Travel Screening: Not Applicable

## 2022-01-25 ENCOUNTER — TELEPHONE (OUTPATIENT)
Dept: PHARMACY | Facility: CLINIC | Age: 62
End: 2022-01-25
Payer: COMMERCIAL

## 2022-01-25 NOTE — TELEPHONE ENCOUNTER
Received voicemail from , Justin, stating that patient is very ill today and they need to cancel the appt with me today    Wendy Caro, Pharm.D.  Medication Therapy Management Pharmacist  [facility]

## 2022-03-14 ENCOUNTER — TELEPHONE (OUTPATIENT)
Dept: NEUROLOGY | Facility: CLINIC | Age: 62
End: 2022-03-14
Payer: COMMERCIAL

## 2022-03-14 DIAGNOSIS — F80.0 ARTICULATION DISORDER: ICD-10-CM

## 2022-03-14 DIAGNOSIS — U09.9 POST-COVID SYNDROME: Primary | ICD-10-CM

## 2022-03-14 DIAGNOSIS — U07.1 COVID-19: Primary | ICD-10-CM

## 2022-03-14 DIAGNOSIS — G20.A1 PARKINSON DISEASE (H): ICD-10-CM

## 2022-03-14 RX ORDER — DEXAMETHASONE 4 MG/1
TABLET ORAL
COMMUNITY
Start: 2022-03-09 | End: 2022-03-21

## 2022-03-14 NOTE — TELEPHONE ENCOUNTER
[facility] Call Center    Phone Message    May a detailed message be left on voicemail: yes     Reason for Call: Other: Alina calling to request a referral to [facility] in [place] for the big and loud tune up program. She is also requesting a referral to the post covid clinic due to feeling a lot of fatigue since she had covid in 2/2022. Please call Alina to confirm this has been sent.     Action Taken: Message routed to:  [facility] NEUROLOGY    Travel Screening: Not Applicable

## 2022-03-14 NOTE — TELEPHONE ENCOUNTER
"She would also like a referral to the post covid clinic to be evaluated. She reports on and off she will have cramping and muscle spasms that travel from her hip down to her toes. The back of her knee will feel light, her toes will curl over. It switches between her left and right side, it is never both at the same time. This usually happens mid-afternoon but can happen at night or other times of the day. She thinks it is usually when her carbidopa/levodopa is wearing off. Massage, stretching her leg outwards, walking, and flexeril helps Sometimes at night she has pins and needles on the bottom of her feet, its always in one foot or the other also. Notes thinking issues with flexeril and gets sleepy after taking gabapentin. Taking gabapentin for trigeminal neuralgia. She wakes up usually at 2,3, and/or 4 AM and will take her as needed carbidopa/levodopa.    She had questions about other medications for Parkinsons that do not cause compulsive behaviors, she asks about \"the inhaler\". Discussed this is a risk with all Parkinson's medications due to dopamine being involved. Always talk with your doctor about medication changes and behaviors that you think may be starting. Willian discussed carbidopa/levodopa CR, Chele Galdamez, and that she can meet with Wendy Caro to discuss further.    BIG and LOUD, and post [facility] symptoms therapy orders faxed to [facility].      45 minute call  "

## 2022-03-21 ENCOUNTER — VIRTUAL VISIT (OUTPATIENT)
Dept: PHARMACY | Facility: CLINIC | Age: 62
End: 2022-03-21
Payer: COMMERCIAL

## 2022-03-21 DIAGNOSIS — I95.1 ORTHOSTATIC HYPOTENSION: ICD-10-CM

## 2022-03-21 DIAGNOSIS — G62.9 NEUROPATHY: ICD-10-CM

## 2022-03-21 DIAGNOSIS — G20.C PARKINSONISM, UNSPECIFIED PARKINSONISM TYPE (H): Primary | ICD-10-CM

## 2022-03-21 DIAGNOSIS — F41.9 ANXIETY: ICD-10-CM

## 2022-03-21 DIAGNOSIS — U09.9 COVID-19 LONG HAULER: ICD-10-CM

## 2022-03-21 DIAGNOSIS — F32.A DEPRESSION, UNSPECIFIED DEPRESSION TYPE: ICD-10-CM

## 2022-03-21 DIAGNOSIS — M62.838 MUSCLE SPASM: ICD-10-CM

## 2022-03-21 DIAGNOSIS — J45.909 UNCOMPLICATED ASTHMA, UNSPECIFIED ASTHMA SEVERITY, UNSPECIFIED WHETHER PERSISTENT: ICD-10-CM

## 2022-03-21 PROCEDURE — 99607 MTMS BY PHARM ADDL 15 MIN: CPT | Performed by: PHARMACIST

## 2022-03-21 PROCEDURE — 99605 MTMS BY PHARM NP 15 MIN: CPT | Performed by: PHARMACIST

## 2022-03-21 NOTE — Clinical Note
She is having more side effects to Flexeril. I wonder if baclofen would be a better option for her. You are seeing her on Thurs this week but let me know what you think

## 2022-03-21 NOTE — PROGRESS NOTES
"Medication Therapy Management (MTM) Encounter    ASSESSMENT:                            Medication Adherence/Access: No issues identified    Parkinson's Disease:  agree with plan to do Big and Loud program and hold off on medication changes    Covid19 long-term symptoms: patient referred to post-covid clinic    Muscle spasms/neuropathy: patient seems quite sensation to Flexeril and gabapentin. Could consider baclofen as alternative to Flexeril as it is usually better tolerated- will discuss with Dr. Hearn.     Depression/anxiety:  Appears stable     Asthma: stable     Orthostatic hypotension: stable     PLAN:                            1. Consider using Baclofen instead of Flexeril for muscle spasms- I'll discuss with Dr. Hearn.     2. Dr. Hearn placed a referral to the post-covid clinic. You can schedule by calling [phone].     3. Referral have been placed by Dr. Hearn for Big and Loud \"tune up.\"    Follow-up: 3-6 months or sooner if needed    SUBJECTIVE/OBJECTIVE:                          Alina Zavala is a 61 year old female contacted via secure video for a follow-up visit.  Today's visit is a follow-up MTM visit from 11/23/21     Reason for visit: medication follow up.    Allergies/ADRs: Reviewed in chart  Past Medical History: Reviewed in chart  Tobacco: She reports that she has never smoked. She has never used smokeless tobacco.  Alcohol: not currently using  Activity: [facility]- exercise class on Tuesdays; would like to do Big and Loud therapy \"tune up\"    Medication Adherence/Access: no issues reported    Parkinson's Disease:  Current medications include: propranolol 5 mg once daily (morning only) and carbidopa-levodopa  mg 1.5 tablets at 4 am, 2 tablets at 10 am, 2 tablets at 4 pm, and 1.5 tablets at 10 pm. Reports she feels like her symptoms have progressed. She has had more life stressors lately and was sick with covid.     Covid19 long-term symptoms: Primary symptom of covid was \"profound " "exhaustion\" which is slowly improving a little. Cognition is reportedly worse since having covid too. Taste has changed - ginger ale tasted like kerosene. Has been referred to post-covid clinic but hasn't been seen yet.     Muscle spasms/neuropathy: Takes gabapentin 100 mg morning/400 mg evening. Has spasms in one leg, starting at hip and down leg. Can come on with long car rides. Takes Flexeril periodically, typically before bed if she needs it as she gets side effects during the day. Recently tried Flexeril + extra gabapentin 300 mg and reports she started getting slurred speech and felt 'drugged.' she also had a different time focusing her eyes and she has discussed this with opthalmologist. Patient states she has been on higher doses of gabapentin in the past when she had shingles but she found that higher doses of gabapentin triggers trigeminal neuralgia. Patient states she has costochondritis and can go a month with no symptoms then it can flare up (like today).     Depression/anxiety:  Current medications include: bupropion  mg twice daily and sertraline 100 mg daily. Reports she has taken the bupropion since she was in her 40s. She tried going off of it several years ago but had withdrawal symptoms and went back on it. She is not sure if sertraline is helping.     Asthma: Current medications: Short-Acting Bronchodilator: Albuterol MDI.  Has been off Asmanex as it was too expensive and she reports her symptoms have been stable    Orthostatic hypotension: Currently taking midodrine 5 mg 3 times/day. Hasn't monitored blood pressure as much lately because didn't feel she needed it. She can feel when blood pressure is dropping and recently had a reading of 70/55. Today her blood pressure was 117/83 today. Pulse in 90s usually. Notices sensation of her ears feeling like under water/swishing sound- she gets her ears cleaned out a few times per year to help with this as it affects her balance and hearing.  "     Today's Vitals: There were no vitals taken for this visit.  ----------------    I spent 36 minutes with this patient today. I offer these suggestions for consideration by Dr. Hearn. A copy of the visit note was provided to the patient's provider(s).    The patient was sent via [employer] a summary of these recommendations.     Wendy Caro, Pharm.D.  Medication Therapy Management Pharmacist  [facility]    Telemedicine Visit Details  Type of service:  Video Conference via [employer]  Start Time: 12:30 PM  End Time: 1:06 PM  Originating Location (patient location): [place]  Distant Location (provider location):  [facility]     Medication Therapy Recommendations  Muscle spasm    Current Medication: cyclobenzaprine (FLEXERIL) 10 MG tablet   Rationale: Undesirable effect - Adverse medication event - Safety   Recommendation: Change Medication - Baclofen 5 MG tablet   Status: Contact Provider - Awaiting Response

## 2022-03-22 NOTE — PATIENT INSTRUCTIONS
"Recommendations from today's MTM visit:                                                      1. Consider using Baclofen instead of Flexeril for muscle spasms- I'll discuss with Dr. Hearn.     2. Dr. Hearn placed a referral to the post-covid clinic. You can schedule by calling [phone].     3. Referral have been placed by Dr. Hearn for Big and Loud \"tune up.\"    Follow-up: 3-6 months or sooner if needed    It was great to speak with you today.  I value your experience and would be very thankful for your time with providing feedback on our clinic survey. You may receive a survey via email or text message in the next few days.     To schedule another MTM appointment, please call the clinic directly or you may call the MTM scheduling line at [phone] or toll-free at [phone].     My Clinical Pharmacist's contact information:                                                      Please feel free to contact me with any questions or concerns you have.      Wendy Caro, Pharm.D.  Medication Therapy Management Pharmacist  [facility]    "

## 2022-03-24 ENCOUNTER — TELEPHONE (OUTPATIENT)
Dept: NEUROLOGY | Facility: CLINIC | Age: 62
End: 2022-03-24

## 2022-03-24 NOTE — TELEPHONE ENCOUNTER
She over did it cleaning out her daughters closet the other day. At most her pain gets up to 8/10 when getting up from a sitting position. Walking makes it better (6 or 7/10). The pain is on the left lower side/mid back, left hip and upper left thigh. She has numbness down her left leg to her foot. When she bends over she will have spasms in her back and sometimes her left leg. She has not seen anyone in a while but is suppose to get an MRI of her back due to chronic back issues.  She did take flexeril 1/2 tab (5 mg) earlier. She has not tried using ice. She does have an ice wrap for her back.    Plan/recommendation:  1. Use your ice wrap to help with the pain. Flexeril will help the spasms but not pain caused by structural problems I.e. your spine    2. If pain gets worse and becomes unbearable (10/10) go to the emergency room    3. He appointment was changed to virtual with Dr. Hearn today

## 2022-03-24 NOTE — TELEPHONE ENCOUNTER
[facility] Call Center    Phone Message    May a detailed message be left on voicemail: yes     Reason for Call: Other: Changed appt to virtual for today with [facility].     Action Taken: Message routed to:  [facility] neurology    Travel Screening: Not Applicable

## 2022-03-24 NOTE — TELEPHONE ENCOUNTER
Left a message asking Alina to call me back to see how she is doing given she reported extreme pain. A virtual visit is okay.

## 2022-03-24 NOTE — TELEPHONE ENCOUNTER
[facility]    Phone Message    May a detailed message be left on voicemail: yes     Reason for Call: Other: Pt called stating that she has an appt today with Dr. Hearn but she is having extreme pain down her leg into her foot and is unable to travel. She would like to know if it is possible to switch to a virtual visit for today. Pt appt is at 1:40pm. Please call back with further information.     Action Taken: Message routed to:  [facility] neurology    Travel Screening: Not Applicable

## 2022-04-06 ENCOUNTER — TELEPHONE (OUTPATIENT)
Dept: NEUROLOGY | Facility: CLINIC | Age: 62
End: 2022-04-06
Payer: COMMERCIAL

## 2022-04-06 DIAGNOSIS — G20.A1 PARKINSON DISEASE (H): ICD-10-CM

## 2022-04-06 DIAGNOSIS — F09 COGNITIVE DISORDER: Primary | ICD-10-CM

## 2022-04-06 NOTE — TELEPHONE ENCOUNTER
PO, PT, demographics, last office note was fax to [phone] per Yamilet, fax number was in message

## 2022-04-06 NOTE — TELEPHONE ENCOUNTER
Patient missed a call from nurse, patient asking for a call back. please call back [phone] her mobile, ok to leave detailed message.

## 2022-04-06 NOTE — TELEPHONE ENCOUNTER
[facility]    Phone Message    May a detailed message be left on voicemail: yes     Reason for Call: Other: pt calling to speak to Yamilet regarding pt referrals that were sent back from [facility]. Please call back when available.    Action Taken: Message routed to:  [facility] neurology    Travel Screening: Not Applicable                                                                       Cardiac

## 2022-04-07 NOTE — TELEPHONE ENCOUNTER
Updated Alina that the orders were sent over. She should keep the neuropsych evaluation per Dr. Hearn to get an in-depth baseline of her cognitive functioning.

## 2022-04-11 ENCOUNTER — TRANSCRIBE ORDERS (OUTPATIENT)
Dept: OTHER | Age: 62
End: 2022-04-11
Payer: COMMERCIAL

## 2022-04-11 DIAGNOSIS — U09.9 POST-COVID SYNDROME: Primary | ICD-10-CM

## 2022-04-22 ENCOUNTER — MYC MEDICAL ADVICE (OUTPATIENT)
Dept: NEUROLOGY | Facility: CLINIC | Age: 62
End: 2022-04-22
Payer: COMMERCIAL

## 2022-04-22 ENCOUNTER — TELEPHONE (OUTPATIENT)
Dept: NEUROLOGY | Facility: CLINIC | Age: 62
End: 2022-04-22
Payer: COMMERCIAL

## 2022-04-22 DIAGNOSIS — G20.A1 PARKINSON DISEASE (H): ICD-10-CM

## 2022-04-22 RX ORDER — CARBIDOPA AND LEVODOPA 25; 100 MG/1; MG/1
TABLET ORAL
Qty: 630 TABLET | Refills: 11 | Status: SHIPPED | OUTPATIENT
Start: 2022-04-22 | End: 2022-05-25

## 2022-04-22 NOTE — CONFIDENTIAL NOTE
Carbidopa/levodopa was last filled for a 30 day supply with 11 refills on 4/29/21. She will need a new prescription.

## 2022-05-05 ENCOUNTER — TELEPHONE (OUTPATIENT)
Dept: NEUROLOGY | Facility: CLINIC | Age: 62
End: 2022-05-05
Payer: COMMERCIAL

## 2022-05-05 NOTE — TELEPHONE ENCOUNTER
Per Dr. Hearn it is okay for Alina to take an extra 1/2 tab of carbidopa/levodopa  if needed for spasms/toe curling during her car ride tomorrow.

## 2022-05-05 NOTE — TELEPHONE ENCOUNTER
"When her carbidopa/levodopa wears off and during car rides is when her leg spasms/cramping and toe curling tend to occur. The spasms can occur in either leg, usually not both at the same time. It use to be mainly before the 4 PM dose but now she wears off before each dose. Sometimes the pain will cause her to yell and scream. She is taking carbidopa/levodopa  as 1.5 tabs at 4 AM, 2 tabs at 10 AM, 4 PM, and 1.5 tabs at 10 PM. Taking magnesium 500 mg twice daily     The baclofen caused her to be \"absolutely horrible\" per her  Justin. It caused her to be very agitated, slowed thinking and confused. She does not remember anything that day after taking it. Justin says she was confused and \"bitching at me the whole day.\"     Her PCP Dr. Melendrez prescribed her Tizanidine 2 mg taking 1-2 tabs by mouth daily as needed and told her not to take baclofen anymore. They make her a little tired but does not cause confusion, agitation or brain fog. She asks if this is a low dose and if she can take more.    She asks about extended release carbidopa/levodopa or if she can take an extra half tab if spasms occur. They are driving 50 miles to [place] for a wedding tomorrow.    Plan/recommendation:  1. Long acting carbidopa/levodopa is not a good medication for acute spasms. I will ask the team about taking 1/2 tab if needed during the car ride. Discussed when her dose wears off she can chew the next dose with carbonated water to help relieve symptoms faster.    2. Do a good amount of stretching before the car ride tomorrow. Utilize massage in the car and be sure to move your legs/ankles/feet once in a while to help them not stiffen up. If needed pull over half way and walk/stretch.    3. Use your tizanidine as prescribed by your PCP.    24 minute phone call  "

## 2022-05-05 NOTE — TELEPHONE ENCOUNTER
[facility] Call Center    Phone Message    May a detailed message be left on voicemail: yes     Reason for Call: Other: Patient would like a call back from Yamilet. She is driving 50 miles tomorrow and wants to know what are ways to prevent her discomfort and pain.     Action Taken: Message routed to:  [facility]y    Travel Screening: Not Applicable

## 2022-05-09 ENCOUNTER — OFFICE VISIT (OUTPATIENT)
Dept: NEUROLOGY | Facility: CLINIC | Age: 62
End: 2022-05-09
Attending: PSYCHIATRY & NEUROLOGY
Payer: COMMERCIAL

## 2022-05-09 DIAGNOSIS — F02.A0 MILD NEUROCOGNITIVE DISORDER WITH LEWY BODIES (H): Primary | ICD-10-CM

## 2022-05-09 DIAGNOSIS — F41.9 ANXIETY DISORDER, UNSPECIFIED TYPE: ICD-10-CM

## 2022-05-09 DIAGNOSIS — G31.83 MILD NEUROCOGNITIVE DISORDER WITH LEWY BODIES (H): Primary | ICD-10-CM

## 2022-05-09 NOTE — PROGRESS NOTES
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 and stopped in 2015 when she was experiencing physical and cognitive difficulties and diagnosed with Lyme disease.     Ms. 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 She would like to get back into this hobby.    Ms. Zavala indicated she has been  to her current  for 21 years (as of this coming June).  She has 2 sons from a previous relationship and a stepdaughter.    MENTAL STATUS AND BEHAVIORAL OBSERVATIONS:   Ms. Zavala arrived 15 minutes late and accompanied by her  to today's appointment. She was appropriately dressed and groomed. She was alert and engaged during the interview. Gait was slightly slowed, but steady. Some dyskinesias became evident towards the end of the interview. Her mood was euthmyic and her affect was appropriately reactive. Rapport was easily established and eye contact was unremarkable. She was pleasant and cooperative. Speech was somewhat fast in rate, but prosody and content of speech were grossly normal. No significant word finding difficulties or paraphasic errors were evident. There was no evidence of a brnenon  thought disorder; no hallucinations or delusions were apparent. Judgment and insight appeared fair.       Ms. Zavala appeared adequately motivated and engaged easily in the testing component of the evaluation. She exhibited dyskinesias (torso and upper extremity writhing type movements) throughout testing. Her performance was fully intact on embedded measures of objective effort. She attempted all tasks presented to her and worked at a steady pace. She did not appear overly frustrated by difficult or challenging tasks and responded appropriately to encouragement from the examiner.  No significant barriers to testing were observed and the following is judged to be a valid representation of Ms. Zavala's current cognitive strengths and weaknesses.    LIMITATIONS:  Due to circumstances that limit contact during in-person clinical visits, this assessment was conducted using face-to-face testing with the examiner wearing [employer] designated PPE and the patient wearing a face mask. The standard administration of these procedures involves in-person, face-to-face methods without PPE. The impact of applying non-standard administration methods has been evaluated only in part by scientific research. While every effort was made to simulate standard assessment practices, the diagnostic conclusions and recommendations for treatment provided in this report are being advanced with these limitations in mind.    TESTS ADMINISTERED:   Arce Judgment of Line Orientation-15 Form V odds (TAMMY), [place] Naming Test (BNT), Brief Visuospatial Memory Test - R Form 1 (BVMT-R), Category Fluency-Animals (CAT), Clock Drawing, Controlled Oral Word Association Test FAS (COWAT), Generalized Anxiety Disorder-7 (LAKESHIA-7), Geriatric Depression Scale 30 (GDS), Nava Verbal Learning Test - R Form 1 (HVLT-R), Americo-Osterrieth Complex Figure Test, copy only (RCFT), Stroop Color and Word Test, Trail Making Test (TMT), WAIS-IV (Similarities,  Information, Block Design, Matrix Reasoning, Digit Span), WMS-IV Logical Memory, WRAT-4 Word Reading (blue), [place] Card Sorting Test-1 deck (WCST) and WMS-III Information and Orientation.    [facility] norms were used for BNT, CAT, COWAT, TMT    Raw scores in parentheses    DESCRIPTIVE PERFORMANCE KEY:    Labels for tests with Normal Distributions  Score Label Standard Score %ile Rank   Exceptionally high score  > 130 > 98   Above average score 120-129 91-97   High average score 110-119 75-90   Average score  25-74   Low average score 80-89 9-24   Below average score 70-79 2-8   Exceptionally low score < 70 < 2     Labels for tests with Non-Normal Distributions  Score Label %ile Rank   Within normal expectations/ limits score (WNL) > 24   Low average score 9-24   Below average score 2-8   Exceptionally low score < 2     The following test results utilize score labels as adapted from Tj Rodríguez, Michael Skaggs, Fe Sorto, BREEZY Garcia, Cuate Kaplan, Oumar Barbour & Conference Participants (2020): American Academy of Clinical Neuropsychology consensus conference statement on uniform labeling of performance test scores, The Clinical Neuropsychologist, DOI: 10.1080/[email or web address]    All scores contain some measure of error; scores are reported here as they are obtained by the individual (without reference to the range of error). These are meant as labels and not interpretation of performance. While other relevant comments regarding task performance are provided below, please see the Assessment, Impressions and Diagnostic Summary sections of this report for interpretation of the scores and the cognitive profile as a whole, including what does and does not constitute impairment.    OPTIMAL PREMORBID INTELLECT:  Optimal premorbid intellectual abilities were estimated as falling in the average range based on Ms. Zavala's educational and occupational  "histories and performance on tasks least likely to be affected by acquired brain dysfunction (i.e.,  hold tests ).    SUMMARY OF TEST RESULTS:     Orientation, Attention and Processing Speed  Mental status exam was measured as a low average score for her age (13). She was oriented to person, place, and date and was able to correctly name the current and previous presidents.  She lost only one-point by being 45 minutes off in estimating the time.    Performance on a measure of basic attention and working memory was assessed as a low average score (20). This reflected an average score for basic attention skills (LDF = 5) and a low average score for working memory skills (LDB = 3, LDS = 4).    A speeded word reading task (97) was assessed as an average score. A speeded color naming task (61) was assessed as a low average score. A simple sequencing task (40\" ) was assessed as a low average score.     Language  Sight word reading skills (60) were assessed as an average score. Verbal abstraction skills (28) were assessed as an average score. Fund of general knowledge (12) was assessed as an average score. Her performance on a measure of semantic fluency was measured as a below average score  (12). Confrontation naming was measured as a low average score (54).    Visuospatial Skills  Performance on a block construction task (12) resulted in a below average score. Performance on an untimed pattern completion task (9) was measured as a low average score.  Her copy of a series of 6 simple geometric figures was performed with only 1 error (11).  Basic visual spatial judgment was assessed as an exceptionally low score (6).     Learning and Memory  Immediate memory for two short stories was measured as an average score (22). Delayed recall of these stories resulted in an average score (20, 91% retention). Recognition memory was measured as a low average score (22/30). She was also administered a measure of rote auditory verbal " list learning that required her to learn a series of 12 words over three trials and retain and recall them over a delay. Her initial rate of learning (6,9,11) reflected an average score. She retained and recalled 11 words (100% retention) after the delay for an average score for delayed recall.  Recognition memory was measured as a high average score as she correctly identified all 12 of the original words and made no false positive errors.     Immediate nonverbal memory (3,7,6) for a series of 6 geometric figures was measured as a low average score while delayed recall of these figures (7 details) resulted in a low average score. Recognition memory was measured as a within normal limits score as she correctly identified all 6 of the original figures and made no false positive errors.    Executive Functioning  Working memory skills were assessed as a low average score (LDB = 3, LDS = 4). A complex sequencing and set shifting task was measured as an exceptionally low score (175 ). This task was notable for one error. Her performance on a measure of phonemic fluency resulted in an average score (40). On a measure of deductive reasoning and problem-solving, overall performance was assessed as a within normal limits score for her age and education in terms of the number of categories learned (3). Her performance was not characterized by an error prone or perseverative response style (NPE= 8, CO= 5). Her ability to inhibit a dominant response was assessed as an average score (36) both corrected and uncorrected for speed. Her performance on this task was without error. Her copy of a complex figure was performed as an exceptionally low score for her age and notable for difficulty integrating detail, a disorganized approach and poor planning (11). Her drawing of a clock was notable for mildly poor planning and stimulus bound hand placement.     Mood  On the Geriatric Depression Scale-30 (GDS), a self report measure of  depressive symptomatology, she obtained a score of 14, placing her in the range of mild symptoms of depression.     On the Generalized Anxiety Disorder-7, a self-report measure of anxiety, she obtained a score of 5, placing her in the range of mild anxiety.     EVALUATION SERVICES AND TIME:   A clinical interview/neurobehavioral status examination was conducted with the patient and documented. I thoroughly reviewed the medical record, selected the neuropsychological test battery, provided supervision to the individual who administered and scored the neuropsychological test battery, interpreted/integrated patient data and test results, engaged in clinical decision making, treatment planning, report writing/preparation and provided and documented interactive feedback of test results on May 31st. A trained examiner/technician directly administered and scored 2+ neuropsychological tests. Please see below for a breakdown of time spent and the associated codes billed for these services. Please note, all charges are filed at the completion of the Episode of Care and associated with the final encounter date (feedback session on May 31st).    Services   Time Spent  CPT Codes   Neurobehavioral Status Exam:  (e.g., face-to-face, interpretation, report) 95 minutes 1 x 96116  1 x 96121   Neuropsychological Evaluation Services:   (e.g., integration, interpretation, treatment planning, clinical decision making, feedback)   217 minutes   1 x 96132  3 x 96133        Neuropsychological Testing by Trained Examiner/Technician:  (e.g., test administration, scoring, 2+ tests administered)   190 minutes   1 x 96138  5 x 96139     Diagnosis:  Mild Neurocognitive Disorder with Lewy Bodies  Unspecified Anxiety Disorder    For diagnostic and coding purposes, Ms. Zavala has a history of Parkinson's disease (diagnosed 2019), orthostatic hypotension, muscle spasms/ neuropathy, left trigeminal neuralgia (reported history of Lyme disease  diagnosed in 2015) and anxiety and was referred for an evaluation of Mild Neurocognitive Disorder. Feedback of results will be provided via a formal feedback appointment with me on May 31st.       Sakshi Perez, PhD, LP, ABPP  Clinical Neuropsychologist, [id number]  Board Certified in Clinical Neuropsychology    [facility], [address]  [place] [address]  Phone:  [phone]      For diagnostic and coding purposes, Ms. Zavala has a history of Parkinson's disease (diagnosed 2019), orthostatic hypotension, muscle spasms/ neuropathy, left trigeminal neuralgia (reported history of Lyme disease diagnosed in 2015) and anxiety and was referred for an evaluation of Mild Neurocognitive Disorder.  My diagnostic impressions from the 5/9/2022 evaluation included    Diagnosis:   Mild Neurocognitive Disorder with Lewy Bodies  Unspecified Anxiety Disorder    As this is the final date for this Episode of Care (initiated on 5/9/2022 in person) all charges for the entire Episode of Care will be filed today. Please see the 5/9/2022 evaluation for a detailed description of codes and services, including services provided today.      In brief:   1 x 96116  1 x 96121  1 x 96132  3 x 96133  1 x 96138  5 x 96139

## 2022-05-09 NOTE — LETTER
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  She did that for about 3 years and stopped in 2015 when she was experiencing physical and cognitive difficulties and diagnosed with Lyme disease.     Ms. 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 She would like to get back into this hobby.    Ms. Zavala indicated she has been  to her current  for 21 years (as of this coming June).  She has 2 sons from a previous relationship and a stepdaughter.    MENTAL STATUS AND BEHAVIORAL OBSERVATIONS:   Ms. Zavala arrived 15 minutes late and accompanied by her  to today's appointment. She was appropriately dressed and groomed. She was alert and engaged during the interview. Gait was slightly slowed, but steady. Some dyskinesias became evident towards the end of the interview. Her mood was euthmyic and her affect was appropriately reactive. Rapport was easily established and  eye contact was unremarkable. She was pleasant and cooperative. Speech was somewhat fast in rate, but prosody and content of speech were grossly normal. No significant word finding difficulties or paraphasic errors were evident. There was no evidence of a brennon thought disorder; no hallucinations or delusions were apparent. Judgment and insight appeared fair.       Ms. Zavala appeared adequately motivated and engaged easily in the testing component of the evaluation. She exhibited dyskinesias (torso and upper extremity writhing type movements) throughout testing. Her performance was fully intact on embedded measures of objective effort. She attempted all tasks presented to her and worked at a steady pace. She did not appear overly frustrated by difficult or challenging tasks and responded appropriately to encouragement from the examiner.  No significant barriers to testing were observed and the following is judged to be a valid representation of Ms. Zavala's current cognitive strengths and weaknesses.    LIMITATIONS:  Due to circumstances that limit contact during in-person clinical visits, this assessment was conducted using face-to-face testing with the examiner wearing Qzzr designated PPE and the patient wearing a face mask. The standard administration of these procedures involves in-person, face-to-face methods without PPE. The impact of applying non-standard administration methods has been evaluated only in part by scientific research. While every effort was made to simulate standard assessment practices, the diagnostic conclusions and recommendations for treatment provided in this report are being advanced with these limitations in mind.    TESTS ADMINISTERED:   Arce Judgment of Line Orientation-15 Form V odds (TAMMY), Rosenberg Naming Test (BNT), Brief Visuospatial Memory Test - R Form 1 (BVMT-R), Category Fluency-Animals (CAT), Clock Drawing, Controlled Oral Word Association Test FAS (COWAT),  Generalized Anxiety Disorder-7 (LAKESHIA-7), Geriatric Depression Scale 30 (GDS), Nava Verbal Learning Test - R Form 1 (HVLT-R), Americo-Osterrieth Complex Figure Test, copy only (RCFT), Stroop Color and Word Test, Trail Making Test (TMT), WAIS-IV (Similarities, Information, Block Design, Matrix Reasoning, Digit Span), WMS-IV Logical Memory, WRAT-4 Word Reading (blue), Wisconsin Card Sorting Test-1 deck (WCST) and WMS-III Information and Orientation.    [facility] norms were used for BNT, CAT, COWAT, TMT    Raw scores in parentheses    DESCRIPTIVE PERFORMANCE KEY:    Labels for tests with Normal Distributions  Score Label Standard Score %ile Rank   Exceptionally high score  > 130 > 98   Above average score 120-129 91-97   High average score 110-119 75-90   Average score  25-74   Low average score 80-89 9-24   Below average score 70-79 2-8   Exceptionally low score < 70 < 2     Labels for tests with Non-Normal Distributions  Score Label %ile Rank   Within normal expectations/ limits score (WNL) > 24   Low average score 9-24   Below average score 2-8   Exceptionally low score < 2     The following test results utilize score labels as adapted from Tj Rodríguez, Michael Skaggs, Fe Sorto, BREEZY Garcia, Farnaz Macedo, Oumar Buck & Conference Participants (2020): American Academy of Clinical Neuropsychology consensus conference statement on uniform labeling of performance test scores, The Clinical Neuropsychologist, DOI: 10.1080/[email or web address]    All scores contain some measure of error; scores are reported here as they are obtained by the individual (without reference to the range of error). These are meant as labels and not interpretation of performance. While other relevant comments regarding task performance are provided below, please see the Assessment, Impressions and Diagnostic Summary sections of this report for interpretation of the scores and the cognitive  "profile as a whole, including what does and does not constitute impairment.    OPTIMAL PREMORBID INTELLECT:  Optimal premorbid intellectual abilities were estimated as falling in the average range based on Ms. Zavala's educational and occupational histories and performance on tasks least likely to be affected by acquired brain dysfunction (i.e.,  hold tests ).    SUMMARY OF TEST RESULTS:     Orientation, Attention and Processing Speed  Mental status exam was measured as a low average score for her age (13). She was oriented to person, place, and date and was able to correctly name the current and previous presidents.  She lost only one-point by being 45 minutes off in estimating the time.    Performance on a measure of basic attention and working memory was assessed as a low average score (20). This reflected an average score for basic attention skills (LDF = 5) and a low average score for working memory skills (LDB = 3, LDS = 4).    A speeded word reading task (97) was assessed as an average score. A speeded color naming task (61) was assessed as a low average score. A simple sequencing task (40\" ) was assessed as a low average score.     Language  Sight word reading skills (60) were assessed as an average score. Verbal abstraction skills (28) were assessed as an average score. Fund of general knowledge (12) was assessed as an average score. Her performance on a measure of semantic fluency was measured as a below average score  (12). Confrontation naming was measured as a low average score (54).    Visuospatial Skills  Performance on a block construction task (12) resulted in a below average score. Performance on an untimed pattern completion task (9) was measured as a low average score.  Her copy of a series of 6 simple geometric figures was performed with only 1 error (11).  Basic visual spatial judgment was assessed as an exceptionally low score (6).     Learning and Memory  Immediate memory for two short " stories was measured as an average score (22). Delayed recall of these stories resulted in an average score (20, 91% retention). Recognition memory was measured as a low average score (22/30). She was also administered a measure of rote auditory verbal list learning that required her to learn a series of 12 words over three trials and retain and recall them over a delay. Her initial rate of learning (6,9,11) reflected an average score. She retained and recalled 11 words (100% retention) after the delay for an average score for delayed recall.  Recognition memory was measured as a high average score as she correctly identified all 12 of the original words and made no false positive errors.     Immediate nonverbal memory (3,7,6) for a series of 6 geometric figures was measured as a low average score while delayed recall of these figures (7 details) resulted in a low average score. Recognition memory was measured as a within normal limits score as she correctly identified all 6 of the original figures and made no false positive errors.    Executive Functioning  Working memory skills were assessed as a low average score (LDB = 3, LDS = 4). A complex sequencing and set shifting task was measured as an exceptionally low score (175 ). This task was notable for one error. Her performance on a measure of phonemic fluency resulted in an average score (40). On a measure of deductive reasoning and problem-solving, overall performance was assessed as a within normal limits score for her age and education in terms of the number of categories learned (3). Her performance was not characterized by an error prone or perseverative response style (NPE= 8, PA= 5). Her ability to inhibit a dominant response was assessed as an average score (36) both corrected and uncorrected for speed. Her performance on this task was without error. Her copy of a complex figure was performed as an exceptionally low score for her age and notable for  difficulty integrating detail, a disorganized approach and poor planning (11). Her drawing of a clock was notable for mildly poor planning and stimulus bound hand placement.     Mood  On the Geriatric Depression Scale-30 (GDS), a self report measure of depressive symptomatology, she obtained a score of 14, placing her in the range of mild symptoms of depression.     On the Generalized Anxiety Disorder-7, a self-report measure of anxiety, she obtained a score of 5, placing her in the range of mild anxiety.     EVALUATION SERVICES AND TIME:   A clinical interview/neurobehavioral status examination was conducted with the patient and documented. I thoroughly reviewed the medical record, selected the neuropsychological test battery, provided supervision to the individual who administered and scored the neuropsychological test battery, interpreted/integrated patient data and test results, engaged in clinical decision making, treatment planning, report writing/preparation and provided and documented interactive feedback of test results on May 31st. A trained examiner/technician directly administered and scored 2+ neuropsychological tests. Please see below for a breakdown of time spent and the associated codes billed for these services. Please note, all charges are filed at the completion of the Episode of Care and associated with the final encounter date (feedback session on May 31st).    Services   Time Spent  CPT Codes   Neurobehavioral Status Exam:  (e.g., face-to-face, interpretation, report) 95 minutes 1 x 96116  1 x 96121   Neuropsychological Evaluation Services:   (e.g., integration, interpretation, treatment planning, clinical decision making, feedback)   217 minutes   1 x 96132  3 x 96133        Neuropsychological Testing by Trained Examiner/Technician:  (e.g., test administration, scoring, 2+ tests administered)   190 minutes   1 x 96138  5 x 96139     Diagnosis:  Mild Neurocognitive Disorder with Lewy  Bodies  Unspecified Anxiety Disorder    For diagnostic and coding purposes, Ms. Zavala has a history of Parkinson's disease (diagnosed 2019), orthostatic hypotension, muscle spasms/ neuropathy, left trigeminal neuralgia (reported history of Lyme disease diagnosed in 2015) and anxiety and was referred for an evaluation of Mild Neurocognitive Disorder. Feedback of results will be provided via a formal feedback appointment with me on May 31st.       Sakshi Perez, PhD, LP, ABPP  Clinical Neuropsychologist, [id number]  Board Certified in Clinical Neuropsychology    [facility] [address] , [address]  [place] [address]  Phone:  [phone]      For diagnostic and coding purposes, Ms. Zavala has a history of Parkinson's disease (diagnosed 2019), orthostatic hypotension, muscle spasms/ neuropathy, left trigeminal neuralgia (reported history of Lyme disease diagnosed in 2015) and anxiety and was referred for an evaluation of Mild Neurocognitive Disorder.  My diagnostic impressions from the 5/9/2022 evaluation included    Diagnosis:   Mild Neurocognitive Disorder with Lewy Bodies  Unspecified Anxiety Disorder    As this is the final date for this Episode of Care (initiated on 5/9/2022 in person) all charges for the entire Episode of Care will be filed today. Please see the 5/9/2022 evaluation for a detailed description of codes and services, including services provided today.      In brief:   1 x 96116  1 x 96121  1 x 96132  3 x 96133  1 x 96138  5 x 96139    The patient was seen for a neuropsychological evaluation for the purposes of diagnostic clarification and treatment planning. 130 minutes of face-to-face testing were provided by this writer.The patient was cooperative with testing. No concerns were brought to my attention. Please see Dr. Perez's report for a detailed description of the charges and interpretation and integration of the findings.    The patient was seen for a  neuropsychological evaluation for the purposes of diagnostic clarification and treatment planning. 15 minutes of face-to-face testing were provided by this writer. An additional 45 minutes were spent scoring and compiling test results. The patient was cooperative with testing. No concerns were brought to my attention. Please see Dr. Perez's report for a detailed description of the charges and interpretation and integration of the findings.      Sakshi Perez, PhD LP

## 2022-05-10 NOTE — PROGRESS NOTES
The patient was seen for a neuropsychological evaluation for the purposes of diagnostic clarification and treatment planning. 130 minutes of face-to-face testing were provided by this writer.The patient was cooperative with testing. No concerns were brought to my attention. Please see Dr. Perez's report for a detailed description of the charges and interpretation and integration of the findings.

## 2022-05-10 NOTE — PROGRESS NOTES
The patient was seen for a neuropsychological evaluation for the purposes of diagnostic clarification and treatment planning. 15 minutes of face-to-face testing were provided by this writer. An additional 45 minutes were spent scoring and compiling test results. The patient was cooperative with testing. No concerns were brought to my attention. Please see Dr. Perez's report for a detailed description of the charges and interpretation and integration of the findings.

## 2022-05-21 ENCOUNTER — HEALTH MAINTENANCE LETTER (OUTPATIENT)
Age: 62
End: 2022-05-21

## 2022-05-25 ENCOUNTER — VIRTUAL VISIT (OUTPATIENT)
Dept: NEUROLOGY | Facility: CLINIC | Age: 62
End: 2022-05-25
Payer: COMMERCIAL

## 2022-05-25 DIAGNOSIS — G20.A1 PARKINSON DISEASE (H): ICD-10-CM

## 2022-05-25 PROCEDURE — 99215 OFFICE O/P EST HI 40 MIN: CPT | Mod: 95 | Performed by: NURSE PRACTITIONER

## 2022-05-25 RX ORDER — ENTACAPONE 200 MG/1
TABLET ORAL
Qty: 120 TABLET | Refills: 4 | Status: SHIPPED | OUTPATIENT
Start: 2022-05-25 | End: 2022-07-22 | Stop reason: SINTOL

## 2022-05-25 RX ORDER — TIZANIDINE 2 MG/1
1-2 TABLET ORAL 2 TIMES DAILY PRN
COMMUNITY
Start: 2022-05-09

## 2022-05-25 RX ORDER — CARBIDOPA AND LEVODOPA 25; 100 MG/1; MG/1
TABLET ORAL
Qty: 810 TABLET | Refills: 3 | Status: SHIPPED | OUTPATIENT
Start: 2022-05-25 | End: 2022-08-30

## 2022-05-25 ASSESSMENT — MOVEMENT DISORDERS SOCIETY - UNIFIED PARKINSONS DISEASE RATING SCALE (MDS-UPDRS)
WALKING_AND_BALANCE: NORMAL: NOT AT ALL (NO PROBLEMS).
DRESSING: NORMAL: NOT AT ALL (NO PROBLEMS).
GETTING_OUT_OF_BED_CAR_DEEP_CHAIR: MILD: I NEED MORE THAN ONE TRY TO GET UP OR NEED OCCASIONAL HELP.
SALIVA_AND_DROOLING: NORMAL: NOT AT ALL (NO PROBLEMS).
TOTAL_SCORE: 6
CHEWING_AND_SWALLOWING: NORMAL: NO PROBLEMS.
TURNING_IN_BED: SLIGHT: I HAVE A BIT OF TROUBLE TURNING, BUT I DO NOT NEED ANY HELP.
HANDWRITING: NORMAL: NOT AT ALL (NO PROBLEMS).
EATING_TASKS: NORMAL: NOT AT ALL (NO PROBLEMS).
FREEZING: NORMAL: NOT AT ALL (NO PROBLEMS).
HYGIENE: NORMAL: NOT AT ALL (NO PROBLEMS).
HOBBIES_AND_OTHER_ACTIVITIES: NORMAL:  NOT AT ALL (NO PROBLEMS).
SPEECH: MILD: MY SPEECH CAUSES PEOPLE TO ASK ME TO OCCASIONALLY REPEAT MYSELF, BUT NOT EVERYDAY.
TREMOR: SLIGHT: SHAKING OR TREMOR OCCURS BUT DOES NOT CAUSE PROBLEMS WITH ANY ACTIVITIES.

## 2022-05-25 NOTE — LETTER
2022       RE: Alina Zavala  [address] [place] [id number]     Dear Colleague,    Thank you for referring your patient, Alina Zavala, to the [facility] [place] [facility]. Please see a copy of my visit note below.      ASSESSMENT:    Parkinson's Disease: Experiencing wearing off motor symptoms. Nees improvement.    PLAN:    I discussed a few options to improve wearing off symptoms.  1.  Switching to Rytary   2.  Increasing the current carbidopa levodopa dose  3.  Reducing the interval between her doses and having her take carbidopa/levodopa 5 or 6 times per day  4.  Adding entacapone  After each options were discussed patient opted to go with entacapone, stay on the same dose of carbidopa/levodopa, and reevaluate.  The following patient instruction was provided:-    __  Will increase your Sinemet 25/100 mg to 2 tabs 4 x a day and 1 tab as needed.  __  Will add Entacapone (Comtan) 200 mg 1 tab to take with the Sinemet to extend the life of Sinemet.    PD Medications 4 am 10 am 4-5 pm 10 pm As needed   Sinemet 25/100 mg  2 2 2 2 1   Entacapone 200 mg  1 1 1 1      __  Call us if you experience side effects of the medication is not effective.  __  Return in 2 months to see Dr. Hearn in he clinic.  You may return sooner as needed.   __  Schedulers will call to set up your next visit.      MOVEMENT DISORDERS CLINIC           PATIENT: Alina Zavala    : 1960    DATE: May 25, 2022    REASON FOR VISIT: To discuss worsening Parkinson's disease (PD) symptoms and medication adjustment.    HPI: Ms. Alina Zavala is a 61 year old who is seen via video visit for a follow up visit.      During today's video visit pt and her  were present.     Patient is new to me. She has been seeing Dr. Hearn since 2019.  Her presenting symptoms were slowness, stiffness, difficulty walking, and cognitive slowness.  She  had a dopamine scan that showed a presynaptic dopaminergic deficit and decreased uptake in bilateral putamen confirming the diagnosis of parkinsonism.    Patient and her  used to live in [place].  She had Lyme's disease.  Looking back she had many of the PD motor symptoms that were blamed on the Lyme's disease.  It took a few years to get the diagnosis of PD.    Patient reports that she is wearing off at the end of each dose of her carbidopa/levodopa.  The medication works well for 5 hours and starts wearing off.  When she is off she gets pain in her feet and mood starts to go down.  Currently she is taking her medications every 6 hours.    PD Medications 4 am 10 am 4-5 pm 10 pm   Sinemet 25/100 mg  1.5 2 2 1.5     MEDICATIONS:   Outpatient Medications Marked as Taking for the 5/25/22 encounter (Appointment) with Sandrine Valle APRN CNP   Medication Sig     acetaminophen (TYLENOL) 500 MG tablet 2 x 500mg tab by mouth twice daily @ 10am and 11pm  = 4/day     albuterol (PROAIR HFA/PROVENTIL HFA/VENTOLIN HFA) 108 (90 Base) MCG/ACT inhaler Inhale 2 puffs into the lungs every 6 hours as needed     B Complex-C (VITAMIN B COMPLEX W/VITAMIN C) TABS tablet Take 1 tablet by mouth daily     buPROPion (WELLBUTRIN SR) 200 MG 12 hr tablet 200mg tab by mouth twice daily at 10am and 10pm     calcium carbonate (OS-TENA) 600 MG tablet 1 tab by mouth nightly at 10pm     carbidopa-levodopa (SINEMET)  MG tablet 1.5 x 25/100 tab by mouth 4/day @4am, 10am, 4/5pm, and 10pm and 1 as needed = 7/day     cholecalciferol 25 MCG (1000 UT) TABS 1000 units (25 mcg) by mouth daily at 10am     co-enzyme Q-10 100 MG CAPS capsule 100mg capsule by mouth every evening at 11pm     gabapentin (NEURONTIN) 100 MG capsule 200 mg capsule @ 10 AM and 100 mg 10pm     gabapentin (NEURONTIN) 300 MG capsule 300mg capsule by mouth nightly @ 10pm     Magnesium Oxide, Antacid, 500 MG CAPS 500mg tab by mouth twice daily (if needed)     midodrine  (PROAMATINE) 5 MG tablet Take 5 mg by mouth 3 times daily @8am, 12p and 4pm     probiotic CAPS 1 capsule by mouth twice daily (Smarty Pants Adult Probiotic Complete) at 10am and 10pm     propranolol (INDERAL) 10 MG tablet 1/2 of 10mg tab by mouth once daily (morning only)     sertraline (ZOLOFT) 100 MG tablet 100mg tab by mouth dailya t 10am     tiZANidine (ZANAFLEX) 2 MG tablet Take 1-2 tablets by mouth 2 times daily as needed     vitamin C (ASCORBIC ACID) 500 MG tablet 500mg tab by mouth daily at 10am     I spent 49 minutes caring for the patient today including video time, reviewing records, answering questions, refilling/ordering medication, and documentation.      ADAM Calzada,  CNP  [facility]

## 2022-05-25 NOTE — PATIENT INSTRUCTIONS
Dear Ms. Alina DARON Zavaal,    Thank you for coming today.  During your visit, we have discussed the following:     __  Will increase your Sinemet 25/100 mg to 2 tabs 4 x a day and 1 tab as needed.    __  Will add Entacapone (Comtan) 200 mg 1 tab to take with the Sinemet to extend the life of Sinemet.    PD Medications 4 am 10 am 4-5 pm 10 pm As needed   Sinemet 25/100 mg  2 2 2 2 1   Entacapone 200 mg  1 1 1 1      __  Call us if you experience side effects of the medication is not effective.    __  Return in 2 months to see Dr. Hearn in he clinic.  You may return sooner as needed.     __  Schedulers will call to set up your next visit.      For questions, you may send us a [employer] message or call [phone]    Fax number: [phone]    ADAM Calzada, CNP  [facility]

## 2022-05-25 NOTE — PROGRESS NOTES
Alina is a 61 year old who is being evaluated via a billable video visit.      How would you like to obtain your AVS? MyChart  If the video visit is dropped, the invitation should be resent by: Send to e-mail at: [email or web address]  Will anyone else be joining your video visit? No          Video-Visit Details    Type of service:  Video Visit    Video Time:  Start: 2022 07:48 am  Stop: 2022 08:22 am  34    Originating Location (pt. Location): Home    Distant Location (provider location):  [facility]     Platform used for Video Visit: AmWell     ----------------------------------------------------------------------------------------------------------------------------------------------------------------------------------------------    ASSESSMENT:    Parkinson's Disease: Experiencing wearing off motor symptoms. Nees improvement.    PLAN:    I discussed a few options to improve wearing off symptoms.  1.  Switching to Rytary   2.  Increasing the current carbidopa levodopa dose  3.  Reducing the interval between her doses and having her take carbidopa/levodopa 5 or 6 times per day  4.  Adding entacapone  After each options were discussed patient opted to go with entacapone, stay on the same dose of carbidopa/levodopa, and reevaluate.  The following patient instruction was provided:-    __  Will increase your Sinemet 25/100 mg to 2 tabs 4 x a day and 1 tab as needed.  __  Will add Entacapone (Comtan) 200 mg 1 tab to take with the Sinemet to extend the life of Sinemet.    PD Medications 4 am 10 am 4-5 pm 10 pm As needed   Sinemet 25/100 mg  2 2 2 2 1   Entacapone 200 mg  1 1 1 1      __  Call us if you experience side effects of the medication is not effective.  __  Return in 2 months to see Dr. Hearn in he clinic.  You may return sooner as needed.   __  Schedulers will call to set up your next visit.      MOVEMENT DISORDERS CLINIC           PATIENT: Alina Zavala    :  1960    DATE: May 25, 2022    REASON FOR VISIT: To discuss worsening Parkinson's disease (PD) symptoms and medication adjustment.    HPI: Ms. Alina Zavala is a 61 year old who is seen via video visit for a follow up visit.      During today's video visit pt and her  were present.     Patient is new to me. She has been seeing Dr. Hearn since February 2019.  Her presenting symptoms were slowness, stiffness, difficulty walking, and cognitive slowness.  She had a dopamine scan that showed a presynaptic dopaminergic deficit and decreased uptake in bilateral putamen confirming the diagnosis of parkinsonism.    Patient and her  used to live in [place].  She had Lyme's disease.  Looking back she had many of the PD motor symptoms that were blamed on the Lyme's disease.  It took a few years to get the diagnosis of PD.    Patient reports that she is wearing off at the end of each dose of her carbidopa/levodopa.  The medication works well for 5 hours and starts wearing off.  When she is off she gets pain in her feet and mood starts to go down.  Currently she is taking her medications every 6 hours.    PD Medications 4 am 10 am 4-5 pm 10 pm   Sinemet 25/100 mg  1.5 2 2 1.5     MEDICATIONS:   Outpatient Medications Marked as Taking for the 5/25/22 encounter (Appointment) with Sandrine Valle APRN CNP   Medication Sig     acetaminophen (TYLENOL) 500 MG tablet 2 x 500mg tab by mouth twice daily @ 10am and 11pm  = 4/day     albuterol (PROAIR HFA/PROVENTIL HFA/VENTOLIN HFA) 108 (90 Base) MCG/ACT inhaler Inhale 2 puffs into the lungs every 6 hours as needed     B Complex-C (VITAMIN B COMPLEX W/VITAMIN C) TABS tablet Take 1 tablet by mouth daily     buPROPion (WELLBUTRIN SR) 200 MG 12 hr tablet 200mg tab by mouth twice daily at 10am and 10pm     calcium carbonate (OS-TENA) 600 MG tablet 1 tab by mouth nightly at 10pm     carbidopa-levodopa (SINEMET)  MG tablet 1.5 x 25/100 tab by mouth 4/day @4am, 10am,  4/5pm, and 10pm and 1 as needed = 7/day     cholecalciferol 25 MCG (1000 UT) TABS 1000 units (25 mcg) by mouth daily at 10am     co-enzyme Q-10 100 MG CAPS capsule 100mg capsule by mouth every evening at 11pm     gabapentin (NEURONTIN) 100 MG capsule 200 mg capsule @ 10 AM and 100 mg 10pm     gabapentin (NEURONTIN) 300 MG capsule 300mg capsule by mouth nightly @ 10pm     Magnesium Oxide, Antacid, 500 MG CAPS 500mg tab by mouth twice daily (if needed)     midodrine (PROAMATINE) 5 MG tablet Take 5 mg by mouth 3 times daily @8am, 12p and 4pm     probiotic CAPS 1 capsule by mouth twice daily (Smarty Pants Adult Probiotic Complete) at 10am and 10pm     propranolol (INDERAL) 10 MG tablet 1/2 of 10mg tab by mouth once daily (morning only)     sertraline (ZOLOFT) 100 MG tablet 100mg tab by mouth dailya t 10am     tiZANidine (ZANAFLEX) 2 MG tablet Take 1-2 tablets by mouth 2 times daily as needed     vitamin C (ASCORBIC ACID) 500 MG tablet 500mg tab by mouth daily at 10am     I spent 49 minutes caring for the patient today including video time, reviewing records, answering questions, refilling/ordering medication, and documentation.    ADAM Calzada,  CNP  [facility]

## 2022-05-31 ENCOUNTER — VIRTUAL VISIT (OUTPATIENT)
Dept: NEUROLOGY | Facility: CLINIC | Age: 62
End: 2022-05-31
Payer: COMMERCIAL

## 2022-05-31 DIAGNOSIS — F02.A0 MILD NEUROCOGNITIVE DISORDER WITH LEWY BODIES (H): Primary | ICD-10-CM

## 2022-05-31 DIAGNOSIS — G31.83 MILD NEUROCOGNITIVE DISORDER WITH LEWY BODIES (H): Primary | ICD-10-CM

## 2022-05-31 DIAGNOSIS — F41.9 ANXIETY DISORDER, UNSPECIFIED TYPE: ICD-10-CM

## 2022-05-31 PROCEDURE — 96121 NUBHVL XM PHY/QHP EA ADDL HR: CPT | Performed by: CLINICAL NEUROPSYCHOLOGIST

## 2022-05-31 PROCEDURE — 96133 NRPSYC TST EVAL PHYS/QHP EA: CPT | Mod: 95 | Performed by: CLINICAL NEUROPSYCHOLOGIST

## 2022-05-31 PROCEDURE — 96139 PSYCL/NRPSYC TST TECH EA: CPT | Performed by: CLINICAL NEUROPSYCHOLOGIST

## 2022-05-31 PROCEDURE — 96132 NRPSYC TST EVAL PHYS/QHP 1ST: CPT | Mod: 95 | Performed by: CLINICAL NEUROPSYCHOLOGIST

## 2022-05-31 PROCEDURE — 96138 PSYCL/NRPSYC TECH 1ST: CPT | Performed by: CLINICAL NEUROPSYCHOLOGIST

## 2022-05-31 PROCEDURE — 96116 NUBHVL XM PHYS/QHP 1ST HR: CPT | Performed by: CLINICAL NEUROPSYCHOLOGIST

## 2022-05-31 NOTE — LETTER
"    5/31/2022         RE: Alina Zavala  [address] [place] [id number]        Dear Colleague,    Thank you for referring your patient, Alina Zavala, to the [facility]. Please see a copy of my visit note below.    NEUROPSYCHOLOGY TELE-HEALTH FEEDBACK VISIT  [facility]    Telephone Visit  Alina Zavala is a 61 year old female who is being evaluated via a billable telephone visit.    The patient has been notified of the following:      \"This telephone visit will be conducted via a call between you and your provider. We have found that certain health care needs can be provided without the need for a physical exam.  This service lets us provide the care you need with a phone conversation. If during the course of the call the provider feels a telephone visit is not appropriate, you will not be charged for this service.\"     Patient has given verbal consent to a Telephone visit? Yes  Consent has been obtained for this service by 1 care team member: yes.    Visit Summary  The purpose of today s appointment was to provide feedback regarding Ms. Zavala's recent Neuropsychological Consult completed on May 9th. We began the session by discussing her experience during the evaluation. I provided Ms. Zavala and her  Justin with feedback regarding her performance on cognitive testing and her pattern of cognitive strengths and weaknesses including generally moderate to severe impairments identified in nonverbal skills (basic judgment, reasoning, planning and organization) as well as aspects of executive functioning (complex attention, visuospatial planning and organization). I shared that there were also subtle weaknesses (low average) evident on additional nonverbal (learning and memory) and executive (working memory) measures as well as in cognitive efficiency. But her language skills, including verbal learning and memory, remain strong. I " discussed my overall impression that her presentation is more consistent with Lewy Body disease rather than a traditional Parkinson's, but that the cognitive deficits are still relatively mild at this point and consistent with Mild Neurocognitive Disorder (mild cognitive impairment). We discussed recommendations including refraining from driving, staying cognitively active, exploring resources (provided in the AVS), and also taking care of herself physically. We also talked about the importance of continuing to work with her therapist to manage mood symptoms and to work towards re-engaging in some previous activities she enjoyed (I.e., choir, barn photography). I provided the opportunity for Ms. Zavala and her  to ask questions about the evaluation and results. At the end of the session, they indicated that they understood the results and that I had answered all of their questions. They were encouraged to reach out to me (contact information included in the AVS) should any further questions or concerns arise in the future. Impressions and recommendations from the report were provided as part of the After Visit Summary which was directed to clinic staff to print and send by mail. I explained that Dr. Hearn would be receiving the full report as the consulting provider.       Sakshi Perez, PhD, LP, ABPP  Clinical Neuropsychologist, [id number]  Board Certified in Clinical Neuropsychology    [place] [address], [address]  [place] [address]  Phone:  [phone]    Telephone Visit Details    Type of service:  Telephone Visit  Start Time: 1133am  End Time: 1230pm    Originating Location (pt. Location): Home    Distant Location (provider location):  Remote work for [employer] [employer] [facility]    Mode of Communication:  Telephone    Alina Zavala was evaluated via a billable telephone visit.       For diagnostic and coding purposes, Ms. Zavala has a  history of Parkinson's disease (diagnosed 2019), orthostatic hypotension, muscle spasms/ neuropathy, left trigeminal neuralgia (reported history of Lyme disease diagnosed in 2015) and anxiety and was referred for an evaluation of Mild Neurocognitive Disorder.  My diagnostic impressions from the 5/9/2022 evaluation included    Diagnosis:   Mild Neurocognitive Disorder with Lewy Bodies  Unspecified Anxiety Disorder    As this is the final date for this Episode of Care (initiated on 5/9/2022 in person) all charges for the entire Episode of Care will be filed today. Please see the 5/9/2022 evaluation for a detailed description of codes and services, including services provided today.      In brief:   1 x 96116  1 x 96121  1 x 96132  3 x 96133  1 x 96138  5 x 96139      Again, thank you for allowing me to participate in the care of your patient.        Sincerely,        Sakshi Peerz, PhD LP

## 2022-05-31 NOTE — PROGRESS NOTES
"NEUROPSYCHOLOGY TELE-HEALTH FEEDBACK VISIT  [facility]    Telephone Visit  Alina Zavala is a 61 year old female who is being evaluated via a billable telephone visit.    The patient has been notified of the following:      \"This telephone visit will be conducted via a call between you and your provider. We have found that certain health care needs can be provided without the need for a physical exam.  This service lets us provide the care you need with a phone conversation. If during the course of the call the provider feels a telephone visit is not appropriate, you will not be charged for this service.\"     Patient has given verbal consent to a Telephone visit? Yes  Consent has been obtained for this service by 1 care team member: yes.    Visit Summary  The purpose of today s appointment was to provide feedback regarding Ms. Zavala's recent Neuropsychological Consult completed on May 9th. We began the session by discussing her experience during the evaluation. I provided Ms. Zavala and her  Justin with feedback regarding her performance on cognitive testing and her pattern of cognitive strengths and weaknesses including generally moderate to severe impairments identified in nonverbal skills (basic judgment, reasoning, planning and organization) as well as aspects of executive functioning (complex attention, visuospatial planning and organization). I shared that there were also subtle weaknesses (low average) evident on additional nonverbal (learning and memory) and executive (working memory) measures as well as in cognitive efficiency. But her language skills, including verbal learning and memory, remain strong. I discussed my overall impression that her presentation is more consistent with Lewy Body disease rather than a traditional Parkinson's, but that the cognitive deficits are still relatively mild at this point and consistent with Mild Neurocognitive Disorder (mild " cognitive impairment). We discussed recommendations including refraining from driving, staying cognitively active, exploring resources (provided in the AVS), and also taking care of herself physically. We also talked about the importance of continuing to work with her therapist to manage mood symptoms and to work towards re-engaging in some previous activities she enjoyed (I.e., choir, barn photography). I provided the opportunity for Ms. Zavala and her  to ask questions about the evaluation and results. At the end of the session, they indicated that they understood the results and that I had answered all of their questions. They were encouraged to reach out to me (contact information included in the AVS) should any further questions or concerns arise in the future. Impressions and recommendations from the report were provided as part of the After Visit Summary which was directed to clinic staff to print and send by mail. I explained that Dr. Hearn would be receiving the full report as the consulting provider.       Sakshi Perez, PhD, LP, ABPP  Clinical Neuropsychologist, [id number]  Board Certified in Clinical Neuropsychology    [facility] [place] [address], [address]  [place] [address]  Phone:  [phone]    Telephone Visit Details    Type of service:  Telephone Visit  Start Time: 1133am  End Time: 1230pm    Originating Location (pt. Location): Home    Distant Location (provider location):  Remote work for [employer]    Mode of Communication:  Telephone    Alina Zavala was evaluated via a billable telephone visit.       For diagnostic and coding purposes, Ms. Zavala has a history of Parkinson's disease (diagnosed 2019), orthostatic hypotension, muscle spasms/ neuropathy, left trigeminal neuralgia (reported history of Lyme disease diagnosed in 2015) and anxiety and was referred for an evaluation of Mild Neurocognitive Disorder.  My  diagnostic impressions from the 5/9/2022 evaluation included    Diagnosis:   Mild Neurocognitive Disorder with Lewy Bodies  Unspecified Anxiety Disorder    As this is the final date for this Episode of Care (initiated on 5/9/2022 in person) all charges for the entire Episode of Care will be filed today. Please see the 5/9/2022 evaluation for a detailed description of codes and services, including services provided today.      In brief:   1 x 96116  1 x 96121  1 x 96132  3 x 96133  1 x 96138  5 x 96139

## 2022-05-31 NOTE — PATIENT INSTRUCTIONS
DIAGNOSTIC IMPRESSIONS (from 5/9/2022 Neuropsychology Consult):    Results  Deficits in spatial skills and some aspects of problem solving, along with emerging weaknesses in speeded processing   Intact performance on measures of basic attention, verbal learning, verbal memory, most language skills and some problem solving skills    Diagnosis  Mild Neurocognitive Disorder with Lewy Bodies   Unspecified Anxiety Disorder    RECOMMENDATIONS:  Driving and Activities of Daily Living  Ms. Zavala will continue to benefit from help in her daily activities particularly in terms of managing medications and finances. She is encouraged to be an active participant in these tasks but would benefit from oversight and assistance.   Given her profile characterized by significant deficits in spatial skills as well as some deficits in aspects of executive functioning, and emerging weaknesses in processing speed, it is strongly encouraged that Ms. Zavala stop driving. If she would like to continue driving, a formal driving evaluation is strongly recommended. Possible options for formal driving evaluations would be: [facility] ([phone]), [facility] program ([phone]) or any option recommended by his physician.   If not already done, completion of paperwork for advance directives and assignment of healthcare and financial power of  should be considered at this time.  Ms. Zavala expressed some concern about mobility and safety in the home. She may benefit from a home OT evaluation. As she is already seeing OT at [facility], she is encouraged to discuss this with her current OT provider.  Family Resources  It will be increasingly important for Ms. Zavala and her family to have a strong support network in place. [facility] has a number of resources including exercise classes and support groups (see attached flyer).  In addition, both the Lewy Body Dementia Association (https://www.lbda.org/ 868.485.6571)  and the National Parkinson s Foundation (http:www.parkinson.org 800-2ME-LROR, 793-0516) have information about local support groups as well as informational materials.   Physical and Emotional Health   It is important that Ms. Zavala continue to adhere to her medication treatment regimen and follow a healthy diet so as to maintain her physical health, as this can have a significant impact on her physical, emotional, and cognitive functioning.   Given her history of sleep disturbance and possible dream re-enactment behavior, Ms. Zavala is encouraged to consult her Neurologist to determine if a formal evaluation for REM Behavior Disorder (RBD) is warranted.  Ms. Zavala is encouraged to continue with psychotherapeutic and psychiatric interventions to help manage her mood symptoms.   In the meantime, behavioral activation techniques such as regular exercise including [employer] (under the guidance of her physician), recreational activities and regular social interaction (with proper social distancing when indicated) would likely be effective in helping Ms. Zavala to manage her mood.   Memory and Organization  Ms. Zavala is encouraged to continue to engage in stimulating activities, (i.e., reading, card games, puzzles, getting back in to barn photography) to keep her cognitively active.   Ms. Zavala did not demonstrate verbal memory deficits on testing; however, such difficulties are more likely to appear when she is especially tired/ fatigued, in pain or struggling with mood symptoms. In those situations, she will benefit from the following strategies.  In her daily life, Ms. Zavala will continue to benefit from the use of compensation techniques. That is, she may find it helpful to post reminder notes around the house, make lists, and carry a small calendar so that she can feel more comfortable and confident in her ability to remember information. A daily planner could also be used as a memory book where  important information is recorded and organized for future reference.   Ms. Zavala should also create a system to establish set locations for certain items (i.e., keys) such that she always knows where to put them upon entering the house and where to look when she needs them. If she would like to keep certain items out of sight (i.e., a wallet), she could set up a specific hidden place to keep items and use that same place so as to ensure she can find the required item when needed.   When required to learn new information, Ms. Zavala showed good benefit from repetition. Thus it is recommended that information be broken down into small units and repeated to facilitate her learning.  Ms. Zavala exhibits nonverbal memory weaknesses but does benefit from cues, thus she will do best when provided with reminders to facilitate retrieval of important information.   Ms. Zavala should be aware that she may not be able to process information as quickly and efficiently as she once could. Thus she should allow herself extra time to complete tasks and not try and work under extreme time constraints.   Follow-up  Given evidence of cognitive impairment on current testing and potential for further decline, re-evaluation in 12-24 months is recommended. The current test data can be used as a baseline to which future comparisons can be made.      [facility] Fitness Classes and Support Group for People with Parkinson s Disease    Fitness Classes: Free virtual fitness classes for people with Parkinson s disease, until we can meet in person!When: Monday, Wednesday and Friday mornings.Where: Classes are virtual, using Zoom .Details: Classes are led by a recreational therapist, who is certified in Parkinson s Wellness Recovery and has 10 years of PD experience. No doctor referral is needed. All classes include a warmup,cardio, strength training, stretching/flexibility work and Parkinson s-specific exercises during the 45-60  minutes.    Nordic Walking: For people with Parkinson s disease and their care providers.When: Class is held 2 times per month, on Thursday mornings.Where: [facility] in [place] or the [facility] in [place], depending on weather.Details: Class is led by a recreational therapist. Some Nordic walking poles are available for use. There is a $4.00 fee to enter the [facility].    Support Group: Free to people with Parkinson s disease and their care partners.When: The first Wednesday of each month from 10 a.m. to 11 a.m., with small groups for care providers from 11:15 a.m. to 12:15 p.m. Where: We meet virtually, using Zoom.Details: The support group is educational, with a speaker on a specific topic and time for questions. The first hour, 10 a.m. to 11 a.m., is for people with PD and their care partners. From 11:15 a.m. to 12:15 p.m., care partners are placed into small groups to enable conversation and support. To sign up or for more information, please contact Ayala David, at [phone] or [email or web address].

## 2022-06-09 NOTE — PROGRESS NOTES
Diagnosis/Summary/Recommendations:    PATIENT: Alina Zavala  61 year old female     : 1960    DAHLIA:  [facility] [id number]   [email or web address]  [phone] (M) -iphone  [phone] (H) -not an iphone  Tejinder Miller LOYD  [phone]     Venkat Cooney and Marissa.       PCP is Dr. HESHAM Melendrez  CArdiologist Dr. Reyna Dick  Eye doctor  Dentist  Chiropractor     [email or web address]       Other options are below:     Because she is taking sertraline (zoloft) and wellbutrin (bupropion) she cannot take selegiline (eldepryl) or rasagiline (Azilect). Not sure if can take safinamide (Xadago) - its another monoamine oxidase inhibitor and may have an interaction with antidepressants and is very expensive.     She has not been on amantadine (symmetrel)  100mg once or twice daily      She has not been on a dopamine agonist which do carry a risk of impulse control issues (gambling, sh opping, eating, etc.) but would opt for a low dose with the sinemet if we used one.      We also consider rytary 4 capsules of 95mg dose by mouth 3 times per day     Assessment:    (G20) Parkinson disease (H)  (primary encounter diagnosis)    Carbidopa/levodopa Sinemet 25/100 1.5 tabs 4/day at 4am, 10a, 4/5p, 10pm  Review of diagnosis    Parkinson    Avoidance of dopamine blockers   Not taking    Motor complication review   Wearing off  She has some dyskinesias  She has some wearing off. At 4-5 hours gets tingling of her toes/fingers  Legs - cramping, clawing - behind her knees  Wearing off afternoon; has to be attentive to afternoon dosing    Review of Impulse control disorders   denies    Review of surgical or medication options   reviewed    Gait/Balance/Falls   Missed a step carrying something in both hands    Exercise/Therapy performed/offered      Getting up at 4 or 5am and have to let dogs in and out and is run down   Taty - yellow lab and [employer] 65  Right femoral artery angiogram was performed  using a Sheath 6fr 25cm 2.5cm Intro Snap On Dil Lock Kink by hand injection.  The access site was deemed to be appropriate for closure.  #   Cat   Other dog     Doing big and loud every day    For RLS -  Gabapentin neurontin 100mg @10am and sometimes @8/11pm extra dose   Gabapentin neurontin 300mg @8/11pm     may consider taking second dose of gabapentin earlier than 10/11pm    Cognitive/Driving   Short distances only; not of freeway  Cognitive problems - recalling names takes longer  Problems with spelling    Mood   Counselling - working with counsellor   Bupropion wellbutrin SR 200mg 12 hr tablet twice daily  Sertraline zoloft 100mg daily    fatigue  Emotional   Missing family for thanksgiving  Mother with c diff and is 85 and frail   with covid     Problems handling the cold  Needs to get full spectrum light     Hallucinations/delusions     Sleep   Gets up to take her first dose of medication @330/530am and then goes back to bed; possibly till 930am  Variable time of wakening up.      Sleep deprived with dogs and affects pill cycle     Bladder   Urinary urgency  Variable nocturia depending on hydration, etc.   Discussed timing of water intake    GI/Constipation/GERD   Magnesium oxide 500mg twice daily at 10am and 10pm  Probiotic twice daily smarty pants at 10am and 10pm   Doing well     ENDO   Calcium carbonate at night at 10pm  Cholecalciferol vitamin D3 1000 units at 10am     Cardio/heart  POTS    Blood pressure is better with medication change  She thinks that the propranolol helps her tremor but worried   Fludrocortisone florinef 0.1mg - not taking  Midodrine proamatine  2.5mg = 2 tabs 3/day = switched to 5mg tab  Midodrine proamatine 5mg 3/day   Propranolol 10mg 1/2 of 10mg twice daily at 10am and 10pm     Cardiology  pan Quiroga, Reyna Saucedo - cardiologist     water and salt intake     Dr. Reyna Dick     Vision   No vision changes  Less contrast sensitivity  Tree vs person     Heme   Not on aspirin     Other:     Left trigeminal neuralgia  Prior history of lyme disease  Sweating episodes, not able to take hot  shower.      Breathing  Albuterol proair as needed   Asmanex 120 metered doses 220mcg/inh inhaler at night   Mometasone asmanex - as above     Acetaminophen tylenol 500mg scheduled.   b complex   Coenzyme Q10 daily   Cyclobenzaprine flexeril as needed 1/2 tab  Gabapentin neurontin 100mg twice daily   Gabapentin neurontin 300mg at night   Vitamin C ascorbic acid 500mg      has had covid19  She tested negative  He is getting an antiviral  Son and daughter had it  She has had both vaccinations.  She is wondering about covid booster      Medications     4/430a 9a 10am 1p 4p 6p 8p 10p   Acetaminophen tylenol 500mg     2       2   Albuterol proair hfa/proventil hfa/ventolin hfa 108 (90 base) mcg/act inhaler    prn           b complex            1    Bupropion wellbutrin SR 200mg 12 hr tablet    1      1    Calcium carbonate 600mg           1    Carbidopa/levodopa Sinemet 25/100 1.5  1.5  1.5   1.5   Cholecalciferol vitamin D3 25mcg 1000 units     1          Coenzyme Q10 100mg           1   Cyclobenzaprine flexeril 10mg    prn          Gabapentin neurontin 100mg    1      1   Gabapentin neurontin 300mg           1    Magnesium oxide antacid 500mg    1          Midodrine proamatine 5mg  1  1  1     Mometasone fumorate asmanex 220 mcg/inh           dose   Probiotic smarty pants 1          1    Propranolol inderal 10mg    1/2     1/2    Sertraline zoloft 100mg    1          Vitamin C 500mg     1                                               Plan:    Return visit with Wendy Gordo to review medications and issues    Table reflects her medication schedule above    Not sure about medication changes yet.     Return in 6 months        Coding statement:   Medical Decision Making:  #  Chronic progressive medical conditions addressed  Parkinson, fatigue,   Review and/or interpretation of unique test or documentation from a provider outside of neurology no   Independent historian provided additional details   yes  Prescription drug management and review of potential side effects and/or monitoring for side effects  yes   Health impacted by social determinants of health  no    I have reviewed the note as documented above.  This accurately captures the substance of my conversation with the patient and total time spent preparing for visit, executing visit and completing visit on the day of the visit:  40 minutes.     Video time face to face: 348pm - 430pm     Ishan Hearn MD     ______________________________________    Last visit date and details:             ______________________________________      Patient was asked about 14 Review of systems including changes in vision (dry eyes, double vision), hearing, heart, lungs, musculoskeletal, depression, anxiety, snoring, RBD, insomnia, urinary frequency, urinary urgency, constipation, swallowing problems, hematological, ID, allergies, skin problems: seborrhea, endocrinological: thyroid, diabetes, cholesterol; balance, weight changes, and other neurological problems and these were not significant at this time except for   Patient Active Problem List   Diagnosis     Asthma     Benign essential hypertension     Adenoma of colon     Anxiety     DDD (degenerative disc disease), lumbar     Depression     Dizziness     Labile blood pressure     Hypotension     Near syncope     Osteopenia     Parkinson disease (H)     POTS (postural orthostatic tachycardia syndrome)     Trigeminal neuralgia          Allergies   Allergen Reactions     Prednisone Anxiety, Palpitations and Shortness Of Breath     Donepezil      Insomnia, nausea, dizziness/somnolence      Penicillins      Unknown     Prednisone      Other reaction(s): *Unknown     Sulfa Drugs      Unknown     Past Surgical History:   Procedure Laterality Date     D & C       Past Medical History:   Diagnosis Date     Anxiety      Depression      Hyperlipidemia      Hypertension      Lyme disease      Trigeminal neuralgia      Social  "History     Socioeconomic History     Marital status:      Spouse name: Not on file     Number of children: Not on file     Years of education: Not on file     Highest education level: Not on file   Occupational History     Not on file   Tobacco Use     Smoking status: Never Smoker     Smokeless tobacco: Never Used   Substance and Sexual Activity     Alcohol use: No     Drug use: Not on file     Sexual activity: Not on file   Other Topics Concern     Not on file   Social History Narrative    . Lives in [place]. Tejinder Zavala spouse. They have three children. Their names are Eros, Venkat, and Christie. She is a homemaker. Studied library science and theology. She was a  for over 16 years. 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[place], focusing on cast[place] - can see on [employer] \"[place].\"        [email or web address]     Social Determinants of Health     Financial Resource Strain:      Difficulty of Paying Living Expenses:    Food Insecurity:      Worried About Running Out of Food in the Last Year:      Ran Out of Food in the Last Year:    Transportation Needs:      Lack of Transportation (Medical):      Lack of Transportation (Non-Medical):    Physical Activity:      Days of Exercise per Week:      Minutes of Exercise per Session:    Stress:      Feeling of Stress :    Social Connections:      Frequency of Communication with Friends and Family:      Frequency of Social Gatherings with Friends and Family:      Attends Congregational Services:      Active Member of Clubs or Organizations:      Attends Club or Organization Meetings:      Marital Status:    Intimate Partner Violence:      Fear of Current or Ex-Partner:      Emotionally Abused:      Physically Abused:      Sexually Abused:        Drug and lactation database from the [facility] National " Library of Medicine:  http://toxnet.nlm.nih.gov/cgi-bin/sis/htmlgen?LACT      B/P: Data Unavailable, T: Data Unavailable, P: Data Unavailable, R: Data Unavailable 0 lbs 0 oz  There were no vitals taken for this visit., There is no height or weight on file to calculate BMI.  Medications and Vitals not listed above were documented in the cart and reviewed by me.     Current Outpatient Medications   Medication Sig Dispense Refill     acetaminophen (TYLENOL) 500 MG tablet 2 x 500mg tab by mouth twice daily @ 10am and 11pm  = 4/day       albuterol (PROAIR HFA/PROVENTIL HFA/VENTOLIN HFA) 108 (90 Base) MCG/ACT inhaler Inhale 2 puffs into the lungs every 6 hours as needed       amantadine (SYMMETREL) 100 MG capsule Take 1 capsule (100 mg) by mouth 2 times daily 60 capsule 11     B Complex-C (VITAMIN B COMPLEX W/VITAMIN C) TABS tablet Take 1 tablet by mouth daily       buPROPion (WELLBUTRIN SR) 200 MG 12 hr tablet 200mg tab by mouth twice daily at 9 am and 9 pm  0     calcium carbonate (CALCIUM CARBONATE) 600 MG tablet Take 1 tablet by mouth At Bedtime        carbidopa-levodopa (SINEMET)  MG tablet 1.5 x 25/100 tab by mouth 4/day @ 4-530am, 10am, 430-5pm, and 10pm and 1 as needed = 7/day 630 tablet 11     cholecalciferol 25 MCG (1000 UT) TABS 1000 units (25 mcg) by mouth daily       co-enzyme Q-10 100 MG CAPS capsule 100mg capsule by mouth every evening at 11pm       cyclobenzaprine (FLEXERIL) 10 MG tablet Take 10 mg by mouth as needed (for back pain - very sparingly)       gabapentin (NEURONTIN) 100 MG capsule 100mg capsule by mouth daily @ 10 AM and second dose as needed at night 90 capsule 3     gabapentin (NEURONTIN) 300 MG capsule 300mg capsule by mouth nightly @ 8-11pm 90 capsule 3     Magnesium Oxide, Antacid, 500 MG CAPS 500mg tab by mouth daily       midodrine (PROAMATINE) 2.5 MG tablet Take 2 tablets (5 mg) at 9 am, 1 tablet (2.5 mg) at 12 pm, and 1 tablet (2.5 mg) at 5 pm       mometasone (ASMANEX, 120  METERED DOSES,) 220 MCG/INH inhaler Inhale 1 puff into the lungs At Bedtime       NONFORMULARY Per Dr. Do wear compression stockings daily. Level of 20-30 mmHg thigh high. Take off one hour 2-3 times daily.       probiotic CAPS 1 capsule by mouth twice daily (Smarty Pants Adult Probiotic Complete)       propranolol (INDERAL) 10 MG tablet 1/2 of 10mg tab by mouth twice daily @ 930/10am and 8/9pm       sertraline (ZOLOFT) 100 MG tablet 100mg tab by mouth daily       vitamin C (ASCORBIC ACID) 500 MG tablet Take 500 mg by mouth daily           Medications                                                                                                                                                  Ishan Hearn MD

## 2022-06-17 ENCOUNTER — DOCUMENTATION ONLY (OUTPATIENT)
Dept: NEUROLOGY | Facility: CLINIC | Age: 62
End: 2022-06-17
Payer: COMMERCIAL

## 2022-06-17 NOTE — PROGRESS NOTES
Received a message that Ms. Zavala had additional questions about her Neuropsychological test results. I called her back and spoke with her. She stated that I answered her questions. I encouraged her to call back if any other questions emerge.

## 2022-06-22 RX ORDER — IVABRADINE 5 MG/1
2.5 TABLET, FILM COATED ORAL 2 TIMES DAILY
COMMUNITY
Start: 2022-06-07 | End: 2022-07-08

## 2022-06-22 RX ORDER — SODIUM FLUORIDE 5 MG/G
GEL, DENTIFRICE DENTAL
COMMUNITY
Start: 2022-06-01 | End: 2023-05-25

## 2022-06-22 NOTE — PROGRESS NOTES
Diagnosis/Summary/Recommendations:    PATIENT: Alina Zavala  62 year old female     : 1960    DAHLIA: 2022    MRN: [id number]     [place] [address]   [email or web address]  [phone] (M) -iphone  [phone] (H) -not an iphone  Tejinder Miller LOYD  [phone]     Venkat Cooney, mallory Soriano.       PCP is Dr. HESHAM Melendrez  CArdiologist Dr. Reyna Dick  Eye doctor  Dentist  Chiropractor     [email or web address]    Assessment:  (G20) Parkinson disease (H)  (primary encounter diagnosis)  Sinemet 25/100 2 tabs 4/day at 4am, 10a, 4/5p, 10pm  Entacapone comtan 200mg    Review of diagnosis    Parkinson disease    Avoidance of dopamine blockers   Not taking    Motor complication review   wearing off. At 4-5 hours gets tingling of her toes/fingers  Legs - cramping, clawing - behind her knees  Wearing off afternoon; has to be attentive to afternoon dosing    Has nausea with comtan and not eating as much   Weight has reduced to 144 from 162 #     Review of Impulse control disorders       Review of surgical or medication options   reviewed    May consider a trial of amantadine (symmetrel) in 1-3 weeks after her back situation is better. Amantadine may help in her wearing off and dyskinesias.     Gait/Balance/Falls   Fell and kind of caught herself yesterday - was on her knees  Discussion about multi tasking - using only one hand and not two hands to carry things.     Exercise/Therapy performed/offered     Getting up at 4 or 5am and have to let dogs in and out and is run down              Taty - yellow lab and red wright lab 65 #              Cat              Other dog   Not feeding till a     Doing big and loud every day    [facility] physical therapy  Also doing occupational therapy     Silver sneaker discussion - [place]   Can use facilities free  Pool assessment at [facility]       She has seen therapists in the past at [facility] and if needed may need to  see orthopedics, PMR or sports medicine for her back symptoms which may improve with conservative management.      For RLS -  Gabapentin neurontin 100mg @10am and sometimes @8/11pm extra dose   Gabapentin neurontin 300mg @8/11pm     may consider taking second dose of gabapentin earlier than 10/11pm       Cognitive/Driving   Short distances only; not of freeway  Cognitive problems - recalling names takes longer  Problems with spelling      Would recommend a baseline neuropsychological evaluation 5/9/2022  brianna belle      Generally moderate to severe impairments identified in nonverbal skills (basic judgment, reasoning, planning and organization) as well as aspects of executive functioning (complex attention, visuospatial planning and organization)    Subtle weaknesses (low average) were evident on additional nonverbal (learning and memory) and executive (working memory) measures as well as in cognitive efficiency    Performance was intact in most verbal skills including verbal learning and memory, and aspects of executive functioning (phonemic fluency, deductive reasoning, inhibition).     She states that spatial skills have always been an area of weaknesse, but her  has observed a decline in this area      Mood   Counselling - working with counsellor   Bupropion wellbutrin SR 200mg 12 hr tablet twice daily  Sertraline zoloft 100mg daily     Mood could be better  Has been unable to go off wellbutrin in the past  Has wearing off related mood issues  Not sure how much the sertraline has helped.     fatigue  Emotional   Missing family for thanksgiving  Mother with c diff and is 85 and frail   with covid      Problems handling the cold  Needs to get full spectrum light     Daughter moving back with her dog   Renuka     Hallucinations/delusions   denies    Sleep   Gets up to take her first dose of medication @330/530am and then goes back to bed; possibly till 930am  Variable time of wakening up.    Falls  asleep relatively easily   Sleeping a lot - exhausted     Sleep deprived with dogs and affects pill cycle    Bladder/Renal/Prostate/Gyn/Other  Urinary urgency  Variable nocturia depending on hydration, etc.   Discussed timing of water intake    GI/Constipation/GERD   Magnesium oxide 500mg twice daily at 10am and 10pm  Probiotic twice daily smarty pants at 10am and 10pm   Doing well    ENDO/Lipid/DM/Bone density/Thyroid  Calcium carbonate at night at 10pm  Cholecalciferol vitamin D3 1000 units at 10am    Cardio/heart/Hyper or Hypotensive   POTS    Blood pressure is better with medication change  She thinks that the propranolol helps her tremor but worried   Fludrocortisone florinef 0.1mg - not taking  Midodrine proamatine 5mg 3/day   Propranolol 10mg 1/2 of 10mg daily at 10am      Cardiology  Kimber,pan Constantino, Marlena Dick, Reyna - cardiologist     water and salt intake     Dr. Reyna Dick    Vision/Dry Eyes/Cataracts/Glaucoma/Macular   Less contrast sensitivity  Tree vs person    Heme/Anticoagulation/Antiplatelet/Anemia/Other  Not on aspirin    ENT/Resp  Breathing  Albuterol proair as needed   Asmanex 120 metered doses 220mcg/inh inhaler at night  - not taking   Mometasone asmanex - not taking  Taste and smell has not improved since covid    Skin/Cancer/Seborrhea/other  denies    Musculoskeletal/Pain/Headache  Denies - other than shoulders     Other:  Left trigeminal neuralgia  Prior history of lyme disease  Sweating episodes, not able to take hot shower       Acetaminophen tylenol 500mg scheduled.   b complex   Coenzyme Q10 daily   Cyclobenzaprine flexeril as needed 1/2 tab  Gabapentin neurontin 100mg twice daily   Gabapentin neurontin 300mg at night   Vitamin C ascorbic acid 500mg        Medications     4/430a 9a 10am 1p 4p 6p 8p 10p   Acetaminophen tylenol 500mg      2          2   Albuterol proair hfa/proventil hfa/ventolin hfa 108 (90 base) mcg/act inhaler     prn              Amantadine symmetrel 100mg  Not yet                 b complex                1    Baclofen lioresal 10mg Off                  Bupropion wellbutrin SR 200mg 12 hr      1         1    Calcium carbonate 600mg               1    Carbidopa/levodopa Sinemet 25/100 2   2   2     2   Cholecalciferol vitamin D3 25mcg 1000u      1             Coenzyme Q10 100mg               1   Cyclobenzaprine flexeril 10mg     prn             Entacapone comtan 200mg 1  1  1   1   Gabapentin neurontin 100mg     2         1   Gabapentin neurontin 300mg               1    Ivabradine colranor 5mg no          Magnesium oxide antacid 500mg     1             Midodrine proamatine 5mg   1   1   1       Mometasone fumorate asmanex 220  off                Probiotic smarty pants 1              1    Propranolol inderal 10mg     1/2             Sertraline zoloft 100mg     1             Sodium fluoride dental gel prevident 1.1%           Tizanidine zanaflex 2mg prn          Vitamin C 500mg      1                                                 Plan:    Ongoing nausea that may be related to her comtan/entacapone or other factors  She has some weight loss  Discussed timing of meals and the sinemet and anti nausea medications  Has seen Wendy Caro 3/2022  Has not been on rytary and has not been on amantadine  She has had wearing off related leg symptoms - numbness -   Has not had an emg to evaluate for neuropathy  And she also has wearing off related neuropathy type of leg symptoms  Blood work and emg could be done    Discussion about report of the neuropsychological findings and terminology.   She does not have lewy body disease.       Coding statement:   Medical Decision Making:  #  Chronic progressive medical conditions addressed  - see above --   Review and/or interpretation of unique test or documentation from a provider outside of neurology yes   Independent historian provided additional details  yes I  Prescription drug management and review of potential side effects and/or  monitoring for side effects  -- see above ---  Health impacted by social determinants of health  no    I have reviewed the note as documented above.  This accurately captures the substance of my conversation with the patient and total time spent preparing for visit, executing visit and completing visit on the day of the visit:  40 minutes.  The portion of this total time included face to face time 5-190a    Ishan Hearn MD     ______________________________________    Last visit date and details:             ______________________________________      Patient was asked about 14 Review of systems including changes in vision (dry eyes, double vision), hearing, heart, lungs, musculoskeletal, depression, anxiety, snoring, RBD, insomnia, urinary frequency, urinary urgency, constipation, swallowing problems, hematological, ID, allergies, skin problems: seborrhea, endocrinological: thyroid, diabetes, cholesterol; balance, weight changes, and other neurological problems and these were not significant at this time except for   Patient Active Problem List   Diagnosis     Asthma     Benign essential hypertension     Adenoma of colon     Anxiety     DDD (degenerative disc disease), lumbar     Depression     Dizziness     Labile blood pressure     Hypotension     Near syncope     Osteopenia     Parkinson disease (H)     POTS (postural orthostatic tachycardia syndrome)     Trigeminal neuralgia          Allergies   Allergen Reactions     Prednisone Anxiety, Palpitations and Shortness Of Breath     Baclofen Other (See Comments)     Agitation, confusion, brain fog, sedation     Donepezil      Insomnia, nausea, dizziness/somnolence      Penicillins      Unknown     Prednisone      Other reaction(s): *Unknown     Sulfa Drugs      Unknown     Past Surgical History:   Procedure Laterality Date     D & C       Past Medical History:   Diagnosis Date     Anxiety      Depression      Hyperlipidemia      Hypertension      Lyme disease       "Trigeminal neuralgia      Social History     Socioeconomic History     Marital status:      Spouse name: Not on file     Number of children: Not on file     Years of education: Not on file     Highest education level: Not on file   Occupational History     Not on file   Tobacco Use     Smoking status: Never Smoker     Smokeless tobacco: Never Used   Substance and Sexual Activity     Alcohol use: No     Drug use: Not on file     Sexual activity: Not on file   Other Topics Concern     Not on file   Social History Narrative    . Lives in [place]. Tejinder Zavala spouse. They have three children. Their names are Eros, Venkat, and Christie. She is a homemaker. Studied library science and theology. She was a  for over 16 years. 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[place], focusing on [place] - can see on [employer] \"[place].\"        [email or web address]     Social Determinants of Health     Financial Resource Strain: Not on file   Food Insecurity: Not on file   Transportation Needs: Not on file   Physical Activity: Not on file   Stress: Not on file   Social Connections: Not on file   Intimate Partner Violence: Not on file   Housing Stability: Not on file       Drug and lactation database from the [place] National Library of Medicine:  http://toxnet.nlm.nih.gov/cgi-bin/sis/htmlgen?LACT      B/P: Data Unavailable, T: Data Unavailable, P: Data Unavailable, R: Data Unavailable 0 lbs 0 oz  There were no vitals taken for this visit., There is no height or weight on file to calculate BMI.  Medications and Vitals not listed above were documented in the cart and reviewed by me.     Current Outpatient Medications   Medication Sig Dispense Refill     acetaminophen (TYLENOL) 500 MG tablet 2 x 500mg tab by mouth twice daily @ 10am and 11pm  = 4/day       " albuterol (PROAIR HFA/PROVENTIL HFA/VENTOLIN HFA) 108 (90 Base) MCG/ACT inhaler Inhale 2 puffs into the lungs every 6 hours as needed       B Complex-C (VITAMIN B COMPLEX W/VITAMIN C) TABS tablet Take 1 tablet by mouth daily       buPROPion (WELLBUTRIN SR) 200 MG 12 hr tablet 200mg tab by mouth twice daily at 10am and 10pm  0     calcium carbonate (OS-TENA) 600 MG tablet 1 tab by mouth nightly at 10pm       carbidopa-levodopa (SINEMET)  MG tablet 2 tabs by mouth 4/day @4am, 10am, 4/5pm, and 10pm and 1 as needed = 9/day 810 tablet 3     cholecalciferol 25 MCG (1000 UT) TABS 1000 units (25 mcg) by mouth daily at 10am       co-enzyme Q-10 100 MG CAPS capsule 100mg capsule by mouth every evening at 11pm       entacapone (COMTAN) 200 MG tablet Take 1 tab orally with Sinemet 4 x a day  At 4 am, 10 am, 4-5 am & 10 pm. 120 tablet 4     gabapentin (NEURONTIN) 100 MG capsule 200 mg capsule @ 10 AM and 100 mg 10pm 180 capsule 3     gabapentin (NEURONTIN) 300 MG capsule 300mg capsule by mouth nightly @ 10pm 90 capsule 3     Magnesium Oxide, Antacid, 500 MG CAPS 500mg tab by mouth twice daily (if needed)       midodrine (PROAMATINE) 5 MG tablet Take 5 mg by mouth 3 times daily @8am, 12p and 4pm       probiotic CAPS 1 capsule by mouth twice daily (Smarty Pants Adult Probiotic Complete) at 10am and 10pm       propranolol (INDERAL) 10 MG tablet 1/2 of 10mg tab by mouth once daily (morning only) 180 tablet 3     sertraline (ZOLOFT) 100 MG tablet 100mg tab by mouth dailya t 10am 90 tablet 3     tiZANidine (ZANAFLEX) 2 MG tablet Take 1-2 tablets by mouth 2 times daily as needed       vitamin C (ASCORBIC ACID) 500 MG tablet 500mg tab by mouth daily at 10am           Ishan Hearn MD

## 2022-07-01 ENCOUNTER — ANCILLARY PROCEDURE (OUTPATIENT)
Dept: MAMMOGRAPHY | Facility: CLINIC | Age: 62
End: 2022-07-01
Attending: FAMILY MEDICINE
Payer: COMMERCIAL

## 2022-07-01 DIAGNOSIS — Z12.31 SCREENING MAMMOGRAM, ENCOUNTER FOR: ICD-10-CM

## 2022-07-01 PROCEDURE — 77067 SCR MAMMO BI INCL CAD: CPT

## 2022-07-08 ENCOUNTER — OFFICE VISIT (OUTPATIENT)
Dept: NEUROLOGY | Facility: CLINIC | Age: 62
End: 2022-07-08
Payer: COMMERCIAL

## 2022-07-08 VITALS
HEART RATE: 74 BPM | OXYGEN SATURATION: 98 % | WEIGHT: 145 LBS | DIASTOLIC BLOOD PRESSURE: 93 MMHG | RESPIRATION RATE: 16 BRPM | BODY MASS INDEX: 23.76 KG/M2 | SYSTOLIC BLOOD PRESSURE: 154 MMHG

## 2022-07-08 DIAGNOSIS — G62.9 NEUROPATHY: ICD-10-CM

## 2022-07-08 DIAGNOSIS — G20.A1 PARKINSON DISEASE (H): Primary | ICD-10-CM

## 2022-07-08 PROCEDURE — 99215 OFFICE O/P EST HI 40 MIN: CPT | Performed by: PSYCHIATRY & NEUROLOGY

## 2022-07-08 NOTE — PATIENT INSTRUCTIONS
Ongoing nausea that may be related to her comtan/entacapone or other factors  She has some weight loss  Discussed timing of meals and the sinemet and anti nausea medications  Has seen Wendy Caro 3/2022  Has not been on rytary and has not been on amantadine  She has had wearing off related leg symptoms - numbness -   Has not had an emg to evaluate for neuropathy  And she also has wearing off related neuropathy type of leg symptoms  Blood work and emg could be done    Discussion about report of the neuropsychological findings and terminology.   She does not have lewy body disease.

## 2022-07-08 NOTE — LETTER
2022       RE: Alina Zavala   [facility] [address]     Dear Colleague,    Thank you for referring your patient, Alina Zavala, to the [facility] NEUROLOGY CLINIC [place] [facility]. Please see a copy of my visit note below.        Diagnosis/Summary/Recommendations:    PATIENT: Alina Zavala  62 year old female     : 1960    DAHLIA: 2022    MRN: [id number]     [facility] [address]   [email or web address]  [phone] (M) -iphone  [phone] (H) -not an iphone  Tejinder HARP  [phone]     Venkat Cooney and Marissa.       PCP is Dr. HESHAM Melendrez  CArdiologist Dr. Reyna Dick  Eye doctor  Dentist  Chiropractor     [email or web address]    Assessment:  (G20) Parkinson disease (H)  (primary encounter diagnosis)  Sinemet 25/100 2 tabs 4/day at 4am, 10a, 4/5p, 10pm  Entacapone comtan 200mg    Review of diagnosis    Parkinson disease    Avoidance of dopamine blockers   Not taking    Motor complication review   wearing off. At 4-5 hours gets tingling of her toes/fingers  Legs - cramping, clawing - behind her knees  Wearing off afternoon; has to be attentive to afternoon dosing    Has nausea with comtan and not eating as much   Weight has reduced to 144 from 162 #     Review of Impulse control disorders       Review of surgical or medication options   reviewed    May consider a trial of amantadine (symmetrel) in 1-3 weeks after her back situation is better. Amantadine may help in her wearing off and dyskinesias.     Gait/Balance/Falls   Fell and kind of caught herself yesterday - was on her knees  Discussion about multi tasking - using only one hand and not two hands to carry things.     Exercise/Therapy performed/offered     Getting up at 4 or 5am and have to let dogs in and out and is run down              Taty - yellow lab and red wright lab 65  #              Cat              Other dog   Not feeding till 7/730a     Doing big and loud every day    [facility] physical therapy  Also doing occupational therapy     Silver sneaker discussion - [place]   Can use facilities free  Pool assessment at [facility]       She has seen therapists in the past at [facility] and if needed may need to see orthopedics, PMR or sports medicine for her back symptoms which may improve with conservative management.      For RLS -  Gabapentin neurontin 100mg @10am and sometimes @8/11pm extra dose   Gabapentin neurontin 300mg @8/11pm     may consider taking second dose of gabapentin earlier than 10/11pm       Cognitive/Driving   Short distances only; not of freeway  Cognitive problems - recalling names takes longer  Problems with spelling      Would recommend a baseline neuropsychological evaluation 5/9/2022  brianna belle      Generally moderate to severe impairments identified in nonverbal skills (basic judgment, reasoning, planning and organization) as well as aspects of executive functioning (complex attention, visuospatial planning and organization)    Subtle weaknesses (low average) were evident on additional nonverbal (learning and memory) and executive (working memory) measures as well as in cognitive efficiency    Performance was intact in most verbal skills including verbal learning and memory, and aspects of executive functioning (phonemic fluency, deductive reasoning, inhibition).     She states that spatial skills have always been an area of weaknesse, but her  has observed a decline in this area      Mood   Counselling - working with counsellor   Bupropion wellbutrin SR 200mg 12 hr tablet twice daily  Sertraline zoloft 100mg daily     Mood could be better  Has been unable to go off wellbutrin in the past  Has wearing off related mood issues  Not sure how much the sertraline has helped.     fatigue  Emotional   Missing family for thanksgiving  Mother  with c diff and is 85 and frail   with covid      Problems handling the cold  Needs to get full spectrum light     Daughter moving back with her dog   Renuka     Hallucinations/delusions   denies    Sleep   Gets up to take her first dose of medication @330/530am and then goes back to bed; possibly till 930am  Variable time of wakening up.    Falls asleep relatively easily   Sleeping a lot - exhausted     Sleep deprived with dogs and affects pill cycle    Bladder/Renal/Prostate/Gyn/Other  Urinary urgency  Variable nocturia depending on hydration, etc.   Discussed timing of water intake    GI/Constipation/GERD   Magnesium oxide 500mg twice daily at 10am and 10pm  Probiotic twice daily smarty pants at 10am and 10pm   Doing well    ENDO/Lipid/DM/Bone density/Thyroid  Calcium carbonate at night at 10pm  Cholecalciferol vitamin D3 1000 units at 10am    Cardio/heart/Hyper or Hypotensive   POTS    Blood pressure is better with medication change  She thinks that the propranolol helps her tremor but worried   Fludrocortisone florinef 0.1mg - not taking  Midodrine proamatine 5mg 3/day   Propranolol 10mg 1/2 of 10mg daily at 10am      Cardiology  Kimber,pan Constantino, Marlena Dick, Reyna - cardiologist     water and salt intake     Dr. Reyna Dick    Vision/Dry Eyes/Cataracts/Glaucoma/Macular   Less contrast sensitivity  Tree vs person    Heme/Anticoagulation/Antiplatelet/Anemia/Other  Not on aspirin    ENT/Resp  Breathing  Albuterol proair as needed   Asmanex 120 metered doses 220mcg/inh inhaler at night  - not taking   Mometasone asmanex - not taking  Taste and smell has not improved since covid    Skin/Cancer/Seborrhea/other  denies    Musculoskeletal/Pain/Headache  Denies - other than shoulders     Other:  Left trigeminal neuralgia  Prior history of lyme disease  Sweating episodes, not able to take hot shower       Acetaminophen tylenol 500mg scheduled.   b complex   Coenzyme Q10 daily   Cyclobenzaprine flexeril as  needed 1/2 tab  Gabapentin neurontin 100mg twice daily   Gabapentin neurontin 300mg at night   Vitamin C ascorbic acid 500mg        Medications     4/430a 9a 10am 1p 4p 6p 8p 10p   Acetaminophen tylenol 500mg      2          2   Albuterol proair hfa/proventil hfa/ventolin hfa 108 (90 base) mcg/act inhaler     prn              Amantadine symmetrel 100mg Not yet                 b complex                1    Baclofen lioresal 10mg Off                  Bupropion wellbutrin SR 200mg 12 hr      1         1    Calcium carbonate 600mg               1    Carbidopa/levodopa Sinemet 25/100 2   2   2     2   Cholecalciferol vitamin D3 25mcg 1000u      1             Coenzyme Q10 100mg               1   Cyclobenzaprine flexeril 10mg     prn             Entacapone comtan 200mg 1  1  1   1   Gabapentin neurontin 100mg     2         1   Gabapentin neurontin 300mg               1    Ivabradine colranor 5mg no          Magnesium oxide antacid 500mg     1             Midodrine proamatine 5mg   1   1   1       Mometasone fumorate asmanex 220  off                Probiotic smarty pants 1              1    Propranolol inderal 10mg     1/2             Sertraline zoloft 100mg     1             Sodium fluoride dental gel prevident 1.1%           Tizanidine zanaflex 2mg prn          Vitamin C 500mg      1                                                 Plan:    Ongoing nausea that may be related to her comtan/entacapone or other factors  She has some weight loss  Discussed timing of meals and the sinemet and anti nausea medications  Has seen Wendy Caro 3/2022  Has not been on rytary and has not been on amantadine  She has had wearing off related leg symptoms - numbness -   Has not had an emg to evaluate for neuropathy  And she also has wearing off related neuropathy type of leg symptoms  Blood work and emg could be done    Discussion about report of the neuropsychological findings and terminology.   She does not have lewy body disease.        Coding statement:   Medical Decision Making:  #  Chronic progressive medical conditions addressed  - see above --   Review and/or interpretation of unique test or documentation from a provider outside of neurology yes   Independent historian provided additional details  yes I  Prescription drug management and review of potential side effects and/or monitoring for side effects  -- see above ---  Health impacted by social determinants of health  no    I have reviewed the note as documented above.  This accurately captures the substance of my conversation with the patient and total time spent preparing for visit, executing visit and completing visit on the day of the visit:  40 minutes.  The portion of this total time included face to face time 4-020a    Ishan Hearn MD     ______________________________________    Last visit date and details:             ______________________________________      Patient was asked about 14 Review of systems including changes in vision (dry eyes, double vision), hearing, heart, lungs, musculoskeletal, depression, anxiety, snoring, RBD, insomnia, urinary frequency, urinary urgency, constipation, swallowing problems, hematological, ID, allergies, skin problems: seborrhea, endocrinological: thyroid, diabetes, cholesterol; balance, weight changes, and other neurological problems and these were not significant at this time except for   Patient Active Problem List   Diagnosis     Asthma     Benign essential hypertension     Adenoma of colon     Anxiety     DDD (degenerative disc disease), lumbar     Depression     Dizziness     Labile blood pressure     Hypotension     Near syncope     Osteopenia     Parkinson disease (H)     POTS (postural orthostatic tachycardia syndrome)     Trigeminal neuralgia          Allergies   Allergen Reactions     Prednisone Anxiety, Palpitations and Shortness Of Breath     Baclofen Other (See Comments)     Agitation, confusion, brain fog, sedation      "Donepezil      Insomnia, nausea, dizziness/somnolence      Penicillins      Unknown     Prednisone      Other reaction(s): *Unknown     Sulfa Drugs      Unknown     Past Surgical History:   Procedure Laterality Date     D & C       Past Medical History:   Diagnosis Date     Anxiety      Depression      Hyperlipidemia      Hypertension      Lyme disease      Trigeminal neuralgia      Social History     Socioeconomic History     Marital status:      Spouse name: Not on file     Number of children: Not on file     Years of education: Not on file     Highest education level: Not on file   Occupational History     Not on file   Tobacco Use     Smoking status: Never Smoker     Smokeless tobacco: Never Used   Substance and Sexual Activity     Alcohol use: No     Drug use: Not on file     Sexual activity: Not on file   Other Topics Concern     Not on file   Social History Narrative    . Lives in [place]. Tejinder Zavala spouse. They have three children. Their names are Eros, Venkat, and Christie. She is a homemaker. Studied library science and theology. She was a  for over 16 years. 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[place], focusing on castro - can see on [employer] \"Cast[place] and Backroads.\"        [email or web address]     Social Determinants of Health     Financial Resource Strain: Not on file   Food Insecurity: Not on file   Transportation Needs: Not on file   Physical Activity: Not on file   Stress: Not on file   Social Connections: Not on file   Intimate Partner Violence: Not on file   Housing Stability: Not on file       Drug and lactation database from the [place] National Library of Medicine:  http://toxnet.nlm.nih.gov/cgi-bin/sis/htmlgen?LACT      B/P: Data Unavailable, T: Data Unavailable, P: Data Unavailable, R: Data Unavailable 0 lbs 0 oz  There were " no vitals taken for this visit., There is no height or weight on file to calculate BMI.  Medications and Vitals not listed above were documented in the cart and reviewed by me.     Current Outpatient Medications   Medication Sig Dispense Refill     acetaminophen (TYLENOL) 500 MG tablet 2 x 500mg tab by mouth twice daily @ 10am and 11pm  = 4/day       albuterol (PROAIR HFA/PROVENTIL HFA/VENTOLIN HFA) 108 (90 Base) MCG/ACT inhaler Inhale 2 puffs into the lungs every 6 hours as needed       B Complex-C (VITAMIN B COMPLEX W/VITAMIN C) TABS tablet Take 1 tablet by mouth daily       buPROPion (WELLBUTRIN SR) 200 MG 12 hr tablet 200mg tab by mouth twice daily at 10am and 10pm  0     calcium carbonate (OS-TENA) 600 MG tablet 1 tab by mouth nightly at 10pm       carbidopa-levodopa (SINEMET)  MG tablet 2 tabs by mouth 4/day @4am, 10am, 4/5pm, and 10pm and 1 as needed = 9/day 810 tablet 3     cholecalciferol 25 MCG (1000 UT) TABS 1000 units (25 mcg) by mouth daily at 10am       co-enzyme Q-10 100 MG CAPS capsule 100mg capsule by mouth every evening at 11pm       entacapone (COMTAN) 200 MG tablet Take 1 tab orally with Sinemet 4 x a day  At 4 am, 10 am, 4-5 am & 10 pm. 120 tablet 4     gabapentin (NEURONTIN) 100 MG capsule 200 mg capsule @ 10 AM and 100 mg 10pm 180 capsule 3     gabapentin (NEURONTIN) 300 MG capsule 300mg capsule by mouth nightly @ 10pm 90 capsule 3     Magnesium Oxide, Antacid, 500 MG CAPS 500mg tab by mouth twice daily (if needed)       midodrine (PROAMATINE) 5 MG tablet Take 5 mg by mouth 3 times daily @8am, 12p and 4pm       probiotic CAPS 1 capsule by mouth twice daily (Smarty Pants Adult Probiotic Complete) at 10am and 10pm       propranolol (INDERAL) 10 MG tablet 1/2 of 10mg tab by mouth once daily (morning only) 180 tablet 3     sertraline (ZOLOFT) 100 MG tablet 100mg tab by mouth dailya t 10am 90 tablet 3     tiZANidine (ZANAFLEX) 2 MG tablet Take 1-2 tablets by mouth 2 times daily as needed        vitamin C (ASCORBIC ACID) 500 MG tablet 500mg tab by mouth daily at 10am           Ishan Hearn MD

## 2022-07-20 ENCOUNTER — TELEPHONE (OUTPATIENT)
Dept: NEUROLOGY | Facility: CLINIC | Age: 62
End: 2022-07-20

## 2022-07-20 NOTE — TELEPHONE ENCOUNTER
Called and left the patient a detailed message to call back to reschedule her appointment on 10/12/22 at 10:50 AM. The writer also stated in the message that the patient was scheduled incorrectly and that there are other times available on 10/12/22. The clinic phone number was left for the patient to call back.

## 2022-07-22 ENCOUNTER — VIRTUAL VISIT (OUTPATIENT)
Dept: PHARMACY | Facility: CLINIC | Age: 62
End: 2022-07-22
Attending: PSYCHIATRY & NEUROLOGY
Payer: COMMERCIAL

## 2022-07-22 DIAGNOSIS — G20.C PARKINSONISM, UNSPECIFIED PARKINSONISM TYPE (H): Primary | ICD-10-CM

## 2022-07-22 PROCEDURE — 99606 MTMS BY PHARM EST 15 MIN: CPT | Performed by: PHARMACIST

## 2022-07-22 PROCEDURE — 99607 MTMS BY PHARM ADDL 15 MIN: CPT | Performed by: PHARMACIST

## 2022-07-22 RX ORDER — AMANTADINE HYDROCHLORIDE 100 MG/1
CAPSULE, GELATIN COATED ORAL
Qty: 60 CAPSULE | Refills: 11 | Status: SHIPPED | OUTPATIENT
Start: 2022-07-22 | End: 2022-08-16

## 2022-07-22 NOTE — Clinical Note
Zeynep/Ishan-- she decided to stop entacapone yesterday as she was having too much nausea. She is also experiencing a lot more dyskinesia. So, we decided to stay off entacapone and add amantadine.

## 2022-07-22 NOTE — PROGRESS NOTES
Medication Therapy Management (MTM) Encounter    ASSESSMENT:                            Medication Adherence/Access: No issues identified    Parkinson's Disease: Patient was not tolerating entacapone well due to nausea so she is back to taking carbidopa-levodopa monotherapy. She is quite dyskinetic today despite stopping entacapone and her goals are to reduce dyskinesia and wearing off. Options include switching carbidopa-levodopa to Rytary, or adding amantadine to carbidopa-levodopa. Rytary is likely to be expensive so we opted to trial amantadine today.       PLAN:                            1. Stay off entacapone.  2. Start Amantadine 100 mg, take 1 capsule every morning (about 10 am, with first dose of carbidopa-levodopa) for 1 week. If you are not having side effects, then you can increase to 1 capsule twice a day (10 am and 4 pm).  3. The goal of amantadine is to lessen your dyskinesia and lessen the wearing off of carbidopa-levodopa. Side effects can include: dry mouth, dry eyes, constipation, and livdeo reticularis (skin condition)  4. Keep recording in your journal how long the medications last and when you are experiencing wearing off and dyskinesia.   5. You may call your insurance company and inquire about cost of Rytary as this may be a future option.     Follow-up: 8/12/22 at 10 am video visit    SUBJECTIVE/OBJECTIVE:                          Alina Zavala is a 62 year old female contacted via secure video for a follow-up visit. Patient was accompanied by . Today's visit is a follow-up MTM visit from 3/21/22     Reason for visit: side effects of entacapone.    Allergies/ADRs: Reviewed in chart  Past Medical History: Reviewed in chart  Tobacco: She reports that she has never smoked. She has never used smokeless tobacco.  Alcohol: not currently using    Medication Adherence/Access: no issues reported    Parkinson's Disease:  Current medications include: propranolol 5 mg once daily (morning only)  and carbidopa-levodopa  mg 2 tablets at 4 am, 2 tablets at 10 am, 2 tablets at 4 pm, and 2 tablets at 10 pm. Patient has stopped entacapone as of yesterday as it was causing increasing issues of nausea and she had dry heaves yesterday. Patient reports that she is trying to stay on time with her medications but sometimes will start her day earlier (ie: today took first dose of carbidopa-levodopa at 7:15 am as she had taken the previous dose at 2:15 am). When the carbidopa-levodopa wears off she has muscle pain/spasms/tingling in her legs. Patient endorses more dyskinesia and she is dyskinetic during our video visit (last dose of medication today was at 7:15 am and video visit was at 10 am).     Today's Vitals: There were no vitals taken for this visit.  ----------------    I spent 34 minutes with this patient today. All changes were made via collaborative practice agreement with Dr. Hearn. A copy of the visit note was provided to the patient's provider(s).    The patient was sent via [employer] a summary of these recommendations.     Wendy Caro, Pharm.D.  Medication Therapy Management Pharmacist  [facility] Neurology    Telemedicine Visit Details  Type of service:  Video Conference via [employer]  Start Time: 10:02 AM  End Time: 10:36 AM  Originating Location (patient location): Home  Distant Location (provider location):  [facility] NEUROLOGY CLINIC     Medication Therapy Recommendations  Parkinsonism, unspecified Parkinsonism type (H)    Current Medication: entacapone (COMTAN) 200 MG tablet (Discontinued)   Rationale: Undesirable effect - Adverse medication event - Safety   Recommendation: Change Medication - amantadine 100 MG capsule   Status: Accepted per CPA

## 2022-07-22 NOTE — PATIENT INSTRUCTIONS
"Recommendations from today's MTM visit:                                                      1. Stay off entacapone.  2. Start Amantadine 100 mg, take 1 capsule every morning (about 10 am, with first dose of carbidopa-levodopa) for 1 week. If you are not having side effects, then you can increase to 1 capsule twice a day (10 am and 4 pm).  3. The goal of amantadine is to lessen your dyskinesia and lessen the wearing off of carbidopa-levodopa. Side effects can include: dry mouth, dry eyes, constipation, and livdeo reticularis (skin condition)  4. Keep recording in your journal how long the medications last and when you are experiencing wearing off and dyskinesia.   5. You may call your insurance company and inquire about cost of Rytary as this may be a future option.     Follow-up: 8/12/22 at 10 am video visit    It was great speaking with you today.  I value your experience and would be very thankful for your time in providing feedback in our clinic survey. In the next few days, you may receive an email or text message from [employer] with a link to a survey related to your  clinical pharmacist.\"     To schedule another MTM appointment, please call the clinic directly or you may call the MTM scheduling line at [phone] or toll-free at [phone].     My Clinical Pharmacist's contact information:                                                      Please feel free to contact me with any questions or concerns you have.      Wendy Caro, Pharm.D.  Medication Therapy Management Pharmacist  [facility]     "

## 2022-08-02 ENCOUNTER — TELEPHONE (OUTPATIENT)
Dept: NEUROLOGY | Facility: CLINIC | Age: 62
End: 2022-08-02

## 2022-08-02 ENCOUNTER — MYC MEDICAL ADVICE (OUTPATIENT)
Dept: NEUROLOGY | Facility: CLINIC | Age: 62
End: 2022-08-02

## 2022-08-02 NOTE — TELEPHONE ENCOUNTER
Informed Alina of the plan. She has the capsule formulation and will try to decrease to 100 mg daily versus splitting it as it may be difficult. Advised her to take 100 mg and I will check with Wendy on if the capsules can be split. Asked Alina to continue to monior her blood pressure, dizziness and falls and let us know if these side effects continue. Discussed benefit-risk between desired effects and side effects. I will send a el? message with these instructions per Alina's request.

## 2022-08-02 NOTE — TELEPHONE ENCOUNTER
Patient may benefit from decreasing amantadine to 100 mg/day- either 1 capsule once daily or 50 mg twice daily (splitting amantadine 100 mg tablet in half). Patient should continue monitoring blood pressure and if continues to be low, we may need to increase midodrine.    Wendy Caro, Pharm.D.  Medication Therapy Management Pharmacist  [facility]

## 2022-08-02 NOTE — TELEPHONE ENCOUNTER
"Situation:  Alina Zavala is a 62 year old female who receives support for Parkinson's disease. Alina's , Justin, writes today with concerns for falls, dizziness, and constipation since starting amantadine    Background:  Patient is taking:  Medications 4 AM 10 AM 4 PM 10 PM PRN   Amantadine 100mg  1 1     C/L  2 2 2 2 1                   July 31st increased amantadine to 1 capsule BID    New Medications:   Amantadine 100 mg    Assessment:  Alina took tizanadine around 5 AM and around 6 AM she fell into her dresser, into a wall, and in the hallway. She does not think she hit her head, her upper neck is sore. She does not think tizanidine is the culprit as she has not had issues with falls when she takes it. Since increase to amantadine 100 mg BID she has noted increased dizziness and lower blood pressures in the mornings around 8 AM (70s/50s). Her blood pressure was stable prior to amantadine. She had some dizziness on 1 capsule of amantadine but did not fall on the lower dose. She notes increased slowness of bowel movements in the last few days. She did not take the 10 AM dose of amantadine and asks if she should take this now.    Her dyskinesia's have improved almost 100%. She has not noticed much improvement for increased \"on\" time    Denies swelling in arms or legs, no increased nausea/vomiting or changes in mood.    Plan/recommendation:  1. I advised to hold off on taking the 10 AM amantadine until she hears back from me. I will discuss her symptoms with Dr. Hearn and Wendy      "

## 2022-08-12 ENCOUNTER — TELEPHONE (OUTPATIENT)
Dept: NEUROLOGY | Facility: CLINIC | Age: 62
End: 2022-08-12

## 2022-08-12 ENCOUNTER — VIRTUAL VISIT (OUTPATIENT)
Dept: PHARMACY | Facility: CLINIC | Age: 62
End: 2022-08-12
Payer: COMMERCIAL

## 2022-08-12 DIAGNOSIS — G20.C PARKINSONISM, UNSPECIFIED PARKINSONISM TYPE (H): Primary | ICD-10-CM

## 2022-08-12 PROCEDURE — 99606 MTMS BY PHARM EST 15 MIN: CPT | Performed by: PHARMACIST

## 2022-08-12 PROCEDURE — 99607 MTMS BY PHARM ADDL 15 MIN: CPT | Performed by: PHARMACIST

## 2022-08-12 RX ORDER — LEVODOPA AND CARBIDOPA 145; 36.25 MG/1; MG/1
2 CAPSULE, EXTENDED RELEASE ORAL
Qty: 300 CAPSULE | Refills: 11 | Status: SHIPPED | OUTPATIENT
Start: 2022-08-12 | End: 2022-08-30

## 2022-08-12 NOTE — TELEPHONE ENCOUNTER
Prior Authorization Retail Medication Request    Medication/Dose: Rytary 145 mg   ICD code (if different than what is on RX):  G20  Previously Tried and Failed:  Carbidopa-levodopa, entacapone, amantadine  Rationale:  Patient is experiencing motor fluctuations of Parkinson's. She did not tolerate entacapone or amantadine and would benefit from switching carbidopa-levodopa to Rytary    Insurance Name:  [facility]   Insurance ID: [id number]       Pharmacy Information (if different than what is on RX)  [id number] [address] [facility] [address]  Phone:

## 2022-08-12 NOTE — TELEPHONE ENCOUNTER
Central Prior Authorization Team   Phone: [phone]    PA Initiation    Medication: Rytary 145 mg   Insurance Company: [employer] - Phone [phone] Fax [phone]  Pharmacy Filling the Rx: [facility][id number] [address] AT [facility] [address] [facility]  Filling Pharmacy Phone: [phone]  Filling Pharmacy Fax:    Start Date: 8/12/2022

## 2022-08-12 NOTE — PROGRESS NOTES
Medication Therapy Management (MTM) Encounter    ASSESSMENT:                            Medication Adherence/Access: No issues identified    Parkinson's Disease: Patient would benefit from switching carbidopa-levodopa tablets to Rytary to smooth out her motor fluctuations. We will attempt insurance approval for Rytary. If insurance will not cover Rytary well, we may need to consider switching her carbidopa-levodopa to 1.5 tablets every 4 hours    PLAN:                            1. Stop carbidopa-levodopa  mg tablets  2. Start Rytary 36. mg, taking 2 capsules every 5 hours (5 times/day)  3. I have started a prior authorization with insurance. Please let me know if the cost is prohibitive.   4. There is a coupon you can use to lower the copay by up to $100/month after insurance:  [email or web address]    Follow-up: 8/30/22 at 10 am video visit    SUBJECTIVE/OBJECTIVE:                          Alina Zavala is a 62 year old female contacted via secure video for a follow-up visit.  Today's visit is a follow-up MTM visit from 7/22/22     Reason for visit: follow up on Parkinson medications.    Allergies/ADRs: Reviewed in chart  Past Medical History: Reviewed in chart  Tobacco: She reports that she has never smoked. She has never used smokeless tobacco.  Alcohol: not currently using    Medication Adherence/Access: no issues reported    Parkinson's Disease:  Current medications include: propranolol 5 mg once daily (morning only) and carbidopa-levodopa  mg 2 tablets every 5 hours (typically 5 times daily), ie: about 4 am, 9 am, 2 pm, 7 pm, and 10 pm. patient has stopped amantadine as it caused her to be off-balance and have some falls. Amantadine did improve her dyskinesia while she was taking it but now dyskinesia is much worse again. Dyskinesia seems to be worst with peak effect of the medication. When carbidopa-levodopa wears off she has spasms and tightness in her leg and foot.  The medication wears off after about 5 hours. She would like to avoid dopamine agonists due to risk of impulse control issues.     Today's Vitals: There were no vitals taken for this visit.  ----------------    I spent 17 minutes with this patient today. All changes were made via collaborative practice agreement with Dr. Hearn. A copy of the visit note was provided to the patient's provider(s).    The patient was sent via [employer] a summary of these recommendations.     Wendy Caro, Pharm.D.  Medication Therapy Management Pharmacist  [facility]    Telemedicine Visit Details  Type of service:  Video Conference via [employer]  Start Time: 9:59 AM  End Time: 10:16 AM  Originating Location (patient location): Home  Distant Location (provider location):  [facility]     Medication Therapy Recommendations  Parkinson disease (H)    Current Medication: carbidopa-levodopa (SINEMET)  MG tablet   Rationale: More effective medication available - Ineffective medication - Effectiveness   Recommendation: Change Medication Formulation  - Rytary 36. MG Cpcr   Status: Accepted per CPA

## 2022-08-16 ENCOUNTER — TELEPHONE (OUTPATIENT)
Dept: NEUROLOGY | Facility: CLINIC | Age: 62
End: 2022-08-16

## 2022-08-16 NOTE — TELEPHONE ENCOUNTER
Central Prior Authorization Team   Phone: [phone]    PA Initiation    Medication: Tier exception request [employer]   Insurance Company: [employer] - Phone [phone] Fax [phone]  Pharmacy Filling the Rx: [id number] [address] AT [facility] [address] & [facility]  Filling Pharmacy Phone: [phone]  Filling Pharmacy Fax:    Start Date: 8/16/2022    Called [employer] admin review [phone] to inquire about Tier Exception request.

## 2022-08-16 NOTE — TELEPHONE ENCOUNTER
Per message below after approval medication still has a $800 copay for patient.  Request for tier exception will be completed in a separate encounter for tracking and reporting purposes.   Please see new encounter for tier exception PA updates.

## 2022-08-16 NOTE — TELEPHONE ENCOUNTER
PRIOR AUTHORIZATION DENIED    Medication: Tier exception request Rytary     Denial Date: 8/16/2022    Denial Rational: Medication Rytary was already approved for a non-formulary exception.   Per rep because medication is non formulary it is not eligible for a tier exception request per medicare law.  Only formulary medications are eligible for tier exception request.         Appeal Information: cannot be appealed, not eligible for tier exception. Medication however was approved for a non-formulary exception

## 2022-08-16 NOTE — TELEPHONE ENCOUNTER
Prior Authorization Approval    Authorization Effective Date: 7/13/2022  Authorization Expiration Date: 8/12/2023  Medication: Rytary 145 mg   Approved Dose/Quantity:   Reference #:     Insurance Company: [employer] - Phone [phone] Fax [phone]  Expected CoPay:       CoPay Card Available:      Foundation Assistance Needed:    Which Pharmacy is filling the prescription (Not needed for infusion/clinic administered): [id number] - [place], [place] - [address] [place] [address] E AT [place] [address] & [facility]  Pharmacy Notified: Yes  Patient Notified: Yes

## 2022-08-30 ENCOUNTER — VIRTUAL VISIT (OUTPATIENT)
Dept: PHARMACY | Facility: CLINIC | Age: 62
End: 2022-08-30
Payer: COMMERCIAL

## 2022-08-30 DIAGNOSIS — G20.A1 PARKINSON DISEASE (H): Primary | ICD-10-CM

## 2022-08-30 PROCEDURE — 99607 MTMS BY PHARM ADDL 15 MIN: CPT | Performed by: PHARMACIST

## 2022-08-30 PROCEDURE — 99606 MTMS BY PHARM EST 15 MIN: CPT | Performed by: PHARMACIST

## 2022-08-30 RX ORDER — AMANTADINE HYDROCHLORIDE 100 MG/1
TABLET ORAL
Qty: 135 TABLET | Refills: 3 | Status: SHIPPED | OUTPATIENT
Start: 2022-08-30 | End: 2022-09-26

## 2022-08-30 RX ORDER — CARBIDOPA AND LEVODOPA 25; 100 MG/1; MG/1
TABLET ORAL
Qty: 900 TABLET | Refills: 3 | Status: SHIPPED | OUTPATIENT
Start: 2022-08-30 | End: 2023-02-17

## 2022-08-30 NOTE — PATIENT INSTRUCTIONS
"Recommendations from today's MTM visit:                                                      1. Increase amantadine to 50 mg (half-tablet) 3 times/day, with your first 3 doses of carbidopa-levodopa. Try to avoid taking amantadine within 4 hours of bedtime as it may interfere with your sleep if too late in the day.     2. I refilled amantadine and carbidopa-levodopa for you.     3. I hope to see you at the [facility] on September 10!    Follow-up: 3-6 months or sooner if needed    It was great speaking with you today.  I value your experience and would be very thankful for your time in providing feedback in our clinic survey. In the next few days, you may receive an email or text message from [employer] with a link to a survey related to your  clinical pharmacist.\"     To schedule another MTM appointment, please call the clinic directly or you may call the MTM scheduling line at [phone] or toll-free at [phone].     My Clinical Pharmacist's contact information:                                                      Please feel free to contact me with any questions or concerns you have.      Wendy Caro, Pharm.D.  Medication Therapy Management Pharmacist  [facility]     "

## 2022-08-30 NOTE — PROGRESS NOTES
Medication Therapy Management (MTM) Encounter    ASSESSMENT:                            Medication Adherence/Access: No issues identified    Parkinson's Disease: Patient would benefit from trying a higher dose of amantadine and taking 50 mg 3 times/day for additional benefit on dyskinesia and lessen wearing off of carbidopa-levodopa.       PLAN:                            1. Increase amantadine to 50 mg (half-tablet) 3 times/day, with your first 3 doses of carbidopa-levodopa. Try to avoid taking amantadine within 4 hours of bedtime as it may interfere with your sleep if too late in the day.     2. I refilled amantadine and carbidopa-levodopa for you.     3. I [facility] to [facility] you at the [facility] on September 10!    Follow-up: 3-6 months or sooner if needed    SUBJECTIVE/OBJECTIVE:                          Alina Zavala is a 62 year old female contacted via secure video for a follow-up visit. Patient was accompanied by , Justin. Today's visit is a follow-up MTM visit from 8/12/22     Reason for visit: follow up on amantadine.    Allergies/ADRs: Reviewed in chart  Past Medical History: Reviewed in chart  Tobacco: She reports that she has never smoked. She has never used smokeless tobacco.  Alcohol: not currently using    Medication Adherence/Access: no issues reported    Parkinson's Disease:  Current medications include: propranolol 5 mg once daily (morning only), carbidopa-levodopa  mg 2 tablets every 5 hours (typically 5 times daily), ie: about 4 am, 9 am, 2 pm, 7 pm, and 10 pm. Also now taking amantadine 50 mg twice daily which has been working well. Her dyskinesia has been improved and she is not having any side effects of amantadine like imbalance or falls. She is wondering if we can increase the amantadine to 3 times/day.     Today's Vitals: There were no vitals taken for this visit.  ----------------    I spent 13 minutes with this patient today. All changes were made via collaborative practice  agreement with Dr. Hearn. A copy of the visit note was provided to the patient's provider(s).    The patient was sent via [employer] a summary of these recommendations.     Wendy Caro, Pharm.D.  Medication Therapy Management Pharmacist  [facility]    Telemedicine Visit Details  Type of service:  Video Conference via [employer]  Start Time: 9:57 AM  End Time: 10:10 AM  Originating Location (patient location): [place]  Distant Location (provider location):  [facility]     Medication Therapy Recommendations  Parkinson disease (H)    Current Medication: amantadine HCl 100 MG TABS   Rationale: Dose too low - Dosage too low - Effectiveness   Recommendation: Increase Frequency   Status: Accepted per CPA

## 2022-09-18 ENCOUNTER — HEALTH MAINTENANCE LETTER (OUTPATIENT)
Age: 62
End: 2022-09-18

## 2022-09-21 ENCOUNTER — TELEPHONE (OUTPATIENT)
Dept: NEUROLOGY | Facility: CLINIC | Age: 62
End: 2022-09-21

## 2022-09-21 NOTE — TELEPHONE ENCOUNTER
[facility] faith kay informing him Dr. Hearn has no specific recommendation.

## 2022-09-21 NOTE — TELEPHONE ENCOUNTER
[facility] Call Center    Phone Message    May a detailed message be left on voicemail: yes     Reason for Call: Other: Pt  Tejinder is calling to ask Dr. Hearn for a recommendation for pt to see an ophthalmologist. Please call Tejinder to discuss.     Action Taken: Message routed to:  [facility]OLOGY    Travel Screening: Not Applicable

## 2022-09-23 ENCOUNTER — TRANSFERRED RECORDS (OUTPATIENT)
Dept: HEALTH INFORMATION MANAGEMENT | Facility: CLINIC | Age: 62
End: 2022-09-23

## 2022-09-23 ENCOUNTER — TELEPHONE (OUTPATIENT)
Dept: NEUROLOGY | Facility: CLINIC | Age: 62
End: 2022-09-23

## 2022-09-23 DIAGNOSIS — G20.A1 PARKINSON DISEASE (H): ICD-10-CM

## 2022-09-23 NOTE — TELEPHONE ENCOUNTER
[facility] Call Center    Phone Message    May a detailed message be left on voicemail: yes     Reason for Call:     Patient called to speak to MATTI Woodard per patient is wondering if she should schedule an appt to see  regarding her eye. Patient was just at the eye doctor and they did not see anything wrong, per pt it was suggested that symptoms may be because of Parkinson's or side effect of Parkinson's medication. Please call patient back to discuss       Action Taken: Message routed to:  [facility] neurology    Travel Screening: Not Applicable

## 2022-09-26 ENCOUNTER — DOCUMENTATION ONLY (OUTPATIENT)
Dept: NEUROLOGY | Facility: CLINIC | Age: 62
End: 2022-09-26

## 2022-09-26 RX ORDER — AMANTADINE HYDROCHLORIDE 100 MG/1
TABLET ORAL
Qty: 90 TABLET | Refills: 3 | Status: SHIPPED | OUTPATIENT
Start: 2022-09-26 | End: 2023-04-27

## 2022-09-26 NOTE — TELEPHONE ENCOUNTER
"Informed Alina of Wendy and Dr. Hearn's instructions. She reports increased stress lately and decreased sleep. She feels drunk \"like I've smoked pot or something or like i'm drunk.\" Informed her its hard to determine what this is from but could be from stress and lack of sleep. Encouraged her to be kind and talk kindly to herself and remind herself that stressful times will come but will also go. She will update me in 2 weeks on the amantadine decrease.  "

## 2022-09-26 NOTE — PROGRESS NOTES
Received eye report from [facility] eye  Results were Normal/ Abnormal   Records Date of service: 9/23/22  Copy has been sent to scanning and encounter routed to specialty nurse for review.

## 2022-09-26 NOTE — TELEPHONE ENCOUNTER
Alina has had increased blue/red glitter looking boxes in her vision in the last week. It is better in the light but wax's and wanes throughout the day. It mostly appears on edges of things. When this happens she will hear a ringing behind her. She will mistake her hair for a person being next to her when her hair is in er face. She will see a dag from a distance but when she gets closer it is a mailbox. She saw Dr. Riley at [facility] and her eyes are working fine. He suggested she may want to see a neuroophthalmologic as these issues could be neurologic. She said he sent an e-mail to Dr. Hearn regarding these issues on Friday.    She is taking Amantadine 50 mg TID

## 2022-09-26 NOTE — TELEPHONE ENCOUNTER
[facility] Call Center    Phone Message    May a detailed message be left on voicemail: yes     Reason for Call: Other: Patient requests a call back from Yamilet. concerning vision issues she has been discussing with Yamilet     Action Taken: Message routed to:  [facility]    Travel Screening: Not Applicable

## 2022-09-26 NOTE — TELEPHONE ENCOUNTER
Per discussion with Dr. Hearn, she should decrease amantadine back to 50 mg twice daily. Prescription updated.    Wendy Caro, Pharm.D.  Medication Therapy Management Pharmacist  [facility]

## 2022-11-22 ENCOUNTER — TELEPHONE (OUTPATIENT)
Dept: NEUROLOGY | Facility: CLINIC | Age: 62
End: 2022-11-22

## 2022-11-22 NOTE — TELEPHONE ENCOUNTER
[facility]    Phone Message    May a detailed message be left on voicemail: yes     Reason for Call: Other: Patient is calling to speak with care team about losing her hair. She would like to know if any of the medications she is on would cause hair loss. Please contact patient at [phone]     Action Taken: Message routed to:  [facility] Neurology    Travel Screening: Not Applicable

## 2022-11-23 NOTE — TELEPHONE ENCOUNTER
Hair loss is not reported with carbidopa-levodopa or amantadine in [employer]. However, pharmacist recalls one other patient who endorsed hair loss with amantadine.     There are some medications that can cause hair loss but usually hair loss is due to other causes (stress, aging etc).    Left voice mail with the information above from Dr. Caro.

## 2023-01-06 ENCOUNTER — TRANSCRIBE ORDERS (OUTPATIENT)
Dept: OTHER | Age: 63
End: 2023-01-06

## 2023-01-06 DIAGNOSIS — H04.129 DRY EYE SYNDROME: ICD-10-CM

## 2023-01-06 DIAGNOSIS — H25.819 COMBINED FORMS OF AGE-RELATED CATARACT: ICD-10-CM

## 2023-01-06 DIAGNOSIS — H53.10 UNSPECIFIED SUBJECTIVE VISUAL DISTURBANCES: Primary | ICD-10-CM

## 2023-02-08 NOTE — PROGRESS NOTES
Diagnosis/Summary/Recommendations:    PATIENT: Alina Zavala  62 year old female     : 1960    DAHLIA: 2023    MRN: [id number]  [facility] [address]   [email or web address]  [phone] (M) -iphone  [phone] (H) -not an iphone  Tejinder Miller LOYD  [phone]     Venkat Cooney, mallory Soriano.       PCP is Dr. HESHAM Melendrez  CArdiologist Dr. Reyna Dick  Eye doctor  Dentist  Chiropractor     [email or web address]    Ongoing nausea that may be related to her comtan/entacapone or other factors  She has some weight loss  Discussed timing of meals and the sinemet and anti nausea medications  Has seen Wendy Caro 3/2022  Has not been on rytary and has not been on amantadine  She has had wearing off related leg symptoms - numbness -   Has not had an emg to evaluate for neuropathy  And she also has wearing off related neuropathy type of leg symptoms  Blood work and emg could be done     Discussion about report of the neuropsychological findings and terminology.   She does not have lewy body disease.     Assessment:  (G20) Parkinson disease (H)  (primary encounter diagnosis)  (G20) Parkinson disease (H)  (primary encounter diagnosis)  Sinemet 25/100 2 tabs 5/day  Entacapone comtan 200mg - not taking     Review of diagnosis    Parkinson disease     Avoidance of dopamine blockers   Not taking     Motor complication review   wearing off. At 4-5 hours gets tingling of her toes/fingers  Legs - cramping, clawing - behind her knees  Wearing off afternoon; has to be attentive to afternoon dosing     Has nausea with comtan and not eating as much   Weight has reduced to 144 from 162 #      Review of Impulse control disorders         Review of surgical or medication options   reviewed     May consider a trial of amantadine (symmetrel) in 1-3 weeks after her back situation is better. Amantadine may help in her wearing off and dyskinesias.      Gait/Balance/Falls   Fell and kind of  caught herself yesterday - was on her knees  Discussion about multi tasking - using only one hand and not two hands to carry things.      Exercise/Therapy performed/offered     Getting up at 4 or 5am and have to let dogs in and out and is run down              Taty - yellow lab and red wright lab 65 #              Cat              Other dog   Not feeding till 7/730a     Doing big and loud every day     [facility] perez physical therapy  Also doing occupational therapy      Silver sneaker discussion - [place]   Can use facilities free  Pool assessment at [facility]         She has seen therapists in the past at [facility] and if needed may need to see orthopedics, PMR or sports medicine for her back symptoms which may improve with conservative management.      For RLS -  Gabapentin neurontin 100mg @10am and sometimes @8/11pm extra dose   Gabapentin neurontin 300mg @8/11pm     may consider taking second dose of gabapentin earlier than 10/11pm        Cognitive/Driving   Short distances only; not of freeway  Cognitive problems - recalling names takes longer  Problems with spelling        Would recommend a baseline neuropsychological evaluation 5/9/2022  brianna belle       Generally moderate to severe impairments identified in nonverbal skills (basic judgment, reasoning, planning and organization) as well as aspects of executive functioning (complex attention, visuospatial planning and organization)    Subtle weaknesses (low average) were evident on additional nonverbal (learning and memory) and executive (working memory) measures as well as in cognitive efficiency    Performance was intact in most verbal skills including verbal learning and memory, and aspects of executive functioning (phonemic fluency, deductive reasoning, inhibition).     She states that spatial skills have always been an area of weaknesse, but her  has observed a decline in this area        Mood   Counselling - working with  counsellor   Bupropion wellbutrin SR 200mg 12 hr tablet twice daily  Sertraline zoloft 100mg daily     Mood could be better  Has been unable to go off wellbutrin in the past  Has wearing off related mood issues  Not sure how much the sertraline has helped.     fatigue  Emotional   Missing family for thanksgiving  Mother with c diff and is 85 and frail   with covid      Problems handling the cold  Needs to get full spectrum light     Daughter moving back with her dog   Renuka    Acceptance issues with the parkinson  Not driving       Hallucinations/delusions   denies     Sleep   Gets up to take her first dose of medication @330/530am and then goes back to bed; possibly till 930am  Variable time of wakening up.    Falls asleep relatively easily   Sleeping a lot - exhausted     Sleep deprived with dogs and affects pill cycle     Bladder/Renal/Prostate/Gyn/Other  Urinary urgency  Variable nocturia depending on hydration, etc.   Discussed timing of water intake     GI/Constipation/GERD   Magnesium oxide 500mg twice daily at 10am and 10pm  Probiotic twice daily smarty pants at 10am and 10pm   Doing well     ENDO/Lipid/DM/Bone density/Thyroid  Calcium carbonate at night at 10pm  Cholecalciferol vitamin D3 1000 units at 10am     Cardio/heart/Hyper or Hypotensive   POTS    Blood pressure is better with medication change  She thinks that the propranolol helps her tremor but worried   Fludrocortisone florinef 0.1mg - not taking  Midodrine proamatine 5mg 3/day   Propranolol 10mg 1/2 of 10mg daily at 10am      Cardiology  Kimber,Marlena Monsalve Nazifa - cardiologist     water and salt intake     Dr. Reyna Dick     Vision/Dry Eyes/Cataracts/Glaucoma/Macular   Less contrast sensitivity  Tree vs person  Will see Oumar Wade on 3/2/2023     Heme/Anticoagulation/Antiplatelet/Anemia/Other  Not on aspirin     ENT/Resp  Breathing  Albuterol proair as needed   Asmanex 120 metered doses 220mcg/inh inhaler at night  -  not taking   Mometasone asmanex - not taking  Taste and smell has not improved since covid     Skin/Cancer/Seborrhea/other  denies     Musculoskeletal/Pain/Headache  Denies - other than shoulders      Other:  Left trigeminal neuralgia  Prior history of lyme disease  Sweating episodes, not able to take hot shower        Acetaminophen tylenol 500mg scheduled.   b complex   Coenzyme Q10 daily   Cyclobenzaprine flexeril as needed 1/2 tab  Gabapentin neurontin 100mg twice daily   Gabapentin neurontin 300mg at night   Vitamin C ascorbic acid 500mg         Medications     4/430a 9a 10am 1p 4p 6p 8p 10p   Acetaminophen tylenol 500mg      2          2   Albuterol proair proventil ventolin inhaler     prn              Amantadine symmetrel 100mg 1/2         1/2       b complex                1    Baclofen lioresal 10mg Off                  Bupropion wellbutrin SR 200mg 12 hr      1         1    Calcium carbonate 600mg               1    Carbidopa/levodopa Sinemet 25/100 2 q5hr 2   2     2   Cholecalciferol vitamin D3 25mcg 1000u      1             Coenzyme Q10 100mg               1   Cyclobenzaprine flexeril 10mg     prn             Entacapone comtan 200mg off          Gabapentin neurontin 100mg     2         1   Gabapentin neurontin 300mg               1    Ivabradine colranor 5mg Off                  Magnesium oxide antacid 500mg off               Midodrine proamatine 2.5mg   2   2   2       Mometasone fumorate asmanex 220  off                 Polyethylene glycol miralax prn          Probiotic smarty pants 1              1    Propranolol inderal 10mg     1/2             Psyllium metamucil  ?try          Sertraline zoloft 100mg     1             Sodium fluoride dental gel prevident 1.1%                   Tizanidine zanaflex 2mg prn                 Vitamin C 500mg      1                                                         Plan:    Visual issues; seeing dr Wade  Seeing Red and green squares and has a reduction in amantadine    Discussion about lab work to check kidney function as is taking amantadine (symmetrel)   Last renal labs in our chart are from 2020.   Not driving  Depth perception problems     Wearing off at 4/5 hours - wears off 45 minutes before dose and has cramping, aching  Has delayed on periods   Cramping at night - toes and feet and walks around till pills work   This may occur at 9pm or so   Has cramping in the car/juan a horse     Last visit was summer 2022    Think that rytary was too expensive  Did not benefit from comtan and had side effects  Has not been on gocovri    Consider reducing the interval to 4.5 hours of the sinemet.   Has had some slow off periods and some sudden off periods.     Bowel movements are still there but may try some things such as the miralax, which she says she has tried in the past.     Cognitive problems  Did not tolerate donepezil (aricept)  Trial of rivastigmine excelon patch for cognition, monitor for blood pressure or heart rate changes or other side effects.   4.6mg patch daily for 4 weeks then 9.5mg patch daily for 4 weeks then increase to the 13.3mg patch daily  Hs problems with  Multi tasking  Discussion about attention changes and focusing changes     Blood pressure being monitored and managed with medications for her orthostatic symptoms.     Urinary urgency     Pharmacy consultation  Pharmacy (MTM) consultation and medication management  Please call the scheduling number @ [phone] to set up an appointment with pharmacists Wendy Caro or Fatemeh Richey.     Ongoing sleep issues - dogs are there  3 dogs and living in the family room  - rashi is in the bedroom  2 y ear old dog, middle age dog and older dog (has problems climbing the stairs)    In summary - will need to work on intervals 2 tabs 4 or 4.5 hours vs 2.5 pills every 5 hours   Work on constipation   Review price of the rivastigmine patch but don't start yet.     Return back in 3-6 months.     Coding statement:    Medical Decision Making:  #  Chronic progressive medical conditions addressed  - see above --   Review and/or interpretation of unique test or documentation from a provider outside of neurology no   Independent historian provided additional details  yes  Prescription drug management and review of potential side effects and/or monitoring for side effects  -- see above ---  Health impacted by social determinants of health  no    I have reviewed the note as documented above.  This accurately captures the substance of my conversation with the patient and total time spent preparing for visit, executing visit and completing visit on the day of the visit:  40 minutes.  The portion of this total time included face to face time 38 minutes    Ishan Hearn MD     ______________________________________    Last visit date and details:             ______________________________________      Patient was asked about 14 Review of systems including changes in vision (dry eyes, double vision), hearing, heart, lungs, musculoskeletal, depression, anxiety, snoring, RBD, insomnia, urinary frequency, urinary urgency, constipation, swallowing problems, hematological, ID, allergies, skin problems: seborrhea, endocrinological: thyroid, diabetes, cholesterol; balance, weight changes, and other neurological problems and these were not significant at this time except for   Patient Active Problem List   Diagnosis     Asthma     Benign essential hypertension     Adenoma of colon     Anxiety     DDD (degenerative disc disease), lumbar     Depression     Dizziness     Labile blood pressure     Hypotension     Near syncope     Osteopenia     Parkinson disease (H)     POTS (postural orthostatic tachycardia syndrome)     Trigeminal neuralgia          Allergies   Allergen Reactions     Prednisone Anxiety, Palpitations and Shortness Of Breath     Baclofen Other (See Comments)     Agitation, confusion, brain fog, sedation     Donepezil      Insomnia,  "nausea, dizziness/somnolence      Penicillins      Unknown     Prednisone      Other reaction(s): *Unknown     Sulfa Drugs      Unknown     Past Surgical History:   Procedure Laterality Date     D & C       Past Medical History:   Diagnosis Date     Anxiety      Depression      Hyperlipidemia      Hypertension      Lyme disease      Trigeminal neuralgia      Social History     Socioeconomic History     Marital status:      Spouse name: Not on file     Number of children: Not on file     Years of education: Not on file     Highest education level: Not on file   Occupational History     Not on file   Tobacco Use     Smoking status: Never     Smokeless tobacco: Never   Substance and Sexual Activity     Alcohol use: No     Drug use: Not on file     Sexual activity: Not on file   Other Topics Concern     Not on file   Social History Narrative    . Lives in [place]. Tejinder Zavala spouse. They have three children. Their names are Eros, Venkat, and Christie. She is a homemaker. Studied library science and theology. She was a  for over 16 years. 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[place], focusing on [place] - can see on [employer] \"[place].\"        [email or web address]     Social Determinants of Health     Financial Resource Strain: Not on file   Food Insecurity: Not on file   Transportation Needs: Not on file   Physical Activity: Not on file   Stress: Not on file   Social Connections: Not on file   Intimate Partner Violence: Not on file   Housing Stability: Not on file       Drug and lactation database from the [place] National Library of Medicine:  http://toxnet.nlm.nih.gov/cgi-bin/sis/htmlgen?LACT      B/P: Data Unavailable, T: Data Unavailable, P: Data Unavailable, R: Data Unavailable 0 lbs 0 oz  There were no vitals taken for this visit., " There is no height or weight on file to calculate BMI.  Medications and Vitals not listed above were documented in the cart and reviewed by me.     Current Outpatient Medications   Medication Sig Dispense Refill     acetaminophen (TYLENOL) 500 MG tablet 2 x 500mg tab by mouth twice daily @ 10am and 11pm  = 4/day       albuterol (PROAIR HFA/PROVENTIL HFA/VENTOLIN HFA) 108 (90 Base) MCG/ACT inhaler Inhale 2 puffs into the lungs every 6 hours as needed       amantadine HCl 100 MG TABS Take half-tablet (50 mg) by mouth twice daily 90 tablet 3     B Complex-C (VITAMIN B COMPLEX W/VITAMIN C) TABS tablet Take 1 tablet by mouth daily       buPROPion (WELLBUTRIN SR) 200 MG 12 hr tablet 200mg tab by mouth twice daily at 10am and 10pm  0     calcium carbonate (OS-TENA) 600 MG tablet 1 tab by mouth nightly at 10pm       carbidopa-levodopa (SINEMET)  MG tablet Take 2 tablets by mouth every 5 hours (5 times daily) = 10 tablets/day 900 tablet 3     cholecalciferol 25 MCG (1000 UT) TABS 1000 units (25 mcg) by mouth daily at 10am       co-enzyme Q-10 100 MG CAPS capsule 100mg capsule by mouth every evening at 11pm       gabapentin (NEURONTIN) 100 MG capsule 200 mg capsule @ 10 AM and 100 mg 10pm 180 capsule 3     gabapentin (NEURONTIN) 300 MG capsule 300mg capsule by mouth nightly @ 10pm 90 capsule 3     Magnesium Oxide, Antacid, 500 MG CAPS 500mg tab by mouth twice daily (if needed)       midodrine (PROAMATINE) 5 MG tablet Take 5 mg by mouth 3 times daily @8am, 12p and 4pm       probiotic CAPS 1 capsule by mouth twice daily (Smarty Pants Adult Probiotic Complete) at 10am and 10pm       propranolol (INDERAL) 10 MG tablet 1/2 of 10mg tab by mouth once daily (morning only) 180 tablet 3     sertraline (ZOLOFT) 100 MG tablet 100mg tab by mouth dailya t 10am 90 tablet 3     sodium fluoride dental gel (PREVIDENT) 1.1 % GEL topical gel APPLY THIN BEAD OF GEL TO TOOTHBRUSH AND BRUSH ONCE AT BEDTIME FOR AT LEAST ONE MINUTE. EXPECTORATE  THOROUGHLY.       tiZANidine (ZANAFLEX) 2 MG tablet Take 1-2 tablets by mouth 2 times daily as needed       vitamin C (ASCORBIC ACID) 500 MG tablet 500mg tab by mouth daily at 10am           Ishan Hearn MD

## 2023-02-13 RX ORDER — MIDODRINE HYDROCHLORIDE 2.5 MG/1
5 TABLET ORAL 3 TIMES DAILY
COMMUNITY
Start: 2023-02-10 | End: 2023-02-17

## 2023-02-17 ENCOUNTER — OFFICE VISIT (OUTPATIENT)
Dept: NEUROLOGY | Facility: CLINIC | Age: 63
End: 2023-02-17
Payer: COMMERCIAL

## 2023-02-17 VITALS — SYSTOLIC BLOOD PRESSURE: 185 MMHG | HEART RATE: 68 BPM | OXYGEN SATURATION: 98 % | DIASTOLIC BLOOD PRESSURE: 111 MMHG

## 2023-02-17 DIAGNOSIS — G20.A1 PARKINSON DISEASE (H): Primary | ICD-10-CM

## 2023-02-17 PROCEDURE — 99215 OFFICE O/P EST HI 40 MIN: CPT | Performed by: PSYCHIATRY & NEUROLOGY

## 2023-02-17 RX ORDER — RIVASTIGMINE 4.6 MG/24H
PATCH, EXTENDED RELEASE TRANSDERMAL
Qty: 30 PATCH | Refills: 11 | Status: SHIPPED | OUTPATIENT
Start: 2023-02-17 | End: 2023-04-27

## 2023-02-17 RX ORDER — CARBIDOPA AND LEVODOPA 25; 100 MG/1; MG/1
TABLET ORAL
Qty: 1080 TABLET | Refills: 3 | Status: SHIPPED | OUTPATIENT
Start: 2023-02-17 | End: 2023-08-17

## 2023-02-17 RX ORDER — MIDODRINE HYDROCHLORIDE 2.5 MG/1
5 TABLET ORAL 3 TIMES DAILY
COMMUNITY
Start: 2023-02-13 | End: 2023-08-17

## 2023-02-17 RX ORDER — POLYETHYLENE GLYCOL 3350 17 G/17G
POWDER, FOR SOLUTION ORAL
COMMUNITY
End: 2024-03-13

## 2023-02-17 RX ORDER — RIVASTIGMINE 9.5 MG/24H
PATCH, EXTENDED RELEASE TRANSDERMAL
Qty: 90 PATCH | Refills: 11 | Status: SHIPPED | OUTPATIENT
Start: 2023-02-17 | End: 2023-06-13 | Stop reason: DRUGHIGH

## 2023-02-17 ASSESSMENT — PAIN SCALES - GENERAL: PAINLEVEL: MODERATE PAIN (4)

## 2023-02-17 NOTE — PATIENT INSTRUCTIONS
Medications     4/430a 9a 10am 1p 4p 6p 8p 10p   Acetaminophen tylenol 500mg      2          2   Albuterol proair proventil ventolin inhaler     prn              Amantadine symmetrel 100mg 1/2         1/2       b complex                1    Baclofen lioresal 10mg Off                  Bupropion wellbutrin SR 200mg 12 hr      1         1    Calcium carbonate 600mg               1    Carbidopa/levodopa Sinemet 25/100 2 q5hr 2   2     2   Cholecalciferol vitamin D3 25mcg 1000u      1             Coenzyme Q10 100mg               1   Cyclobenzaprine flexeril 10mg     prn             Entacapone comtan 200mg off          Gabapentin neurontin 100mg     2         1   Gabapentin neurontin 300mg               1    Ivabradine colranor 5mg Off                  Magnesium oxide antacid 500mg off               Midodrine proamatine 2.5mg   2   2   2       Mometasone fumorate asmanex 220  off                 Polyethylene glycol miralax prn          Probiotic smarty pants 1              1    Propranolol inderal 10mg     1/2             Psyllium metamucil  ?try          Sertraline zoloft 100mg     1             Sodium fluoride dental gel prevident 1.1%                   Tizanidine zanaflex 2mg prn                 Vitamin C 500mg      1                                                         Plan:    Visual issues; seeing dr Wade  Seeing Red and green squares and has a reduction in amantadine   Discussion about lab work to check kidney function as is taking amantadine (symmetrel)   Last renal labs in our chart are from 2020.   Not driving  Depth perception problems     Wearing off at 4/5 hours - wears off 45 minutes before dose and has cramping, aching  Has delayed on periods   Cramping at night - toes and feet and walks around till pills work   This may occur at 9pm or so   Has cramping in the car/juan a horse     Last visit was summer 2022    Think that rytary was too expensive  Did not benefit from comtan and had side effects  Has  not been on gocovri    Consider reducing the interval to 4.5 hours of the sinemet.   Has had some slow off periods and some sudden off periods.     Bowel movements are still there but may try some things such as the miralax, which she says she has tried in the past.     Cognitive problems  Did not tolerate donepezil (aricept)  Trial of rivastigmine excelon patch for cognition, monitor for blood pressure or heart rate changes or other side effects.   4.6mg patch daily for 4 weeks then 9.5mg patch daily for 4 weeks then increase to the 13.3mg patch daily  Hs problems with  Multi tasking  Discussion about attention changes and focusing changes     Blood pressure being monitored and managed with medications for her orthostatic symptoms.     Urinary urgency     Pharmacy consultation  Pharmacy (MTM) consultation and medication management  Please call the scheduling number @ [phone] to set up an appointment with pharmacists Wendy Caro or Fatemeh Richey.     Ongoing sleep issues - dogs are there  3 dogs and living in the family room  - rashi is in the bedroom  2 y ear old dog, middle age dog and older dog (has problems climbing the stairs)    In summary - will need to work on intervals 2 tabs 4 or 4.5 hours vs 2.5 pills every 5 hours   Work on constipation   Review price of the rivastigmine patch but don't start yet.     Return back in 3-6 months.

## 2023-02-17 NOTE — LETTER
2023       RE: Alina Zavala   [facility] [address]     Dear Colleague,    Thank you for referring your patient, Alina Zavala, to the [facility] [place] at [facility]. Please see a copy of my visit note below.        Diagnosis/Summary/Recommendations:    PATIENT: Alina Zavala  62 year old female     : 1960    DAHLIA: 2023    MRN: [id number]  [facility] [address]   [email or web address]  [phone] (M) -iphone  [phone] (H) -not an iphone  Tejinder HARP  [phone]     Venkat Cooney and Marissa.       PCP is Dr. HESHAM Melendrez  CArdiologist Dr. Reyna Dick  Eye doctor  Dentist  Chiropractor     [email or web address]    Ongoing nausea that may be related to her comtan/entacapone or other factors  She has some weight loss  Discussed timing of meals and the sinemet and anti nausea medications  Has seen Wendy Gordo 3/2022  Has not been on rytary and has not been on amantadine  She has had wearing off related leg symptoms - numbness -   Has not had an emg to evaluate for neuropathy  And she also has wearing off related neuropathy type of leg symptoms  Blood work and emg could be done     Discussion about report of the neuropsychological findings and terminology.   She does not have lewy body disease.     Assessment:  (G20) Parkinson disease (H)  (primary encounter diagnosis)  (G20) Parkinson disease (H)  (primary encounter diagnosis)  Sinemet 25/100 2 tabs 5/day  Entacapone comtan 200mg - not taking     Review of diagnosis    Parkinson disease     Avoidance of dopamine blockers   Not taking     Motor complication review   wearing off. At 4-5 hours gets tingling of her toes/fingers  Legs - cramping, clawing - behind her knees  Wearing off afternoon; has to be attentive to afternoon dosing     Has nausea with comtan and not eating as much   Weight has reduced  to 144 from 162 #      Review of Impulse control disorders         Review of surgical or medication options   reviewed     May consider a trial of amantadine (symmetrel) in 1-3 weeks after her back situation is better. Amantadine may help in her wearing off and dyskinesias.      Gait/Balance/Falls   Fell and kind of caught herself yesterday - was on her knees  Discussion about multi tasking - using only one hand and not two hands to carry things.      Exercise/Therapy performed/offered     Getting up at 4 or 5am and have to let dogs in and out and is run down              Taty - yellow lab and red [employer] 65 #              Cat              Other dog   Not feeding till 7/730a     Doing big and loud every day     [facility] physical therapy  Also doing occupational therapy      Silver sneaker discussion - [place]   Can use facilities free  [place] assessment at [facility]         She has seen therapists in the past at [facility] and if needed may need to see orthopedics, PMR or sports medicine for her back symptoms which may improve with conservative management.      For RLS -  Gabapentin neurontin 100mg @10am and sometimes @8/11pm extra dose   Gabapentin neurontin 300mg @8/11pm     may consider taking second dose of gabapentin earlier than 10/11pm        Cognitive/Driving   Short distances only; not of freeway  Cognitive problems - recalling names takes longer  Problems with spelling        Would recommend a baseline neuropsychological evaluation 5/9/2022  brianna belle       Generally moderate to severe impairments identified in nonverbal skills (basic judgment, reasoning, planning and organization) as well as aspects of executive functioning (complex attention, visuospatial planning and organization)    Subtle weaknesses (low average) were evident on additional nonverbal (learning and memory) and executive (working memory) measures as well as in cognitive efficiency    Performance was intact in most  verbal skills including verbal learning and memory, and aspects of executive functioning (phonemic fluency, deductive reasoning, inhibition).     She states that spatial skills have always been an area of weaknesse, but her  has observed a decline in this area        Mood   Counselling - working with counsellor   Bupropion wellbutrin SR 200mg 12 hr tablet twice daily  Sertraline zoloft 100mg daily     Mood could be better  Has been unable to go off wellbutrin in the past  Has wearing off related mood issues  Not sure how much the sertraline has helped.     fatigue  Emotional   Missing family for thanksgiving  Mother with c diff and is 85 and frail   with covid      Problems handling the cold  Needs to get full spectrum light     Daughter moving back with her dog   Renuka    Acceptance issues with the parkinson  Not driving       Hallucinations/delusions   denies     Sleep   Gets up to take her first dose of medication @330/530am and then goes back to bed; possibly till 930am  Variable time of wakening up.    Falls asleep relatively easily   Sleeping a lot - exhausted     Sleep deprived with dogs and affects pill cycle     Bladder/Renal/Prostate/Gyn/Other  Urinary urgency  Variable nocturia depending on hydration, etc.   Discussed timing of water intake     GI/Constipation/GERD   Magnesium oxide 500mg twice daily at 10am and 10pm  Probiotic twice daily smarty pants at 10am and 10pm   Doing well     ENDO/Lipid/DM/Bone density/Thyroid  Calcium carbonate at night at 10pm  Cholecalciferol vitamin D3 1000 units at 10am     Cardio/heart/Hyper or Hypotensive   POTS    Blood pressure is better with medication change  She thinks that the propranolol helps her tremor but worried   Fludrocortisone florinef 0.1mg - not taking  Midodrine proamatine 5mg 3/day   Propranolol 10mg 1/2 of 10mg daily at 10am      Cardiology  pan Quiroga Trena Sajady, Nazifa - cardiologist     water and salt intake     Dr. Orellana  Mayelin     Vision/Dry Eyes/Cataracts/Glaucoma/Macular   Less contrast sensitivity  Tree vs person  Will see Oumar Wade on 3/2/2023     Heme/Anticoagulation/Antiplatelet/Anemia/Other  Not on aspirin     ENT/Resp  Breathing  Albuterol proair as needed   Asmanex 120 metered doses 220mcg/inh inhaler at night  - not taking   Mometasone asmanex - not taking  Taste and smell has not improved since covid     Skin/Cancer/Seborrhea/other  denies     Musculoskeletal/Pain/Headache  Denies - other than shoulders      Other:  Left trigeminal neuralgia  Prior history of lyme disease  Sweating episodes, not able to take hot shower        Acetaminophen tylenol 500mg scheduled.   b complex   Coenzyme Q10 daily   Cyclobenzaprine flexeril as needed 1/2 tab  Gabapentin neurontin 100mg twice daily   Gabapentin neurontin 300mg at night   Vitamin C ascorbic acid 500mg         Medications     4/430a 9a 10am 1p 4p 6p 8p 10p   Acetaminophen tylenol 500mg      2          2   Albuterol proair proventil ventolin inhaler     prn              Amantadine symmetrel 100mg 1/2         1/2       b complex                1    Baclofen lioresal 10mg Off                  Bupropion wellbutrin SR 200mg 12 hr      1         1    Calcium carbonate 600mg               1    Carbidopa/levodopa Sinemet 25/100 2 q5hr 2   2     2   Cholecalciferol vitamin D3 25mcg 1000u      1             Coenzyme Q10 100mg               1   Cyclobenzaprine flexeril 10mg     prn             Entacapone comtan 200mg off          Gabapentin neurontin 100mg     2         1   Gabapentin neurontin 300mg               1    Ivabradine colranor 5mg Off                  Magnesium oxide antacid 500mg off               Midodrine proamatine 2.5mg   2   2   2       Mometasone fumorate asmanex 220  off                 Polyethylene glycol miralax prn          Probiotic smarty pants 1              1    Propranolol inderal 10mg     1/2             Psyllium metamucil  ?try          Sertraline zoloft  100mg     1             Sodium fluoride dental gel prevident 1.1%                   Tizanidine zanaflex 2mg prn                 Vitamin C 500mg      1                                                         Plan:    Visual issues; seeing dr Wade  Seeing Red and green squares and has a reduction in amantadine   Discussion about lab work to check kidney function as is taking amantadine (symmetrel)   Last renal labs in our chart are from 2020.   Not driving  Depth perception problems     Wearing off at 4/5 hours - wears off 45 minutes before dose and has cramping, aching  Has delayed on periods   Cramping at night - toes and feet and walks around till pills work   This may occur at 9pm or so   Has cramping in the car/juan a horse     Last visit was summer 2022    Think that rytary was too expensive  Did not benefit from comtan and had side effects  Has not been on gocovri    Consider reducing the interval to 4.5 hours of the sinemet.   Has had some slow off periods and some sudden off periods.     Bowel movements are still there but may try some things such as the miralax, which she says she has tried in the past.     Cognitive problems  Did not tolerate donepezil (aricept)  Trial of rivastigmine excelon patch for cognition, monitor for blood pressure or heart rate changes or other side effects.   4.6mg patch daily for 4 weeks then 9.5mg patch daily for 4 weeks then increase to the 13.3mg patch daily  Hs problems with  Multi tasking  Discussion about attention changes and focusing changes     Blood pressure being monitored and managed with medications for her orthostatic symptoms.     Urinary urgency     Pharmacy consultation  Pharmacy (MTM) consultation and medication management  Please call the scheduling number @ [phone] to set up an appointment with pharmacists Wendy Caro or Fatemeh Richey.     Ongoing sleep issues - dogs are there  3 dogs and living in the family room  - rashi is in the bedroom  2 y ear  old dog, middle age dog and older dog (has problems climbing the stairs)    In summary - will need to work on intervals 2 tabs 4 or 4.5 hours vs 2.5 pills every 5 hours   Work on constipation   Review price of the rivastigmine patch but don't start yet.     Return back in 3-6 months.     Coding statement:   Medical Decision Making:  #  Chronic progressive medical conditions addressed  - see above --   Review and/or interpretation of unique test or documentation from a provider outside of neurology no   Independent historian provided additional details  yes  Prescription drug management and review of potential side effects and/or monitoring for side effects  -- see above ---  Health impacted by social determinants of health  no    I have reviewed the note as documented above.  This accurately captures the substance of my conversation with the patient and total time spent preparing for visit, executing visit and completing visit on the day of the visit:  40 minutes.  The portion of this total time included face to face time 38 minutes    Ishan Hearn MD     ______________________________________    Last visit date and details:             ______________________________________      Patient was asked about 14 Review of systems including changes in vision (dry eyes, double vision), hearing, heart, lungs, musculoskeletal, depression, anxiety, snoring, RBD, insomnia, urinary frequency, urinary urgency, constipation, swallowing problems, hematological, ID, allergies, skin problems: seborrhea, endocrinological: thyroid, diabetes, cholesterol; balance, weight changes, and other neurological problems and these were not significant at this time except for   Patient Active Problem List   Diagnosis     Asthma     Benign essential hypertension     Adenoma of colon     Anxiety     DDD (degenerative disc disease), lumbar     Depression     Dizziness     Labile blood pressure     Hypotension     Near syncope     Osteopenia      "Parkinson disease (H)     POTS (postural orthostatic tachycardia syndrome)     Trigeminal neuralgia          Allergies   Allergen Reactions     Prednisone Anxiety, Palpitations and Shortness Of Breath     Baclofen Other (See Comments)     Agitation, confusion, brain fog, sedation     Donepezil      Insomnia, nausea, dizziness/somnolence      Penicillins      Unknown     Prednisone      Other reaction(s): *Unknown     Sulfa Drugs      Unknown     Past Surgical History:   Procedure Laterality Date     D & C       Past Medical History:   Diagnosis Date     Anxiety      Depression      Hyperlipidemia      Hypertension      Lyme disease      Trigeminal neuralgia      Social History     Socioeconomic History     Marital status:      Spouse name: Not on file     Number of children: Not on file     Years of education: Not on file     Highest education level: Not on file   Occupational History     Not on file   Tobacco Use     Smoking status: Never     Smokeless tobacco: Never   Substance and Sexual Activity     Alcohol use: No     Drug use: Not on file     Sexual activity: Not on file   Other Topics Concern     Not on file   Social History Narrative    . Lives in [place]. Tejinder Zavala spouse. They have three children. Their names are Eros, Venkat, and Christie. She is a homemaker. Studied library science and theology. She was a  for over 16 years. 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[place], focusing on castro - can see on [employer] \"Castro and Backroads.\"        [email or web address]     Social Determinants of Health     Financial Resource Strain: Not on file   Food Insecurity: Not on file   Transportation Needs: Not on file   Physical Activity: Not on file   Stress: Not on file   Social Connections: Not on file   Intimate Partner Violence: Not on file   Housing " Stability: Not on file       Drug and lactation database from the [place] National Library of Medicine:  http://toxnet.nlm.nih.gov/cgi-bin/sis/htmlgen?LACT      B/P: Data Unavailable, T: Data Unavailable, P: Data Unavailable, R: Data Unavailable 0 lbs 0 oz  There were no vitals taken for this visit., There is no height or weight on file to calculate BMI.  Medications and Vitals not listed above were documented in the cart and reviewed by me.     Current Outpatient Medications   Medication Sig Dispense Refill     acetaminophen (TYLENOL) 500 MG tablet 2 x 500mg tab by mouth twice daily @ 10am and 11pm  = 4/day       albuterol (PROAIR HFA/PROVENTIL HFA/VENTOLIN HFA) 108 (90 Base) MCG/ACT inhaler Inhale 2 puffs into the lungs every 6 hours as needed       amantadine HCl 100 MG TABS Take half-tablet (50 mg) by mouth twice daily 90 tablet 3     B Complex-C (VITAMIN B COMPLEX W/VITAMIN C) TABS tablet Take 1 tablet by mouth daily       buPROPion (WELLBUTRIN SR) 200 MG 12 hr tablet 200mg tab by mouth twice daily at 10am and 10pm  0     calcium carbonate (OS-TENA) 600 MG tablet 1 tab by mouth nightly at 10pm       carbidopa-levodopa (SINEMET)  MG tablet Take 2 tablets by mouth every 5 hours (5 times daily) = 10 tablets/day 900 tablet 3     cholecalciferol 25 MCG (1000 UT) TABS 1000 units (25 mcg) by mouth daily at 10am       co-enzyme Q-10 100 MG CAPS capsule 100mg capsule by mouth every evening at 11pm       gabapentin (NEURONTIN) 100 MG capsule 200 mg capsule @ 10 AM and 100 mg 10pm 180 capsule 3     gabapentin (NEURONTIN) 300 MG capsule 300mg capsule by mouth nightly @ 10pm 90 capsule 3     Magnesium Oxide, Antacid, 500 MG CAPS 500mg tab by mouth twice daily (if needed)       midodrine (PROAMATINE) 5 MG tablet Take 5 mg by mouth 3 times daily @8am, 12p and 4pm       probiotic CAPS 1 capsule by mouth twice daily (Smarty Pants Adult Probiotic Complete) at 10am and 10pm       propranolol (INDERAL) 10 MG tablet 1/2  of 10mg tab by mouth once daily (morning only) 180 tablet 3     sertraline (ZOLOFT) 100 MG tablet 100mg tab by mouth dailya t 10am 90 tablet 3     sodium fluoride dental gel (PREVIDENT) 1.1 % GEL topical gel APPLY THIN BEAD OF GEL TO TOOTHBRUSH AND BRUSH ONCE AT BEDTIME FOR AT LEAST ONE MINUTE. EXPECTORATE THOROUGHLY.       tiZANidine (ZANAFLEX) 2 MG tablet Take 1-2 tablets by mouth 2 times daily as needed       vitamin C (ASCORBIC ACID) 500 MG tablet 500mg tab by mouth daily at 10am           Ishan Hearn MD

## 2023-02-23 ENCOUNTER — VIRTUAL VISIT (OUTPATIENT)
Dept: PHARMACY | Facility: CLINIC | Age: 63
End: 2023-02-23
Payer: COMMERCIAL

## 2023-02-23 DIAGNOSIS — G20.A1 PARKINSON DISEASE (H): Primary | ICD-10-CM

## 2023-02-23 DIAGNOSIS — I95.1 ORTHOSTATIC HYPOTENSION: ICD-10-CM

## 2023-02-23 PROCEDURE — 99605 MTMS BY PHARM NP 15 MIN: CPT | Mod: VID | Performed by: PHARMACIST

## 2023-02-23 PROCEDURE — 99607 MTMS BY PHARM ADDL 15 MIN: CPT | Mod: VID | Performed by: PHARMACIST

## 2023-02-23 NOTE — PROGRESS NOTES
"Medication Therapy Management (MTM) Encounter    ASSESSMENT:                            Medication Adherence/Access: No issues identified    Parkinson's Disease:  We discussed expected goals of rivastigmine including improving cognition- thinking, memory, word-finding. She would like to try the medication now and she will continue monitoring vital signs.     Orthostatic hypotension: appears relatively stable     PLAN:                            1. Start rivastigmine (Exelon)  4.6 mg - apply one patch every evening after your shower  2. Please monitor your blood pressure/heart rate and let us know if there are any significant changes, or if you experience nausea/diarrhea (which is unlikely with the patch)  3. Next month we will discuss whether to increase the dose of rivastigmine patch     Follow-up: 3/23/23 at 1 pm video visit    SUBJECTIVE/OBJECTIVE:                          Alina Zavala is a 62 year old female contacted via secure video for a follow-up visit.  Today's visit is a follow-up MTM visit from 8/30/22     Reason for visit: discuss starting rivastigmine.    Allergies/ADRs: Reviewed in chart  Past Medical History: Reviewed in chart  Tobacco: She reports that she has never smoked. She has never used smokeless tobacco.  Alcohol: not currently using    Medication Adherence/Access: no issues reported    Parkinson's Disease:  Current medications include: propranolol 5 mg once daily (morning only), carbidopa-levodopa  mg 2 tablets at about 4 am, 10 am, 4 pm, and 10 pm. She also continues to take amantadine 50 mg twice daily. Patient states Dr. Hearn prescribed rivastigmine patches and she is not sure why. She stated \"my short term memory isn't great.\" She does not report freezing of gait. Patient states she has lost weight in the last year because her appetite is lower and smell is 'off' but she is working on eating more.     Orthostatic hypotension: Taking midodrine 2.5 mg 3 times/day. Patient states her " orthostatic hypotension has been well controlled. She has had some low blood pressure readings occasionally, but not very often.     Today's Vitals: There were no vitals taken for this visit.  ----------------    I spent 22 minutes with this patient today. All changes were made via collaborative practice agreement with Dr. Hearn. A copy of the visit note was provided to the patient's provider(s).    A summary of these recommendations was sent via [employer].    Wendy Caro, Pharm.D.  Medication Therapy Management Pharmacist  [facility]    Telemedicine Visit Details  Type of service:  Video Conference via [employer]  Start Time: 1:02 PM  End Time: 1:24 PM     Medication Therapy Recommendations  Parkinson disease (H)    Current Medication: carbidopa-levodopa (SINEMET)  MG tablet   Rationale: Synergistic therapy - Needs additional medication therapy - Indication   Recommendation: Start Medication - rivastigmine 4.6 MG/24HR 24 hr patch   Status: Accepted per CPA

## 2023-02-23 NOTE — PATIENT INSTRUCTIONS
"Recommendations from today's MTM visit:                                                      1. Start rivastigmine (Exelon)  4.6 mg - apply one patch every evening after your shower  2. Please monitor your blood pressure/heart rate and let us know if there are any significant changes, or if you experience nausea/diarrhea (which is unlikely with the patch)  3. Next month we will discuss whether to increase the dose of rivastigmine patch     Follow-up: 3/23/23 at 1 pm video visit    It was great speaking with you today.  I value your experience and would be very thankful for your time in providing feedback in our clinic survey. In the next few days, you may receive an email or text message from [employer] with a link to a survey related to your  clinical pharmacist.\"     To schedule another MTM appointment, please call the clinic directly or you may call the MTM scheduling line at [phone] or toll-free at [phone].     My Clinical Pharmacist's contact information:                                                      Please feel free to contact me with any questions or concerns you have.      Wendy Caro, Pharm.D.  Medication Therapy Management Pharmacist  [facility]     "

## 2023-03-01 NOTE — PROGRESS NOTES
Alina Zavala is a 62 year old female with the following diagnoses:   1. Unspecified subjective visual disturbances    2. Dry eye syndrome    3. Combined forms of age-related cataract    4. Visual field defect         Patient was sent for consultation by Dr. Riley for evaluation of subjective visual disturbance in both eyes.     HPI:    Alina Zavala is a 62 year old female with a history of parkinson's disease who presents for evaluation of subjective visual disturbances in both eyes.     Approximately 8 months ago, started to notice difficulty identifying things in the distance or with low lighting. Per the , this has been ongoing since before she was diagnosed with Parkinson's. He reports episodes dating 8 years ago where Ms. Zavala would have difficulty processing visual stimuli that were approaching her fast. For example, she would be scared on the highway going 70 mph because images would approach her abruptly.     Over the past 2-3 weeks has developed flashing lights peripherally in both eyes that lasts for 2-3 seconds that occurs at least 3-4 times a day. Notices these issues occur more as the day goes on. Finally, she notes green/red shimmering around objects. This occurs every other day, on and off throughout the day and gets worse throughout the day.     Denies diplopia. Notes that she her vision would probably be better if she closed one eye but hasn't tried it.     Independent historians:  Patient    Review of outside testing:    [facility] Scan 2/6/19:                  Impression:     A presynaptic dopaminergic deficit is present. Findings consistent  with Parkinsonian syndrome.      My interpretation performed today of outside testing:  None     Review of outside clinical notes:     -- Visit with Dr. Riley 1/25/23       -- Dr. Heanr 2/17/23:   - Parkinson disease diagnosed ~ 4 years ago:       Past medical history:    Patient Active Problem List   Diagnosis     Asthma      Benign essential hypertension     Adenoma of colon     Anxiety     DDD (degenerative disc disease), lumbar     Depression     Dizziness     Labile blood pressure     Hypotension     Near syncope     Osteopenia     Parkinson disease (H)     POTS (postural orthostatic tachycardia syndrome)     Trigeminal neuralgia         Medications:   Current Outpatient Medications   Medication Instructions     acetaminophen (TYLENOL) 500 MG tablet 2 x 500mg tab by mouth twice daily @ 10am and 11pm  = 4/day     albuterol (PROAIR HFA/PROVENTIL HFA/VENTOLIN HFA) 108 (90 Base) MCG/ACT inhaler 2 puffs, Inhalation, EVERY 6 HOURS PRN     amantadine HCl 100 MG TABS Take half-tablet (50 mg) by mouth twice daily     B Complex-C (VITAMIN B COMPLEX W/VITAMIN C) TABS tablet 1 tablet, Oral, DAILY     buPROPion (WELLBUTRIN SR) 200 MG 12 hr tablet 200mg tab by mouth twice daily at 10am and 10pm     calcium carbonate (OS-TENA) 600 MG tablet 1 tab by mouth nightly at 10pm     carbidopa-levodopa (SINEMET)  MG tablet 2 tablets by mouth every 4.5 hours  = 12 pills/day     cholecalciferol 25 MCG (1000 UT) TABS 1000 units (25 mcg) by mouth daily at 10am     co-enzyme Q-10 100 MG CAPS capsule 100mg capsule by mouth every evening at 11pm     gabapentin (NEURONTIN) 100 MG capsule 200 mg capsule @ 10 AM and 100 mg 10pm     gabapentin (NEURONTIN) 300 MG capsule 300mg capsule by mouth nightly @ 10pm     midodrine (PROAMATINE) 5 mg, Oral, 3 TIMES DAILY, @9am, 1pm and 6pm     polyethylene glycol (MIRALAX) 17 g packet ? START ?     probiotic CAPS 1 capsule by mouth twice daily (Smarty Pants Adult Probiotic Complete) at 10am and 10pm     propranolol (INDERAL) 10 MG tablet 1/2 of 10mg tab by mouth once daily (morning only)     psyllium (METAMUCIL/KONSYL) capsule MAY TRY     rivastigmine (EXELON) 4.6 MG/24HR 24 hr patch 4.6mg patch daily for 4 weeks then 9.5mg patch daily for 4 weeks then increase to the 13.3mg patch daily     rivastigmine (EXELON) 9.5 MG/24HR  24 hr patch 4.6mg patch daily for 4 weeks then 9.5mg patch daily for 4 weeks then increase to the 13.3mg patch daily     sertraline (ZOLOFT) 100 MG tablet 100mg tab by mouth dailya t 10am     sodium fluoride dental gel (PREVIDENT) 1.1 % GEL topical gel APPLY THIN BEAD OF GEL TO TOOTHBRUSH AND BRUSH ONCE AT BEDTIME FOR AT LEAST ONE MINUTE. EXPECTORATE THOROUGHLY.     tiZANidine (ZANAFLEX) 2 MG tablet 1-2 tablets, Oral, 2 TIMES DAILY PRN     vitamin C (ASCORBIC ACID) 500 MG tablet 500mg tab by mouth daily at 10am     Family history / social history:  Patient's family history includes Arthritis in her mother; Cerebrovascular Disease in her father; Hypertension in her father and mother; Muscular Dystrophy in an other family member; Polymyalgia rheumatica in her mother; Tremor in her mother.     Patient  reports that she has never smoked. She has never used smokeless tobacco. She reports that she does not drink alcohol.       Exam:  Visual acuity 20/25 right eye 20/25 left eye.  Color vision 11/11 right eye and 11/11 left eye.  Pupils equal round and reactive  Intraocular pressure 13 right eye and 12 left eye.  Anterior segment exam demonstrates early cataracts in both eyes.  Fundus exam was unrevealing.  Strabismus exam reveals orthophoria.    Tests ordered and interpreted today:  GTOP Visual Field March 2, 2023   RE: FP 11%, scattered areas of depression   LE: reliable field, normal     OCT RNFL March 2, 2023   RE: G97, no thinning   LE: G 95, no thinning     Discussion of management / interpretation with another provider:   None     Assessment/Plan:   It is my impression that patient has subjective visual disturbances likely related to her Parkinson's disease. Her flashing lights could be a manifestation of Parkinson's disease vs vitreous traction. But she is not sure if they are simultaneously vs one eye at a time. If they are one eye at a time then could be consistent with vitreous traction. If simultaneously might  be hallucinations secondary to PD.  She also sees red/green lights around objects, which would not be consistent with vitreous traction but more in line with hallucinations from PD.      Peripheral retinal exam was unremarkable with no evidence of holes or tears. OCT Macula was unremarkable. She is orthophoric on exam today. Her color plates are full. Integrative visual screening test was unremarkable.     We discussed that she can talk to Dr. Hearn about possible treatment of the hallucinations should they become more bothersome.          Attending Physician Attestation:  Complete documentation of historical and exam elements from today's encounter can be found in the full encounter summary report (not reduplicated in this progress note).  I personally obtained the chief complaint(s) and history of present illness.  I confirmed and edited as necessary the review of systems, past medical/surgical history, family history, social history, and examination findings as documented by others; and I examined the patient myself.  I personally reviewed the relevant tests, images, and reports as documented above.  I formulated and edited as necessary the assessment and plan and discussed the findings and management plan with the patient and family. I personally reviewed the ophthalmic test(s) associated with this encounter, agree with the interpretation(s) as documented by the resident/fellow, and have edited the corresponding report(s) as necessary.  - Oumar Yañez MD  Resident Physician, PGY-3  Department of Ophthalmology    Dr. Rosemary Melendrez PCP

## 2023-03-02 ENCOUNTER — OFFICE VISIT (OUTPATIENT)
Dept: OPHTHALMOLOGY | Facility: CLINIC | Age: 63
End: 2023-03-02
Attending: OPHTHALMOLOGY
Payer: COMMERCIAL

## 2023-03-02 DIAGNOSIS — H25.819 COMBINED FORMS OF AGE-RELATED CATARACT: ICD-10-CM

## 2023-03-02 DIAGNOSIS — H53.40 VISUAL FIELD DEFECT: ICD-10-CM

## 2023-03-02 DIAGNOSIS — H53.10 UNSPECIFIED SUBJECTIVE VISUAL DISTURBANCES: ICD-10-CM

## 2023-03-02 DIAGNOSIS — H04.129 DRY EYE SYNDROME: ICD-10-CM

## 2023-03-02 DIAGNOSIS — H53.40 VISUAL FIELD DEFECT: Primary | ICD-10-CM

## 2023-03-02 PROCEDURE — 92083 EXTENDED VISUAL FIELD XM: CPT | Performed by: OPHTHALMOLOGY

## 2023-03-02 PROCEDURE — 92133 CPTRZD OPH DX IMG PST SGM ON: CPT | Performed by: OPHTHALMOLOGY

## 2023-03-02 PROCEDURE — 99203 OFFICE O/P NEW LOW 30 MIN: CPT | Mod: GC | Performed by: OPHTHALMOLOGY

## 2023-03-02 PROCEDURE — G0463 HOSPITAL OUTPT CLINIC VISIT: HCPCS | Mod: 25

## 2023-03-02 ASSESSMENT — CONF VISUAL FIELD
OS_INFERIOR_NASAL_RESTRICTION: 0
OS_SUPERIOR_TEMPORAL_RESTRICTION: 0
OS_INFERIOR_TEMPORAL_RESTRICTION: 0
OD_INFERIOR_TEMPORAL_RESTRICTION: 0
OD_SUPERIOR_TEMPORAL_RESTRICTION: 0
METHOD: COUNTING FINGERS
OS_NORMAL: 1
OS_SUPERIOR_NASAL_RESTRICTION: 0
OD_NORMAL: 1
OD_INFERIOR_NASAL_RESTRICTION: 0
OD_SUPERIOR_NASAL_RESTRICTION: 0

## 2023-03-02 ASSESSMENT — VISUAL ACUITY
OS_CC+: -1
CORRECTION_TYPE: GLASSES
OD_CC: 20/25
OD_CC+: +2
METHOD: SNELLEN - LINEAR
OS_CC: 20/25

## 2023-03-02 ASSESSMENT — CUP TO DISC RATIO
OD_RATIO: 0.2
OS_RATIO: 0.2

## 2023-03-02 ASSESSMENT — REFRACTION_WEARINGRX
OD_ADD: +2.00
OS_CYLINDER: SPHERE
OS_ADD: +2.00
OD_AXIS: 165
OS_SPHERE: -4.50
OD_CYLINDER: +0.75
OD_SPHERE: -4.00

## 2023-03-02 ASSESSMENT — SLIT LAMP EXAM - LIDS
COMMENTS: NORMAL
COMMENTS: NORMAL

## 2023-03-02 ASSESSMENT — TONOMETRY
OS_IOP_MMHG: 12
IOP_METHOD: ICARE
OD_IOP_MMHG: 13

## 2023-03-02 ASSESSMENT — EXTERNAL EXAM - RIGHT EYE: OD_EXAM: NORMAL

## 2023-03-02 ASSESSMENT — EXTERNAL EXAM - LEFT EYE: OS_EXAM: NORMAL

## 2023-03-02 NOTE — Clinical Note
3/2/2023       RE: Alina Zavala  [address] [facility] [id number]     Dear Colleague,    Thank you for referring your patient, Alina Zavala, to the [facility] [place] at [facility]. Please see a copy of my visit note below.        Alina Zavala is a 62 year old female with the following diagnoses:   1. Unspecified subjective visual disturbances    2. Dry eye syndrome    3. Combined forms of age-related cataract    4. Visual field defect         Patient was sent for consultation by Dr. Riley for evaluation of subjective visual disturbance in both eyes.     HPI:    Alina Zavala is a 62 year old female with a history of parkinson's disease who presents for evaluation of subjective visual disturbances in both eyes.     Approximately 8 months ago, started to notice difficulty identifying things in the distance or with low lighting. Per the , this has been ongoing since before she was diagnosed with Parkinson's. He reports episodes dating 8 years ago where Ms. Zavala would have difficulty processing visual stimuli that were approaching her fast. For example, she would be scared on the highway going 70 mph because images would approach her abruptly.     Over the past 2-3 weeks has developed flashing lights peripherally in both eyes that lasts for 2-3 seconds that occurs at least 3-4 times a day. Notices these issues occur more as the day goes on. Finally, she notes green/red shimmering around objects. This occurs every other day, on and off throughout the day and gets worse throughout the day.     Denies diplopia. Notes that she her vision would probably be better if she closed one eye but hasn't tried it.     The patient is presenting with a chronic illness with severe exacerbation or progression.    Independent historians:  Patient    Review of outside testing:    [facility] Scan 2/6/19:                   Impression:     A presynaptic dopaminergic deficit is present. Findings consistent  with Parkinsonian syndrome.      My interpretation performed today of outside testing:  None     Review of outside clinical notes:     -- Visit with Dr. Riley 1/25/23       -- Dr. Hearn 2/17/23:   - Parkinson disease diagnosed ~ 4 years ago:       Past medical history:    Patient Active Problem List   Diagnosis     Asthma     Benign essential hypertension     Adenoma of colon     Anxiety     DDD (degenerative disc disease), lumbar     Depression     Dizziness     Labile blood pressure     Hypotension     Near syncope     Osteopenia     Parkinson disease (H)     POTS (postural orthostatic tachycardia syndrome)     Trigeminal neuralgia         Medications:   Current Outpatient Medications   Medication Instructions     acetaminophen (TYLENOL) 500 MG tablet 2 x 500mg tab by mouth twice daily @ 10am and 11pm  = 4/day     albuterol (PROAIR HFA/PROVENTIL HFA/VENTOLIN HFA) 108 (90 Base) MCG/ACT inhaler 2 puffs, Inhalation, EVERY 6 HOURS PRN     amantadine HCl 100 MG TABS Take half-tablet (50 mg) by mouth twice daily     B Complex-C (VITAMIN B COMPLEX W/VITAMIN C) TABS tablet 1 tablet, Oral, DAILY     buPROPion (WELLBUTRIN SR) 200 MG 12 hr tablet 200mg tab by mouth twice daily at 10am and 10pm     calcium carbonate (OS-TENA) 600 MG tablet 1 tab by mouth nightly at 10pm     carbidopa-levodopa (SINEMET)  MG tablet 2 tablets by mouth every 4.5 hours  = 12 pills/day     cholecalciferol 25 MCG (1000 UT) TABS 1000 units (25 mcg) by mouth daily at 10am     co-enzyme Q-10 100 MG CAPS capsule 100mg capsule by mouth every evening at 11pm     gabapentin (NEURONTIN) 100 MG capsule 200 mg capsule @ 10 AM and 100 mg 10pm     gabapentin (NEURONTIN) 300 MG capsule 300mg capsule by mouth nightly @ 10pm     midodrine (PROAMATINE) 5 mg, Oral, 3 TIMES DAILY, @9am, 1pm and 6pm     polyethylene glycol (MIRALAX) 17 g packet ? START ?     probiotic CAPS 1  capsule by mouth twice daily (Smarty Pants Adult Probiotic Complete) at 10am and 10pm     propranolol (INDERAL) 10 MG tablet 1/2 of 10mg tab by mouth once daily (morning only)     psyllium (METAMUCIL/KONSYL) capsule MAY TRY     rivastigmine (EXELON) 4.6 MG/24HR 24 hr patch 4.6mg patch daily for 4 weeks then 9.5mg patch daily for 4 weeks then increase to the 13.3mg patch daily     rivastigmine (EXELON) 9.5 MG/24HR 24 hr patch 4.6mg patch daily for 4 weeks then 9.5mg patch daily for 4 weeks then increase to the 13.3mg patch daily     sertraline (ZOLOFT) 100 MG tablet 100mg tab by mouth dailya t 10am     sodium fluoride dental gel (PREVIDENT) 1.1 % GEL topical gel APPLY THIN BEAD OF GEL TO TOOTHBRUSH AND BRUSH ONCE AT BEDTIME FOR AT LEAST ONE MINUTE. EXPECTORATE THOROUGHLY.     tiZANidine (ZANAFLEX) 2 MG tablet 1-2 tablets, Oral, 2 TIMES DAILY PRN     vitamin C (ASCORBIC ACID) 500 MG tablet 500mg tab by mouth daily at 10am     Family history / social history:  Patient's family history includes Arthritis in her mother; Cerebrovascular Disease in her father; Hypertension in her father and mother; Muscular Dystrophy in an other family member; Polymyalgia rheumatica in her mother; Tremor in her mother.     Patient  reports that she has never smoked. She has never used smokeless tobacco. She reports that she does not drink alcohol.       Exam:  Visual acuity 20/25 right eye 20/25 left eye.  Color vision 11/11 right eye and 11/11 left eye.  Pupils equal round and reactive  Intraocular pressure 13 right eye and 12 left eye.  Anterior segment exam demonstrates early cataracts in both eyes.  Fundus exam was unrevealing.  Strabismus exam reveals orthophoria.    Tests ordered and interpreted today:  GTOP Visual Field March 2, 2023   RE: FP 11%, scattered areas of depression   LE: reliable field, normal     OCT RNFL March 2, 2023   RE: G97, no thinning   LE: G 95, no thinning     Discussion of management / interpretation with  another provider:   None     Assessment/Plan:   It is my impression that patient has subjective visual disturbances likely related to her Parkinson's disease. Peripheral retinal exam was unremarkable with no evidence of holes or tears. OCT Macula was unremarkable. She is orthophoric on exam today. Her color plates are full. Integrative visual screening test was unremarkable.         @ATT@    Roldan Yañez MD  Resident Physician, PGY-3  Department of Ophthalmology              Alina Zavala is a 62 year old female with the following diagnoses:   1. Unspecified subjective visual disturbances    2. Dry eye syndrome    3. Combined forms of age-related cataract    4. Visual field defect         Patient was sent for consultation by Dr. Riley for evaluation of subjective visual disturbance in both eyes.     HPI:    Alina Zavala is a 62 year old female with a history of parkinson's disease who presents for evaluation of subjective visual disturbances in both eyes.     Approximately 8 months ago, started to notice difficulty identifying things in the distance or with low lighting. Per the , this has been ongoing since before she was diagnosed with Parkinson's. He reports episodes dating 8 years ago where Ms. Zavala would have difficulty processing visual stimuli that were approaching her fast. For example, she would be scared on the highway going 70 mph because images would approach her abruptly.     Over the past 2-3 weeks has developed flashing lights peripherally in both eyes that lasts for 2-3 seconds that occurs at least 3-4 times a day. Notices these issues occur more as the day goes on. Finally, she notes green/red shimmering around objects. This occurs every other day, on and off throughout the day and gets worse throughout the day.     Denies diplopia. Notes that she her vision would probably be better if she closed one eye but hasn't tried it.     The patient is presenting with a chronic illness  with severe exacerbation or progression.    Independent historians:  Patient    Review of outside testing:    [facility] Scan 2/6/19:                  Impression:     A presynaptic dopaminergic deficit is present. Findings consistent  with Parkinsonian syndrome.      My interpretation performed today of outside testing:  None     Review of outside clinical notes:     -- Visit with Dr. Riley 1/25/23       -- Dr. Hearn 2/17/23:   - Parkinson disease diagnosed ~ 4 years ago:       Past medical history:    Patient Active Problem List   Diagnosis     Asthma     Benign essential hypertension     Adenoma of colon     Anxiety     DDD (degenerative disc disease), lumbar     Depression     Dizziness     Labile blood pressure     Hypotension     Near syncope     Osteopenia     Parkinson disease (H)     POTS (postural orthostatic tachycardia syndrome)     Trigeminal neuralgia         Medications:   Current Outpatient Medications   Medication Instructions     acetaminophen (TYLENOL) 500 MG tablet 2 x 500mg tab by mouth twice daily @ 10am and 11pm  = 4/day     albuterol (PROAIR HFA/PROVENTIL HFA/VENTOLIN HFA) 108 (90 Base) MCG/ACT inhaler 2 puffs, Inhalation, EVERY 6 HOURS PRN     amantadine HCl 100 MG TABS Take half-tablet (50 mg) by mouth twice daily     B Complex-C (VITAMIN B COMPLEX W/VITAMIN C) TABS tablet 1 tablet, Oral, DAILY     buPROPion (WELLBUTRIN SR) 200 MG 12 hr tablet 200mg tab by mouth twice daily at 10am and 10pm     calcium carbonate (OS-TENA) 600 MG tablet 1 tab by mouth nightly at 10pm     carbidopa-levodopa (SINEMET)  MG tablet 2 tablets by mouth every 4.5 hours  = 12 pills/day     cholecalciferol 25 MCG (1000 UT) TABS 1000 units (25 mcg) by mouth daily at 10am     co-enzyme Q-10 100 MG CAPS capsule 100mg capsule by mouth every evening at 11pm     gabapentin (NEURONTIN) 100 MG capsule 200 mg capsule @ 10 AM and 100 mg 10pm     gabapentin (NEURONTIN) 300 MG capsule 300mg capsule by mouth nightly @  10pm     midodrine (PROAMATINE) 5 mg, Oral, 3 TIMES DAILY, @9am, 1pm and 6pm     polyethylene glycol (MIRALAX) 17 g packet ? START ?     probiotic CAPS 1 capsule by mouth twice daily (Smarty Pants Adult Probiotic Complete) at 10am and 10pm     propranolol (INDERAL) 10 MG tablet 1/2 of 10mg tab by mouth once daily (morning only)     psyllium (METAMUCIL/KONSYL) capsule MAY TRY     rivastigmine (EXELON) 4.6 MG/24HR 24 hr patch 4.6mg patch daily for 4 weeks then 9.5mg patch daily for 4 weeks then increase to the 13.3mg patch daily     rivastigmine (EXELON) 9.5 MG/24HR 24 hr patch 4.6mg patch daily for 4 weeks then 9.5mg patch daily for 4 weeks then increase to the 13.3mg patch daily     sertraline (ZOLOFT) 100 MG tablet 100mg tab by mouth dailya t 10am     sodium fluoride dental gel (PREVIDENT) 1.1 % GEL topical gel APPLY THIN BEAD OF GEL TO TOOTHBRUSH AND BRUSH ONCE AT BEDTIME FOR AT LEAST ONE MINUTE. EXPECTORATE THOROUGHLY.     tiZANidine (ZANAFLEX) 2 MG tablet 1-2 tablets, Oral, 2 TIMES DAILY PRN     vitamin C (ASCORBIC ACID) 500 MG tablet 500mg tab by mouth daily at 10am     Family history / social history:  Patient's family history includes Arthritis in her mother; Cerebrovascular Disease in her father; Hypertension in her father and mother; Muscular Dystrophy in an other family member; Polymyalgia rheumatica in her mother; Tremor in her mother.     Patient  reports that she has never smoked. She has never used smokeless tobacco. She reports that she does not drink alcohol.       Exam:  Visual acuity 20/25 right eye 20/25 left eye.  Color vision 11/11 right eye and 11/11 left eye.  Pupils equal round and reactive  Intraocular pressure 13 right eye and 12 left eye.  Anterior segment exam demonstrates early cataracts in both eyes.  Fundus exam was unrevealing.  Strabismus exam reveals orthophoria.    Tests ordered and interpreted today:  OP Visual Field March 2, 2023   RE: FP 11%, scattered areas of depression    LE: reliable field, normal     OCT RNFL March 2, 2023   RE: G97, no thinning   LE: G 95, no thinning     Discussion of management / interpretation with another provider:   None     Assessment/Plan:   It is my impression that patient has subjective visual disturbances likely related to her Parkinson's disease. She notes illusions which can be associated with Parkinson's. Her flashing lights could be a manifestation of Parkinson's disease vs vitreomacular traction. But she is not sure if they are simultaneously vs one eye at a time. If they are one eye at a time likely vitreomacular traction. If simultaneously might be hallucinations which may benefit from anti-psychotics. Peripheral retinal exam was unremarkable with no evidence of holes or tears. OCT Macula was unremarkable. She is orthophoric on exam today. Her color plates are full. Integrative visual screening test was unremarkable.         @ATT@    Roldan Yañez MD  Resident Physician, PGY-3  Department of Ophthalmology    Dr. Rosemary Melendrez PCP            Again, thank you for allowing me to participate in the care of your patient.      Sincerely,    Oumar Wade MD

## 2023-03-02 NOTE — LETTER
3/2/2023         RE:  :  MRN: Alina Zavala  1960  [id number]     Dear Dr. Amrit Riley,    Thank you for asking me to see your very pleasant patient, Alina Zavala, in neuro-ophthalmic consultation.  I would like to thank you for sending your records and I have summarized them in the history of present illness.  My assessment and plan are below.  For further details, please see my attached clinic note.      Alina Zavala is a 62 year old female with the following diagnoses:   1. Unspecified subjective visual disturbances    2. Dry eye syndrome    3. Combined forms of age-related cataract    4. Visual field defect         Patient was sent for consultation by Dr. Riley for evaluation of subjective visual disturbance in both eyes.     HPI:    Alina Zavala is a 62 year old female with a history of parkinson's disease who presents for evaluation of subjective visual disturbances in both eyes.     Approximately 8 months ago, started to notice difficulty identifying things in the distance or with low lighting. Per the , this has been ongoing since before she was diagnosed with Parkinson's. He reports episodes dating 8 years ago where Ms. Zavala would have difficulty processing visual stimuli that were approaching her fast. For example, she would be scared on the highway going 70 mph because images would approach her abruptly.     Over the past 2-3 weeks has developed flashing lights peripherally in both eyes that lasts for 2-3 seconds that occurs at least 3-4 times a day. Notices these issues occur more as the day goes on. Finally, she notes green/red shimmering around objects. This occurs every other day, on and off throughout the day and gets worse throughout the day.     Denies diplopia. Notes that she her vision would probably be better if she closed one eye but hasn't tried it.     Independent historians:  Patient    Review of outside testing:    [facility] Scan 19:                   Impression:     A presynaptic dopaminergic deficit is present. Findings consistent  with Parkinsonian syndrome.      My interpretation performed today of outside testing:  None     Review of outside clinical notes:     -- Visit with Dr. Riley 1/25/23       -- Dr. Hearn 2/17/23:   - Parkinson disease diagnosed ~ 4 years ago:       Past medical history:    Patient Active Problem List   Diagnosis     Asthma     Benign essential hypertension     Adenoma of colon     Anxiety     DDD (degenerative disc disease), lumbar     Depression     Dizziness     Labile blood pressure     Hypotension     Near syncope     Osteopenia     Parkinson disease (H)     POTS (postural orthostatic tachycardia syndrome)     Trigeminal neuralgia         Medications:   Current Outpatient Medications   Medication Instructions     acetaminophen (TYLENOL) 500 MG tablet 2 x 500mg tab by mouth twice daily @ 10am and 11pm  = 4/day     albuterol (PROAIR HFA/PROVENTIL HFA/VENTOLIN HFA) 108 (90 Base) MCG/ACT inhaler 2 puffs, Inhalation, EVERY 6 HOURS PRN     amantadine HCl 100 MG TABS Take half-tablet (50 mg) by mouth twice daily     B Complex-C (VITAMIN B COMPLEX W/VITAMIN C) TABS tablet 1 tablet, Oral, DAILY     buPROPion (WELLBUTRIN SR) 200 MG 12 hr tablet 200mg tab by mouth twice daily at 10am and 10pm     calcium carbonate (OS-TENA) 600 MG tablet 1 tab by mouth nightly at 10pm     carbidopa-levodopa (SINEMET)  MG tablet 2 tablets by mouth every 4.5 hours  = 12 pills/day     cholecalciferol 25 MCG (1000 UT) TABS 1000 units (25 mcg) by mouth daily at 10am     co-enzyme Q-10 100 MG CAPS capsule 100mg capsule by mouth every evening at 11pm     gabapentin (NEURONTIN) 100 MG capsule 200 mg capsule @ 10 AM and 100 mg 10pm     gabapentin (NEURONTIN) 300 MG capsule 300mg capsule by mouth nightly @ 10pm     midodrine (PROAMATINE) 5 mg, Oral, 3 TIMES DAILY, @9am, 1pm and 6pm     polyethylene glycol (MIRALAX) 17 g packet ? START ?     probiotic  CAPS 1 capsule by mouth twice daily (Smarty Pants Adult Probiotic Complete) at 10am and 10pm     propranolol (INDERAL) 10 MG tablet 1/2 of 10mg tab by mouth once daily (morning only)     psyllium (METAMUCIL/KONSYL) capsule MAY TRY     rivastigmine (EXELON) 4.6 MG/24HR 24 hr patch 4.6mg patch daily for 4 weeks then 9.5mg patch daily for 4 weeks then increase to the 13.3mg patch daily     rivastigmine (EXELON) 9.5 MG/24HR 24 hr patch 4.6mg patch daily for 4 weeks then 9.5mg patch daily for 4 weeks then increase to the 13.3mg patch daily     sertraline (ZOLOFT) 100 MG tablet 100mg tab by mouth dailya t 10am     sodium fluoride dental gel (PREVIDENT) 1.1 % GEL topical gel APPLY THIN BEAD OF GEL TO TOOTHBRUSH AND BRUSH ONCE AT BEDTIME FOR AT LEAST ONE MINUTE. EXPECTORATE THOROUGHLY.     tiZANidine (ZANAFLEX) 2 MG tablet 1-2 tablets, Oral, 2 TIMES DAILY PRN     vitamin C (ASCORBIC ACID) 500 MG tablet 500mg tab by mouth daily at 10am     Family history / social history:  Patient's family history includes Arthritis in her mother; Cerebrovascular Disease in her father; Hypertension in her father and mother; Muscular Dystrophy in an other family member; Polymyalgia rheumatica in her mother; Tremor in her mother.     Patient  reports that she has never smoked. She has never used smokeless tobacco. She reports that she does not drink alcohol.       Exam:  Visual acuity 20/25 right eye 20/25 left eye.  Color vision 11/11 right eye and 11/11 left eye.  Pupils equal round and reactive  Intraocular pressure 13 right eye and 12 left eye.  Anterior segment exam demonstrates early cataracts in both eyes.  Fundus exam was unrevealing.  Strabismus exam reveals orthophoria.    Tests ordered and interpreted today:  GTOP Visual Field March 2, 2023   RE: FP 11%, scattered areas of depression   LE: reliable field, normal     OCT RNFL March 2, 2023   RE: G97, no thinning   LE: G 95, no thinning     Discussion of management / interpretation  with another provider:   None     Assessment/Plan:   It is my impression that patient has subjective visual disturbances likely related to her Parkinson's disease. Her flashing lights could be a manifestation of Parkinson's disease vs vitreous traction. But she is not sure if they are simultaneously vs one eye at a time. If they are one eye at a time then could be consistent with vitreous traction. If simultaneously might be hallucinations secondary to PD.  She also sees red/green lights around objects, which would not be consistent with vitreous traction but more in line with hallucinations from PD.      Peripheral retinal exam was unremarkable with no evidence of holes or tears. OCT Macula was unremarkable. She is orthophoric on exam today. Her color plates are full. Integrative visual screening test was unremarkable.     We discussed that she can talk to Dr. Hearn about possible treatment of the hallucinations should they become more bothersome.       Again, thank you for allowing me to participate in the care of your patient.      Sincerely,    Oumar Wade MD  Professor  Ophthalmology Residency   Director of Neuro-Ophthalmology  [place] - Scheie Endow Chair  Departments of Ophthalmology, Neurology, and Neurosurgery  [facility] [address] [place] [address]  [address]  T - [phone]   - [phone]  SIXTO [email or web address]      CC: Ishan Melendrez MD  [facility]  [address]  [facility] [address]  Via In Basket     Amrit Riley MD  [facility]  [address]  [facility] [address]  Via Fax: [phone]     Ishan Hearn MD  [address] [facility] [address] [address]  Via In Basket    DX = hallucinations, parkinsons

## 2023-03-02 NOTE — NURSING NOTE
"Chief Complaints and History of Present Illnesses   Patient presents with     New Patient     Chief Complaint(s) and History of Present Illness(es)     New Patient            Laterality: both eyes    Onset: gradual    Associated symptoms: dryness, photophobia and flashes.  Negative for double vision, eye pain and floaters    Treatments tried: artificial tears    Pain scale: 0/10          Comments    Pt states her eyes feel strained, especially when reading.  Her depth perception seems off.  She notices that the contrast in her vision is less than normal.   The last 2 weeks she has noticed flashing in her peripheral vision.   There is usually green and red \"fairy dust\" flashes in her vision.     YAHAIRA MURILLO March 2, 2023 9:36 AM                   "

## 2023-03-23 ENCOUNTER — VIRTUAL VISIT (OUTPATIENT)
Dept: PHARMACY | Facility: CLINIC | Age: 63
End: 2023-03-23
Payer: COMMERCIAL

## 2023-03-23 DIAGNOSIS — G20.A1 PARKINSON DISEASE (H): Primary | ICD-10-CM

## 2023-03-23 PROCEDURE — 99606 MTMS BY PHARM EST 15 MIN: CPT | Mod: VID | Performed by: PHARMACIST

## 2023-03-23 NOTE — PATIENT INSTRUCTIONS
"Recommendations from today's MTM visit:                                                      1. Please decrease your carbidopa-levodopa back to 2 tablets for the mid-day doses and continue 2.5 tablets for the first dose in the morning and the bedtime dose. Ie: 2.5 - 2 - 2 - 2 - 2.5, or if you only have 4 doses then 2.5 - 2 - 2 - 2.5.     2. Continue rivastigmine 4.6 mg patch daily for the full 30 days then if no side effects, you can increase to 9.5 mg daily.     3. We discussed that the Parkinson's symposium is coming up on April 11! You may register here:   [email or web address]    Follow-up: 4/27/23 at 1 pm video visit    It was great speaking with you today.  I value your experience and would be very thankful for your time in providing feedback in our clinic survey. In the next few days, you may receive an email or text message from [employer] with a link to a survey related to your  clinical pharmacist.\"     To schedule another MTM appointment, please call the clinic directly or you may call the MTM scheduling line at [phone] or toll-free at [phone].     My Clinical Pharmacist's contact information:                                                      Please feel free to contact me with any questions or concerns you have.      Wendy Caro, Pharm.D.  Medication Therapy Management Pharmacist  [facility]     "

## 2023-03-23 NOTE — PROGRESS NOTES
Medication Therapy Management (MTM) Encounter    ASSESSMENT:                            Medication Adherence/Access: No issues identified    Parkinson's Disease: Patient would benefit from decreasing the dose of carbidopa-levodopa given worsening of her dyskinesia and visual issues. She will continue to trial the rivastigmine. No change to amantadine today but could revisit the dose of this in the future or try Gocovri instead.     PLAN:                            1. Please decrease your carbidopa-levodopa back to 2 tablets for the mid-day doses and continue 2.5 tablets for the first dose in the morning and the bedtime dose. Ie: 2.5 - 2 - 2 - 2 - 2.5, or if you only have 4 doses then 2.5 - 2 - 2 - 2.5.     2. Continue rivastigmine 4.6 mg patch daily for the full 30 days then if no side effects, you can increase to 9.5 mg daily.     3. We discussed that the Parkinson's symposium is coming up on April 11! You may register here:   [email or web address]    Follow-up: 4/27/23 at 1 pm video visit    SUBJECTIVE/OBJECTIVE:                          Alina Zavala is a 62 year old female contacted via secure video for a follow-up visit. Patient was accompanied by , Justin. Today's visit is a follow-up MTM visit from 2/23/23     Reason for visit: follow up on Parkinson medications.    Allergies/ADRs: Reviewed in chart  Past Medical History: Reviewed in chart  Tobacco: She reports that she has never smoked. She has never used smokeless tobacco.  Alcohol: not currently using    Medication Adherence/Access: no issues reported    Parkinson's Disease:  Current medications include: propranolol 5 mg once daily (morning only), carbidopa-levodopa  mg 2.5 tablets every 5 hours (4-5 times/day), amantadine 50 mg twice daily, and rivastigmine 4.6 mg daily. Patient states that since increasing the carbidopa-levodopa she is having less wearing off- able to  "make it 5 hours between doses; however, she has more dyskinesia (especially second half of the day). She has also noticed more visual issues including illusions, images in the corner of her eyes, white flashes, and red/blue dots on the edges of things. She saw ophthalmology and was told her vision is good but these may be symptoms of Parkinson's disease. She also mentioned hearing a \"whoosing\" sound in the back of her head, like letting air out of a balloon. The sounds and illusions have been more pronounced since increasing carbidopa-levodopa. She has been on the rivastigmine for 5-6 days so far and reports no side effects or other issues.     Today's Vitals: There were no vitals taken for this visit.  ----------------    I spent 26 minutes with this patient today. All changes were made via collaborative practice agreement with Dr Hearn. A copy of the visit note was provided to the patient's provider(s).    A summary of these recommendations was sent via [employer].    Wendy Caro, Pharm.D.  Medication Therapy Management Pharmacist  [facility]    Telemedicine Visit Details  Type of service:  Video Conference via [employer]  Start Time: 12:59 PM  End Time: 1:25 PM     Medication Therapy Recommendations  Parkinson disease (H)    Current Medication: carbidopa-levodopa (SINEMET)  MG tablet   Rationale: Undesirable effect - Adverse medication event - Safety   Recommendation: Decrease Dose   Status: Accepted per CPA            "

## 2023-04-27 ENCOUNTER — VIRTUAL VISIT (OUTPATIENT)
Dept: PHARMACY | Facility: CLINIC | Age: 63
End: 2023-04-27
Payer: COMMERCIAL

## 2023-04-27 DIAGNOSIS — G20.A1 PARKINSON DISEASE (H): Primary | ICD-10-CM

## 2023-04-27 PROCEDURE — 99606 MTMS BY PHARM EST 15 MIN: CPT | Mod: VID | Performed by: PHARMACIST

## 2023-04-27 PROCEDURE — 99607 MTMS BY PHARM ADDL 15 MIN: CPT | Mod: VID | Performed by: PHARMACIST

## 2023-04-27 RX ORDER — AMANTADINE HYDROCHLORIDE 100 MG/1
TABLET ORAL
Qty: 135 TABLET | Refills: 3 | Status: SHIPPED | OUTPATIENT
Start: 2023-04-27 | End: 2023-08-17

## 2023-04-27 NOTE — PATIENT INSTRUCTIONS
"Recommendations from today's MTM visit:                                                      1. We will try to increase amantadine to half-tablet (50 mg) 3 times/day- in the early morning, late morning, and afternoon. Amantadine can help with dyskinesia. Please monitor for any vision changes, low blood pressure, or issues with balance/falls. If you experience these side effects, let us know and reduce back to 2 times/day.    2. Continue the rivastigmine patch at 4.6 mg daily for the next 2-3 weeks until the patches are gone, then increase to 9.5 mg patch daily. The pharmacy should already have this one on file for you.     Follow-up: 5/25/23 at 1 pm video visit     It was great speaking with you today.  I value your experience and would be very thankful for your time in providing feedback in our clinic survey. In the next few days, you may receive an email or text message from [employer] with a link to a survey related to your  clinical pharmacist.\"     To schedule another MTM appointment, please call the clinic directly or you may call the MTM scheduling line at [phone] or toll-free at [phone].     My Clinical Pharmacist's contact information:                                                      Please feel free to contact me with any questions or concerns you have.      Wendy Caro, Pharm.D.  Medication Therapy Management Pharmacist  [facility]     "

## 2023-04-27 NOTE — PROGRESS NOTES
Medication Therapy Management (MTM) Encounter    ASSESSMENT:                            Medication Adherence/Access: No issues identified    Parkinson's Disease: We discussed that we'd like to increase the rivastigmine to 9.5 mg dose but she would prefer to prioritize treatment of the ongoing dyskinesia first. She does not think she could take less carbidopa-levodopa so we try a small increase in amantadine. She will monitor for side effects with the increase in amantadine. After she has been on the higher dose of amantadine for 2-3 weeks, she may try the higher dose of rivastigmine.       PLAN:                            1. We will try to increase amantadine to half-tablet (50 mg) 3 times/day- in the early morning, late morning, and afternoon. Amantadine can help with dyskinesia. Please monitor for any vision changes, low blood pressure, or issues with balance/falls. If you experience these side effects, let us know and reduce back to 2 times/day.    2. Continue the rivastigmine patch at 4.6 mg daily for the next 2-3 weeks until the patches are gone, then increase to 9.5 mg patch daily. The pharmacy should already have this one on file for you.     Follow-up: 5/25/23 at 1 pm video visit     SUBJECTIVE/OBJECTIVE:                          Alina Zavala is a 62 year old female contacted via secure video for a follow-up visit. Patient was accompanied by , Justin. Today's visit is a follow-up MTM visit from 3/23/23     Reason for visit: follow up on Parkinson's.    Allergies/ADRs: Reviewed in chart  Past Medical History: Reviewed in chart  Tobacco: She reports that she has never smoked. She has never used smokeless tobacco.  Alcohol: not currently using    Medication Adherence/Access: no issues reported    Parkinson's Disease:  Current medications include: amantadine 50 mg twice daily (6 am and late morning) and carbidopa-levodopa  mg every 5 hours- alternating 2 tablet and 2.5 tablets every other time (ie: 2  "- 2.5 - 2 - 2.5 - 2). Has pain, stiffness, and cramping when it wears off or if she misses a dose. Has noticed with this higher dose that she has less wearing off.  She continues on rivastigmine 4.6 mg patch daily and states she replaces it before showering. She missed 1 or 2 days of the patch because she forgot to refill it and her  said she seemed a little \"down\" that day without it. Otherwise has not noticed any improvement in cognition or side effects on the rivastigmine. She has 19 patches remaining in the second box of patches. She notes that her dyskinesia is worse when she is tired but not having much dyskinesia overnight. She is still having some vision changes, calls these \"bursts\" in her vision; has had her eyes checked.     She states that her blood pressure had been good- no symptoms for several weeks but recently had 3 days in a row where she had low blood pressure's in the morning (ie: 70s-80s systolic) and states her  had her drink Liquid IV and eat potato chips and this helped.     Today's Vitals: There were no vitals taken for this visit.  ----------------    I spent 30 minutes with this patient today. All changes were made via collaborative practice agreement with Dr. Hearn. A copy of the visit note was provided to the patient's provider(s).    A summary of these recommendations was sent via [employer].    Wendy Caro, Pharm.D.  Medication Therapy Management Pharmacist  [facility]    Telemedicine Visit Details  Type of service:  Video Conference via [employer]  Start Time: 1:00 PM  End Time: 1:30 PM     Medication Therapy Recommendations  Parkinson disease (H)    Current Medication: amantadine HCl 100 MG TABS   Rationale: Dose too low - Dosage too low - Effectiveness   Recommendation: Increase Frequency   Status: Accepted per CPA            "

## 2023-05-17 ENCOUNTER — OFFICE VISIT (OUTPATIENT)
Dept: NEUROLOGY | Facility: CLINIC | Age: 63
End: 2023-05-17
Payer: COMMERCIAL

## 2023-05-17 VITALS
HEART RATE: 58 BPM | BODY MASS INDEX: 20.14 KG/M2 | WEIGHT: 122.9 LBS | OXYGEN SATURATION: 100 % | DIASTOLIC BLOOD PRESSURE: 55 MMHG | SYSTOLIC BLOOD PRESSURE: 89 MMHG

## 2023-05-17 DIAGNOSIS — F09 COGNITIVE DISORDER: ICD-10-CM

## 2023-05-17 DIAGNOSIS — I95.1 ORTHOSTATIC HYPOTENSION: ICD-10-CM

## 2023-05-17 DIAGNOSIS — G20.A1 PARKINSON'S DISEASE (H): Primary | ICD-10-CM

## 2023-05-17 PROCEDURE — 99215 OFFICE O/P EST HI 40 MIN: CPT | Performed by: NURSE PRACTITIONER

## 2023-05-17 ASSESSMENT — UNIFIED PARKINSONS DISEASE RATING SCALE (UPDRS)
AMPLITUDE_LUE: (0) NORMAL: NO TREMOR.
LEG_AGILITY_LEFT: (1) SLIGHT: ANY OF THE FOLLOWING: A) THE REGULAR RHYTHM IS BROKEN WITH ONE WITH ONE OR TWO INTERRUPTIONS OR HESITATIONS OF THE MOVEMENT  B) SLIGHT SLOWING  C) THE AMPLITUDE DECREMENTS NEAR THE END OF THE 10 MOVEMENTS.
GAIT: (0) NORMAL: NO PROBLEMS.
AMPLITUDE_LLE: (0) NORMAL: NO TREMOR.
LEG_AGILITY_RIGHT: (0) NORMAL: NO PROBLEMS.
SPEECH: (0) NORMAL: NO SPEECH PROBLEMS.
AMPLITUDE_RUE: (0) NORMAL: NO TREMOR.
HANDMOVEMENTS_RIGHT: (1) SLIGHT: ANY OF THE FOLLOWING: A) THE REGULAR RHYTHM IS BROKEN WITH ONE WITH ONE OR TWO INTERRUPTIONS OR HESITATIONS OF THE MOVEMENT  B) SLIGHT SLOWING  C) THE AMPLITUDE DECREMENTS NEAR THE END OF THE 10 MOVEMENTS.
FACIAL_EXPRESSION: (3) MASKED FACIES WITH LIPS PARTED SOME OF THE TIME WHEN THE MOUTH IS AT REST.
AMPLITUDE_LIP_JAW: (0) NORMAL: NO TREMOR.
MOVEMENTS_INTERFERE_WITH_RATINGS: NO
FINGER_TAPPING_RIGHT: (2) MILD: ANY OF THE FOLLOWING: A) 3 TO 5 INTERRUPTIONS DURING TAPPING  B) MILD SLOWING  C) THE AMPLITUDE DECREMENTS MIDWAY IN THE 10-MOVEMENT SEQUENCE.
POSTURE: (2) MILD: DEFINITE FLEXION, SCOLIOSIS OR LEANING TO ONE SIDE, BUT CAN CORRECT POSTURE TO NORMAL WHEN ASKED.
TOTAL_SCORE: 33
TOTAL_SCORE_LEFT: 12
RIGIDITY_LLE: (0) NORMAL: NO RIGIDITY.
PRONATION_SUPINATION_RIGHT: (2) MILD: ANY OF THE FOLLOWING: A) 3 TO 5 INTERRUPTIONS DURING TAPPING  B) MILD SLOWING  C) THE AMPLITUDE DECREMENTS MIDWAY IN THE 10-MOVEMENT SEQUENCE.
AXIAL_SCORE: 10
TOETAPPING_LEFT: (1) SLIGHT: ANY OF THE FOLLOWING: A) THE REGULAR RHYTHM IS BROKEN WITH ONE WITH ONE OR TWO INTERRUPTIONS OR HESITATIONS OF THE MOVEMENT  B) SLIGHT SLOWING  C) THE AMPLITUDE DECREMENTS NEAR THE END OF THE 10 MOVEMENTS.
CONSTANCY_TREMOR_ATREST: (0) NORMAL: NO TREMOR.
FINGER_TAPPING_LEFT: (3) MODERATE: ANY OF THE FOLLOWING: A) MORE THAN 5 INTERRUPTIONS OR AT LEAST ONE LONGER ARREST (FREEZE) IN ONGOING MOVEMENT  B) MODERATE SLOWING  C) THE AMPLITUDE DECREMENTS STARTING AFTER THE FIRST MOVEMENT.
TOTAL_SCORE: 11
ARISING_CHAIR: (1) SLIGHT: ARISING IS SLOWER THAN NORMAL, OR MAY NEED MORE THAN ONE ATTEMPT, OR MAY NEED TO MOVE FORWARD IN THE CHAIR TO ARISE. NO NEED TO USE THE ARMS OF THE CHAIR.
POSTURAL_STABILITY: (2) MILD: MORE THAN 5 STEPS, BUT SUBJECT RECOVERS UNAIDED.
DYSKINESIAS_PRESENT: NO
PRONATION_SUPINATION_LEFT: (1) SLIGHT: ANY OF THE FOLLOWING: A) THE REGULAR RHYTHM IS BROKEN WITH ONE WITH ONE OR TWO INTERRUPTIONS OR HESITATIONS OF THE MOVEMENT  B) SLIGHT SLOWING  C) THE AMPLITUDE DECREMENTS NEAR THE END OF THE 10 MOVEMENTS.
AMPLITUDE_RLE: (0) NORMAL: NO TREMOR.
RIGIDITY_LUE: (1) SLIGHT: RIGIDITY ONLY DETECTED WITH ACTIVATION MANEUVER.
SPONTANEITY_OF_MOVEMENT: (1) SLIGHT: SLIGHT GLOBAL SLOWNESS AND POVERTY OF SPONTANEOUS MOVEMENTS.
RIGIDITY_NECK: (1) SLIGHT: RIGIDITY ONLY DETECTED WITH ACTIVATION MANEUVER.
HANDMOVEMENTS_LEFT: (1) SLIGHT: ANY OF THE FOLLOWING: A) THE REGULAR RHYTHM IS BROKEN WITH ONE WITH ONE OR TWO INTERRUPTIONS OR HESITATIONS OF THE MOVEMENT  B) SLIGHT SLOWING  C) THE AMPLITUDE DECREMENTS NEAR THE END OF THE 10 MOVEMENTS.
TOETAPPING_RIGHT: (1) SLIGHT: ANY OF THE FOLLOWING: A) THE REGULAR RHYTHM IS BROKEN WITH ONE WITH ONE OR TWO INTERRUPTIONS OR HESITATIONS OF THE MOVEMENT  B) SLIGHT SLOWING  C) THE AMPLITUDE DECREMENTS NEAR THE END OF THE 10 MOVEMENTS.
FREEZING_GAIT: (0) NORMAL: NO FREEZING.
RIGIDITY_RLE: (0) NORMAL: NO RIGIDITY.
PARKINSONS_MEDS: ON
RIGIDITY_RUE: (1) SLIGHT: RIGIDITY ONLY DETECTED WITH ACTIVATION MANEUVER.

## 2023-05-17 ASSESSMENT — MOVEMENT DISORDERS SOCIETY - UNIFIED PARKINSONS DISEASE RATING SCALE (MDS-UPDRS)
FREEZING: (0) NORMAL: NOT AT ALL (NO PROBLEMS).
SALIVA_AND_DROOLING: (0) NORMAL: NOT AT ALL (NO PROBLEMS).
SPEECH: (2) MILD: MY SPEECH CAUSES PEOPLE TO ASK ME TO OCCASIONALLY REPEAT MYSELF, BUT NOT EVERYDAY.
TOTAL_SCORE: 0
HOBBIES_AND_OTHER_ACTIVITIES: (1) SLIGHT: I AM A BIT SLOW BUT DO THESE ACTIVITIES EASILY.
DRESSING: (1) SLIGHT: I AM SLOW, BUT I DO NOT NEED HELP.
WALKING_AND_BALANCE: (0) NORMAL: NOT AT ALL (NO PROBLEMS).
CHEWING_AND_SWALLOWING: (0) NORMAL: NO PROBLEMS.
TREMOR: (1) SLIGHT: SHAKING OR TREMOR OCCURS BUT DOES NOT CAUSE PROBLEMS WITH ANY ACTIVITIES.
HANDWRITING: (1) SLIGHT: MY WRITING IS SLOW, CLUMSY OR UNEVEN, BUT ALL WORDS ARE CLEAR.
EATING_TASKS: (0) NORMAL: NOT AT ALL (NO PROBLEMS).
GETTING_OUT_OF_BED_CAR_DEEP_CHAIR: (1) SLIGHT: I AM SLOW OR AWKWARD, BUT I USUALLY CAN DO IT ON MY FIRST TRY.
TURNING_IN_BED: (1) SLIGHT: I HAVE A BIT OF TROUBLE TURNING, BUT I DO NOT NEED ANY HELP.
HYGIENE: (0) NORMAL: NOT AT ALL (NO PROBLEMS).

## 2023-05-17 ASSESSMENT — PAIN SCALES - GENERAL: PAINLEVEL: MILD PAIN (3)

## 2023-05-17 NOTE — PROGRESS NOTES
ASSESSMENT:    Parkinson's Disease:      Orthostatic Hypotension:  Today's BP showed significant SBP drop. Most likely, this is why pt was feeling dizzy, not moving well.   gave her the midday dose after her orthostatic BP/HR was completed.     Memory Problems: Ongoing pt. Pt had difficulty follow the instructions to perform tasks when motor assessment was completed. On Rivastigmine 4.6 mg patch & hasn't increased the dose yet. No side effects from the current dose.         PLAN:    __  The following patient instructions provided: -     __  Stay on the same antiparkinsonian medication.     __  Check your BP regularly. Today, your standing BP dropped.  Slowly stand up and wait before walking. Monitor dizziness.  Continue taking the Midodrine.     2023  1:23 PM 2023  1:25 PM   Vital Signs     Systolic 146 !  89 (L)    Diastolic 82 !  55 !    Pulse 58  58       __  Increase the Rivastigmine patch to 9.6 mg today.      __  See Wendy Caro, Pharm.D. on May 25th.     __ Return in 3 - 4 months to see Dr. Hearn.  You may return sooner as needed.             MOVEMENT DISORDERS CLINIC           PATIENT: Alina Zavala    : 1960    DAHLIA: May 17, 2023    REASON FOR VISIT: Parkinson's disease (PD) follow up.    HPI: Ms. Alina Zavala is a 62 year old who came to the [facility] neurology clinic accompanied by her  for a follow up visit.  She was last seen in the clinic on 2023 by Dr. Hearn for a routine f/u visit.    She reports that today is the worse day she had in a long time. He PD symptoms, blurred vision and depth perception are off.    Pt has orthostatic hypotension. She is feeling dizzy today. Hasn't taken her midday Midodrine.     Pt has increased stress due to her father's hospitalization at [facility] for stroke. Her mother also had some infection.     She has been on Rivastigmine 4.6 mg patch for 7 weeks.  She reports having memory problems.  reports that at times  she gets confused. She hasn't increased her dose to 9.5 mg. Pt and  plan to increase the dose today.     She is taking Sinemet 25/100 mg alternating between taking 2 tabs and 2.5 tabs.      MEDICATIONS:   Outpatient Medications Marked as Taking for the 5/17/23 encounter (Office Visit) with Sandrine Valle APRN CNP   Medication Sig     acetaminophen (TYLENOL) 500 MG tablet 2 x 500mg tab by mouth twice daily @ 10am and 11pm  = 4/day     albuterol (PROAIR HFA/PROVENTIL HFA/VENTOLIN HFA) 108 (90 Base) MCG/ACT inhaler Inhale 2 puffs into the lungs every 6 hours as needed     amantadine HCl 100 MG TABS Take half-tablet (50 mg) by mouth 3 times daily (early morning, late morning, and afternoon)     B Complex-C (VITAMIN B COMPLEX W/VITAMIN C) TABS tablet Take 1 tablet by mouth daily     buPROPion (WELLBUTRIN SR) 200 MG 12 hr tablet 200mg tab by mouth twice daily at 10am and 10pm     calcium carbonate (OS-TENA) 600 MG tablet 1 tab by mouth nightly at 10pm     carbidopa-levodopa (SINEMET)  MG tablet 2 tablets by mouth every 4.5 hours  = 12 pills/day     cholecalciferol 25 MCG (1000 UT) TABS 1000 units (25 mcg) by mouth daily at 10am     co-enzyme Q-10 100 MG CAPS capsule 100mg capsule by mouth every evening at 11pm     gabapentin (NEURONTIN) 100 MG capsule 200 mg capsule @ 10 AM and 100 mg 10pm     gabapentin (NEURONTIN) 300 MG capsule 300mg capsule by mouth nightly @ 10pm     midodrine (PROAMATINE) 2.5 MG tablet Take 5 mg by mouth 3 times daily @9am, 1pm and 6pm     polyethylene glycol (MIRALAX) 17 g packet ? START ?     probiotic CAPS 1 capsule by mouth twice daily (Smarty Pants Adult Probiotic Complete) at 10am and 10pm     propranolol (INDERAL) 10 MG tablet 1/2 of 10mg tab by mouth once daily (morning only)     rivastigmine (EXELON) 9.5 MG/24HR 24 hr patch 4.6mg patch daily for 4 weeks then 9.5mg patch daily for 4 weeks then increase to the 13.3mg patch daily     sertraline (ZOLOFT) 100 MG tablet 100mg  tab by mouth dailya t 10am     tiZANidine (ZANAFLEX) 2 MG tablet Take 1-2 tablets by mouth 2 times daily as needed     vitamin C (ASCORBIC ACID) 500 MG tablet 500mg tab by mouth daily at 10am       PHYSICAL EXAM:    VITAL SIGNS:  Blood pressure (!) 144/73, pulse 67, weight 55.7 kg (122 lb 14.4 oz), SpO2 100 %.. Body mass index is 20.14 kg/m .    GENERAL:  Ms. Zavala is a pleasant  female who is well-groomed and well-developed sitting comfortably in the exam room without any distress.  Affect is appropriate.     5/17/2023  1:23 PM 5/17/2023  1:25 PM   Vital Signs     Systolic 146 !  89 (L)    Diastolic 82 !  55 !    Pulse 58  58             MOVEMENT DISORDERS ASSESSMENT:  MDS UPDRS III (Last Sinemet was taken in the clinic)   5/17/2023  1:00 PM   UPDRS Motor Scale    Time: 1:02 PM    Medication On    R Brain DBS: None    L Brain DBS: None    Dyskinesia (LID) No    Did LID interfere No    Speech 0    Facial Expression 3    Rigidity Neck 1    Rigidity RUE 1    Rigidity LUE 1    Rigidity RLE 0    Rigidity LLE 0    Finger Taps R 2    Finger Taps L 3    Hand Mvt R 1    Hand Mvt L 1    Pron-/Supinate R 2    Pron-/Supinate L 1    Toe Tap R 1    Toe Tap L 1    Leg Agility R 0    Leg Agility L 1    Arise From Chair 1    Gait 0    Gait Freezing 0    Postural Stability 2    Posture 2    Global Spont Mvt 1    Postural Tremor RUE 2    Postural Tremor LUE 2    Kinetic Tremor RUE 2    Kinetic Tremor LUE 2    Rest Tremor RUE 0    Rest Tremor LUE 0    Rest Tremor RLE 0    Rest Tremor LLE 0    Rest Tremor Lip/Jaw 0    Rest Tremor Constancy 0    Total Right 11    Total Left 12    Axial Total 10    Total 33          Today I spent 43 minutes caring for the patient. Time was spent with reviewing records, meeting with the patient, answering questions, examining, and documentation.    ADAM Calzada,  CNP  [facility]

## 2023-05-17 NOTE — PATIENT INSTRUCTIONS
Dear Ms. Alina NERI Connernilda,    Thank you for coming today.  During your visit, we have discussed the following:     __  Stay on the same antiparkinsonian medication.     __  Check your BP regularly. Today, your standing BP dropped.  Slowly stand up and wait before walking. Monitor dizziness.  Continue taking the Midodrine.        5/17/2023  1:23 PM 5/17/2023  1:25 PM   Vital Signs     Systolic 146 !  89 (L)    Diastolic 82 !  55 !    Pulse 58  58       __  Increase the Rivastigmine patch to 9.6 mg today.      __  See Wendy Caro, Pharm.D. on May 25th.     __ Return in 3 - 4 months to see Dr. Hearn.  You may return sooner as needed.       For questions, you may send us a [employer] message or call [phone]    Fax number: [phone]    To make an appointment with one of our pharmacists, (Wendy Caro, Pharm.D. or Fatemeh Richey PharmD), call [phone].    Zeynep Valle, ADAM, CNP  [facility]

## 2023-05-17 NOTE — LETTER
2023       RE: Alina Zavala   [facility] [place] [address]       Dear Colleague,    Thank you for referring your patient, Alina Zavala, to the [facility]UROLOGY [facility]INIC [facility]. Please see a copy of my visit note below.      ASSESSMENT:    Parkinson's Disease:      Orthostatic Hypotension:  Today's BP showed significant SBP drop. Most likely, this is why pt was feeling dizzy, not moving well.   gave her the midday dose after her orthostatic BP/HR was completed.     Memory Problems: Ongoing pt. Pt had difficulty follow the instructions to perform tasks when motor assessment was completed. On Rivastigmine 4.6 mg patch & hasn't increased the dose yet. No side effects from the current dose.         PLAN:    __  The following patient instructions provided: -     __  Stay on the same antiparkinsonian medication.     __  Check your BP regularly. Today, your standing BP dropped.  Slowly stand up and wait before walking. Monitor dizziness.  Continue taking the Midodrine.     2023  1:23 PM 2023  1:25 PM   Vital Signs     Systolic 146 !  89 (L)    Diastolic 82 !  55 !    Pulse 58  58       __  Increase the Rivastigmine patch to 9.6 mg today.      __  See Wendy Caro, Pharm.D. on May 25th.     __ Return in 3 - 4 months to see Dr. Hearn.  You may return sooner as needed.             MOVEMENT DISORDERS CLINIC           PATIENT: Alina Zavala    : 1960    DAHLIA: May 17, 2023    REASON FOR VISIT: Parkinson's disease (PD) follow up.    HPI: Ms. Alina Zavala is a 62 year old who came to the [facility] neurology clinic accompanied by her  for a follow up visit.  She was last seen in the clinic on 2023 by Dr. Hearn for a routine f/u visit.    She reports that today is the worse day she had in a long time. He PD symptoms, blurred vision and depth perception are off.    Pt has orthostatic hypotension.  She is feeling dizzy today. Hasn't taken her midday Midodrine.     Pt has increased stress due to her father's hospitalization at [facility] for stroke. Her mother also had some infection.     She has been on Rivastigmine 4.6 mg patch for 7 weeks.  She reports having memory problems.  reports that at times she gets confused. She hasn't increased her dose to 9.5 mg. Pt and  plan to increase the dose today.     She is taking Sinemet 25/100 mg alternating between taking 2 tabs and 2.5 tabs.      MEDICATIONS:   Outpatient Medications Marked as Taking for the 5/17/23 encounter (Office Visit) with Sandrine Valle APRN CNP   Medication Sig    acetaminophen (TYLENOL) 500 MG tablet 2 x 500mg tab by mouth twice daily @ 10am and 11pm  = 4/day    albuterol (PROAIR HFA/PROVENTIL HFA/VENTOLIN HFA) 108 (90 Base) MCG/ACT inhaler Inhale 2 puffs into the lungs every 6 hours as needed    amantadine HCl 100 MG TABS Take half-tablet (50 mg) by mouth 3 times daily (early morning, late morning, and afternoon)    B Complex-C (VITAMIN B COMPLEX W/VITAMIN C) TABS tablet Take 1 tablet by mouth daily    buPROPion (WELLBUTRIN SR) 200 MG 12 hr tablet 200mg tab by mouth twice daily at 10am and 10pm    calcium carbonate (OS-TENA) 600 MG tablet 1 tab by mouth nightly at 10pm    carbidopa-levodopa (SINEMET)  MG tablet 2 tablets by mouth every 4.5 hours  = 12 pills/day    cholecalciferol 25 MCG (1000 UT) TABS 1000 units (25 mcg) by mouth daily at 10am    co-enzyme Q-10 100 MG CAPS capsule 100mg capsule by mouth every evening at 11pm    gabapentin (NEURONTIN) 100 MG capsule 200 mg capsule @ 10 AM and 100 mg 10pm    gabapentin (NEURONTIN) 300 MG capsule 300mg capsule by mouth nightly @ 10pm    midodrine (PROAMATINE) 2.5 MG tablet Take 5 mg by mouth 3 times daily @9am, 1pm and 6pm    polyethylene glycol (MIRALAX) 17 g packet ? START ?    probiotic CAPS 1 capsule by mouth twice daily (Smarty Pants Adult Probiotic Complete)  at 10am and 10pm    propranolol (INDERAL) 10 MG tablet 1/2 of 10mg tab by mouth once daily (morning only)    rivastigmine (EXELON) 9.5 MG/24HR 24 hr patch 4.6mg patch daily for 4 weeks then 9.5mg patch daily for 4 weeks then increase to the 13.3mg patch daily    sertraline (ZOLOFT) 100 MG tablet 100mg tab by mouth dailya t 10am    tiZANidine (ZANAFLEX) 2 MG tablet Take 1-2 tablets by mouth 2 times daily as needed    vitamin C (ASCORBIC ACID) 500 MG tablet 500mg tab by mouth daily at 10am       PHYSICAL EXAM:    VITAL SIGNS:  Blood pressure (!) 144/73, pulse 67, weight 55.7 kg (122 lb 14.4 oz), SpO2 100 %.. Body mass index is 20.14 kg/m .    GENERAL:  Ms. Zavala is a pleasant  female who is well-groomed and well-developed sitting comfortably in the exam room without any distress.  Affect is appropriate.     5/17/2023  1:23 PM 5/17/2023  1:25 PM   Vital Signs     Systolic 146 !  89 (L)    Diastolic 82 !  55 !    Pulse 58  58             MOVEMENT DISORDERS ASSESSMENT:  MDS UPDRS III (Last Sinemet was taken in the clinic)   5/17/2023  1:00 PM   UPDRS Motor Scale    Time: 1:02 PM    Medication On    R Brain DBS: None    L Brain DBS: None    Dyskinesia (LID) No    Did LID interfere No    Speech 0    Facial Expression 3    Rigidity Neck 1    Rigidity RUE 1    Rigidity LUE 1    Rigidity RLE 0    Rigidity LLE 0    Finger Taps R 2    Finger Taps L 3    Hand Mvt R 1    Hand Mvt L 1    Pron-/Supinate R 2    Pron-/Supinate L 1    Toe Tap R 1    Toe Tap L 1    Leg Agility R 0    Leg Agility L 1    Arise From Chair 1    Gait 0    Gait Freezing 0    Postural Stability 2    Posture 2    Global Spont Mvt 1    Postural Tremor RUE 2    Postural Tremor LUE 2    Kinetic Tremor RUE 2    Kinetic Tremor LUE 2    Rest Tremor RUE 0    Rest Tremor LUE 0    Rest Tremor RLE 0    Rest Tremor LLE 0    Rest Tremor Lip/Jaw 0    Rest Tremor Constancy 0    Total Right 11    Total Left 12    Axial Total 10    Total 33          Today I spent  43 minutes caring for the patient. Time was spent with reviewing records, meeting with the patient, answering questions, examining, and documentation.          Again, thank you for allowing me to participate in the care of your patient.      Sincerely,    ADAM Dumont CNP

## 2023-05-25 ENCOUNTER — VIRTUAL VISIT (OUTPATIENT)
Dept: PHARMACY | Facility: CLINIC | Age: 63
End: 2023-05-25
Payer: COMMERCIAL

## 2023-05-25 DIAGNOSIS — I95.1 ORTHOSTATIC HYPOTENSION: ICD-10-CM

## 2023-05-25 DIAGNOSIS — G20.A1 PARKINSON DISEASE (H): Primary | ICD-10-CM

## 2023-05-25 PROCEDURE — 99607 MTMS BY PHARM ADDL 15 MIN: CPT | Mod: VID | Performed by: PHARMACIST

## 2023-05-25 PROCEDURE — 99606 MTMS BY PHARM EST 15 MIN: CPT | Mod: VID | Performed by: PHARMACIST

## 2023-05-25 NOTE — PROGRESS NOTES
Medication Therapy Management (MTM) Encounter    ASSESSMENT:                            Medication Adherence/Access: No issues identified    Parkinson's Disease:  the higher dose of amantadine has not been helpful for her dyskinesias and may be exacerbating her orthostatic hypotension. We will decrease back to 50 mg twice daily and continue the current dosage of rivastigmine for another month. If dyskinesia continues to be bothersome, we may need to decrease carbidopa-levodopa back to 2 tabs each dose (5 times/day)     Orthostatic hypotension: patient will continue monitoring blood pressure as we reduce the amantadine back to twice daily.     PLAN:                            1. Reduce amantadine back to 50 mg (half of 100 mg tab) twice daily, with the second and third dose of carbidopa-levodopa each day.    2. Continue the rivastigmine 9.5 mg patch daily for 1 more month. This is for your memory/cognition.     3. Before your dentist appointment, you may want to take a lower dose of carbidopa-levodopa (ie: 1.5 or 2 tabs for each dose) that day to prevent the dyskinesia from affecting the dental work.    4. We discussed that carbidopa-levodopa should ideally be taken 1 hour before or 2 hours after high-protein foods, but the amount of protein that interferes with carbidopa-levodopa is variable, depending on the person. If you do try eating protein near the carbidopa-levodopa, please monitor for any change in effectiveness and if it does wear off more quickly you could try taking an extra half-tablet of carbidopa-levodopa before the next dose is due.     Follow-up: 6/23/23 at 1 pm video visit    SUBJECTIVE/OBJECTIVE:                          Alina Zavala is a 62 year old female contacted via secure video for a follow-up visit. Patient was accompanied by , Justin. Today's visit is a follow-up MTM visit from 4/27/23     Reason for visit: follow up on Parkinson's    Allergies/ADRs: Reviewed in chart  Past Medical  History: Reviewed in chart  Tobacco: She reports that she has never smoked. She has never used smokeless tobacco.  Alcohol: not currently using    Medication Adherence/Access: no issues reported    Parkinson's Disease:  Current medications include: amantadine 50 mg 3 times daily, and carbidopa-levodopa  mg every 5 hours- alternating 2 tablet and 2.5 tablets every other time (ie: 2 - 2.5 - 2 - 2.5 - 2). Since our last visit, the amantadine was increased but she reports no change in her dyskinesias and possibly worse orthostatic hypotension and possibly worse visual issues/illusions. She has also increased the rivastigmine patch to 9.5 mg daily for the past week and unclear if this is having any effect yet, but states her visual issues seem to be getting worse. Patient states that the current dosage of carbidopa-levodopa is working well to control her Parkinson's symptoms. The medication consistently lasts 5 hours and if she is 30 min late to take a dose she will have feet spasms or cramping in her muscles. She is sleeping well at night. She had some questions about protein and carbidopa-levodopa dosing. Also wanted to know about managing dyskinesia during dental appt.     Orthostatic hypotension: Taking midodrine 2.5 mg 3 times/day. She states that she is still having very low blood pressure at times, ie: 70s/40s and has to lie down or drink Liquid IV and eat salty chips. After taking the midodrine her blood pressure tends to run in the 120s/70s. Patient states that the low blood pressure occurs more in the first part of the day. She has not noticed it happening while she is in the shower (admits she does take hot showers). She is planning to follow up with her cardiologies on the blood pressure issues.     Today's Vitals: There were no vitals taken for this visit.  ----------------    I spent 24 minutes with this patient today. All changes were made via collaborative practice agreement with Dr. Hearn. A copy  of the visit note was provided to the patient's provider(s).    A summary of these recommendations was sent via [employer].    Wendy Caro, Pharm.D.  Medication Therapy Management Pharmacist  [facility]    Telemedicine Visit Details  Type of service:  Video Conference via [employer]  Start Time: 1:01 PM  End Time: 1:25 PM     Medication Therapy Recommendations  Parkinson disease (H)    Current Medication: amantadine HCl 100 MG TABS   Rationale: Undesirable effect - Adverse medication event - Safety   Recommendation: Decrease Frequency   Status: Accepted per CPA

## 2023-05-25 NOTE — PATIENT INSTRUCTIONS
"Recommendations from today's MTM visit:                                                      1. Reduce amantadine back to 50 mg (half of 100 mg tab) twice daily, with the second and third dose of carbidopa-levodopa each day.    2. Continue the rivastigmine 9.5 mg patch daily for 1 more month. This is for your memory/cognition.     3. Before your dentist appointment, you may want to take a lower dose of carbidopa-levodopa (ie: 1.5 or 2 tabs for each dose) that day to prevent the dyskinesia from affecting the dental work.    4. We discussed that carbidopa-levodopa should ideally be taken 1 hour before or 2 hours after high-protein foods, but the amount of protein that interferes with carbidopa-levodopa is variable, depending on the person. If you do try eating protein near the carbidopa-levodopa, please monitor for any change in effectiveness and if it does wear off more quickly you could try taking an extra half-tablet of carbidopa-levodopa before the next dose is due.     Follow-up: 6/23/23 at 1 pm video visit    It was great speaking with you today.  I value your experience and would be very thankful for your time in providing feedback in our clinic survey. In the next few days, you may receive an email or text message from [employer] with a link to a survey related to your  clinical pharmacist.\"     To schedule another MTM appointment, please call the clinic directly or you may call the MTM scheduling line at [phone] or toll-free at [phone].     My Clinical Pharmacist's contact information:                                                      Please feel free to contact me with any questions or concerns you have.      Wendy Caro, Pharm.D.  Medication Therapy Management Pharmacist  [facility]     "

## 2023-06-04 ENCOUNTER — HEALTH MAINTENANCE LETTER (OUTPATIENT)
Age: 63
End: 2023-06-04

## 2023-06-05 ENCOUNTER — TELEPHONE (OUTPATIENT)
Dept: NEUROLOGY | Facility: CLINIC | Age: 63
End: 2023-06-05
Payer: COMMERCIAL

## 2023-06-05 NOTE — TELEPHONE ENCOUNTER
Health Call Center    Phone Message    May a detailed message be left on voicemail: yes     Reason for Call:  Patient called to speak to her nurse, wondering if Yamilet can call her back and discuss about her dental appt on Wednesday. Patient would like to discuss her medications with nurse.       Action Taken: Message routed to:  [facility]    Travel Screening: Not Applicable

## 2023-06-13 DIAGNOSIS — G20.A1 PARKINSON'S DISEASE (H): Primary | ICD-10-CM

## 2023-06-13 DIAGNOSIS — F09 COGNITIVE DISORDER: ICD-10-CM

## 2023-06-13 RX ORDER — RIVASTIGMINE 13.3 MG/24H
1 PATCH, EXTENDED RELEASE TRANSDERMAL DAILY
Qty: 30 PATCH | Refills: 11 | Status: SHIPPED | OUTPATIENT
Start: 2023-06-13 | End: 2024-03-13

## 2023-06-13 NOTE — TELEPHONE ENCOUNTER
[facility]    Phone Message    May a detailed message be left on voicemail: yes     Reason for Call: Medication Refill Request    Has the patient contacted the pharmacy for the refill? Yes   Name of medication being requested: rivastigmine (EXELON) 13.3 MG/24HR 24 hr patch   Provider who prescribed the medication: Dr. Hearn  Pharmacy: [id number] - [address] [facility] [address] & [facility]  Date medication is needed: asap   Pt called to request a refill for medication and to have the increased does of 13.3 mg.  Pt states she will be out of medication after today 06/13/23.    Please call Pt back at [phone] to confirm.      Action Taken: Message routed to:  [facility] Neurology    Travel Screening: Not Applicable

## 2023-06-13 NOTE — TELEPHONE ENCOUNTER
Rivastigmine patch most common side effects:   Decrease in appetite (1% to 6% ), Diarrhea (5% to 7%), Nausea (3% to 12% ), Vomiting (3% to 10%), weight loss (6% to 14%) Urinary tract infection (2% to 10%), Dysuria (difficult or painful urination), redness/itching at the patch site (6% to 13%)    She denies side effects from the patch other than mild redness that goes away quickly when she takes it off. She would like to increase to the next dose. She is on her last patch today.

## 2023-06-23 ENCOUNTER — TELEPHONE (OUTPATIENT)
Dept: PHARMACY | Facility: CLINIC | Age: 63
End: 2023-06-23
Payer: COMMERCIAL

## 2023-06-23 NOTE — TELEPHONE ENCOUNTER
Called patient for scheduled MTM video visit today as she had not logged in by 1:07 pm. Left a voicemail offering to do the appt by phone if needed, or we can reschedule.     Wendy Caro, Pharm.D.  Medication Therapy Management Pharmacist  [facility]

## 2023-06-26 ENCOUNTER — TELEPHONE (OUTPATIENT)
Dept: PHARMACY | Facility: CLINIC | Age: 63
End: 2023-06-26
Payer: COMMERCIAL

## 2023-06-27 NOTE — TELEPHONE ENCOUNTER
Received voicemail from patient stating that she missed our appointment on Friday because she had a family emergency (her dad has been in the hospital). She would like to reschedule our appointment and was quite apologetic in the message. I will have the clinic coordinators reschedule this appointment.     Wendy Caro, Pharm.D.  Medication Therapy Management Pharmacist  [facility]

## 2023-06-27 NOTE — TELEPHONE ENCOUNTER
Left Voicemail (1st Attempt) for the patient to call back and schedule the following:    Appointment type: Return - MTM  Provider: Wendy Caro RPH  Return date: First available  Specialty phone number: [phone]   Additional appointment(s) needed: N/A  Additonal Notes: N/A    Nash Vail on 6/27/2023 at 10:40 AM

## 2023-06-27 NOTE — TELEPHONE ENCOUNTER
Received another voicemail from patient stating that she would like to reschedule an appt with me. I have asked our clinic coordinators to set this up for us.    Wendy Caro, Pharm.D.  Medication Therapy Management Pharmacist  [facility]

## 2023-07-12 ENCOUNTER — VIRTUAL VISIT (OUTPATIENT)
Dept: PHARMACY | Facility: CLINIC | Age: 63
End: 2023-07-12
Payer: COMMERCIAL

## 2023-07-12 DIAGNOSIS — G20.A1 PARKINSON DISEASE (H): Primary | ICD-10-CM

## 2023-07-12 PROCEDURE — 99606 MTMS BY PHARM EST 15 MIN: CPT | Mod: VID | Performed by: PHARMACIST

## 2023-07-12 PROCEDURE — 99607 MTMS BY PHARM ADDL 15 MIN: CPT | Mod: VID | Performed by: PHARMACIST

## 2023-07-12 NOTE — PROGRESS NOTES
"Medication Therapy Management (MTM) Encounter    ASSESSMENT:                            Medication Adherence/Access: No issues identified    Parkinson's Disease: Patient would benefit from trying Gocovri for her levodopa-induced dyskinesias. She has not tolerated higher doses of amantadine immediate release but her dyskinesias continue to be bothersome and she is open to trying Gocovri if it is affordable.       PLAN:                            1. We will try to get Gocovri covered by your insurance. There may also be a coupon we can use to lower the cost of this medication. I have sent the Gocovri prescription to [place] [facility] (they will mail the prescription to you)    2. If Gocovri is affordable, you will start taking Gocovri 68.5 mg 1 capsule nightly at bedtime. This is the lowest dosage and we may increase the dosage if needed in the future. You will stop the regular amantadine when you start Gocovri.      Follow-up: 9/13/23 at 10 am video visit    SUBJECTIVE/OBJECTIVE:                          Alina Zavala is a 63 year old female contacted via[employer] secure video for a follow-up visit. Patient was accompanied by , Justin. Today's visit is a follow-up MTM visit from 5/25/23     Reason for visit: follow up on medications.    Allergies/ADRs: Reviewed in chart  Past Medical History: Reviewed in chart  Tobacco: She reports that she has never smoked. She has never used smokeless tobacco.  Alcohol: not currently using  Social: her daughter's wedding is on 9/16/23    Medication Adherence/Access: no issues reported    Parkinson's Disease:    carbidopa-levodopa  mg every 5 hours- alternating 2 tablets and 2.5 tablets every other time (ie: 2 - 2.5 - 2 - 2.5 - 2)  amantadine 50 mg twice daily   Rivastigmine 13.3 mg daily   Patient states she is having more dyskinesia since reducing the dose of amantadine; however, her vision is better and she is not seeing the \"dots\" in her vision as often. Since " "increasing the rivastigmine about a month ago, she has noticed no significant change in short-term memory but she states she feels less foggy and more clear with her thinking. Justin stated that her alertness is better and she is \"asserting\" herself more, and is more decisive. She stated that she finally called a friend that she hadn't called for a while. Was having apathy but having less apathy lately. has not noticed any side effects from the rivastigmine. She does notice \"purple veins\" from amantadine.     Today's Vitals: There were no vitals taken for this visit.  ----------------    I spent 23 minutes with this patient today. All changes were made via collaborative practice agreement with Dr. Hearn. A copy of the visit note was provided to the patient's provider(s).    A summary of these recommendations was sent via [employer].    Wendy Caro, Pharm.D.  Medication Therapy Management Pharmacist  [facility]    Telemedicine Visit Details  Type of service:  Video Conference via [employer]  Start Time: 10:03 AM  End Time: 10:26 AM     Medication Therapy Recommendations  Parkinson disease (H)    Current Medication: amantadine HCl 100 MG TABS   Rationale: More effective medication available - Ineffective medication - Effectiveness   Recommendation: Change Medication Formulation  - Gocovri 68.5 MG Cp24   Status: Accepted per CPA            "

## 2023-07-13 ENCOUNTER — TELEPHONE (OUTPATIENT)
Dept: NEUROLOGY | Facility: CLINIC | Age: 63
End: 2023-07-13
Payer: COMMERCIAL

## 2023-07-13 NOTE — TELEPHONE ENCOUNTER
PA Initiation    Medication: GOCOVRI 68.5 MG PO CP24  Insurance Company: [employer] - Phone [phone] Fax [phone]  Pharmacy Filling the Rx: [facility] - [place], [place] - [facility] [address]  Filling Pharmacy Phone:    Filling Pharmacy Fax:    Start Date: 7/13/2023    [id number]

## 2023-07-13 NOTE — PATIENT INSTRUCTIONS
"Recommendations from today's MTM visit:                                                      1. We will try to get Gocovri covered by your insurance. There may also be a coupon we can use to lower the cost of this medication. I have sent the Gocovri prescription to [place] [facility] (they will mail the prescription to you)    2. If Gocovri is affordable, you will start taking Gocovri 68.5 mg 1 capsule nightly at bedtime. This is the lowest dosage and we may increase the dosage if needed in the future. You will stop the regular amantadine when you start Gocovri.      Follow-up: 9/13/23 at 10 am video visit    It was great speaking with you today.  I value your experience and would be very thankful for your time in providing feedback in our clinic survey. In the next few days, you may receive an email or text message from [employer] with a link to a survey related to your  clinical pharmacist.\"     To schedule another MTM appointment, please call the clinic directly or you may call the MTM scheduling line at [phone] or toll-free at [phone].     My Clinical Pharmacist's contact information:                                                      Please feel free to contact me with any questions or concerns you have.      Wendy Caro, Pharm.D.  Medication Therapy Management Pharmacist  [facility]     "

## 2023-07-13 NOTE — TELEPHONE ENCOUNTER
Prior Authorization Approval    Medication: GOCOVRI 68.5 MG PO CP24  Authorization Effective Date: 6/13/2023  Authorization Expiration Date: 7/12/2024  Approved Dose/Quantity: 60 caps per 30 days  Reference #: [id number]   Insurance Company: [employer] - Phone [phone] Fax [phone]  Expected CoPay: $1,499     CoPay Card Available: Yes    Financial Assistance Needed: Yes, pharmacy to enroll in Gocovri co-pay card  Which Pharmacy is filling the prescription: [facility] - [place], [place] - [address] [place] [address] AVE   Pharmacy Notified: Yes  Patient Notified: Yes

## 2023-08-04 ENCOUNTER — TELEPHONE (OUTPATIENT)
Dept: NEUROLOGY | Facility: CLINIC | Age: 63
End: 2023-08-04
Payer: COMMERCIAL

## 2023-08-04 NOTE — TELEPHONE ENCOUNTER
[facility] Call Center    Phone Message    May a detailed message be left on voicemail: yes     Reason for Call: Medication Refill Request    Has the patient contacted the pharmacy for the refill? Yes   Name of medication being requested: amantadine (GOCOVRI) 68.5 MG CP24 ER capsule   Provider who prescribed the medication: Dr. Hearn  Pharmacy: [facility]  Date medication is needed: ASAP       Action Taken: Message routed to:  [facility] neuro    Travel Screening: Not Applicable

## 2023-08-04 NOTE — TELEPHONE ENCOUNTER
Called [facility] and spoke to Muriel. She transferred me to Hugo, pharmacist. Gave Hugo the prescription for Gocovri.    9 minute phone call

## 2023-08-07 RX ORDER — MIDODRINE HYDROCHLORIDE 5 MG/1
5 TABLET ORAL 3 TIMES DAILY
COMMUNITY
Start: 2023-07-27 | End: 2024-02-22

## 2023-08-07 NOTE — PROGRESS NOTES
Diagnosis/Summary/Recommendations:    PATIENT: Alina Zavala  63 year old female     : 1960    DAHLIA: 2023    MRN: [id number]  [place] [address]   [email or web address]  [phone] (M) -iphone  [phone] (H) -not an iphone  Tejinder Miller LOYD  [phone]     Venkat Cooney, mallory Soriano.       PCP is Dr. HESHAM Melendrez  CArdiologist Dr. Reyna Dick  Eye doctor  Dentist  Chiropractor     [email or web address]        Assessment:  (G20) Parkinson disease (H)  (primary encounter diagnosis)  (G20) Parkinson disease (H)  (primary encounter diagnosis)  Sinemet 25/100 2 tabs 5/day  Entacapone comtan 200mg - not taking     Review of diagnosis    Parkinson disease     Avoidance of dopamine blockers   Not taking     Motor complication review   wearing off. At 4-5 hours gets tingling of her toes/fingers  Legs - cramping, clawing - behind her knees  Wearing off afternoon; has to be attentive to afternoon dosing     Has nausea with comtan and not eating as much   Weight has reduced to 144 from 162 #      Review of Impulse control disorders         Review of surgical or medication options   reviewed     May consider a trial of amantadine (symmetrel) in 1-3 weeks after her back situation is better. Amantadine may help in her wearing off and dyskinesias.      Gait/Balance/Falls   Fell and kind of caught herself yesterday - was on her knees  Discussion about multi tasking - using only one hand and not two hands to carry things.      Exercise/Therapy performed/offered     Getting up at 4 or 5am and have to let dogs in and out and is run down              Taty - yellow lab and red wright lab 65 #              Cat              Other dog   Not feeding till a     Doing big and loud every day     [facility] perez physical therapy  Also doing occupational therapy      Silver sneaker discussion - [place]   Can use facilities free  Pool assessment at [facility]          She has seen therapists in the past at [facility] and if needed may need to see orthopedics, PMR or sports medicine for her back symptoms which may improve with conservative management.      For RLS -  Gabapentin neurontin 100mg @10am and sometimes @8/11pm extra dose   Gabapentin neurontin 300mg @8/11pm     may consider taking second dose of gabapentin earlier than 10/11pm        Cognitive/Driving   Short distances only; not of freeway  Cognitive problems - recalling names takes longer  Problems with spelling        Would recommend a baseline neuropsychological evaluation 5/9/2022  brianna belle     Generally moderate to severe impairments identified in nonverbal skills (basic judgment, reasoning, planning and organization) as well as aspects of executive functioning (complex attention, visuospatial planning and organization)  Subtle weaknesses (low average) were evident on additional nonverbal (learning and memory) and executive (working memory) measures as well as in cognitive efficiency  Performance was intact in most verbal skills including verbal learning and memory, and aspects of executive functioning (phonemic fluency, deductive reasoning, inhibition).   She states that spatial skills have always been an area of weaknesse, but her  has observed a decline in this area        Mood   Counselling - working with counsellor   Bupropion wellbutrin SR 200mg 12 hr tablet twice daily  Sertraline zoloft 100mg daily     Mood could be better  Has been unable to go off wellbutrin in the past  Has wearing off related mood issues  Not sure how much the sertraline has helped.     fatigue  Emotional   Missing family for thanksgiving  Mother with c diff and is 85 and frail   with covid      Problems handling the cold  Needs to get full spectrum light     Daughter moving back with her dog   Renuka     Acceptance issues with the parkinson  Not driving       Hallucinations/delusions   denies     Sleep   Gets up to take  her first dose of medication @330/530am and then goes back to bed; possibly till 930am  Variable time of wakening up.    Falls asleep relatively easily   Sleeping a lot - exhausted     Sleep deprived with dogs and affects pill cycle     Bladder/Renal/Prostate/Gyn/Other  Urinary urgency  Variable nocturia depending on hydration, etc.   Discussed timing of water intake     GI/Constipation/GERD   Magnesium oxide 500mg twice daily at 10am and 10pm  Probiotic twice daily smarty pants at 10am and 10pm   Doing well     ENDO/Lipid/DM/Bone density/Thyroid  Calcium carbonate at night at 10pm  Cholecalciferol vitamin D3 1000 units at 10am     Cardio/heart/Hyper or Hypotensive   POTS    Blood pressure is better with medication change  She thinks that the propranolol helps her tremor but worried   Fludrocortisone florinef 0.1mg - not taking  Midodrine proamatine 5mg 3/day   Propranolol 10mg 1/2 of 10mg daily at 10am      Cardiology  Kimber,pan Constantino, Marlena Dick, Reyna - cardiologist     water and salt intake     Dr. Reyna Dick     Vision/Dry Eyes/Cataracts/Glaucoma/Macular   Less contrast sensitivity  Tree vs person  Will see Oumar Wade on 3/2/2023     Heme/Anticoagulation/Antiplatelet/Anemia/Other  Not on aspirin     ENT/Resp  Breathing  Albuterol proair as needed   Asmanex 120 metered doses 220mcg/inh inhaler at night  - not taking   Mometasone asmanex - not taking  Taste and smell has not improved since covid     Skin/Cancer/Seborrhea/other  denies     Musculoskeletal/Pain/Headache  Denies - other than shoulders      Other:      Ongoing nausea that may be related to her comtan/entacapone or other factors  She has some weight loss    Discussed timing of meals and the sinemet and anti nausea medications  Has seen Wendy Caro 3/2022  Has not been on rytary and has not been on amantadine  She has had wearing off related leg symptoms - numbness -     Has not had an emg to evaluate for neuropathy  And she also has  wearing off related neuropathy type of leg symptoms  Blood work and emg could be done     Discussion about report of the neuropsychological findings and terminology.   She does not have lewy body disease.     Left trigeminal neuralgia    Prior history of lyme disease    Sweating episodes, not able to take hot shower      Acetaminophen tylenol 500mg scheduled.   b complex   Coenzyme Q10 daily   Cyclobenzaprine flexeril as needed 1/2 tab  Gabapentin neurontin 100mg twice daily   Gabapentin neurontin 300mg at night   Vitamin C ascorbic acid 500mg     Visual issues; seeing dr Wade  Seeing Red and green squares and has a reduction in amantadine   Discussion about lab work to check kidney function as is taking amantadine (symmetrel)   Last renal labs in our chart are from 2020.   Not driving  Depth perception problems      Wearing off at 4/5 hours - wears off 45 minutes before dose and has cramping, aching  Has delayed on periods   Cramping at night - toes and feet and walks around till pills work   This may occur at 9pm or so   Has cramping in the car/juan a horse      Last visit was summer 2022     Think that rytary was too expensive  Did not benefit from comtan and had side effects  Has not been on gocovri     Consider reducing the interval to 4.5 hours of the sinemet.   Has had some slow off periods and some sudden off periods.      Bowel movements are still there but may try some things such as the miralax, which she says she has tried in the past.      Cognitive problems  Did not tolerate donepezil (aricept)  Trial of rivastigmine excelon patch for cognition, monitor for blood pressure or heart rate changes or other side effects.   4.6mg patch daily for 4 weeks then 9.5mg patch daily for 4 weeks then increase to the 13.3mg patch daily  Hs problems with  Multi tasking  Discussion about attention changes and focusing changes      Blood pressure being monitored and managed with medications for her orthostatic symptoms.       Urinary urgency      Pharmacy consultation  Pharmacy (MTM) consultation and medication management  Please call the scheduling number @ [phone] to set up an appointment with pharmacists Wendy Caro or Fatemeh Richey.      Ongoing sleep issues - dogs are there  3 dogs and living in the family room  - rashi is in the bedroom  2 y ear old dog, middle age dog and older dog (has problems climbing the stairs)     In summary - will need to work on intervals 2 tabs 4 or 4.5 hours vs 2.5 pills every 5 hours   Work on constipation   Review price of the rivastigmine patch but don't start yet.      Return back in 3-6 months.     Parkinson's Disease:       Orthostatic Hypotension:  Today's BP showed significant SBP drop. Most likely, this is why pt was feeling dizzy, not moving well.   gave her the midday dose after her orthostatic BP/HR was completed.      Memory Problems: Ongoing pt. Pt had difficulty follow the instructions to perform tasks when motor assessment was completed. On Rivastigmine 4.6 mg patch & hasn't increased the dose yet. No side effects from the current dose.      __  Stay on the same antiparkinsonian medication.      __  Check your BP regularly. Today, your standing BP dropped.  Slowly stand up and wait before walking. Monitor dizziness.  Continue taking the Midodrine.       5/17/2023  1:23 PM 5/17/2023  1:25 PM   Vital Signs       Systolic 146 !  89 (L)    Diastolic 82 !  55 !    Pulse 58  58       __  Increase the Rivastigmine patch to 9.6 mg today.       __  See Wendy Crao, Pharm.D. on May 25th.         Medications     4/430 9a 10a 1p 4p 6p 8p 10p   Acetaminophen tylenol 500mg      2          2   Albuterol proair proventil ventolin      prn              Amantadine gocovri 68.5mg 24hr 121/mo        starting   Amantadine symmetrel 100mg 1/2         1/2    switch   b complex                1    Bupropion wellbutrin SR 200mg 12      1         1    Calcium carbonate 600mg                1    Carbidopa/levodopa Sinemet  25/100  2  2.5   2     2.5   Cholecalciferol vit D3 25mcg 1000u      1             Coenzyme Q10 100mg               1   Gabapentin neurontin 100mg     2         1   Gabapentin neurontin 300mg               1    Midodrine proamatine 5mg  1  1  1     Midodrine proamatine 2.5mg off          Mometasone fumorate asmanex   off                 Polyethylene glycol miralax prn                 Probiotic prebiotic 1              1    Propranolol inderal 10mg     1/2             Psyllium metamucil  no                 Rivastigmine exelon 13.3mg 24hr patch using          Sertraline zoloft 100mg     1             Sodium fluoride dental gel previden                   Tizanidine zanaflex 2mg prn                 Vitamin C 500mg      1                                                         Plan:    Weight loss - encouraged to increase calories and don't take protein within 1-2 hours of carbidopa/levodopa (sinemet)  Stress of planning a wedding in 4 weeks hair done in the morning, wedding     Will switch to gocovri tomorrow and last dose of regular amantadine will be today   Can use as needed sinemet to help    Has had falls and tripped over the dog  Had a fall trying to get dressed and stand on one leg  May drop something if doing two things at one time - using both hands    9/13/2023 visit with Wendy aCro     Return back in 3 months with Zeynep    Discussion about blood pressure - continue on midodrine 5mg 3/day    Doing exercise classes     Using the patch rivastigmine 13.3mg - and has helped a bit     Discussion about bowel habits  Using prebiotic twice daily     May want to talk with Dr Ishan Melendrez about repeat dexa scan - l ast one was 2018 I think  May need bone enhancing medication  And continue vitamin d and calcium       Coding statement:   Medical Decision Making:  #  Chronic progressive medical conditions addressed  - see above --   Review and/or interpretation of unique test or  documentation from a provider outside of neurology no   Independent historian provided additional details  yes I  Prescription drug management and review of potential side effects and/or monitoring for side effects  -- see above ---  Health impacted by social determinants of health  no    I have reviewed the note as documented above.  This accurately captures the substance of my conversation with the patient and total time spent preparing for visit, executing visit and completing visit on the day of the visit:  30 minutes.  The portion of this total time included face to face time 26 minutes     Ishan Hearn MD     ______________________________________    Last visit date and details:             ______________________________________      Patient was asked about 14 Review of systems including changes in vision (dry eyes, double vision), hearing, heart, lungs, musculoskeletal, depression, anxiety, snoring, RBD, insomnia, urinary frequency, urinary urgency, constipation, swallowing problems, hematological, ID, allergies, skin problems: seborrhea, endocrinological: thyroid, diabetes, cholesterol; balance, weight changes, and other neurological problems and these were not significant at this time except for   Patient Active Problem List   Diagnosis    Asthma    Benign essential hypertension    Adenoma of colon    Anxiety    DDD (degenerative disc disease), lumbar    Depression    Dizziness    Labile blood pressure    Hypotension    Near syncope    Osteopenia    Parkinson disease (H)    POTS (postural orthostatic tachycardia syndrome)    Trigeminal neuralgia          Allergies   Allergen Reactions    Prednisone Anxiety, Palpitations and Shortness Of Breath    Baclofen Other (See Comments)     Agitation, confusion, brain fog, sedation    Donepezil      Insomnia, nausea, dizziness/somnolence     Entacapone     Penicillins      Unknown    Prednisone      Other reaction(s): *Unknown    Sulfa Antibiotics      Unknown     Past  "Surgical History:   Procedure Laterality Date    D & C       Past Medical History:   Diagnosis Date    Anxiety     Depression     Hyperlipidemia     Hypertension     Lyme disease     Trigeminal neuralgia      Social History     Socioeconomic History    Marital status:      Spouse name: Not on file    Number of children: Not on file    Years of education: Not on file    Highest education level: Not on file   Occupational History    Not on file   Tobacco Use    Smoking status: Never    Smokeless tobacco: Never   Substance and Sexual Activity    Alcohol use: No    Drug use: Not on file    Sexual activity: Not on file   Other Topics Concern    Not on file   Social History Narrative    . Lives in [place]. Tejinder Zavala spouse. They have three children. Their names are Eros, Venkat, and Christie. She is a homemaker. Studied library science and theology. She was a  for over 16 years. 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[place], focusing on castro - can see on [employer] \"[place] and Backroads.\"        [email or web address]     Social Determinants of Health     Financial Resource Strain: Not on file   Food Insecurity: Not on file   Transportation Needs: Not on file   Physical Activity: Not on file   Stress: Not on file   Social Connections: Not on file   Intimate Partner Violence: Not on file   Housing Stability: Not on file       Drug and lactation database from the [place] National Library of Medicine:  http://toxnet.nlm.nih.gov/cgi-bin/sis/htmlgen?LACT      B/P: Data Unavailable, T: Data Unavailable, P: Data Unavailable, R: Data Unavailable 0 lbs 0 oz  There were no vitals taken for this visit., There is no height or weight on file to calculate BMI.  Medications and Vitals not listed above were documented in the cart and reviewed by me.     Current Outpatient " Medications   Medication Sig Dispense Refill    midodrine (PROAMATINE) 5 MG tablet       acetaminophen (TYLENOL) 500 MG tablet 2 x 500mg tab by mouth twice daily @ 10am and 11pm  = 4/day      albuterol (PROAIR HFA/PROVENTIL HFA/VENTOLIN HFA) 108 (90 Base) MCG/ACT inhaler Inhale 2 puffs into the lungs every 6 hours as needed      amantadine (GOCOVRI) 68.5 MG CP24 ER capsule Take 1 capsule (68.5 mg) by mouth At Bedtime 30 capsule 11    amantadine HCl 100 MG TABS Take half-tablet (50 mg) by mouth 3 times daily (early morning, late morning, and afternoon) (Patient taking differently: Take half-tablet (50 mg) by mouth 2 times daily) 135 tablet 3    B Complex-C (VITAMIN B COMPLEX W/VITAMIN C) TABS tablet Take 1 tablet by mouth daily      buPROPion (WELLBUTRIN SR) 200 MG 12 hr tablet 200mg tab by mouth twice daily at 10am and 10pm  0    calcium carbonate (OS-TENA) 600 MG tablet 1 tab by mouth nightly at 10pm      carbidopa-levodopa (SINEMET)  MG tablet 2 tablets by mouth every 4.5 hours  = 12 pills/day 1080 tablet 3    cholecalciferol 25 MCG (1000 UT) TABS 1000 units (25 mcg) by mouth daily at 10am      co-enzyme Q-10 100 MG CAPS capsule 100mg capsule by mouth every evening at 11pm      gabapentin (NEURONTIN) 100 MG capsule 200 mg capsule @ 10 AM and 100 mg 10pm 180 capsule 3    gabapentin (NEURONTIN) 300 MG capsule 300mg capsule by mouth nightly @ 10pm 90 capsule 3    midodrine (PROAMATINE) 2.5 MG tablet Take 5 mg by mouth 3 times daily @9am, 1pm and 6pm      polyethylene glycol (MIRALAX) 17 g packet ? START ?      probiotic CAPS 1 capsule by mouth twice daily (Smarty Pants Adult Probiotic Complete) at 10am and 10pm      propranolol (INDERAL) 10 MG tablet 1/2 of 10mg tab by mouth once daily (morning only) 180 tablet 3    rivastigmine (EXELON) 13.3 MG/24HR 24 hr patch Place 1 patch onto the skin daily 30 patch 11    sertraline (ZOLOFT) 100 MG tablet 100mg tab by mouth dailya t 10am 90 tablet 3    tiZANidine (ZANAFLEX)  2 MG tablet Take 1-2 tablets by mouth 2 times daily as needed      vitamin C (ASCORBIC ACID) 500 MG tablet 500mg tab by mouth daily at 10am           Ishan Hearn MD

## 2023-08-17 ENCOUNTER — OFFICE VISIT (OUTPATIENT)
Dept: NEUROLOGY | Facility: CLINIC | Age: 63
End: 2023-08-17
Payer: COMMERCIAL

## 2023-08-17 VITALS
RESPIRATION RATE: 16 BRPM | BODY MASS INDEX: 19.67 KG/M2 | HEART RATE: 69 BPM | OXYGEN SATURATION: 99 % | WEIGHT: 120 LBS

## 2023-08-17 DIAGNOSIS — G20.A1 PARKINSON'S DISEASE (H): Primary | ICD-10-CM

## 2023-08-17 DIAGNOSIS — G20.A1 PARKINSON DISEASE (H): ICD-10-CM

## 2023-08-17 PROCEDURE — 99214 OFFICE O/P EST MOD 30 MIN: CPT | Performed by: PSYCHIATRY & NEUROLOGY

## 2023-08-17 RX ORDER — CARBIDOPA AND LEVODOPA 25; 100 MG/1; MG/1
TABLET ORAL
Qty: 1080 TABLET | Refills: 3 | Status: SHIPPED | OUTPATIENT
Start: 2023-08-17 | End: 2024-05-28

## 2023-08-17 RX ORDER — AMANTADINE HYDROCHLORIDE 100 MG/1
TABLET ORAL
Qty: 135 TABLET | Refills: 3 | COMMUNITY
Start: 2023-08-17 | End: 2023-08-31

## 2023-08-17 RX ORDER — SODIUM FLUORIDE 5 MG/G
GEL, DENTIFRICE DENTAL
COMMUNITY
Start: 2023-08-08

## 2023-08-17 ASSESSMENT — PAIN SCALES - GENERAL: PAINLEVEL: NO PAIN (0)

## 2023-08-17 NOTE — LETTER
2023       RE: Alina Zavala   [facility] [address]     Dear Colleague,    Thank you for referring your patient, Alina Zavala, to the [facility] [place] at [facility]. Please see a copy of my visit note below.        Diagnosis/Summary/Recommendations:    PATIENT: Alina Zavala  63 year old female     : 1960    DAHLIA: 2023    MRN: [id number]  [facility] [address]   [email or web address]  [phone] (M) -iphone  [phone] (H) -not an iphone  Tejinder HARP  [phone]     Venkat Cooney and Marissa.       PCP is Dr. HESHAM Melendrez  CArdiologist Dr. Reyna Dick  Eye doctor  Dentist  Chiropractor     [email or web address]        Assessment:  (G20) Parkinson disease (H)  (primary encounter diagnosis)  (G20) Parkinson disease (H)  (primary encounter diagnosis)  Sinemet 25/100 2 tabs 5/day  Entacapone comtan 200mg - not taking     Review of diagnosis    Parkinson disease     Avoidance of dopamine blockers   Not taking     Motor complication review   wearing off. At 4-5 hours gets tingling of her toes/fingers  Legs - cramping, clawing - behind her knees  Wearing off afternoon; has to be attentive to afternoon dosing     Has nausea with comtan and not eating as much   Weight has reduced to 144 from 162 #      Review of Impulse control disorders         Review of surgical or medication options   reviewed     May consider a trial of amantadine (symmetrel) in 1-3 weeks after her back situation is better. Amantadine may help in her wearing off and dyskinesias.      Gait/Balance/Falls   Fell and kind of caught herself yesterday - was on her knees  Discussion about multi tasking - using only one hand and not two hands to carry things.      Exercise/Therapy performed/offered     Getting up at 4 or 5am and have to let dogs in and out and is run down              Taty -  yellow lab and red wright lab 65 #              Cat              Other dog   Not feeding till 7/730a     Doing big and loud every day     [facility] perez physical therapy  Also doing occupational therapy      Silver sneaker discussion - [place]   Can use facilities free  Pool assessment at [facility]         She has seen therapists in the past at [facility] and if needed may need to see orthopedics, PMR or sports medicine for her back symptoms which may improve with conservative management.      For RLS -  Gabapentin neurontin 100mg @10am and sometimes @8/11pm extra dose   Gabapentin neurontin 300mg @8/11pm     may consider taking second dose of gabapentin earlier than 10/11pm        Cognitive/Driving   Short distances only; not of freeway  Cognitive problems - recalling names takes longer  Problems with spelling        Would recommend a baseline neuropsychological evaluation 5/9/2022  brianna belle     Generally moderate to severe impairments identified in nonverbal skills (basic judgment, reasoning, planning and organization) as well as aspects of executive functioning (complex attention, visuospatial planning and organization)  Subtle weaknesses (low average) were evident on additional nonverbal (learning and memory) and executive (working memory) measures as well as in cognitive efficiency  Performance was intact in most verbal skills including verbal learning and memory, and aspects of executive functioning (phonemic fluency, deductive reasoning, inhibition).   She states that spatial skills have always been an area of weaknesse, but her  has observed a decline in this area        Mood   Counselling - working with counsellor   Bupropion wellbutrin SR 200mg 12 hr tablet twice daily  Sertraline zoloft 100mg daily     Mood could be better  Has been unable to go off wellbutrin in the past  Has wearing off related mood issues  Not sure how much the sertraline has helped.     fatigue  Emotional   Missing  family for thanksgiving  Mother with c diff and is 85 and frail   with covid      Problems handling the cold  Needs to get full spectrum light     Daughter moving back with her dog   Renuka     Acceptance issues with the parkinson  Not driving       Hallucinations/delusions   denies     Sleep   Gets up to take her first dose of medication @330/530am and then goes back to bed; possibly till 930am  Variable time of wakening up.    Falls asleep relatively easily   Sleeping a lot - exhausted     Sleep deprived with dogs and affects pill cycle     Bladder/Renal/Prostate/Gyn/Other  Urinary urgency  Variable nocturia depending on hydration, etc.   Discussed timing of water intake     GI/Constipation/GERD   Magnesium oxide 500mg twice daily at 10am and 10pm  Probiotic twice daily smarty pants at 10am and 10pm   Doing well     ENDO/Lipid/DM/Bone density/Thyroid  Calcium carbonate at night at 10pm  Cholecalciferol vitamin D3 1000 units at 10am     Cardio/heart/Hyper or Hypotensive   POTS    Blood pressure is better with medication change  She thinks that the propranolol helps her tremor but worried   Fludrocortisone florinef 0.1mg - not taking  Midodrine proamatine 5mg 3/day   Propranolol 10mg 1/2 of 10mg daily at 10am      Cardiology  Kimber,pan Constantino, Marlena Dick, Reyna - cardiologist     water and salt intake     Dr. Reyna Dick     Vision/Dry Eyes/Cataracts/Glaucoma/Macular   Less contrast sensitivity  Tree vs person  Will see Oumar Wade on 3/2/2023     Heme/Anticoagulation/Antiplatelet/Anemia/Other  Not on aspirin     ENT/Resp  Breathing  Albuterol proair as needed   Asmanex 120 metered doses 220mcg/inh inhaler at night  - not taking   Mometasone asmanex - not taking  Taste and smell has not improved since covid     Skin/Cancer/Seborrhea/other  denies     Musculoskeletal/Pain/Headache  Denies - other than shoulders      Other:      Ongoing nausea that may be related to her comtan/entacapone or other  factors  She has some weight loss    Discussed timing of meals and the sinemet and anti nausea medications  Has seen Wendy Caro 3/2022  Has not been on rytary and has not been on amantadine  She has had wearing off related leg symptoms - numbness -     Has not had an emg to evaluate for neuropathy  And she also has wearing off related neuropathy type of leg symptoms  Blood work and emg could be done     Discussion about report of the neuropsychological findings and terminology.   She does not have lewy body disease.     Left trigeminal neuralgia    Prior history of lyme disease    Sweating episodes, not able to take hot shower      Acetaminophen tylenol 500mg scheduled.   b complex   Coenzyme Q10 daily   Cyclobenzaprine flexeril as needed 1/2 tab  Gabapentin neurontin 100mg twice daily   Gabapentin neurontin 300mg at night   Vitamin C ascorbic acid 500mg     Visual issues; seeing dr Wade  Seeing Red and green squares and has a reduction in amantadine   Discussion about lab work to check kidney function as is taking amantadine (symmetrel)   Last renal labs in our chart are from 2020.   Not driving  Depth perception problems      Wearing off at 4/5 hours - wears off 45 minutes before dose and has cramping, aching  Has delayed on periods   Cramping at night - toes and feet and walks around till pills work   This may occur at 9pm or so   Has cramping in the car/juan a horse      Last visit was summer 2022     Think that rytary was too expensive  Did not benefit from comtan and had side effects  Has not been on gocovri     Consider reducing the interval to 4.5 hours of the sinemet.   Has had some slow off periods and some sudden off periods.      Bowel movements are still there but may try some things such as the miralax, which she says she has tried in the past.      Cognitive problems  Did not tolerate donepezil (aricept)  Trial of rivastigmine excelon patch for cognition, monitor for blood pressure or heart rate  changes or other side effects.   4.6mg patch daily for 4 weeks then 9.5mg patch daily for 4 weeks then increase to the 13.3mg patch daily  Hs problems with  Multi tasking  Discussion about attention changes and focusing changes      Blood pressure being monitored and managed with medications for her orthostatic symptoms.      Urinary urgency      Pharmacy consultation  Pharmacy ([facility]) consultation and medication management  Please call the scheduling number @ [phone] to set up an appointment with pharmacists Wendy Caro or Fatemeh Richey.      Ongoing sleep issues - dogs are there  3 dogs and living in the family room  - rashi is in the bedroom  2 y ear old dog, middle age dog and older dog (has problems climbing the stairs)     In summary - will need to work on intervals 2 tabs 4 or 4.5 hours vs 2.5 pills every 5 hours   Work on constipation   Review price of the rivastigmine patch but don't start yet.      Return back in 3-6 months.     Parkinson's Disease:       Orthostatic Hypotension:  Today's BP showed significant SBP drop. Most likely, this is why pt was feeling dizzy, not moving well.   gave her the midday dose after her orthostatic BP/HR was completed.      Memory Problems: Ongoing pt. Pt had difficulty follow the instructions to perform tasks when motor assessment was completed. On Rivastigmine 4.6 mg patch & hasn't increased the dose yet. No side effects from the current dose.      __  Stay on the same antiparkinsonian medication.      __  Check your BP regularly. Today, your standing BP dropped.  Slowly stand up and wait before walking. Monitor dizziness.  Continue taking the Midodrine.       5/17/2023  1:23 PM 5/17/2023  1:25 PM   Vital Signs       Systolic 146 !  89 (L)    Diastolic 82 !  55 !    Pulse 58  58       __  Increase the Rivastigmine patch to 9.6 mg today.       __  See Wendy Caro, Pharm.D. on May 25th.         Medications     4/430 9a 10a 1p 4p 6p 8p 10p   Acetaminophen  tylenol 500mg      2          2   Albuterol proair proventil ventolin      prn              Amantadine gocovri 68.5mg 24hr 121/mo        starting   Amantadine symmetrel 100mg 1/2         1/2    switch   b complex                1    Bupropion wellbutrin SR 200mg 12      1         1    Calcium carbonate 600mg               1    Carbidopa/levodopa Sinemet  25/100  2  2.5   2     2.5   Cholecalciferol vit D3 25mcg 1000u      1             Coenzyme Q10 100mg               1   Gabapentin neurontin 100mg     2         1   Gabapentin neurontin 300mg               1    Midodrine proamatine 5mg  1  1  1     Midodrine proamatine 2.5mg off          Mometasone fumorate asmanex   off                 Polyethylene glycol miralax prn                 Probiotic prebiotic 1              1    Propranolol inderal 10mg     1/2             Psyllium metamucil  no                 Rivastigmine exelon 13.3mg 24hr patch using          Sertraline zoloft 100mg     1             Sodium fluoride dental gel previden                   Tizanidine zanaflex 2mg prn                 Vitamin C 500mg      1                                                         Plan:    Weight loss - encouraged to increase calories and don't take protein within 1-2 hours of carbidopa/levodopa (sinemet)  Stress of planning a wedding in 4 weeks hair done in the morning, wedding     Will switch to gocovri tomorrow and last dose of regular amantadine will be today   Can use as needed sinemet to help    Has had falls and tripped over the dog  Had a fall trying to get dressed and stand on one leg  May drop something if doing two things at one time - using both hands    9/13/2023 visit with Wendy Caro     Return back in 3 months with Zeynep    Discussion about blood pressure - continue on midodrine 5mg 3/day    Doing exercise classes     Using the patch rivastigmine 13.3mg - and has helped a bit     Discussion about bowel habits  Using prebiotic twice daily     May want to  talk with Dr Ishan Melendrez about repeat dexa scan - l ast one was 2018 I think  May need bone enhancing medication  And continue vitamin d and calcium       Coding statement:   Medical Decision Making:  #  Chronic progressive medical conditions addressed  - see above --   Review and/or interpretation of unique test or documentation from a provider outside of neurology no   Independent historian provided additional details  yes I  Prescription drug management and review of potential side effects and/or monitoring for side effects  -- see above ---  Health impacted by social determinants of health  no    I have reviewed the note as documented above.  This accurately captures the substance of my conversation with the patient and total time spent preparing for visit, executing visit and completing visit on the day of the visit:  30 minutes.  The portion of this total time included face to face time 26 minutes     Ishan Hearn MD     ______________________________________    Last visit date and details:             ______________________________________      Patient was asked about 14 Review of systems including changes in vision (dry eyes, double vision), hearing, heart, lungs, musculoskeletal, depression, anxiety, snoring, RBD, insomnia, urinary frequency, urinary urgency, constipation, swallowing problems, hematological, ID, allergies, skin problems: seborrhea, endocrinological: thyroid, diabetes, cholesterol; balance, weight changes, and other neurological problems and these were not significant at this time except for   Patient Active Problem List   Diagnosis    Asthma    Benign essential hypertension    Adenoma of colon    Anxiety    DDD (degenerative disc disease), lumbar    Depression    Dizziness    Labile blood pressure    Hypotension    Near syncope    Osteopenia    Parkinson disease (H)    POTS (postural orthostatic tachycardia syndrome)    Trigeminal neuralgia          Allergies   Allergen Reactions     "Prednisone Anxiety, Palpitations and Shortness Of Breath    Baclofen Other (See Comments)     Agitation, confusion, brain fog, sedation    Donepezil      Insomnia, nausea, dizziness/somnolence     Entacapone     Penicillins      Unknown    Prednisone      Other reaction(s): *Unknown    Sulfa Antibiotics      Unknown     Past Surgical History:   Procedure Laterality Date    D & C       Past Medical History:   Diagnosis Date    Anxiety     Depression     Hyperlipidemia     Hypertension     Lyme disease     Trigeminal neuralgia      Social History     Socioeconomic History    Marital status:      Spouse name: Not on file    Number of children: Not on file    Years of education: Not on file    Highest education level: Not on file   Occupational History    Not on file   Tobacco Use    Smoking status: Never    Smokeless tobacco: Never   Substance and Sexual Activity    Alcohol use: No    Drug use: Not on file    Sexual activity: Not on file   Other Topics Concern    Not on file   Social History Narrative    . Lives in [place]. Tejinder Zavala spouse. They have three children. Their names are Eros, Venkat, and Christie. She is a homemaker. Studied library science and theology. She was a  for over 16 years. 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[place], focusing on cast[place] - can see on [employer] \"[place] Back[place].\"        [email or web address]     Social Determinants of Health     Financial Resource Strain: Not on file   Food Insecurity: Not on file   Transportation Needs: Not on file   Physical Activity: Not on file   Stress: Not on file   Social Connections: Not on file   Intimate Partner Violence: Not on file   Housing Stability: Not on file       Drug and lactation database from the [employer] " Medicine:  http://toxnet.nlm.nih.gov/cgi-bin/sis/htmlgen?LACT      B/P: Data Unavailable, T: Data Unavailable, P: Data Unavailable, R: Data Unavailable 0 lbs 0 oz  There were no vitals taken for this visit., There is no height or weight on file to calculate BMI.  Medications and Vitals not listed above were documented in the cart and reviewed by me.     Current Outpatient Medications   Medication Sig Dispense Refill    midodrine (PROAMATINE) 5 MG tablet       acetaminophen (TYLENOL) 500 MG tablet 2 x 500mg tab by mouth twice daily @ 10am and 11pm  = 4/day      albuterol (PROAIR HFA/PROVENTIL HFA/VENTOLIN HFA) 108 (90 Base) MCG/ACT inhaler Inhale 2 puffs into the lungs every 6 hours as needed      amantadine (GOCOVRI) 68.5 MG CP24 ER capsule Take 1 capsule (68.5 mg) by mouth At Bedtime 30 capsule 11    amantadine HCl 100 MG TABS Take half-tablet (50 mg) by mouth 3 times daily (early morning, late morning, and afternoon) (Patient taking differently: Take half-tablet (50 mg) by mouth 2 times daily) 135 tablet 3    B Complex-C (VITAMIN B COMPLEX W/VITAMIN C) TABS tablet Take 1 tablet by mouth daily      buPROPion (WELLBUTRIN SR) 200 MG 12 hr tablet 200mg tab by mouth twice daily at 10am and 10pm  0    calcium carbonate (OS-TENA) 600 MG tablet 1 tab by mouth nightly at 10pm      carbidopa-levodopa (SINEMET)  MG tablet 2 tablets by mouth every 4.5 hours  = 12 pills/day 1080 tablet 3    cholecalciferol 25 MCG (1000 UT) TABS 1000 units (25 mcg) by mouth daily at 10am      co-enzyme Q-10 100 MG CAPS capsule 100mg capsule by mouth every evening at 11pm      gabapentin (NEURONTIN) 100 MG capsule 200 mg capsule @ 10 AM and 100 mg 10pm 180 capsule 3    gabapentin (NEURONTIN) 300 MG capsule 300mg capsule by mouth nightly @ 10pm 90 capsule 3    midodrine (PROAMATINE) 2.5 MG tablet Take 5 mg by mouth 3 times daily @9am, 1pm and 6pm      polyethylene glycol (MIRALAX) 17 g packet ? START ?      probiotic CAPS 1 capsule by mouth  twice daily (Smarty Pants Adult Probiotic Complete) at 10am and 10pm      propranolol (INDERAL) 10 MG tablet 1/2 of 10mg tab by mouth once daily (morning only) 180 tablet 3    rivastigmine (EXELON) 13.3 MG/24HR 24 hr patch Place 1 patch onto the skin daily 30 patch 11    sertraline (ZOLOFT) 100 MG tablet 100mg tab by mouth dailya t 10am 90 tablet 3    tiZANidine (ZANAFLEX) 2 MG tablet Take 1-2 tablets by mouth 2 times daily as needed      vitamin C (ASCORBIC ACID) 500 MG tablet 500mg tab by mouth daily at 10am           Ishan Hearn MD

## 2023-08-17 NOTE — PATIENT INSTRUCTIONS
Medications     4/430 9a 10a 1p 4p 6p 8p 10p   Acetaminophen tylenol 500mg      2          2   Albuterol proair proventil ventolin      prn              Amantadine gocovri 68.5mg 24hr 121/mo        starting   Amantadine symmetrel 100mg 1/2         1/2    switch   b complex                1    Bupropion wellbutrin SR 200mg 12      1         1    Calcium carbonate 600mg               1    Carbidopa/levodopa Sinemet  25/100  2  2.5   2     2.5   Cholecalciferol vit D3 25mcg 1000u      1             Coenzyme Q10 100mg               1   Gabapentin neurontin 100mg     2         1   Gabapentin neurontin 300mg               1    Midodrine proamatine 5mg  1  1  1     Midodrine proamatine 2.5mg off          Mometasone fumorate asmanex   off                 Polyethylene glycol miralax prn                 Probiotic prebiotic 1              1    Propranolol inderal 10mg     1/2             Psyllium metamucil  no                 Rivastigmine exelon 13.3mg 24hr patch using          Sertraline zoloft 100mg     1             Sodium fluoride dental gel previden                   Tizanidine zanaflex 2mg prn                 Vitamin C 500mg      1                                                         Plan:    Weight loss - encouraged to increase calories and don't take protein within 1-2 hours of carbidopa/levodopa (sinemet)  Stress of planning a wedding in 4 weeks hair done in the morning, wedding     Will switch to gocovri tomorrow and last dose of regular amantadine will be today   Can use as needed sinemet to help        Has had falls and tripped over the dog  Had a fall trying to get dressed and stand on one leg  May drop something if doing two things at one time - using both hands    9/13/2023 visit with Wendy Caro     Return back in 3 months with Zeynep    Discussion about blood pressure - continue on midodrine 5mg 3/day    Doing exercise classes     Using the patch rivastigmine 13.3mg - and has helped a bit      Discussion about bowel habits  Using prebiotic twice daily     May want to talk with Dr Ishan Melendrez about repeat dexa scan - l ast one was 2018 I think  May need bone enhancing medication  And continue vitamin d and calcium

## 2023-08-30 ENCOUNTER — TELEPHONE (OUTPATIENT)
Dept: NEUROLOGY | Facility: CLINIC | Age: 63
End: 2023-08-30
Payer: COMMERCIAL

## 2023-08-30 DIAGNOSIS — G20.A1 PARKINSON DISEASE (H): ICD-10-CM

## 2023-08-30 NOTE — TELEPHONE ENCOUNTER
Health Call Center    Phone Message    May a detailed message be left on voicemail: yes     Reason for Call: Symptoms or Concerns     If patient has red-flag symptoms, warm transfer to triage line    Current symptom or concern: nerve pain, hard to walk    Symptoms have been present for:  1 week(s)    Has patient previously been seen for this? Yes    By : Dr. Hearn    Date: 08/30/23    Are there any new or worsening symptoms? Yes: Pt called to inform provider that Pt is having a hard time with nerve pain.  Pt thinks it has to do with the change of medication and would like to speak with care team on next steps.    Please call Pt back at [phone] to advise.    Action Taken: Message routed to:  [facility] Neurology    Travel Screening: Not Applicable

## 2023-08-31 RX ORDER — AMANTADINE HYDROCHLORIDE 100 MG/1
TABLET ORAL
Qty: 90 TABLET | Refills: 3 | Status: SHIPPED | OUTPATIENT
Start: 2023-08-31 | End: 2024-05-28

## 2023-08-31 NOTE — TELEPHONE ENCOUNTER
She had mild nerve pain a couple months ago, a couple weeks ago it started getting worse. It is in her feet and lower legs mainly, she describes it has pins and needles, slight burning sensation. She has some muscle pain in her hips and thighs and toe curling when her carbidopa/levodopa wears off. She takes it q5 hours and it wears off 15 minutes before the next dose. She does take gabapentin 300 mg at night and 200 mg in the morning. Pins and needles get worse in her feet when she has toe curling. The nerve and muscle pain sometimes keeps her up at night. She urinates with urgency/frequency at night anywhere from every 1/2-4 hours.    She is using gocvori now and thinks her dyskinesia's are worse. It costs $121 per month. Rather than increase it due to price she thinks she would like to go back to taking amantadine 100 mg tablets. She started Gocovri on 8/5    Plan/recommendation:  I will connect with Wendy and Dr. Hearn about Gocovri  Stress may cause more Parkinson's disease symptoms especially as the wedding comes up. Your carbidopa/levodopa RX says you may take it every 4-5 hours. Since you wear off by 5 hours you could take the doses every 4.5 hours  Limit fluids 3 hours before bedtime. Limit caffeine and aspartame containing drinks to the morning/early afternoon. If you have leakage issues you could practice Kegel exercises. Otherwise if you are urinating more often especially every 1/2 at night you may talk with Dr. Hearn about starting a urinary medication  Call Dr. Melendrez about your nerve pain, he may want to increase your gabapentin    30 minute phone call

## 2023-08-31 NOTE — TELEPHONE ENCOUNTER
Phone went straight to . Asked Alina to call me back or send a message with a good time to call her.

## 2023-08-31 NOTE — TELEPHONE ENCOUNTER
[facility] Call Center    Phone Message    May a detailed message be left on voicemail: yes     Reason for Call:  Patient is returning a call to nurse, pt will be available for call back     Action Taken: Message routed to:  [facility] luan neurology    Travel Screening: Not Applicable

## 2023-08-31 NOTE — TELEPHONE ENCOUNTER
Thanks Yamilet, I'm fine with her going back to amantadine IR. We were trying Gocovri to see if she would tolerate better but started with a very low dose and she would probably require a higher dose to see benefit, but sounds like cost is prohibitive. She can go back to previous dose of amantadine IR 50 mg twice daily (that order should still be active at the pharmacy)    Wendy Caro, Pharm.D.  Medication Therapy Management Pharmacist  [facility] Neurology

## 2023-08-31 NOTE — TELEPHONE ENCOUNTER
Re-ordered amantadine for 50 mg twice daily and stopped Gocovri prescription.    Wendy Caro, Pharm.D.  Medication Therapy Management Pharmacist  [facility]

## 2023-09-13 ENCOUNTER — VIRTUAL VISIT (OUTPATIENT)
Dept: PHARMACY | Facility: CLINIC | Age: 63
End: 2023-09-13
Payer: COMMERCIAL

## 2023-09-13 DIAGNOSIS — G62.9 NEUROPATHY: ICD-10-CM

## 2023-09-13 DIAGNOSIS — G20.A1 PARKINSON DISEASE (H): Primary | ICD-10-CM

## 2023-09-13 PROCEDURE — 99606 MTMS BY PHARM EST 15 MIN: CPT | Mod: VID | Performed by: PHARMACIST

## 2023-09-13 PROCEDURE — 99607 MTMS BY PHARM ADDL 15 MIN: CPT | Mod: VID | Performed by: PHARMACIST

## 2023-09-13 RX ORDER — AMITRIPTYLINE HYDROCHLORIDE 10 MG/1
10 TABLET ORAL AT BEDTIME
COMMUNITY
Start: 2023-09-01 | End: 2023-10-18

## 2023-09-13 NOTE — PATIENT INSTRUCTIONS
"Recommendations from today's MTM visit:                                                      We can try switching the carbidopa-levodopa regimen to 2 tablets every 4 hours (total of 12 tablets per day). The goal is to reduce your peak-dose dyskinesias and early wearing off of the medication with this new schedule.   I would recommend not taking amitriptyline as this medication can cause cognitive impairment and imbalance/falls as well as being very drying (dry eyes, dry mouth, blurry vision, constipation). Rather, I would recommend trying a higher dosage of gabapentin for your neuropathy. Please discuss with Dr. Melendrez or see a general neurologist if needed regarding the neuropathy.     Follow-up: 10/18/23 at 10 am video visit    It was great speaking with you today.  I value your experience and would be very thankful for your time in providing feedback in our clinic survey. In the next few days, you may receive an email or text message from [employer] with a link to a survey related to your  clinical pharmacist.\"     To schedule another MTM appointment, please call the clinic directly or you may call the MTM scheduling line at [phone] or toll-free at [phone].     My Clinical Pharmacist's contact information:                                                      Please feel free to contact me with any questions or concerns you have.      Wendy Caro, Pharm.D.  Medication Therapy Management Pharmacist  [facility]     "

## 2023-09-13 NOTE — PROGRESS NOTES
Medication Therapy Management (MTM) Encounter    ASSESSMENT:                            Medication Adherence/Access: No issues identified    Parkinson's Disease/neuropathy:     Patient would benefit from switching her carbidopa-levodopa regimen to 2 tablets every 4 hours to lessen the peak-dose dyskinesias and wearing off of the medication. This may also help her maintain a more consistent schedule. She decided to try this for a few days before the wedding on Saturday to see how it works. I advised that she not try the amitriptyline at this time as it can have several side effects in a person with Parkinson's disease and increasing the gabapentin may be a better option for her.     PLAN:                            We can try switching the carbidopa-levodopa regimen to 2 tablets every 4 hours (total of 12 tablets per day). The goal is to reduce your peak-dose dyskinesias and early wearing off of the medication with this new schedule.   I would recommend not taking amitriptyline as this medication can cause cognitive impairment and imbalance/falls as well as being very drying (dry eyes, dry mouth, blurry vision, constipation). Rather, I would recommend trying a higher dosage of gabapentin for your neuropathy. Please discuss with Dr. Melendrez or see a general neurologist if needed regarding the neuropathy.     Follow-up: 10/18/23 at 10 am video visit    SUBJECTIVE/OBJECTIVE:                          Alina Zavala is a 63 year old female contacted via secure video for a follow-up visit from 7/12/23. Patient was accompanied by , Justin.      Reason for visit: follow up on Parkinson's disease medications.    Allergies/ADRs: Reviewed in chart  Past Medical History: Reviewed in chart  Tobacco: She reports that she has never smoked. She has never used smokeless tobacco.  Alcohol: not currently using  Social: her daughter's wedding is on 9/16/23     Medication Adherence/Access: no issues reported     Parkinson's  "Disease/neuropathy:     carbidopa-levodopa  mg every 5 hours- alternating 2 tablets and 2.5 tablets every other time (ie: 2 - 2.5 - 2 - 2.5 - 2)  amantadine 50 mg twice daily   Rivastigmine 13.3 mg daily   Gabapentin 200 mg in the morning and 300 mg at night   Tizanidine 2 mg as needed   Amitriptyline 10 mg- not started     Patient states that Gocovri did not work well and was very expensive so she prefers to stay on regular amantadine. For her daughter's wedding this weekend, her  is wondering if she should take more carbidopa-levodopa so she can stay \"on.\" She has a scheduled nap planned during the wedding day.     She is getting some leg cramping and foot cramping at night some days so takes tizanidine for this and it helps her sleep but doesn't really help with pain just helps with sleep. She also is having more nerve pain and is thinking about seeing a chiropractor and trying more stretching. She will get severe pain behind her knees and down leg/feet. Her PCP has been helping to manage the nerve pain and recently prescribed amitriptyline 10 mg after a triage call but patient is concerned about taking this given possible side effects. She has noticed  that her pain in hips and lower back gets worse when carbidopa-levodopa wears off and better when the next dose kicks in.     Today's Vitals: There were no vitals taken for this visit.  ----------------    I spent 29 minutes with this patient today. All changes were made via collaborative practice agreement with Dr. Hearn. A copy of the visit note was provided to the patient's provider(s).    A summary of these recommendations was sent via [employer].    Wendy Caro, Pharm.D.  Medication Therapy Management Pharmacist  [facility]    Telemedicine Visit Details  Type of service:  Video Conference via [employer]  Start Time:  10:03 AM  End Time: 10:32 AM       Medication Therapy Recommendations  Neuropathy    Current Medication: gabapentin " (NEURONTIN) 100 MG capsule   Rationale: Dose too low - Dosage too low - Effectiveness   Recommendation: Increase Dose   Status: Contact Provider - Awaiting Response         Parkinson disease (H)    Current Medication: carbidopa-levodopa (SINEMET)  MG tablet   Rationale: Dose too low - Dosage too low - Effectiveness   Recommendation: Increase Frequency   Status: Accepted per CPA

## 2023-10-18 ENCOUNTER — VIRTUAL VISIT (OUTPATIENT)
Dept: PHARMACY | Facility: CLINIC | Age: 63
End: 2023-10-18
Payer: COMMERCIAL

## 2023-10-18 DIAGNOSIS — F32.A DEPRESSION, UNSPECIFIED DEPRESSION TYPE: ICD-10-CM

## 2023-10-18 DIAGNOSIS — I95.1 ORTHOSTATIC HYPOTENSION: ICD-10-CM

## 2023-10-18 DIAGNOSIS — R32 URINARY INCONTINENCE, UNSPECIFIED TYPE: ICD-10-CM

## 2023-10-18 DIAGNOSIS — G20.B2 PARKINSON'S DISEASE WITH DYSKINESIA AND FLUCTUATING MANIFESTATIONS (H): Primary | ICD-10-CM

## 2023-10-18 DIAGNOSIS — G62.9 NEUROPATHY: ICD-10-CM

## 2023-10-18 PROCEDURE — 99606 MTMS BY PHARM EST 15 MIN: CPT | Mod: VID | Performed by: PHARMACIST

## 2023-10-18 NOTE — PROGRESS NOTES
Medication Therapy Management (MTM) Encounter    ASSESSMENT:                            Medication Adherence/Access: No issues identified    Parkinson's Disease/neuropathy:     Appears stable     Orthostatic hypotension:  Stable     Urinary incontinence:   Patient may benefit from pelvic floor therapy. I do not recommend anticholinergic medications in her case as they could worsen balance and cognition. The beta3 agonists could be an option but are typically expensive.     Depression:  Patient asked about weaning off bupropion so we discussed a possible approach for weaning this medication but she will be discussing further with her PCP     PLAN:                            Continue with current carbidopa-levodopa regimen of 2 tablets every 4 hours (5-6 times daily). We discussed  the medication from meals by about 1 hour if you can, but it's more important that you eat even if it may interfere with the medication  We discussed that Pelvic floor therapy could be a non-medication approach to help with urinary incontinence. I would recommend avoiding the bladder medications given risk of side effects  You asked about possibly weaning off bupropion (Wellbutrin). This medication could be weaned over 6-8 weeks, for example, reduce by 100 mg every 2 weeks until you are off of it. Please discuss this with your primary care provider if interested in weaning off this medication     Follow-up: 1/17/24 at 10 am video visit    SUBJECTIVE/OBJECTIVE:                          Alina Zavala is a 63 year old female contacted via secure video for a follow-up visit from 9/13/23.       Reason for visit: follow up on medications.    Allergies/ADRs: Reviewed in chart  Past Medical History: Reviewed in chart  Tobacco: She reports that she has never smoked. She has never used smokeless tobacco.  Alcohol: not currently using  Social: daughter's wedding was on 9/16/23 - it went really well     Medication Adherence/Access: no issues  reported    Parkinson's Disease/neuropathy:     carbidopa-levodopa  mg, 2 tablets every 4 hours (usually 5 times daily)   amantadine 50 mg twice daily   Rivastigmine 13.3 mg daily   Gabapentin 200 mg in the morning and 300 mg at night   Tizanidine 2 mg as needed   Patient states that with the new carbidopa-levodopa regimen she is having less wearing off, as long as she is taking it on time. Unclear if dyskinesia is any better. She is dyskinetic during our visit today. She typically missed the overnight dose of carbidopa-levodopa and may experience some muscle cramping. She has lost some weight - feels full and low appetite, but once she starts eating then gets more hungry. She is going to work on a better schedule with eating around her carbidopa-levodopa doses. She is having more lower back and hip pain when carbidopa-levodopa wears off and make take tizanidine in the evening (makes her sleepy if taking during the day). She states that rivastigmine patch seems to have been helpful.     Orthostatic hypotension:  Midodrine 5 mg 3 times daily  States she hasn't been monitoring blood pressure lately - having very few episodes of dizziness lately. May have missed blood pressure pill when it was 98/55 on one occasion but otherwise no severely low blood pressure readings. She knows to check her blood pressure right away when feeling dizzy.     Urinary incontinence:   No current mediations  States her incontinence comes and goes. She is wearing pads. Is wondering about other treatments.     Depression:  Bupropion 200 mg Twice daily   Sertraline 100 mg daily    Patient reports that she has been on bupropion for about 20 years- it was started after her second miscarriage. She is not sure if it is doing anything any longer and is interested in weaning off bupropion. She states that she would be interested in trying CBD for pain but is aware that CBD and bupropion interact with each other. She had read it could take a  year to get off of bupropion.     Today's Vitals: There were no vitals taken for this visit.  ----------------    I spent 36 minutes with this patient today. All changes were made via collaborative practice agreement with Dr. Hearn. A copy of the visit note was provided to the patient's provider(s).    A summary of these recommendations was sent via [employer].    Wendy Caro, Pharm.D.  Medication Therapy Management Pharmacist  [facility]    Telemedicine Visit Details  Type of service:  Video Conference via [employer]  Start Time:  9:58 AM  End Time: 10:34 AM       Medication Therapy Recommendations  No medication therapy recommendations to display

## 2023-10-18 NOTE — PATIENT INSTRUCTIONS
"Recommendations from today's MTM visit:                                                      Continue with current carbidopa-levodopa regimen of 2 tablets every 4 hours (5-6 times daily). We discussed  the medication from meals by about 1 hour if you can, but it's more important that you eat even if it may interfere with the medication  We discussed that Pelvic floor therapy could be a non-medication approach to help with urinary incontinence. I would recommend avoiding the bladder medications given risk of side effects  You asked about possibly weaning off bupropion (Wellbutrin). This medication could be weaned over 6-8 weeks, for example, reduce by 100 mg every 2 weeks until you are off of it. Please discuss this with your primary care provider if interested in weaning off this medication     Follow-up: 1/17/24 at 10 am video visit    It was great speaking with you today.  I value your experience and would be very thankful for your time in providing feedback in our clinic survey. In the next few days, you may receive an email or text message from [employer] with a link to a survey related to your  clinical pharmacist.\"     To schedule another MTM appointment, please call the clinic directly or you may call the MTM scheduling line at [phone] or toll-free at [phone].     My Clinical Pharmacist's contact information:                                                      Please feel free to contact me with any questions or concerns you have.      Wendy Caro, Pharm.D.  Medication Therapy Management Pharmacist  [facility]     "

## 2023-11-21 ENCOUNTER — OFFICE VISIT (OUTPATIENT)
Dept: NEUROLOGY | Facility: CLINIC | Age: 63
End: 2023-11-21
Payer: COMMERCIAL

## 2023-11-21 VITALS
BODY MASS INDEX: 20.66 KG/M2 | SYSTOLIC BLOOD PRESSURE: 106 MMHG | WEIGHT: 124 LBS | HEART RATE: 68 BPM | DIASTOLIC BLOOD PRESSURE: 68 MMHG | HEIGHT: 65 IN | OXYGEN SATURATION: 99 %

## 2023-11-21 DIAGNOSIS — G20.A1 PARKINSON'S DISEASE WITHOUT DYSKINESIA OR FLUCTUATING MANIFESTATIONS (H): Primary | ICD-10-CM

## 2023-11-21 DIAGNOSIS — I95.1 ORTHOSTATIC HYPOTENSION: ICD-10-CM

## 2023-11-21 DIAGNOSIS — R41.3 MEMORY PROBLEM: ICD-10-CM

## 2023-11-21 PROCEDURE — 99215 OFFICE O/P EST HI 40 MIN: CPT | Performed by: NURSE PRACTITIONER

## 2023-11-21 ASSESSMENT — PAIN SCALES - GENERAL: PAINLEVEL: EXTREME PAIN (8)

## 2023-11-21 NOTE — PROGRESS NOTES
"  ASSESSMENT:    Parkinson's Disease:  Manageable if taking medication on time.     Orthostatic Hypotension:  Ongoing issue.     Memory Problems: Mostly has affected her short-term memory.         PLAN:    __  The following patient instructions provided: -     __  Stay on the same antiparkinsonian medication.      __  Continue on the Rivastigmine patch.    __  Continue with Midodrine to help with low     __  Continue with the Big and Loud Therapy exercises.     __  See Dr. Caro, Pharm.D. in January.     __ Return in 3 - 4 months to see Dr. Hearn.  You may return sooner as needed.           MOVEMENT DISORDERS CLINIC           PATIENT: Alina Zavala    : 1960    DAHLIA: 2023    REASON FOR VISIT: Parkinson's disease (PD) follow up.    HPI: Ms. Alina Zavala is a 63 year old who came to the [facility] neurology clinic accompanied by her  for a follow up visit.  She was last seen in the clinic on 2023 by Dr. Hearn for a routine follow up visit. I saw her last in May 2023.     \"Today is no a good day.\" She feels tired. She has low back and left hip pain and her feet. She also has cramping and spasms.     She hasn't seen her chiropractor.    She has vivid dreams.  The dogs sleep with her. She wakes up to take the dog out.     Her Sinemet wears off every 4 hours. Patient doesn't take the mediation daily at the same time. If she takes her medication on time, she doesn't have wearing off. If she's at home, she remembers to take her medications on time. If she has other things go do or has places to go, she forgets to take it on time and takes is later. When she delays taking Sinemet, she gets cramping, feels tired, and down.    Short-term memory is affected. The Rivastigmine patch has helped.     Mood varies from day to day and sometimes from time to time. She reports that generally, she tends to look at life half empty.     She is in the process of getting the genetic spit test. She hasn't told " her children about it yet.     She is doing the [employer] ad [employer] [place]. It's called Living Big.     She has orthostatic hypotension. Most of the time her morning BP is in the 83's & 90's. She is on Midodrine. She also drinks Liquid I.V. Hydration drink that has more sodium and electrolytes.  No falls due to dizziness.     MEDICATIONS:   Outpatient Medications Marked as Taking for the 11/21/23 encounter (Office Visit) with Sandrine Valle APRN CNP   Medication Sig    acetaminophen (TYLENOL) 500 MG tablet 2 x 500mg tab by mouth twice daily @ 10am and 11pm  = 4/day    albuterol (PROAIR HFA/PROVENTIL HFA/VENTOLIN HFA) 108 (90 Base) MCG/ACT inhaler Inhale 2 puffs into the lungs every 6 hours as needed    amantadine HCl 100 MG TABS Take half-tablet (50 mg) by mouth 2 times daily    B Complex-C (VITAMIN B COMPLEX W/VITAMIN C) TABS tablet Take 1 tablet by mouth daily    buPROPion (WELLBUTRIN SR) 200 MG 12 hr tablet 200mg tab by mouth twice daily at 10am and 10pm    calcium carbonate (OS-TENA) 600 MG tablet 1 tab by mouth nightly at 10pm    carbidopa-levodopa (SINEMET)  MG tablet 2 -2.5 tabs by mouth every 4-5 hours = 12/day    cholecalciferol 25 MCG (1000 UT) TABS 1000 units (25 mcg) by mouth daily at 10am    co-enzyme Q-10 100 MG CAPS capsule 100mg capsule by mouth every evening at 11pm    gabapentin (NEURONTIN) 100 MG capsule Take 200 mg by mouth every morning    gabapentin (NEURONTIN) 300 MG capsule Take 300 mg by mouth every evening    midodrine (PROAMATINE) 5 MG tablet Take 5 mg by mouth 3 times daily    NONFORMULARY Prebiotic twice daily    polyethylene glycol (MIRALAX) 17 g packet ? START ?    probiotic CAPS Prebiotic twice daily    propranolol (INDERAL) 10 MG tablet 1/2 of 10mg tab by mouth once daily (morning only)    rivastigmine (EXELON) 13.3 MG/24HR 24 hr patch Place 1 patch onto the skin daily    sertraline (ZOLOFT) 100 MG tablet 100mg tab by mouth dailya t 10am    sodium  "fluoride dental gel (PREVIDENT) 1.1 % GEL topical gel APPLY A THIN BEAD OF GEL TO TOOTHBRUSH AND BRUSH TEETH ONCE DAILY AT BEDTIME FOR AT LEAST 1 MINUTE. EXPECTORATE THROROUGHLY.    tiZANidine (ZANAFLEX) 2 MG tablet Take 1-2 tablets by mouth 2 times daily as needed    vitamin C (ASCORBIC ACID) 500 MG tablet 500mg tab by mouth daily at 10am       PHYSICAL EXAM:    VITAL SIGNS:  Blood pressure 106/68, pulse 68, height 1.651 m (5' 5\"), weight 56.2 kg (124 lb), SpO2 99%.. Body mass index is 20.63 kg/m .    GENERAL:  Ms. Zavala is a pleasant  female who is well-groomed and well-developed sitting comfortably in the exam room without any distress.  Affect is appropriate.    MOVEMENT DISORDERS ASSESSMENT:   Speech: Slight loss of expression and/or volume.  Facial Expression: Mild.   Rest Tremor: Absent in all extremities.    Action/Postural Tremor: Mild postural & action bilaterally.    Finger Taps: Slight bilaterally.    Hand opening & closing: Mild slowing and/or reduction in amplitude   Hand pronation & supination: Slight bilaterally.   Toe Tapping:  Slight bilaterally.    Leg Agility: Mild slowing bilaterally.  Arising from chair - arms folded across chest: Normal.  Posture: Mild.   Gait: Slight.   Freezing of gait:  Absent.   Body Bradykinesia: Minimal slowness, giving movement a deliberate character. Minimal reduced amplitude.  Dyskinesias:  Absent.     Today I spent 40 minutes caring for the patient. Time was spent with reviewing records, meeting with the patient, answering questions, examining, and documentation.    Zeynep Valle DNP, APRN  [facility]      "

## 2023-11-21 NOTE — LETTER
"2023       RE: Alina Zavala  1971 [facility] [id number]     Dear Colleague,    Thank you for referring your patient, Alina Zavala, to the [facility]UR[facility]INIC [place] [facility]. Please see a copy of my visit note below.      ASSESSMENT:    Parkinson's Disease:  Manageable if taking medication on time.     Orthostatic Hypotension:  Ongoing issue.     Memory Problems: Mostly has affected her short-term memory.         PLAN:    __  The following patient instructions provided: -     __  Stay on the same antiparkinsonian medication.      __  Continue on the Rivastigmine patch.    __  Continue with Midodrine to help with low     __  Continue with the Big and Loud Therapy exercises.     __  See Dr. Caro, Pharm.D. in January.     __ Return in 3 - 4 months to see Dr. Hearn.  You may return sooner as needed.           MOVEMENT DISORDERS CLINIC           PATIENT: Alina Zavala    : 1960    DAHLIA: 2023    REASON FOR VISIT: Parkinson's disease (PD) follow up.    HPI: Ms. Alina Zavala is a 63 year old who came to the [facility] neurology clinic accompanied by her  for a follow up visit.  She was last seen in the clinic on 2023 by Dr. Hearn for a routine follow up visit. I saw her last in May 2023.     \"Today is no a good day.\" She feels tired. She has low back and left hip pain and her feet. She also has cramping and spasms.     She hasn't seen her chiropractor.    She has vivid dreams.  The dogs sleep with her. She wakes up to take the dog out.     Her Sinemet wears off every 4 hours. Patient doesn't take the mediation daily at the same time. If she takes her medication on time, she doesn't have wearing off. If she's at home, she remembers to take her medications on time. If she has other things go do or has places to go, she forgets to take it on time and takes is later. When she delays taking " Sinemet, she gets cramping, feels tired, and down.    Short-term memory is affected. The Rivastigmine patch has helped.     Mood varies from day to day and sometimes from time to time. She reports that generally, she tends to look at life half empty.     She is in the process of getting the genetic spit test. She hasn't told her children about it yet.     She is doing the [employer] ad [employer] [place]. It's called Living Big.     She has orthostatic hypotension. Most of the time her morning BP is in the 83's & 90's. She is on Midodrine. She also drinks Liquid I.V. Hydration drink that has more sodium and electrolytes.  No falls due to dizziness.     MEDICATIONS:   Outpatient Medications Marked as Taking for the 11/21/23 encounter (Office Visit) with Sandrine Valle APRN CNP   Medication Sig    acetaminophen (TYLENOL) 500 MG tablet 2 x 500mg tab by mouth twice daily @ 10am and 11pm  = 4/day    albuterol (PROAIR HFA/PROVENTIL HFA/VENTOLIN HFA) 108 (90 Base) MCG/ACT inhaler Inhale 2 puffs into the lungs every 6 hours as needed    amantadine HCl 100 MG TABS Take half-tablet (50 mg) by mouth 2 times daily    B Complex-C (VITAMIN B COMPLEX W/VITAMIN C) TABS tablet Take 1 tablet by mouth daily    buPROPion (WELLBUTRIN SR) 200 MG 12 hr tablet 200mg tab by mouth twice daily at 10am and 10pm    calcium carbonate (OS-TENA) 600 MG tablet 1 tab by mouth nightly at 10pm    carbidopa-levodopa (SINEMET)  MG tablet 2 -2.5 tabs by mouth every 4-5 hours = 12/day    cholecalciferol 25 MCG (1000 UT) TABS 1000 units (25 mcg) by mouth daily at 10am    co-enzyme Q-10 100 MG CAPS capsule 100mg capsule by mouth every evening at 11pm    gabapentin (NEURONTIN) 100 MG capsule Take 200 mg by mouth every morning    gabapentin (NEURONTIN) 300 MG capsule Take 300 mg by mouth every evening    midodrine (PROAMATINE) 5 MG tablet Take 5 mg by mouth 3 times daily    NONFORMULARY Prebiotic twice daily    polyethylene glycol  "(MIRALAX) 17 g packet ? START ?    probiotic CAPS Prebiotic twice daily    propranolol (INDERAL) 10 MG tablet 1/2 of 10mg tab by mouth once daily (morning only)    rivastigmine (EXELON) 13.3 MG/24HR 24 hr patch Place 1 patch onto the skin daily    sertraline (ZOLOFT) 100 MG tablet 100mg tab by mouth dailya t 10am    sodium fluoride dental gel (PREVIDENT) 1.1 % GEL topical gel APPLY A THIN BEAD OF GEL TO TOOTHBRUSH AND BRUSH TEETH ONCE DAILY AT BEDTIME FOR AT LEAST 1 MINUTE. EXPECTORATE THROROUGHLY.    tiZANidine (ZANAFLEX) 2 MG tablet Take 1-2 tablets by mouth 2 times daily as needed    vitamin C (ASCORBIC ACID) 500 MG tablet 500mg tab by mouth daily at 10am       PHYSICAL EXAM:    VITAL SIGNS:  Blood pressure 106/68, pulse 68, height 1.651 m (5' 5\"), weight 56.2 kg (124 lb), SpO2 99%.. Body mass index is 20.63 kg/m .    GENERAL:  Ms. Zavala is a pleasant  female who is well-groomed and well-developed sitting comfortably in the exam room without any distress.  Affect is appropriate.    MOVEMENT DISORDERS ASSESSMENT:   Speech: Slight loss of expression and/or volume.  Facial Expression: Mild.   Rest Tremor: Absent in all extremities.    Action/Postural Tremor: Mild postural & action bilaterally.    Finger Taps: Slight bilaterally.    Hand opening & closing: Mild slowing and/or reduction in amplitude   Hand pronation & supination: Slight bilaterally.   Toe Tapping:  Slight bilaterally.    Leg Agility: Mild slowing bilaterally.  Arising from chair - arms folded across chest: Normal.  Posture: Mild.   Gait: Slight.   Freezing of gait:  Absent.   Body Bradykinesia: Minimal slowness, giving movement a deliberate character. Minimal reduced amplitude.  Dyskinesias:  Absent.     Today I spent 40 minutes caring for the patient. Time was spent with reviewing records, meeting with the patient, answering questions, examining, and documentation.        Again, thank you for allowing me to participate in the care of your " patient.      Sincerely,    ADAM Dumont CNP

## 2023-11-21 NOTE — PATIENT INSTRUCTIONS
Dear Ms. Alina NERI Connernilda,      Thank you for coming today.  During your visit, we have discussed the following:     __  Stay on the same antiparkinsonian medication.      __  Continue on the Rivastigmine patch.    __  Continue with Midodrine to help with low     __  Continue with the [employer] exercises.     __  See Dr. Caro, Pharm.D. in January.     __ Return in 3 - 4 months to see Dr. Hearn.  You may return sooner as needed.       For questions, you may send us a [employer] message or call [phone]    Fax number: [phone]    To make an appointment with one of our pharmacists, (Wendy Caro, Pharm.D. or Jacqueline AnayaD), call [phone].    Zeynep Valle DNP, APRN  [facility]

## 2023-12-18 ENCOUNTER — TELEPHONE (OUTPATIENT)
Dept: NEUROLOGY | Facility: CLINIC | Age: 63
End: 2023-12-18
Payer: COMMERCIAL

## 2023-12-18 ENCOUNTER — TELEPHONE (OUTPATIENT)
Dept: PHARMACY | Facility: CLINIC | Age: 63
End: 2023-12-18
Payer: COMMERCIAL

## 2023-12-18 NOTE — TELEPHONE ENCOUNTER
She is seeing her PCP tomorrow. Zeynep recommended talking with her PCP about alternative pain medications due to hers not working well. Something else was prescribed but interfered with her medications so she is going to talk about another alternative. Discussed her primary will want to see her first and review pain and options. If her PCP would like to consult with Neurology asked that she have his clinic call ours through the clinic number [phone].

## 2023-12-18 NOTE — TELEPHONE ENCOUNTER
[facility] Call Center    Phone Message    May a detailed message be left on voicemail: yes     Reason for Call: Other: Pt is requesting Dr. Hearn to call her PCP to review and discuss her pain medications. Please call back to advise at # [phone]    Action Taken: Message routed to:  [facility] Neurology     Travel Screening: Not Applicable

## 2023-12-19 NOTE — TELEPHONE ENCOUNTER
It appears patient talked with our team earlier today already.     Wendy Caro, Pharm.D.  Medication Therapy Management Pharmacist  [facility]

## 2023-12-19 NOTE — TELEPHONE ENCOUNTER
Marty Nguyen,     Patient called and would like for you to return her call back at [phone]     Thank you     Allison Jones   [facility]

## 2024-01-17 ENCOUNTER — TELEPHONE (OUTPATIENT)
Dept: NEUROLOGY | Facility: CLINIC | Age: 64
End: 2024-01-17
Payer: COMMERCIAL

## 2024-01-17 ENCOUNTER — VIRTUAL VISIT (OUTPATIENT)
Dept: NEUROLOGY | Facility: CLINIC | Age: 64
End: 2024-01-17
Attending: PSYCHIATRY & NEUROLOGY
Payer: COMMERCIAL

## 2024-01-17 DIAGNOSIS — G62.9 NEUROPATHY: ICD-10-CM

## 2024-01-17 DIAGNOSIS — R52 PAIN: ICD-10-CM

## 2024-01-17 DIAGNOSIS — G20.A1 PARKINSON'S DISEASE WITHOUT DYSKINESIA OR FLUCTUATING MANIFESTATIONS (H): Primary | ICD-10-CM

## 2024-01-17 PROCEDURE — 99207 PR NO BILLABLE SERVICE THIS VISIT: CPT | Mod: 95 | Performed by: PHARMACIST

## 2024-01-17 NOTE — Clinical Note
STEPHANIE- her PCP really wants to prescribe amitriptyline for neuropathy but I have already tried to express my concerns with this as it could affect her cognition and balance, especially since she is already on rivastigmine. I called him again today and hopefully will be able to talk with him directly

## 2024-01-17 NOTE — Clinical Note
1/17/2024       RE: Alina Zavala  [address] [place] [id number]     Dear Colleague,    Thank you for referring your patient, Alina Zavala, to the [facility] [place] at [facility]. Please see a copy of my visit note below.    Medication Therapy Management (MTM) Encounter    ASSESSMENT:                            Medication Adherence/Access: No issues identified    Parkinson's Disease/neuropathy:         Neuropathy/pain:       PLAN:                            We discussed keeping a more regimented schedule of carbidopa-levodopa (ie: 8:30 am, 12:30 pm, 4:30 pm, 8:30 pm, 12:30 am, 4:30 am) to try to avoid wearing off of carbidopa-levodopa which leads to more pain   I will call Dr. Melendrez and discuss my concerns about amitritpyline with him. I will recommend an increase in gabapentin instead (ie: 200 mg-200 mg-400 mg or 300 mg-300 mg-600 mg)  Duloxetine (Cymbalta) may be another option for neuropathic pain but this is also an antidepressant and since you are already taking 2 other antidepressants I am hesitant to have you add a 3rd     Follow-up: 3/13/24 at 10 am video visit     SUBJECTIVE/OBJECTIVE:                          Alina Zavala is a 63 year old female contacted via secure video for a follow-up visit from 10/18/23.       Reason for visit: follow up on medications.    Allergies/ADRs: Reviewed in chart  Past Medical History: Reviewed in chart  Tobacco: She reports that she has never smoked. She has never used smokeless tobacco.  Alcohol: not currently using  PCP: Dr. Melendrez [facility] [phone]    Medication Adherence/Access: some doses of carbidopa-levodopa are late    Parkinson's Disease/neuropathy:     carbidopa-levodopa  mg, 2 tablets every 4 hours (5-6 times daily)   amantadine 50 mg twice daily   Rivastigmine 13.3 mg daily   Patient states that she has wearing off carbidopa-levodopa  "about 10 minutes before the next dose is due and if she takes the medication on time she is able to mitigate the \"off\" symptoms but oftentimes she is late to take a dose if she is busy with something or sleeping. She has not been taking the medication on a particular schedule- she just tries to take it about every 4 hours and each day this starts at a different time. She tries to avoid eating protein within 30-60 minutes as it does make the carbidopa-levodopa less effective.     Neuropathy/pain:   Gabapentin 200 mg in the morning and 400 mg at night   Tizanidine 2 mg as needed   Amitriptyline- hasn't started yet   Patient states that she went to her PCP to discuss the amitriptyline and he recommended that she take it despite my concerns about cognitive impairment and imbalance. Patient states that she is hesitant to try amitriptyline due to these risks. She is willing to try a higher dose of gabapentin. She recalls being on 1500 mg/day of gabapentin when she was dealing with trigeminal neuralgia in the past and states it made her pretty drowsy. She does get drowsy from tizanidine as well.   Her pain is primarily in her low back and hips- at night will go down her legs and behind knees which is painful. Feet may cramp up on one side then switch to the other side. When she is uncomfortable she will get up and walk around.   Patient states she is also dealing with a broken crown on her tooth and is seeing her dentist tomorrow.     Today's Vitals: There were no vitals taken for this visit.  ----------------    I spent 28 minutes with this patient today. All changes were made via collaborative practice agreement with Dr. Hearn. A copy of the visit note was provided to the patient's provider(s).    A summary of these recommendations was sent via [employer].    Wendy Caro, Pharm.D.  Medication Therapy Management Pharmacist  [facility]    Telemedicine Visit Details  Type of service:  Video Conference via " [employer]  Start Time:  10:00 AM  End Time: 10:28 AM     Medication Therapy Recommendations  No medication therapy recommendations to display     Medication Therapy Management (MTM) Encounter    ASSESSMENT:                            Medication Adherence/Access: No issues identified    Parkinson's Disease/neuropathy:     Patient was encouraged to keep her schedule of carbidopa-levodopa on a regimented schedule of every 4 hours to help lessen wearing off     Neuropathy/pain:   I am concerned about adding amitriptyline given her Parkinson's disease-associated cognitive impairment and imbalance/fall risk. She is on rivastigmine, a cholinesterase inhibitor and amitriptyline is an anticholinergic medication which could have the opposite effect of rivastigmine. I will call her PCP Dr. Melendrez and discuss this with him to see if we could increase gabapentin instead (ie: 200 mg-200 mg-400 mg or 300 mg-300 mg-600 mg). Duloxetine could be considered as well but she is already on 2 other antidepressants (sertraline and bupropion).      PLAN:                            We discussed keeping a more regimented schedule of carbidopa-levodopa (ie: 8:30 am, 12:30 pm, 4:30 pm, 8:30 pm, 12:30 am, 4:30 am) to try to avoid wearing off of carbidopa-levodopa which leads to more pain   I called and left a message for Dr. Melendrez to discuss my concerns about amitritpyline with him. I will recommend an increase in gabapentin instead (ie: 200 mg-200 mg-400 mg or 300 mg-300 mg-600 mg)  Duloxetine (Cymbalta) may be another option for neuropathic pain but this is also an antidepressant and since you are already taking 2 other antidepressants I am hesitant to have you add a 3rd     Follow-up: 3/13/24 at 10 am video visit     SUBJECTIVE/OBJECTIVE:                          Alina Zavala is a 63 year old female contacted via secure video for a follow-up visit from 10/18/23.       Reason for visit: follow up on medications.    Allergies/ADRs: Reviewed in  "chart  Past Medical History: Reviewed in chart  Tobacco: She reports that she has never smoked. She has never used smokeless tobacco.  Alcohol: not currently using  PCP: Dr. Melendrez [facility] [phone]    Medication Adherence/Access: some doses of carbidopa-levodopa are late    Parkinson's Disease/neuropathy:     carbidopa-levodopa  mg, 2 tablets every 4 hours (5-6 times daily)   amantadine 50 mg twice daily   Rivastigmine 13.3 mg daily   Patient states that she has wearing off carbidopa-levodopa about 10 minutes before the next dose is due and if she takes the medication on time she is able to mitigate the \"off\" symptoms but oftentimes she is late to take a dose if she is busy with something or sleeping. She has not been taking the medication on a particular schedule- she just tries to take it about every 4 hours and each day this starts at a different time. She tries to avoid eating protein within 30-60 minutes as it does make the carbidopa-levodopa less effective.     Neuropathy/pain:   Gabapentin 200 mg in the morning and 400 mg at night   Tizanidine 2 mg as needed   Amitriptyline- hasn't started yet   Patient states that she went to her PCP to discuss the amitriptyline and he recommended that she take it despite my concerns about cognitive impairment and imbalance. Patient states that she is hesitant to try amitriptyline due to these risks. She is willing to try a higher dose of gabapentin. She recalls being on 1500 mg/day of gabapentin when she was dealing with trigeminal neuralgia in the past and states it made her pretty drowsy. She does get drowsy from tizanidine as well.   Her pain is primarily in her low back and hips- at night will go down her legs and behind knees which is painful. Feet may cramp up on one side then switch to the other side. When she is uncomfortable she will get up and walk around.   Patient states she is also dealing with a broken crown on her tooth and is seeing " her dentist tomorrow.     Today's Vitals: There were no vitals taken for this visit.  ----------------    I spent 28 minutes with this patient today. All changes were made via collaborative practice agreement with Dr. Hearn. A copy of the visit note was provided to the patient's provider(s).    A summary of these recommendations was sent via [employer].    Wendy Caro, Pharm.D.  Medication Therapy Management Pharmacist  [facility]    Telemedicine Visit Details  Type of service:  Video Conference via [employer]  Start Time:  10:00 AM  End Time: 10:28 AM     Medication Therapy Recommendations  No medication therapy recommendations to display       Again, thank you for allowing me to participate in the care of your patient.      Sincerely,    Wendy Caro RP

## 2024-01-17 NOTE — TELEPHONE ENCOUNTER
Called patient's PCP clinic ([facility]) to speak with Dr. Melendrez about amitriptyline for this patient's neuropathic pain.  I am concerned about adding amitriptyline given her Parkinson's disease-associated cognitive impairment and imbalance/fall risk. She is on rivastigmine, a cholinesterase inhibitor and amitriptyline is an anticholinergic medication which could have the opposite effect of rivastigmine.   Rather, I would recommend increasing gabapentin (ie: 200 mg-200 mg-400 mg or 300 mg-300 mg-600 mg). Duloxetine could be considered as well but she is already on 2 other antidepressants (sertraline and bupropion).     Spoke with Kesha in the clinic. She will relay the message to Dr. Melendrez and he will call me back.     Wendy Caro, Pharm.D.  Medication Therapy Management Pharmacist  [facility]

## 2024-01-17 NOTE — PROGRESS NOTES
Medication Therapy Management (MTM) Encounter    ASSESSMENT:                            Medication Adherence/Access: No issues identified    Parkinson's Disease:     Patient was encouraged to keep her schedule of carbidopa-levodopa on a regimented schedule of every 4 hours to help lessen wearing off     Neuropathy/pain:   I am concerned about adding amitriptyline given her Parkinson's disease-associated cognitive impairment and imbalance/fall risk. She is on rivastigmine, a cholinesterase inhibitor and amitriptyline is an anticholinergic medication which could have the opposite effect of rivastigmine. I will call her PCP Dr. Melendrez and discuss this with him to see if we could increase gabapentin instead (ie: 200 mg-200 mg-400 mg or 300 mg-300 mg-600 mg). Duloxetine could be considered as well but she is already on 2 other antidepressants (sertraline and bupropion).      PLAN:                            We discussed keeping a more regimented schedule of carbidopa-levodopa (ie: 8:30 am, 12:30 pm, 4:30 pm, 8:30 pm, 12:30 am, 4:30 am) to try to avoid wearing off of carbidopa-levodopa which leads to more pain   I called and left a message for Dr. Melendrez to discuss my concerns about amitritpyline with him. I will recommend an increase in gabapentin instead (ie: 200 mg-200 mg-400 mg or 300 mg-300 mg-600 mg)  Duloxetine (Cymbalta) may be another option for neuropathic pain but this is also an antidepressant and since you are already taking 2 other antidepressants I am hesitant to have you add a 3rd     Follow-up: 3/13/24 at 10 am video visit     SUBJECTIVE/OBJECTIVE:                          Alina Zavala is a 63 year old female contacted via secure video for a follow-up visit from 10/18/23.       Reason for visit: follow up on medications.    Allergies/ADRs: Reviewed in chart  Past Medical History: Reviewed in chart  Tobacco: She reports that she has never smoked. She has never used smokeless tobacco.  Alcohol: not  "currently using  PCP: Dr. Melendrez [facility] [phone]    Medication Adherence/Access: some doses of carbidopa-levodopa are late    Parkinson's Disease:     carbidopa-levodopa  mg, 2 tablets every 4 hours (5-6 times daily)   amantadine 50 mg twice daily   Rivastigmine 13.3 mg daily   Patient states that she has wearing off carbidopa-levodopa about 10 minutes before the next dose is due and if she takes the medication on time she is able to mitigate the \"off\" symptoms but oftentimes she is late to take a dose if she is busy with something or sleeping. She has not been taking the medication on a particular schedule- she just tries to take it about every 4 hours and each day this starts at a different time. She tries to avoid eating protein within 30-60 minutes as it does make the carbidopa-levodopa less effective.     Neuropathy/pain:   Gabapentin 200 mg in the morning and 400 mg at night   Tizanidine 2 mg as needed   Amitriptyline- hasn't started yet   Patient states that she went to her PCP to discuss the amitriptyline and he recommended that she take it despite my concerns about cognitive impairment and imbalance. Patient states that she is hesitant to try amitriptyline due to these risks. She is willing to try a higher dose of gabapentin. She recalls being on 1500 mg/day of gabapentin when she was dealing with trigeminal neuralgia in the past and states it made her pretty drowsy. She does get drowsy from tizanidine as well.   Her pain is primarily in her low back and hips- at night will go down her legs and behind knees which is painful. Feet may cramp up on one side then switch to the other side. When she is uncomfortable she will get up and walk around.   Patient states she is also dealing with a broken crown on her tooth and is seeing her dentist tomorrow.     Today's Vitals: There were no vitals taken for this visit.  ----------------    I spent 28 minutes with this patient today. All changes " were made via collaborative practice agreement with Dr. Hearn. A copy of the visit note was provided to the patient's provider(s).    A summary of these recommendations was sent via [employer].    Wendy Caro, Pharm.D.  Medication Therapy Management Pharmacist  [facility]    Telemedicine Visit Details  Type of service:  Video Conference via [employer]  Start Time:  10:00 AM  End Time: 10:28 AM     Medication Therapy Recommendations  Parkinson disease    Current Medication: carbidopa-levodopa (SINEMET)  MG tablet   Rationale: Patient forgets to take - Adherence - Adherence   Recommendation: Provide Adherence Intervention   Status: Patient Agreed - Adherence/Education

## 2024-01-17 NOTE — PATIENT INSTRUCTIONS
"Recommendations from today's MTM visit:                                                      We discussed keeping a more regimented schedule of carbidopa-levodopa (ie: 8:30 am, 12:30 pm, 4:30 pm, 8:30 pm, 12:30 am, 4:30 am) to try to avoid wearing off of carbidopa-levodopa which leads to more pain   I called and left a message for Dr. Melendrez to discuss my concerns about amitritpyline with him. I will recommend an increase in gabapentin instead (ie: 200 mg-200 mg-400 mg or 300 mg-300 mg-600 mg)  Duloxetine (Cymbalta) may be another option for neuropathic pain but this is also an antidepressant and since you are already taking 2 other antidepressants I am hesitant to have you add a 3rd     Follow-up: 3/13/24 at 10 am video visit     It was great speaking with you today.  I value your experience and would be very thankful for your time in providing feedback in our clinic survey. In the next few days, you may receive an email or text message from [employer] with a link to a survey related to your  clinical pharmacist.\"     To schedule another MTM appointment, please call the clinic directly or you may call the MTM scheduling line at [phone].    My Clinical Pharmacist's contact information:                                                      Please feel free to contact me with any questions or concerns you have.      Wendy Caro, Pharm.D.  Medication Therapy Management Pharmacist  [facility]     "

## 2024-01-24 NOTE — PROGRESS NOTES
Diagnosis/Summary/Recommendations:    PATIENT: Alina Zavala  63 year old female     : 1960    DAHLIA: 2024    MRN: [id number]  [place] [address]   [email or web address]  [phone] (M) -iphone  [phone] (H) -not an iphone  Tejinder Connerbarber LOYD  [phone]     Venkat Cooney and Marissa.       PCP is Dr. HESHAM Melendrez  CArdiologist Dr. Reyna Dick  Eye doctor  Dentist  Chiropractor     [email or web address]        Assessment:  (G20) Parkinson disease (H)  (primary encounter diagnosis)  (G20) Parkinson disease (H)  (primary encounter diagnosis)  Sinemet 25/100 2 tabs 5/day  Entacapone comtan 200mg - not taking     Review of diagnosis    Parkinson disease     Avoidance of dopamine blockers   Not taking     Motor complication review   wearing off. At 4-5 hours gets tingling of her toes/fingers  Legs - cramping, clawing - behind her knees  Wearing off afternoon; has to be attentive to afternoon dosing     Has nausea with comtan and not eating as much   Weight has reduced to 144 from 162 #      Review of Impulse control disorders         Review of surgical or medication options   reviewed     May consider a trial of amantadine (symmetrel) in 1-3 weeks after her back situation is better. Amantadine may help in her wearing off and dyskinesias.      Gait/Balance/Falls   Fell and kind of caught herself yesterday - was on her knees  Discussion about multi tasking - using only one hand and not two hands to carry things.      Exercise/Therapy performed/offered     Getting up at 4 or 5am and have to let dogs in and out and is run down              Taty - yellow lab and red wright lab 65 #              Cat              Other dog   Not feeding till a     Doing big and loud every day     [facility]  Also doing occupational therapy      Silver sneaker discussion - [place]   Can use facilities free  Pool assessment at [facility]         She  has seen therapists in the past at [facility] and if needed may need to see orthopedics, PMR or sports medicine for her back symptoms which may improve with conservative management.      For RLS -  Gabapentin neurontin 100mg @10am and sometimes @8/11pm extra dose   Gabapentin neurontin 300mg @8/11pm     may consider taking second dose of gabapentin earlier than 10/11pm        Cognitive/Driving   Short distances only; not of freeway  Cognitive problems - recalling names takes longer  Problems with spelling        Would recommend a baseline neuropsychological evaluation 5/9/2022  brianna belle     Generally moderate to severe impairments identified in nonverbal skills (basic judgment, reasoning, planning and organization) as well as aspects of executive functioning (complex attention, visuospatial planning and organization)  Subtle weaknesses (low average) were evident on additional nonverbal (learning and memory) and executive (working memory) measures as well as in cognitive efficiency  Performance was intact in most verbal skills including verbal learning and memory, and aspects of executive functioning (phonemic fluency, deductive reasoning, inhibition).   She states that spatial skills have always been an area of weaknesse, but her  has observed a decline in this area        Mood   Counselling - working with counsellor   Bupropion wellbutrin SR 200mg 12 hr tablet twice daily  Sertraline zoloft 100mg daily     Mood could be better  Has been unable to go off wellbutrin in the past  Has wearing off related mood issues  Not sure how much the sertraline has helped.     fatigue  Emotional   Missing family for thanksgiving  Mother with c diff and is 85 and frail   with covid      Problems handling the cold  Needs to get full spectrum light     Daughter moving back with her dog   Renuka     Acceptance issues with the parkinson  Not driving       Hallucinations/delusions   denies     Sleep   Gets up to take her  first dose of medication @330/530am and then goes back to bed; possibly till 930am  Variable time of wakening up.    Falls asleep relatively easily   Sleeping a lot - exhausted     Sleep deprived with dogs and affects pill cycle     Bladder/Renal/Prostate/Gyn/Other  Urinary urgency  Variable nocturia depending on hydration, etc.   Discussed timing of water intake     GI/Constipation/GERD   Magnesium oxide 500mg twice daily at 10am and 10pm  Probiotic twice daily smarty pants at 10am and 10pm   Doing well     ENDO/Lipid/DM/Bone density/Thyroid  Calcium carbonate at night at 10pm  Cholecalciferol vitamin D3 1000 units at 10am     Cardio/heart/Hyper or Hypotensive   POTS    Blood pressure is better with medication change  She thinks that the propranolol helps her tremor but worried   Fludrocortisone florinef 0.1mg - not taking  Midodrine proamatine 5mg 3/day   Propranolol 10mg 1/2 of 10mg daily at 10am      Cardiology  Kimber,pan Constantino, Marlena Dick, Reyna - cardiologist     water and salt intake     Dr. Reyna Dick     Vision/Dry Eyes/Cataracts/Glaucoma/Macular   Less contrast sensitivity  Tree vs person  Will see Oumar Wade on 3/2/2023     Heme/Anticoagulation/Antiplatelet/Anemia/Other  Not on aspirin     ENT/Resp  Breathing  Albuterol proair as needed   Asmanex 120 metered doses 220mcg/inh inhaler at night  - not taking   Mometasone asmanex - not taking  Taste and smell has not improved since covid     Skin/Cancer/Seborrhea/other  denies     Musculoskeletal/Pain/Headache  Denies - other than shoulders      Other:        Ongoing nausea that may be related to her comtan/entacapone or other factors  She has some weight loss     Discussed timing of meals and the sinemet and anti nausea medications  Has seen Wendy Caro 3/2022  Has not been on rytary and has not been on amantadine  She has had wearing off related leg symptoms - numbness -      Has not had an emg to evaluate for neuropathy  And she also has  wearing off related neuropathy type of leg symptoms  Blood work and emg could be done     Discussion about report of the neuropsychological findings and terminology.   She does not have lewy body disease.      Left trigeminal neuralgia     Prior history of lyme disease     Sweating episodes, not able to take hot shower      Acetaminophen tylenol 500mg scheduled.   b complex   Coenzyme Q10 daily   Cyclobenzaprine flexeril as needed 1/2 tab  Gabapentin neurontin 100mg twice daily   Gabapentin neurontin 300mg at night   Vitamin C ascorbic acid 500mg      Visual issues; seeing dr Wade  Seeing Red and green squares and has a reduction in amantadine   Discussion about lab work to check kidney function as is taking amantadine (symmetrel)   Last renal labs in our chart are from 2020.   Not driving  Depth perception problems      Wearing off at 4/5 hours - wears off 45 minutes before dose and has cramping, aching  Has delayed on periods   Cramping at night - toes and feet and walks around till pills work   This may occur at 9pm or so   Has cramping in the car/juan a horse      Last visit was summer 2022     Think that rytary was too expensive  Did not benefit from comtan and had side effects  Has not been on gocovri     Consider reducing the interval to 4.5 hours of the sinemet.   Has had some slow off periods and some sudden off periods.      Bowel movements are still there but may try some things such as the miralax, which she says she has tried in the past.      Cognitive problems  Did not tolerate donepezil (aricept)  Trial of rivastigmine excelon patch for cognition, monitor for blood pressure or heart rate changes or other side effects.   4.6mg patch daily for 4 weeks then 9.5mg patch daily for 4 weeks then increase to the 13.3mg patch daily  Hs problems with  Multi tasking  Discussion about attention changes and focusing changes      Blood pressure being monitored and managed with medications for her orthostatic  symptoms.      Urinary urgency      Pharmacy consultation  Pharmacy (MTM) consultation and medication management  Please call the scheduling number @ [phone] to set up an appointment with pharmacists Wendy Caro or Fatemeh Richey.      Ongoing sleep issues - dogs are there  3 dogs and living in the family room  - rashi is in the bedroom  2 y ear old dog, middle age dog and older dog (has problems climbing the stairs)     In summary - will need to work on intervals 2 tabs 4 or 4.5 hours vs 2.5 pills every 5 hours   Work on constipation   Review price of the rivastigmine patch but don't start yet.      Return back in 3-6 months.      Parkinson's Disease:       Orthostatic Hypotension:  Today's BP showed significant SBP drop. Most likely, this is why pt was feeling dizzy, not moving well.   gave her the midday dose after her orthostatic BP/HR was completed.      Memory Problems: Ongoing pt. Pt had difficulty follow the instructions to perform tasks when motor assessment was completed. On Rivastigmine 4.6 mg patch & hasn't increased the dose yet. No side effects from the current dose.      __  Stay on the same antiparkinsonian medication.      __  Check your BP regularly. Today, your standing BP dropped.  Slowly stand up and wait before walking. Monitor dizziness.  Continue taking the Midodrine.       5/17/2023  1:23 PM 5/17/2023  1:25 PM   Vital Signs       Systolic 146 !  89 (L)    Diastolic 82 !  55 !    Pulse 58  58       __  Increase the Rivastigmine patch to 9.6 mg today.       __  See Wendy Caro, Pharm.D. on May 25th.     Weight loss - encouraged to increase calories and don't take protein within 1-2 hours of carbidopa/levodopa (sinemet)  Stress of planning a wedding in 4 weeks hair done in the morning, wedding      Will switch to gocovri tomorrow and last dose of regular amantadine will be today   Can use as needed sinemet to help     Has had falls and tripped over the dog  Had a fall  trying to get dressed and stand on one leg  May drop something if doing two things at one time - using both hands     9/13/2023 visit with Wendy Caro      Return back in 3 months with Zeynep     Discussion about blood pressure - continue on midodrine 5mg 3/day     Doing exercise classes      Using the patch rivastigmine 13.3mg - and has helped a bit      Discussion about bowel habits  Using prebiotic twice daily      May want to talk with Dr Ishan Melendrez about repeat dexa scan - l ast one was 2018 I think  May need bone enhancing medication  And continue vitamin d and calcium        Medications     4/430 9a 10a 1p 4p 6p 8p 10p   Acetaminophen tylenol 500mg      2          2   Albuterol proair proventil ventolin      prn              Amantadine gocovri 68.5mg 24hr 121/mo                  Amantadine symmetrel 100mg 1/2         1/2       b complex                1    Bupropion wellbutrin SR 200mg 12      1         1    Calcium carbonate 600mg               1    Carbidopa/levodopa Sinemet  25/100  2   2.5   2     2.5   Cholecalciferol vit D3 25mcg 1000u      1             Coenzyme Q10 100mg               1   Gabapentin neurontin 100mg     2         2   Gabapentin neurontin 300mg               1    Midodrine proamatine 5mg   1   1   1       Nonformulary prebiotic            Polyethylene glycol miralax prn                 Probiotic prebiotic 1              1    Propranolol inderal 10mg     1/2             Rivastigmine exelon 13.3mg  patch using                 Sertraline zoloft 100mg     1             Sodium fluoride dental gel previden                   Tizanidine zanaflex 2mg prn                 Vitamin C 500mg      1                                                        Plan:    Very brief trial of gocovri 8/2023 but stopped as not sure if helped and was very expensive. She continues on amantadine 1/2 of 100mg twice daily    Has not had a recent bone density. May want to talk with pcp  Taking calcium and vitamin  "D    Bowel habits are stable - not using miralax  May have a few bowel movements per day  Using prebiotic  Incomplete evacuation.     Discussion about blood pressure - continue on midodrine 5mg 3/day  Still on midodrine  Bp has been lower than today's readings.   Has had bp in the 70/40 range.   Has had dull headache and loss of sight and hearing and has feeling of fainting  Rare in the morning every few months  May be more common if misses the pill.   Refilled the midodrine  However encouraged her to see her cardiologist and review bp etc. Dr. Reyna Dick    Has pain on the edges of her tongue - it is intermittent  Worse problem more talking.   Visually her tongue looks okay and is not cracked.   \"The most common vitamin or nutrient deficiencies that cause cracked tongue are vitamin B12 deficiency, low iron, zinc or biotin. Glossodynia     Urinary urgency variable  Has nocturia 2/noc   She is trying to take more water in the day and lessen water at night.   Mirabegron/myrbetriq or gemtesa/ubegron are reasonable options as they are beta3 agonists and may increase blood pressure (good thing) but may be expensive.     In summary  Review bone issues with pcp - bone density -   Consider glossodynia tests: checking vitamins with pcp:  iron/ferritin, zinc, biotin, B12/mma  PDgeneration testing - mother with tremor, maternal aunt with possible parkinson (lee ann Sorensen)   See cardiologist   GENETIC CONSULTATION FOR muscular dystrophy (Duchene?), parkinson and tremor, etc. See family tree that I filled out. Grandparents first cousin on mother's side - david.   Return appointment with Zeynep Valle   No changes in medications  Therapy orders to fax to [facility] speech and PD     Coding statement:   Medical Decision Making:  #  Chronic progressive medical conditions addressed  - see above --   Review and/or interpretation of unique test or documentation from a provider outside of neurology yes "   Independent historian provided additional details  yes I  Prescription drug management and review of potential side effects and/or monitoring for side effects  -- see above ---  Health impacted by social determinants of health  no    I have reviewed the note as documented above.  This accurately captures the substance of my conversation with the patient and total time spent preparing for visit, executing visit and completing visit on the day of the visit:  50 minutes.  The portion of this total time included face to face time 43 minutes     The longitudinal plan of care for Alina Zavala was addressed during this visit. Due to the added complexity in care, I will continue to support Alina Zavala in the subsequent management of this condition(s) and with the ongoing continuity of care of this condition(s).      Ishan Hearn MD     ______________________________________    Last visit date and details:             ______________________________________      Patient was asked about 14 Review of systems including changes in vision (dry eyes, double vision), hearing, heart, lungs, musculoskeletal, depression, anxiety, snoring, RBD, insomnia, urinary frequency, urinary urgency, constipation, swallowing problems, hematological, ID, allergies, skin problems: seborrhea, endocrinological: thyroid, diabetes, cholesterol; balance, weight changes, and other neurological problems and these were not significant at this time except for   Patient Active Problem List   Diagnosis    Asthma    Benign essential hypertension    Adenoma of colon    Anxiety    DDD (degenerative disc disease), lumbar    Depression    Dizziness    Labile blood pressure    Hypotension    Near syncope    Osteopenia    Parkinson disease    POTS (postural orthostatic tachycardia syndrome)    Trigeminal neuralgia          Allergies   Allergen Reactions    Prednisone Anxiety, Palpitations and Shortness Of Breath    Baclofen Other (See Comments)     Agitation,  "confusion, brain fog, sedation    Donepezil      Insomnia, nausea, dizziness/somnolence     Entacapone     Penicillins      Unknown    Prednisone      Other reaction(s): *Unknown    Sulfa Antibiotics      Unknown     Past Surgical History:   Procedure Laterality Date    D & C       Past Medical History:   Diagnosis Date    Anxiety     Depression     Hyperlipidemia     Hypertension     Lyme disease     Trigeminal neuralgia      Social History     Socioeconomic History    Marital status:      Spouse name: Not on file    Number of children: Not on file    Years of education: Not on file    Highest education level: Not on file   Occupational History    Not on file   Tobacco Use    Smoking status: Never    Smokeless tobacco: Never   Substance and Sexual Activity    Alcohol use: No    Drug use: Not on file    Sexual activity: Not on file   Other Topics Concern    Not on file   Social History Narrative    . Lives in [place]. Tejinder Zavala spouse. They have three children. Their names are Eros, Venkat, and Christie. She is a homemaker. Studied library science and theology. She was a  for over 16 years. 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[place], focusing on castro - can see on [employer] \"[place] and Backroads.\"        [email or web address]     Social Determinants of Health     Financial Resource Strain: Not on file   Food Insecurity: Not on file   Transportation Needs: Not on file   Physical Activity: Not on file   Stress: Not on file   Social Connections: Not on file   Interpersonal Safety: Not on file   Housing Stability: Not on file       Drug and lactation database from the [place] National Library of Medicine:  http://toxnet.nlm.nih.gov/cgi-bin/sis/htmlgen?LACT      B/P: Data Unavailable, T: Data Unavailable, P: Data Unavailable, R: Data Unavailable 0 lbs " 0 oz  There were no vitals taken for this visit., There is no height or weight on file to calculate BMI.  Medications and Vitals not listed above were documented in the cart and reviewed by me.     Current Outpatient Medications   Medication Sig Dispense Refill    acetaminophen (TYLENOL) 500 MG tablet 2 x 500mg tab by mouth twice daily @ 10am and 11pm  = 4/day      albuterol (PROAIR HFA/PROVENTIL HFA/VENTOLIN HFA) 108 (90 Base) MCG/ACT inhaler Inhale 2 puffs into the lungs every 6 hours as needed      amantadine HCl 100 MG TABS Take half-tablet (50 mg) by mouth 2 times daily 90 tablet 3    B Complex-C (VITAMIN B COMPLEX W/VITAMIN C) TABS tablet Take 1 tablet by mouth daily      buPROPion (WELLBUTRIN SR) 200 MG 12 hr tablet 200mg tab by mouth twice daily at 10am and 10pm  0    calcium carbonate (OS-TENA) 600 MG tablet 1 tab by mouth nightly at 10pm      carbidopa-levodopa (SINEMET)  MG tablet 2 -2.5 tabs by mouth every 4-5 hours = 12/day 1080 tablet 3    cholecalciferol 25 MCG (1000 UT) TABS 1000 units (25 mcg) by mouth daily at 10am      co-enzyme Q-10 100 MG CAPS capsule 100mg capsule by mouth every evening at 11pm      gabapentin (NEURONTIN) 100 MG capsule Take 200 mg by mouth every morning 180 capsule 3    gabapentin (NEURONTIN) 300 MG capsule Take 300 mg by mouth every evening 90 capsule 3    midodrine (PROAMATINE) 5 MG tablet Take 5 mg by mouth 3 times daily      NONFORMULARY Prebiotic twice daily      polyethylene glycol (MIRALAX) 17 g packet ? START ?      probiotic CAPS Prebiotic twice daily      propranolol (INDERAL) 10 MG tablet 1/2 of 10mg tab by mouth once daily (morning only) 180 tablet 3    rivastigmine (EXELON) 13.3 MG/24HR 24 hr patch Place 1 patch onto the skin daily 30 patch 11    sertraline (ZOLOFT) 100 MG tablet 100mg tab by mouth dailya t 10am 90 tablet 3    sodium fluoride dental gel (PREVIDENT) 1.1 % GEL topical gel APPLY A THIN BEAD OF GEL TO TOOTHBRUSH AND BRUSH TEETH ONCE DAILY AT  BEDTIME FOR AT LEAST 1 MINUTE. EXPECTORATE THROROUGHLY.      tiZANidine (ZANAFLEX) 2 MG tablet Take 1-2 tablets by mouth 2 times daily as needed      vitamin C (ASCORBIC ACID) 500 MG tablet 500mg tab by mouth daily at 10am           Ishan Hearn MD

## 2024-02-22 ENCOUNTER — OFFICE VISIT (OUTPATIENT)
Dept: NEUROLOGY | Facility: CLINIC | Age: 64
End: 2024-02-22
Payer: COMMERCIAL

## 2024-02-22 VITALS
OXYGEN SATURATION: 98 % | HEART RATE: 73 BPM | RESPIRATION RATE: 16 BRPM | SYSTOLIC BLOOD PRESSURE: 139 MMHG | DIASTOLIC BLOOD PRESSURE: 84 MMHG

## 2024-02-22 DIAGNOSIS — F80.0 ARTICULATION DISORDER: ICD-10-CM

## 2024-02-22 DIAGNOSIS — G20.A1 PARKINSON'S DISEASE WITHOUT DYSKINESIA OR FLUCTUATING MANIFESTATIONS (H): Primary | ICD-10-CM

## 2024-02-22 DIAGNOSIS — I95.1 ORTHOSTATIC HYPOTENSION: ICD-10-CM

## 2024-02-22 DIAGNOSIS — Z82.0 FAMILY HISTORY OF MUSCULAR DYSTROPHY: ICD-10-CM

## 2024-02-22 PROCEDURE — 99215 OFFICE O/P EST HI 40 MIN: CPT | Performed by: PSYCHIATRY & NEUROLOGY

## 2024-02-22 RX ORDER — MIDODRINE HYDROCHLORIDE 5 MG/1
5 TABLET ORAL 3 TIMES DAILY
Qty: 270 TABLET | Refills: 3 | Status: SHIPPED | OUTPATIENT
Start: 2024-02-22 | End: 2024-08-29

## 2024-02-22 ASSESSMENT — PAIN SCALES - GENERAL: PAINLEVEL: SEVERE PAIN (6)

## 2024-02-22 NOTE — NURSING NOTE
Chief Complaint   Patient presents with    Parkinson     /84 (BP Location: Left arm, Patient Position: Sitting, Cuff Size: Adult Regular)   Pulse 73   Resp 16   SpO2 98%     JAMI VANDANA

## 2024-02-22 NOTE — LETTER
2024       RE: Alina Zavala   [facility] [address]     Dear Colleague,    Thank you for referring your patient, Alina Zavala, to the [facility]UR[facility] [place] [facility]. Please see a copy of my visit note below.        Diagnosis/Summary/Recommendations:    PATIENT: Alina Zavala  63 year old female     : 1960    DAHLIA: 2024    MRN: [id number]  [facility] [address]   [email or web address]  [phone] (M) -iphone  [phone] (H) -not an iphone  Tejinder HARP  [phone]     Venkat Cooney and Marissa.       PCP is Dr. HESHAM Melendrez  CArdiologist Dr. Reyna Dick  Eye doctor  Dentist  Chiropractor     [email or web address]        Assessment:  (G20) Parkinson disease (H)  (primary encounter diagnosis)  (G20) Parkinson disease (H)  (primary encounter diagnosis)  Sinemet 25/100 2 tabs 5/day  Entacapone comtan 200mg - not taking     Review of diagnosis    Parkinson disease     Avoidance of dopamine blockers   Not taking     Motor complication review   wearing off. At 4-5 hours gets tingling of her toes/fingers  Legs - cramping, clawing - behind her knees  Wearing off afternoon; has to be attentive to afternoon dosing     Has nausea with comtan and not eating as much   Weight has reduced to 144 from 162 #      Review of Impulse control disorders         Review of surgical or medication options   reviewed     May consider a trial of amantadine (symmetrel) in 1-3 weeks after her back situation is better. Amantadine may help in her wearing off and dyskinesias.      Gait/Balance/Falls   Fell and kind of caught herself yesterday - was on her knees  Discussion about multi tasking - using only one hand and not two hands to carry things.      Exercise/Therapy performed/offered     Getting up at 4 or 5am and have to let dogs in and out and is run down              Taty -  yellow lab and red wright lab 65 #              Cat              Other dog   Not feeding till 7/730a     Doing big and loud every day     [facility] perez physical therapy  Also doing occupational therapy      Silver sneaker discussion - [place]   Can use facilities free  Pool assessment at [facility]         She has seen therapists in the past at [facility] and if needed may need to see orthopedics, PMR or sports medicine for her back symptoms which may improve with conservative management.      For RLS -  Gabapentin neurontin 100mg @10am and sometimes @8/11pm extra dose   Gabapentin neurontin 300mg @8/11pm     may consider taking second dose of gabapentin earlier than 10/11pm        Cognitive/Driving   Short distances only; not of freeway  Cognitive problems - recalling names takes longer  Problems with spelling        Would recommend a baseline neuropsychological evaluation 5/9/2022  brianna belle     Generally moderate to severe impairments identified in nonverbal skills (basic judgment, reasoning, planning and organization) as well as aspects of executive functioning (complex attention, visuospatial planning and organization)  Subtle weaknesses (low average) were evident on additional nonverbal (learning and memory) and executive (working memory) measures as well as in cognitive efficiency  Performance was intact in most verbal skills including verbal learning and memory, and aspects of executive functioning (phonemic fluency, deductive reasoning, inhibition).   She states that spatial skills have always been an area of weaknesse, but her  has observed a decline in this area        Mood   Counselling - working with counsellor   Bupropion wellbutrin SR 200mg 12 hr tablet twice daily  Sertraline zoloft 100mg daily     Mood could be better  Has been unable to go off wellbutrin in the past  Has wearing off related mood issues  Not sure how much the sertraline has helped.     fatigue  Emotional   Missing  family for thanksgiving  Mother with c diff and is 85 and frail   with covid      Problems handling the cold  Needs to get full spectrum light     Daughter moving back with her dog   Renuka     Acceptance issues with the parkinson  Not driving       Hallucinations/delusions   denies     Sleep   Gets up to take her first dose of medication @330/530am and then goes back to bed; possibly till 930am  Variable time of wakening up.    Falls asleep relatively easily   Sleeping a lot - exhausted     Sleep deprived with dogs and affects pill cycle     Bladder/Renal/Prostate/Gyn/Other  Urinary urgency  Variable nocturia depending on hydration, etc.   Discussed timing of water intake     GI/Constipation/GERD   Magnesium oxide 500mg twice daily at 10am and 10pm  Probiotic twice daily smarty pants at 10am and 10pm   Doing well     ENDO/Lipid/DM/Bone density/Thyroid  Calcium carbonate at night at 10pm  Cholecalciferol vitamin D3 1000 units at 10am     Cardio/heart/Hyper or Hypotensive   POTS    Blood pressure is better with medication change  She thinks that the propranolol helps her tremor but worried   Fludrocortisone florinef 0.1mg - not taking  Midodrine proamatine 5mg 3/day   Propranolol 10mg 1/2 of 10mg daily at 10am      Cardiology  Kimber,pan Constantino, Marlena Dick, Reyna - cardiologist     water and salt intake     Dr. Reyna Dick     Vision/Dry Eyes/Cataracts/Glaucoma/Macular   Less contrast sensitivity  Tree vs person  Will see Oumar Wade on 3/2/2023     Heme/Anticoagulation/Antiplatelet/Anemia/Other  Not on aspirin     ENT/Resp  Breathing  Albuterol proair as needed   Asmanex 120 metered doses 220mcg/inh inhaler at night  - not taking   Mometasone asmanex - not taking  Taste and smell has not improved since covid     Skin/Cancer/Seborrhea/other  denies     Musculoskeletal/Pain/Headache  Denies - other than shoulders      Other:        Ongoing nausea that may be related to her comtan/entacapone or other  factors  She has some weight loss     Discussed timing of meals and the sinemet and anti nausea medications  Has seen Wendy Caro 3/2022  Has not been on rytary and has not been on amantadine  She has had wearing off related leg symptoms - numbness -      Has not had an emg to evaluate for neuropathy  And she also has wearing off related neuropathy type of leg symptoms  Blood work and emg could be done     Discussion about report of the neuropsychological findings and terminology.   She does not have lewy body disease.      Left trigeminal neuralgia     Prior history of lyme disease     Sweating episodes, not able to take hot shower      Acetaminophen tylenol 500mg scheduled.   b complex   Coenzyme Q10 daily   Cyclobenzaprine flexeril as needed 1/2 tab  Gabapentin neurontin 100mg twice daily   Gabapentin neurontin 300mg at night   Vitamin C ascorbic acid 500mg      Visual issues; seeing dr Wade  Seeing Red and green squares and has a reduction in amantadine   Discussion about lab work to check kidney function as is taking amantadine (symmetrel)   Last renal labs in our chart are from 2020.   Not driving  Depth perception problems      Wearing off at 4/5 hours - wears off 45 minutes before dose and has cramping, aching  Has delayed on periods   Cramping at night - toes and feet and walks around till pills work   This may occur at 9pm or so   Has cramping in the car/juan a horse      Last visit was summer 2022     Think that rytary was too expensive  Did not benefit from comtan and had side effects  Has not been on gocovri     Consider reducing the interval to 4.5 hours of the sinemet.   Has had some slow off periods and some sudden off periods.      Bowel movements are still there but may try some things such as the miralax, which she says she has tried in the past.      Cognitive problems  Did not tolerate donepezil (aricept)  Trial of rivastigmine excelon patch for cognition, monitor for blood pressure or  heart rate changes or other side effects.   4.6mg patch daily for 4 weeks then 9.5mg patch daily for 4 weeks then increase to the 13.3mg patch daily  Hs problems with  Multi tasking  Discussion about attention changes and focusing changes      Blood pressure being monitored and managed with medications for her orthostatic symptoms.      Urinary urgency      Pharmacy consultation  Pharmacy (MTM) consultation and medication management  Please call the scheduling number @ [phone] to set up an appointment with pharmacists Wendy Caro or Fatemeh iRchey.      Ongoing sleep issues - dogs are there  3 dogs and living in the family room  - rashi is in the bedroom  2 y ear old dog, middle age dog and older dog (has problems climbing the stairs)     In summary - will need to work on intervals 2 tabs 4 or 4.5 hours vs 2.5 pills every 5 hours   Work on constipation   Review price of the rivastigmine patch but don't start yet.      Return back in 3-6 months.      Parkinson's Disease:       Orthostatic Hypotension:  Today's BP showed significant SBP drop. Most likely, this is why pt was feeling dizzy, not moving well.   gave her the midday dose after her orthostatic BP/HR was completed.      Memory Problems: Ongoing pt. Pt had difficulty follow the instructions to perform tasks when motor assessment was completed. On Rivastigmine 4.6 mg patch & hasn't increased the dose yet. No side effects from the current dose.      __  Stay on the same antiparkinsonian medication.      __  Check your BP regularly. Today, your standing BP dropped.  Slowly stand up and wait before walking. Monitor dizziness.  Continue taking the Midodrine.       5/17/2023  1:23 PM 5/17/2023  1:25 PM   Vital Signs       Systolic 146 !  89 (L)    Diastolic 82 !  55 !    Pulse 58  58       __  Increase the Rivastigmine patch to 9.6 mg today.       __  See Wendy Caro, Pharm.D. on May 25th.     Weight loss - encouraged to increase calories and  don't take protein within 1-2 hours of carbidopa/levodopa (sinemet)  Stress of planning a wedding in 4 weeks hair done in the morning, wedding      Will switch to gocovri tomorrow and last dose of regular amantadine will be today   Can use as needed sinemet to help     Has had falls and tripped over the dog  Had a fall trying to get dressed and stand on one leg  May drop something if doing two things at one time - using both hands     9/13/2023 visit with Wendy Caro      Return back in 3 months with Zeynep     Discussion about blood pressure - continue on midodrine 5mg 3/day     Doing exercise classes      Using the patch rivastigmine 13.3mg - and has helped a bit      Discussion about bowel habits  Using prebiotic twice daily      May want to talk with Dr Ishan Melendrez about repeat dexa scan - l ast one was 2018 I think  May need bone enhancing medication  And continue vitamin d and calcium        Medications     4/430 9a 10a 1p 4p 6p 8p 10p   Acetaminophen tylenol 500mg      2          2   Albuterol proair proventil ventolin      prn              Amantadine gocovri 68.5mg 24hr 121/mo                  Amantadine symmetrel 100mg 1/2         1/2       b complex                1    Bupropion wellbutrin SR 200mg 12      1         1    Calcium carbonate 600mg               1    Carbidopa/levodopa Sinemet  25/100  2   2.5   2     2.5   Cholecalciferol vit D3 25mcg 1000u      1             Coenzyme Q10 100mg               1   Gabapentin neurontin 100mg     2         2   Gabapentin neurontin 300mg               1    Midodrine proamatine 5mg   1   1   1       Nonformulary prebiotic            Polyethylene glycol miralax prn                 Probiotic prebiotic 1              1    Propranolol inderal 10mg     1/2             Rivastigmine exelon 13.3mg  patch using                 Sertraline zoloft 100mg     1             Sodium fluoride dental gel previden                   Tizanidine zanaflex 2mg prn                "  Vitamin C 500mg      1                                                        Plan:    Very brief trial of gocovri 8/2023 but stopped as not sure if helped and was very expensive. She continues on amantadine 1/2 of 100mg twice daily    Has not had a recent bone density. May want to talk with pcp  Taking calcium and vitamin D    Bowel habits are stable - not using miralax  May have a few bowel movements per day  Using prebiotic  Incomplete evacuation.     Discussion about blood pressure - continue on midodrine 5mg 3/day  Still on midodrine  Bp has been lower than today's readings.   Has had bp in the 70/40 range.   Has had dull headache and loss of sight and hearing and has feeling of fainting  Rare in the morning every few months  May be more common if misses the pill.   Refilled the midodrine  However encouraged her to see her cardiologist and review bp etc. Dr. Reyna Dick    Has pain on the edges of her tongue - it is intermittent  Worse problem more talking.   Visually her tongue looks okay and is not cracked.   \"The most common vitamin or nutrient deficiencies that cause cracked tongue are vitamin B12 deficiency, low iron, zinc or biotin. Glossodynia     Urinary urgency variable  Has nocturia 2/noc   She is trying to take more water in the day and lessen water at night.   Mirabegron/myrbetriq or gemtesa/ubegron are reasonable options as they are beta3 agonists and may increase blood pressure (good thing) but may be expensive.     In summary  Review bone issues with pcp - bone density -   Consider glossodynia tests: checking vitamins with pcp:  iron/ferritin, zinc, biotin, B12/mma  PDgeneration testing - mother with tremor, maternal aunt with possible parkinson (lee ann Sorensen)   See cardiologist   GENETIC CONSULTATION FOR muscular dystrophy (Duchene?), parkinson and tremor, etc. See family tree that I filled out. Grandparents first cousin on mother's side - david.   Return appointment with " Zeynep Valle   No changes in medications  [facility] speech and PD     Coding statement:   Medical Decision Making:  #  Chronic progressive medical conditions addressed  - see above --   Review and/or interpretation of unique test or documentation from a provider outside of neurology yes   Independent historian provided additional details  yes I  Prescription drug management and review of potential side effects and/or monitoring for side effects  -- see above ---  Health impacted by social determinants of health  no    I have reviewed the note as documented above.  This accurately captures the substance of my conversation with the patient and total time spent preparing for visit, executing visit and completing visit on the day of the visit:  50 minutes.  The portion of this total time included face to face time 43 minutes     The longitudinal plan of care for Alina Zavala was addressed during this visit. Due to the added complexity in care, I will continue to support Alina Zavala in the subsequent management of this condition(s) and with the ongoing continuity of care of this condition(s).      Ishan Hearn MD     ______________________________________    Last visit date and details:             ______________________________________      Patient was asked about 14 Review of systems including changes in vision (dry eyes, double vision), hearing, heart, lungs, musculoskeletal, depression, anxiety, snoring, RBD, insomnia, urinary frequency, urinary urgency, constipation, swallowing problems, hematological, ID, allergies, skin problems: seborrhea, endocrinological: thyroid, diabetes, cholesterol; balance, weight changes, and other neurological problems and these were not significant at this time except for   Patient Active Problem List   Diagnosis     Asthma     Benign essential hypertension     Adenoma of colon     Anxiety     DDD (degenerative disc disease), lumbar     Depression      "Dizziness     Labile blood pressure     Hypotension     Near syncope     Osteopenia     Parkinson disease     POTS (postural orthostatic tachycardia syndrome)     Trigeminal neuralgia          Allergies   Allergen Reactions     Prednisone Anxiety, Palpitations and Shortness Of Breath     Baclofen Other (See Comments)     Agitation, confusion, brain fog, sedation     Donepezil      Insomnia, nausea, dizziness/somnolence      Entacapone      Penicillins      Unknown     Prednisone      Other reaction(s): *Unknown     Sulfa Antibiotics      Unknown     Past Surgical History:   Procedure Laterality Date     D & C       Past Medical History:   Diagnosis Date     Anxiety      Depression      Hyperlipidemia      Hypertension      Lyme disease      Trigeminal neuralgia      Social History     Socioeconomic History     Marital status:      Spouse name: Not on file     Number of children: Not on file     Years of education: Not on file     Highest education level: Not on file   Occupational History     Not on file   Tobacco Use     Smoking status: Never     Smokeless tobacco: Never   Substance and Sexual Activity     Alcohol use: No     Drug use: Not on file     Sexual activity: Not on file   Other Topics Concern     Not on file   Social History Narrative    . Lives in [place]. Tejinder Zavala spouse. They have three children. Their names are Eros, Venkat, and Christie. She is a homemaker. Studied library science and theology. She was a  for over 16 years. 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[place], focusing on cast[place] - can see on [employer] \"[place].\"        [email or web address]     Social Determinants of Health     Financial Resource Strain: Not on file   Food Insecurity: Not on file   Transportation Needs: Not on file   Physical Activity: Not on file "   Stress: Not on file   Social Connections: Not on file   Interpersonal Safety: Not on file   Housing Stability: Not on file       Drug and lactation database from the [place] National Library of Medicine:  http://toxnet.nlm.nih.gov/cgi-bin/sis/htmlgen?LACT      B/P: Data Unavailable, T: Data Unavailable, P: Data Unavailable, R: Data Unavailable 0 lbs 0 oz  There were no vitals taken for this visit., There is no height or weight on file to calculate BMI.  Medications and Vitals not listed above were documented in the cart and reviewed by me.     Current Outpatient Medications   Medication Sig Dispense Refill     acetaminophen (TYLENOL) 500 MG tablet 2 x 500mg tab by mouth twice daily @ 10am and 11pm  = 4/day       albuterol (PROAIR HFA/PROVENTIL HFA/VENTOLIN HFA) 108 (90 Base) MCG/ACT inhaler Inhale 2 puffs into the lungs every 6 hours as needed       amantadine HCl 100 MG TABS Take half-tablet (50 mg) by mouth 2 times daily 90 tablet 3     B Complex-C (VITAMIN B COMPLEX W/VITAMIN C) TABS tablet Take 1 tablet by mouth daily       buPROPion (WELLBUTRIN SR) 200 MG 12 hr tablet 200mg tab by mouth twice daily at 10am and 10pm  0     calcium carbonate (OS-TENA) 600 MG tablet 1 tab by mouth nightly at 10pm       carbidopa-levodopa (SINEMET)  MG tablet 2 -2.5 tabs by mouth every 4-5 hours = 12/day 1080 tablet 3     cholecalciferol 25 MCG (1000 UT) TABS 1000 units (25 mcg) by mouth daily at 10am       co-enzyme Q-10 100 MG CAPS capsule 100mg capsule by mouth every evening at 11pm       gabapentin (NEURONTIN) 100 MG capsule Take 200 mg by mouth every morning 180 capsule 3     gabapentin (NEURONTIN) 300 MG capsule Take 300 mg by mouth every evening 90 capsule 3     midodrine (PROAMATINE) 5 MG tablet Take 5 mg by mouth 3 times daily       NONFORMULARY Prebiotic twice daily       polyethylene glycol (MIRALAX) 17 g packet ? START ?       probiotic CAPS Prebiotic twice daily       propranolol (INDERAL) 10 MG tablet 1/2  of 10mg tab by mouth once daily (morning only) 180 tablet 3     rivastigmine (EXELON) 13.3 MG/24HR 24 hr patch Place 1 patch onto the skin daily 30 patch 11     sertraline (ZOLOFT) 100 MG tablet 100mg tab by mouth dailya t 10am 90 tablet 3     sodium fluoride dental gel (PREVIDENT) 1.1 % GEL topical gel APPLY A THIN BEAD OF GEL TO TOOTHBRUSH AND BRUSH TEETH ONCE DAILY AT BEDTIME FOR AT LEAST 1 MINUTE. EXPECTORATE THROROUGHLY.       tiZANidine (ZANAFLEX) 2 MG tablet Take 1-2 tablets by mouth 2 times daily as needed       vitamin C (ASCORBIC ACID) 500 MG tablet 500mg tab by mouth daily at 10am           Ishan Hearn MD

## 2024-02-22 NOTE — PATIENT INSTRUCTIONS
Medications     4/430 9a 10a 1p 4p 6p 8p 10p   Acetaminophen tylenol 500mg      2          2   Albuterol proair proventil ventolin      prn              Amantadine gocovri 68.5mg 24hr 121/mo                  Amantadine symmetrel 100mg 1/2         1/2       b complex                1    Bupropion wellbutrin SR 200mg 12      1         1    Calcium carbonate 600mg               1    Carbidopa/levodopa Sinemet  25/100  2   2.5   2     2.5   Cholecalciferol vit D3 25mcg 1000u      1             Coenzyme Q10 100mg               1   Gabapentin neurontin 100mg     2         2   Gabapentin neurontin 300mg               1    Midodrine proamatine 5mg   1   1   1       Nonformulary prebiotic            Polyethylene glycol miralax prn                 Probiotic prebiotic 1              1    Propranolol inderal 10mg     1/2             Rivastigmine exelon 13.3mg  patch using                 Sertraline zoloft 100mg     1             Sodium fluoride dental gel previden                   Tizanidine zanaflex 2mg prn                 Vitamin C 500mg      1                                                        Plan:    Very brief trial of gocovri 8/2023 but stopped as not sure if helped and was very expensive. She continues on amantadine 1/2 of 100mg twice daily    Has not had a recent bone density. May want to talk with pcp  Taking calcium and vitamin D    Bowel habits are stable - not using miralax  May have a few bowel movements per day  Using prebiotic  Incomplete evacuation.     Discussion about blood pressure - continue on midodrine 5mg 3/day  Still on midodrine  Bp has been lower than today's readings.   Has had bp in the 70/40 range.   Has had dull headache and loss of sight and hearing and has feeling of fainting  Rare in the morning every few months  May be more common if misses the pill.   Refilled the midodrine  However encouraged her to see her cardiologist and review bp etc. Dr. Reyna Dick    Has pain on the edges of  "her tongue - it is intermittent  Worse problem more talking.   Visually her tongue looks okay and is not cracked.   \"The most common vitamin or nutrient deficiencies that cause cracked tongue are vitamin B12 deficiency, low iron, zinc or biotin. Glossodynia     Urinary urgency variable  Has nocturia 2/noc   She is trying to take more water in the day and lessen water at night.   Mirabegron/myrbetriq or gemtesa/ubegron are reasonable options as they are beta3 agonists and may increase blood pressure (good thing) but may be expensive.     In summary  Review bone issues with pcp - bone density -   Consider glossodynia tests: checking vitamins with pcp:  iron/ferritin, zinc, biotin, B12/mma  PDgeneration testing   See cardiologist   GENETIC CONSULTATION FOR muscular dystrophy (Duchene?), parkinson and tremor, etc. See family tree that I filled out. Grandparents first cousin on mother's side - norwegordon[place].   Return appointment with Zeynep Valle   No changes in medications  "

## 2024-02-29 ENCOUNTER — TELEPHONE (OUTPATIENT)
Dept: NEUROLOGY | Facility: CLINIC | Age: 64
End: 2024-02-29
Payer: COMMERCIAL

## 2024-02-29 NOTE — TELEPHONE ENCOUNTER
She can do a video visits on 3/22 at 2:30 with Pipo. If he has a 3PM appointment this would work better for her.    She gets dry mouth a lot and tongue pain on the middle sides of her tongue. The more she talks the worse it is. PCP and Dr. Hearn said her tongue looked fine. Her counselor today said medications can cause tongue pain. She asked that this writer reach out to Wendy about this. It is very bothersome for her.

## 2024-02-29 NOTE — TELEPHONE ENCOUNTER
Health Call Center    Phone Message    May a detailed message be left on voicemail: yes     Reason for Call:  Patient called to speak to Yamilet, still waiting for a call from genetic counseling. Wondering if she missed a call? Requesting to speak to nurse.     Action Taken: Message routed to:  [facility] neurology    Travel Screening: Not Applicable

## 2024-02-29 NOTE — TELEPHONE ENCOUNTER
[facility] Call Center     Phone Message     May a detailed message be left on voicemail: yes      Reason for Call:  Patient was returning a call to [facility]. Please return Pt's call at [phone]     Action Taken: Message routed to:  [facility] ([facility] Neurology     Travel Screening: Not Applicable

## 2024-02-29 NOTE — TELEPHONE ENCOUNTER
Left a voicemail informing Alina our  Pipo sent her a [employer] message. Asked her to call me back if available today to further discuss any other questions she may have.

## 2024-03-01 NOTE — TELEPHONE ENCOUNTER
Left a message asking Alina to follow-up with her primary doctor to check her vitamin levels due to the tongue pain.  Asked that she respond to Pipo Willard on MyChart if they would rather do a visit on Friday in April.

## 2024-03-13 ENCOUNTER — VIRTUAL VISIT (OUTPATIENT)
Dept: NEUROLOGY | Facility: CLINIC | Age: 64
End: 2024-03-13
Attending: PSYCHIATRY & NEUROLOGY
Payer: COMMERCIAL

## 2024-03-13 DIAGNOSIS — G20.A1 PARKINSON'S DISEASE (H): Primary | ICD-10-CM

## 2024-03-13 DIAGNOSIS — K59.00 CONSTIPATION, UNSPECIFIED CONSTIPATION TYPE: ICD-10-CM

## 2024-03-13 DIAGNOSIS — G62.9 NEUROPATHY: ICD-10-CM

## 2024-03-13 PROCEDURE — 99207 PR NO BILLABLE SERVICE THIS VISIT: CPT | Mod: 95 | Performed by: PHARMACIST

## 2024-03-13 RX ORDER — RIVASTIGMINE 13.3 MG/24H
1 PATCH, EXTENDED RELEASE TRANSDERMAL DAILY
Qty: 90 PATCH | Refills: 3 | Status: SHIPPED | OUTPATIENT
Start: 2024-03-13 | End: 2024-08-29

## 2024-03-13 NOTE — PROGRESS NOTES
"Medication Therapy Management (MTM) Encounter    ASSESSMENT:                            Medication Adherence/Access: No issues identified    Parkinson's Disease:     Symptoms have been largely stable. Discussed dosing of Tylenol and that she may increase to up to 3000 mg/day if needed.     Neuropathy:  Stable; monitor drowsiness which could be a side effect of medications or symptom of Parkinson's disease     Constipation:  Stable     PLAN:                            You may try increasing the acetaminophen (Tylenol) to 1000 mg 3 times daily if needed for pain   No other medication changes recommended at this time     Follow-up: 7/17/24 at 10 am video visit     SUBJECTIVE/OBJECTIVE:                          Alina Zavala is a 63 year old female contacted via secure video for a follow-up visit. Patient was accompanied by .      Reason for visit: follow up on medications.    Allergies/ADRs: Reviewed in chart  Past Medical History: Reviewed in chart  Tobacco: She reports that she has never smoked. She has never used smokeless tobacco.  Alcohol: not currently using    Medication Adherence/Access: no issues reported    Parkinson's Disease:     - carbidopa-levodopa  mg, 2 tablets 4 times daily (about every 5-6 hours)  - amantadine 50 mg twice daily   - Rivastigmine 13.3 mg daily   - Midodrine 7.5 mg in the morning, 5 mg mid-day, 5 mg in the evening   Patient states if she takes her medication on time her symptoms are pretty well controlled. Dyskinesias come and go.   If she is late to take a dose of carbidopa-levodopa by 1-1.5 hours it can be hard to \"catch up\" and she gets more cramping in her feet and legs. Her hips will hurt. She always brings her medication with her when she leaves the house. She is starting physical therapy for 8 weeks. When she gets sore after physical therapy will take Tylenol. Currently is taking Tylenol 1500 mg in the morning and 1000 mg in the evening. Is wondering if Tylenol can be " increased.   She is not sure how much the rivastigmine patch is helping. She missed one dose and had a headache that day.   Since our last visit the midodrine was increased per cardiology.She hasn't had any dizziness lately. Hasn't monitored blood pressure lately.   Patient has been under more stress taking care of her inlaws. She is doing deep breathing and prayer.     Neuropathy:  - gabapentin 200 mg in the AM/500 mg in the evening  - tizanidine 2 mg, half tab or full tab as needed  Dose was recently increased by PCP. She is now sleeping deeper. Has some trouble getting going in the morning. She is acting out dreams and screaming out in the night. Has not fallen out of bed. She is sleeping in a separate room from her . The neuropathy has been better at night. She has some fatigue during the day and takes naps for 10-20 min.   Tizanidine is from her PCP. She takes it for cramping/tightness as needed in the evenings. Makes her too drowsy if takes during the day.     Constipation:  - Womens probiotic (Bacillus Subtilis)  Reports this is working well. Does not feel she needs Miralax.     Today's Vitals: There were no vitals taken for this visit.  ----------------    I spent 34 minutes with this patient today. All changes were made via collaborative practice agreement with Dr. Hearn. A copy of the visit note was provided to the patient's provider(s).    A summary of these recommendations was sent via [employer].    Wendy Caro, Pharm.D.  Medication Therapy Management Pharmacist  [facility]    Telemedicine Visit Details  Type of service:  Video Conference via [employer]  Start Time:  10:02 AM  End Time: 10:36 AM     Medication Therapy Recommendations  No medication therapy recommendations to display

## 2024-03-13 NOTE — Clinical Note
3/13/2024       RE: Alina Zavala  [address] [facility] [address]     Dear Colleague,    Thank you for referring your patient, Alina Zavala, to the [facility]. Please see a copy of my visit note below.    Medication Therapy Management (MTM) Encounter    ASSESSMENT:                            Medication Adherence/Access: {adherencechoices:540638}    ***:   ***    PLAN:                            ***    Follow-up: {followuptest2:616192}    SUBJECTIVE/OBJECTIVE:                          Alina Zavala is a 63 year old female { :810687} for a follow-up visit. {mtmvisitdetails:422101}      Reason for visit: ***.    Allergies/ADRs: Reviewed in chart  Past Medical History: Reviewed in chart  Tobacco: She reports that she has never smoked. She has never used smokeless tobacco.  Alcohol: {ALCOHOL CONSUMPTION HX:208599}  {Social and Goals:121246}  Medication Adherence/Access: {fumedadherence:239759}    {MTM SUBJECTIVE (Optional):521901}      We discussed keeping a more regimented schedule of carbidopa-levodopa (ie: 8:30 am, 12:30 pm, 4:30 pm, 8:30 pm, 12:30 am, 4:30 am) to try to avoid wearing off of carbidopa-levodopa which leads to more pain   I called and left a message for Dr. Melendrez to discuss my concerns about amitritpyline with him. I will recommend an increase in gabapentin instead (ie: 200 mg-200 mg-400 mg or 300 mg-300 mg-600 mg)  Duloxetine (Cymbalta) may be another option for neuropathic pain but this is also an antidepressant and since you are already taking 2 other antidepressants I am hesitant to have you add a 3rd     Today's Vitals: There were no vitals taken for this visit.  ----------------    I spent *** minutes with this patient today. All changes were made via collaborative practice agreement with Dr. Hearn. A copy of the visit note was provided to the patient's provider(s).    A  summary of these recommendations was sent via [employer].    Wendy Caro, Pharm.D.  Medication Therapy Management Pharmacist  [facility]    Telemedicine Visit Details  Type of service:  Video Conference via Well  Start Time: {video/phone visit start time:152948}  End Time: {video/phone visit end time:152948}     Medication Therapy Recommendations  No medication therapy recommendations to display       Again, thank you for allowing me to participate in the care of your patient.      Sincerely,    Wendy Caro RP

## 2024-03-14 NOTE — PATIENT INSTRUCTIONS
"Recommendations from today's MTM visit:                                                      You may try increasing the acetaminophen (Tylenol) to 1000 mg 3 times daily if needed for pain   No other medication changes recommended at this time     Follow-up: 7/17/24 at 10 am video visit     It was great speaking with you today.  I value your experience and would be very thankful for your time in providing feedback in our clinic survey. In the next few days, you may receive an email or text message from [employer] with a link to a survey related to your  clinical pharmacist.\"     To schedule another MTM appointment, please call the clinic directly or you may call the MTM scheduling line at [phone].    My Clinical Pharmacist's contact information:                                                      Please feel free to contact me with any questions or concerns you have.      Wendy Caro, Pharm.D.  Medication Therapy Management Pharmacist  [facility]     "

## 2024-03-26 ENCOUNTER — TELEPHONE (OUTPATIENT)
Dept: NEUROLOGY | Facility: CLINIC | Age: 64
End: 2024-03-26
Payer: COMMERCIAL

## 2024-03-26 NOTE — TELEPHONE ENCOUNTER
GENETIC COUNSELING-Neurology  Left message for patient offering video visit times. Asked for call back if she wants to schedule.    Pipo Willard MS, [facility]  Certified Genetic Counselor

## 2024-05-10 ENCOUNTER — VIRTUAL VISIT (OUTPATIENT)
Dept: NEUROLOGY | Facility: CLINIC | Age: 64
End: 2024-05-10
Payer: COMMERCIAL

## 2024-05-10 VITALS — HEIGHT: 65 IN | WEIGHT: 126 LBS | BODY MASS INDEX: 20.99 KG/M2

## 2024-05-10 DIAGNOSIS — G20.A1 PARKINSON DISEASE (H): Primary | ICD-10-CM

## 2024-05-10 PROCEDURE — 99207 PR NO CHARGE LOS: CPT | Mod: 95 | Performed by: GENETIC COUNSELOR, MS

## 2024-05-10 ASSESSMENT — PAIN SCALES - GENERAL: PAINLEVEL: MODERATE PAIN (5)

## 2024-05-10 NOTE — PROGRESS NOTES
Virtual Visit Details    Type of service:  Video Visit     Originating Location (pt. Location): Home    Distant Location (provider location):  On-site  Platform used for Video Visit: Well    GENETIC COUNSELING-Neurology  Alina Zavala was seen today for genetic consult. Alina was accompanied to the appointment by her , Justin. I spent a total of 45 minutes with the patient with the majority of the time being spent on genetic counseling and testing options. The patient also underwent a neurological examination from Dr. Hearn at a prior visit.     MEDICAL HISTORY  Please see Dr. Hearn's notes for a more thorough summary of medical history. Briefly, Alina was diagnosed with PD in 2019. She reports that she clearly noted symptoms in  and may have experienced symptoms as early as .       FAMILY HISTORY-see scanned pedigree  Alina's mother is reported to have a tremor.  Alina's maternal aunt  at 76 with Parkinson disease.  Alina's maternal uncles had a muscular dystrophy. She believes that it may have been Duchenne muscular dystrophy, but she also thought that one of her uncles may have had an affected son- which would not be consistent. She also believes that her mother had a test and was told that she was 'not a carrier.' I indicated that in order to answer specific questions about the history, I would need more specific details about the exact diagnosis in the family.  Alina's maternal grandmother's brother had Parkinson disease and he had two daughters who had Parkinson disease.  Alina reported a paternal first cousin with a tremor who has 'suspicious symptoms' for Parkinson disease.     GENETIC COUNSELING  We discussed the genetic and environmental basis of Parkinson disease. I explained that in most cases, it results from complex and lifelong interaction of multiple genetic and environmental factors. In some cases, there may be a single gene cause.  I explained that the presence of other family  members with Parkinson disease and/or young age of onset are often clues for a genetic basis.    We discussed rare Mendelian forms of Parkinson disease. With these forms of Parkinson disease, a single genetic change (or a pair of changes) is sufficient to cause Parkinson disease.  I explained that there are some dominant forms (e.g. SNCA) and some recessive forms (e.g. PRKN, PINK1).  We discussed the implications of autosomal dominant and autosomal recessive patterns of inheritance in terms of risks to siblings and children.  If one of these types of Parkinson disease is identified, predictive and./or carrier testing would be available to family members.    In addition, there are some common autosomal dominant genetic 'risk factors' for Parkinson disease (e.g. GBA and LRRK2).  Mutations in these genes confer an increased susceptibility but not certainty for developing Parkinson disease. If we find one of these variations in a person with Parkinson disease, it may provide a partial explanation for why they have this condition. Testing for these variants in other asymptomatic relatives is a complex decision as these results cannot definitively determine whether or not an asymptomatic individual will develop Parkinson disease.     I indicated that there is a strong family history of PD in her mother's family. Even if genetic testing is normal, I would still be suspicious for some genetic risk factor in her family.     NAINA Fuller consented to genetic testing for PD through [employer]. I will contact her with results.    Pipo Willard MS, [facility]  Certified Genetic Counselor  [facility]

## 2024-05-10 NOTE — NURSING NOTE
Is the patient currently in the state of [place]? YES    Visit mode:VIDEO    If the visit is dropped, the patient can be reconnected by: VIDEO VISIT: Text to cell phone:   Telephone Information:   Mobile [phone]       Will anyone else be joining the visit? NO  (If patient encounters technical issues they should call [phone])    How would you like to obtain your AVS? MyChart    Are changes needed to the allergy or medication list? No    Are refills needed on medications prescribed by this physician? NO    Reason for visit: Consult    Christina ELENA

## 2024-05-10 NOTE — LETTER
5/10/2024       RE: Alina Zavala   [facility] [address]       Dear Colleague,    Thank you for referring your patient, Alina Zavala, to the [facility]. Please see a copy of my visit note below.    GENETIC COUNSELING-Neurology  Alina Zavala was seen today for genetic consult. Alina was accompanied to the appointment by her , Justin. I spent a total of 45 minutes with the patient with the majority of the time being spent on genetic counseling and testing options. The patient also underwent a neurological examination from Dr. Hearn at a prior visit.     MEDICAL HISTORY  Please see Dr. Hearn's notes for a more thorough summary of medical history. Briefly, Alina was diagnosed with PD in 2019. She reports that she clearly noted symptoms in  and may have experienced symptoms as early as .       FAMILY HISTORY-see scanned pedigree  Alina's mother is reported to have a tremor.  Alina's maternal aunt  at 76 with Parkinson disease.  Alina's maternal uncles had a muscular dystrophy. She believes that it may have been Duchenne muscular dystrophy, but she also thought that one of her uncles may have had an affected son- which would not be consistent. She also believes that her mother had a test and was told that she was 'not a carrier.' I indicated that in order to answer specific questions about the history, I would need more specific details about the exact diagnosis in the family.  Alina's maternal grandmother's brother had Parkinson disease and he had two daughters who had Parkinson disease.  Alina reported a paternal first cousin with a tremor who has 'suspicious symptoms' for Parkinson disease.     GENETIC COUNSELING  We discussed the genetic and environmental basis of Parkinson disease. I explained that in most cases, it results from complex and lifelong interaction of multiple  genetic and environmental factors. In some cases, there may be a single gene cause.  I explained that the presence of other family members with Parkinson disease and/or young age of onset are often clues for a genetic basis.    We discussed rare Mendelian forms of Parkinson disease. With these forms of Parkinson disease, a single genetic change (or a pair of changes) is sufficient to cause Parkinson disease.  I explained that there are some dominant forms (e.g. SNCA) and some recessive forms (e.g. PRKN, PINK1).  We discussed the implications of autosomal dominant and autosomal recessive patterns of inheritance in terms of risks to siblings and children.  If one of these types of Parkinson disease is identified, predictive and./or carrier testing would be available to family members.    In addition, there are some common autosomal dominant genetic 'risk factors' for Parkinson disease (e.g. GBA and LRRK2).  Mutations in these genes confer an increased susceptibility but not certainty for developing Parkinson disease. If we find one of these variations in a person with Parkinson disease, it may provide a partial explanation for why they have this condition. Testing for these variants in other asymptomatic relatives is a complex decision as these results cannot definitively determine whether or not an asymptomatic individual will develop Parkinson disease.     I indicated that there is a strong family history of PD in her mother's family. Even if genetic testing is normal, I would still be suspicious for some genetic risk factor in her family.     NAINA Fuller consented to genetic testing for PD through [employer]. I will contact her with results.        Again, thank you for allowing me to participate in the care of your patient.      Sincerely,    Eros Willard GC

## 2024-05-28 ENCOUNTER — OFFICE VISIT (OUTPATIENT)
Dept: NEUROLOGY | Facility: CLINIC | Age: 64
End: 2024-05-28
Payer: COMMERCIAL

## 2024-05-28 VITALS — BODY MASS INDEX: 21.05 KG/M2 | WEIGHT: 126.5 LBS | OXYGEN SATURATION: 98 %

## 2024-05-28 DIAGNOSIS — R25.2 LEG CRAMPS: ICD-10-CM

## 2024-05-28 DIAGNOSIS — G20.B1 PARKINSON'S DISEASE WITH DYSKINESIA WITHOUT FLUCTUATING MANIFESTATIONS (H): ICD-10-CM

## 2024-05-28 DIAGNOSIS — R41.3 MEMORY PROBLEM: Primary | ICD-10-CM

## 2024-05-28 DIAGNOSIS — I95.1 ORTHOSTATIC HYPOTENSION: ICD-10-CM

## 2024-05-28 PROCEDURE — G2211 COMPLEX E/M VISIT ADD ON: HCPCS | Performed by: NURSE PRACTITIONER

## 2024-05-28 PROCEDURE — 99214 OFFICE O/P EST MOD 30 MIN: CPT | Performed by: NURSE PRACTITIONER

## 2024-05-28 RX ORDER — CARBIDOPA AND LEVODOPA 25; 100 MG/1; MG/1
TABLET ORAL
Qty: 1080 TABLET | Refills: 3 | Status: SHIPPED | OUTPATIENT
Start: 2024-05-28 | End: 2024-08-29

## 2024-05-28 RX ORDER — AMANTADINE HYDROCHLORIDE 100 MG/1
TABLET ORAL
Qty: 90 TABLET | Refills: 3 | Status: SHIPPED | OUTPATIENT
Start: 2024-05-28 | End: 2024-08-29

## 2024-05-28 ASSESSMENT — UNIFIED PARKINSONS DISEASE RATING SCALE (UPDRS)
AMPLITUDE_RUE: (0) NORMAL: NO TREMOR.
FREEZING_GAIT: (0) NORMAL: NO FREEZING.
ARISING_CHAIR: (0) NORMAL: NO PROBLEMS. ABLE TO ARISE QUICKLY WITHOUT HESITATION.
FACIAL_EXPRESSION: (3) MASKED FACIES WITH LIPS PARTED SOME OF THE TIME WHEN THE MOUTH IS AT REST.
TOETAPPING_LEFT: (1) SLIGHT: ANY OF THE FOLLOWING: A) THE REGULAR RHYTHM IS BROKEN WITH ONE WITH ONE OR TWO INTERRUPTIONS OR HESITATIONS OF THE MOVEMENT  B) SLIGHT SLOWING  C) THE AMPLITUDE DECREMENTS NEAR THE END OF THE 10 MOVEMENTS.
PRONATION_SUPINATION_RIGHT: (2) MILD: ANY OF THE FOLLOWING: A) 3 TO 5 INTERRUPTIONS DURING TAPPING  B) MILD SLOWING  C) THE AMPLITUDE DECREMENTS MIDWAY IN THE 10-MOVEMENT SEQUENCE.
PRONATION_SUPINATION_LEFT: (1) SLIGHT: ANY OF THE FOLLOWING: A) THE REGULAR RHYTHM IS BROKEN WITH ONE WITH ONE OR TWO INTERRUPTIONS OR HESITATIONS OF THE MOVEMENT  B) SLIGHT SLOWING  C) THE AMPLITUDE DECREMENTS NEAR THE END OF THE 10 MOVEMENTS.
FINGER_TAPPING_LEFT: (1) SLIGHT: ANY OF THE FOLLOWING: A) THE REGULAR RHYTHM IS BROKEN WITH ONE WITH ONE OR TWO INTERRUPTIONS OR HESITATIONS OF THE MOVEMENT  B) SLIGHT SLOWING  C) THE AMPLITUDE DECREMENTS NEAR THE END OF THE 10 MOVEMENTS.
AXIAL_SCORE: 7
TOTAL_SCORE_LEFT: 7
GAIT: (0) NORMAL: NO PROBLEMS.
CONSTANCY_TREMOR_ATREST: (0) NORMAL: NO TREMOR.
FINGER_TAPPING_RIGHT: (1) SLIGHT: ANY OF THE FOLLOWING: A) THE REGULAR RHYTHM IS BROKEN WITH ONE WITH ONE OR TWO INTERRUPTIONS OR HESITATIONS OF THE MOVEMENT  B) SLIGHT SLOWING  C) THE AMPLITUDE DECREMENTS NEAR THE END OF THE 10 MOVEMENTS.
TOTAL_SCORE: 22
RIGIDITY_RLE: (0) NORMAL: NO RIGIDITY.
AMPLITUDE_LIP_JAW: (0) NORMAL: NO TREMOR.
DYSKINESIAS_PRESENT: YES
LEG_AGILITY_LEFT: (0) NORMAL: NO PROBLEMS.
POSTURE: (1) SLIGHT: NOT QUITE ERECT, BUT COULD BE NORMAL FOR OLDER PERSONS.
RIGIDITY_RUE: (1) SLIGHT: RIGIDITY ONLY DETECTED WITH ACTIVATION MANEUVER.
RIGIDITY_NECK: (1) SLIGHT: RIGIDITY ONLY DETECTED WITH ACTIVATION MANEUVER.
RIGIDITY_LLE: (0) NORMAL: NO RIGIDITY.
AMPLITUDE_LUE: (0) NORMAL: NO TREMOR.
SPONTANEITY_OF_MOVEMENT: (0) NORMAL: NO PROBLEMS.
TOTAL_SCORE: 8
HANDMOVEMENTS_LEFT: (1) SLIGHT: ANY OF THE FOLLOWING: A) THE REGULAR RHYTHM IS BROKEN WITH ONE WITH ONE OR TWO INTERRUPTIONS OR HESITATIONS OF THE MOVEMENT  B) SLIGHT SLOWING  C) THE AMPLITUDE DECREMENTS NEAR THE END OF THE 10 MOVEMENTS.
AMPLITUDE_LLE: (0) NORMAL: NO TREMOR.
POSTURAL_STABILITY: (2) MILD: MORE THAN 5 STEPS, BUT SUBJECT RECOVERS UNAIDED.
PARKINSONS_MEDS: ON
LEG_AGILITY_RIGHT: (0) NORMAL: NO PROBLEMS.
HANDMOVEMENTS_RIGHT: (1) SLIGHT: ANY OF THE FOLLOWING: A) THE REGULAR RHYTHM IS BROKEN WITH ONE WITH ONE OR TWO INTERRUPTIONS OR HESITATIONS OF THE MOVEMENT  B) SLIGHT SLOWING  C) THE AMPLITUDE DECREMENTS NEAR THE END OF THE 10 MOVEMENTS.
AMPLITUDE_RLE: (0) NORMAL: NO TREMOR.
TOETAPPING_RIGHT: (1) SLIGHT: ANY OF THE FOLLOWING: A) THE REGULAR RHYTHM IS BROKEN WITH ONE WITH ONE OR TWO INTERRUPTIONS OR HESITATIONS OF THE MOVEMENT  B) SLIGHT SLOWING  C) THE AMPLITUDE DECREMENTS NEAR THE END OF THE 10 MOVEMENTS.
MOVEMENTS_INTERFERE_WITH_RATINGS: NO
SPEECH: (0) NORMAL: NO SPEECH PROBLEMS.
RIGIDITY_LUE: (1) SLIGHT: RIGIDITY ONLY DETECTED WITH ACTIVATION MANEUVER.

## 2024-05-28 ASSESSMENT — MOVEMENT DISORDERS SOCIETY - UNIFIED PARKINSONS DISEASE RATING SCALE (MDS-UPDRS)
GETTING_OUT_OF_BED_CAR_DEEP_CHAIR: (2) MILD: I NEED MORE THAN ONE TRY TO GET UP OR NEED OCCASIONAL HELP.
SALIVA_AND_DROOLING: (0) NORMAL: NOT AT ALL (NO PROBLEMS).
HOBBIES_AND_OTHER_ACTIVITIES: (2) MILD: I HAVE SOME DIFFICULTY DOING THESE ACTIVITIES.
CHEWING_AND_SWALLOWING: (0) NORMAL: NO PROBLEMS.
SPEECH: (1) SLIGHT: MY SPEECH IS SOFT, SLURRED OR UNEVEN, BUT IT DOES NOT CAUSE OTHERS TO ASK ME TO REPEAT MYSELF.
FREEZING: (0) NORMAL: NOT AT ALL (NO PROBLEMS).
EATING_TASKS: (0) NORMAL: NOT AT ALL (NO PROBLEMS).
TREMOR: (1) SLIGHT: SHAKING OR TREMOR OCCURS BUT DOES NOT CAUSE PROBLEMS WITH ANY ACTIVITIES.
TURNING_IN_BED: (0) NORMAL: NOT AT ALL (NO PROBLEMS).
HANDWRITING: (2) MILD: SOME WORDS ARE UNCLEAR AND DIFFICULT TO READ.
HYGIENE: (0) NORMAL: NOT AT ALL (NO PROBLEMS).
DRESSING: (0) NORMAL: NOT AT ALL (NO PROBLEMS).
WALKING_AND_BALANCE: (0) NORMAL: NOT AT ALL (NO PROBLEMS).
TOTAL_SCORE: 8

## 2024-05-28 ASSESSMENT — PAIN SCALES - GENERAL: PAINLEVEL: SEVERE PAIN (6)

## 2024-05-28 NOTE — LETTER
"2024       RE: Alina Zavala   [facility] [address]     Dear Colleague,    Thank you for referring your patient, Alina Zavala, to the [facility]UROLOGY CLINIC [facility]. Please see a copy of my visit note below.      ASSESSMENT:    Parkinson's Disease:      Dyskinesias Due to PD:      Orthostatic Hypotension:       Memory Problems:     Leg Cramping:       PLAN:    __  The following patient instructions provided: -     __  Stay on the same antiparkinsonian medication.  I have refilled your Sinemet and Amantadine Prescriptions.      __  Continue on the Rivastigmine patch.    __  Continue using Tizanidine to help with cramping.      __  Continue with Midodrine to help with dizziness and low Blood pressure. Continue to follow up with your cardiologist.     __  Continue with the Big and Loud Therapy exercises.     __  Continue seeing Dr. Caro, Pharm.D.    __ Return in 3 - 4 months to see Dr. Hearn.  You may return sooner as needed.               MOVEMENT DISORDERS CLINIC           PATIENT: Alina Zavala    : 1960    DAHLIA: May 28, 2024    REASON FOR VISIT: Parkinson's disease (PD) follow up.    HPI: Ms. Alina Zavala is a 63 year old who came to the [facility] neurology clinic accompanied by her  for a follow up visit.      She was last seen in the clinic on 2024 by Dr. Hearn for a routine follow up visit.      Today, she asked the rooming staff to check her orthostatic vitals. She wasn't feeling dizzy. \"Just wanted to see.\" She has taken her 9 am Midodrine 7.5 mg. She usually takes Midodrine 5 mg 1.5 TID -- 9 am, 1 pm, & 5 pm, which is managed by her cardiologist.  Her last visit was in 2024.    Orthostatic Vitals  24 1156 164/90 72 Supine Left arm Adult Regular   24 1154 82/56 -- Standing Right arm Adult Regular   24 1152 125/73 84 Sitting Right arm Adult Regular      She " does the weekly Big and Loud Therapy. However, she is not exercising.    She takes her meds at variable times depending on what she has planned for the day. Generally, the 1st dose is in the middle of the night at 3 -4 am, then the subsequent doses are every 4 - 6 hrs.     She has dyskinesias. The Amantadine 100 mg has helped. However, due to vision changes, she has reduced the dose to 1/2 tab BID. Morning and early afternoon. Dyskinesias are not bothersome.     She has leg pain at night.  She also has cramping. Tizanidine helps.  asked if she should take Sinemet to help with cramping.    Pt is on Rivastigmine 13.3 mg patch, and per  it has made a big difference on her memory.     MDS UPDRS PART II - Score range: 0-52      5/28/2024    12:26 AM   UPDRS EDL Scale   2.1  Speech 1   2.2  Salivation 0   2.3  Chew & Swallow 0   2.4  Eating                  0   2.5  Dressing                0   2.6  Hygiene                 0   2.7  Handwriting             2   2.8  Hobbies etc.            2   2.9  Turn in bed             0   2.10 Tremor                  1   2.11 Stand from chair        2   2.12 Walking & Balance  0   2.13 Freezing                0   MDS-UPDRS II Total Score 8         MEDICATIONS:   Current Outpatient Medications   Medication Sig Dispense Refill    acetaminophen (TYLENOL) 500 MG tablet 3 x 500mg during day, 2 x 500mg at night      amantadine HCl 100 MG TABS Take half-tablet (50 mg) by mouth 2 times daily 90 tablet 3    B Complex-C (VITAMIN B COMPLEX W/VITAMIN C) TABS tablet Take 1 tablet by mouth daily      buPROPion (WELLBUTRIN SR) 200 MG 12 hr tablet 200mg tab by mouth twice daily at 10am and 10pm  0    calcium carbonate (OS-TENA) 600 MG tablet 1 tab by mouth nightly at 10pm      carbidopa-levodopa (SINEMET)  MG tablet 2 -2.5 tabs by mouth every 4-5 hours = 12/day 1080 tablet 3    cholecalciferol 25 MCG (1000 UT) TABS 1000 units (25 mcg) by mouth daily at 10am      co-enzyme Q-10 100 MG  CAPS capsule 100mg capsule by mouth every evening at 11pm      gabapentin (NEURONTIN) 100 MG capsule Take 200 mg by mouth 2 times daily (200 mg in the morning and 500 mg in the evening) 180 capsule 3    gabapentin (NEURONTIN) 300 MG capsule Take 300 mg by mouth every evening (500 mg in the evening total) 90 capsule 3    midodrine (PROAMATINE) 5 MG tablet Take 1 tablet (5 mg) by mouth 3 times daily 270 tablet 3    NONFORMULARY Prebiotic twice daily      probiotic CAPS Prebiotic twice daily      propranolol (INDERAL) 10 MG tablet 1/2 of 10mg tab by mouth once daily (morning only) 180 tablet 3    rivastigmine (EXELON) 13.3 MG/24HR 24 hr patch Place 1 patch onto the skin daily 90 patch 3    sertraline (ZOLOFT) 100 MG tablet 100mg tab by mouth dailya t 10am 90 tablet 3    sodium fluoride dental gel (PREVIDENT) 1.1 % GEL topical gel APPLY A THIN BEAD OF GEL TO TOOTHBRUSH AND BRUSH TEETH ONCE DAILY AT BEDTIME FOR AT LEAST 1 MINUTE. EXPECTORATE THROROUGHLY.      tiZANidine (ZANAFLEX) 2 MG tablet Take 1-2 tablets by mouth 2 times daily as needed      vitamin C (ASCORBIC ACID) 500 MG tablet 500mg tab by mouth daily at 10am      albuterol (PROAIR HFA/PROVENTIL HFA/VENTOLIN HFA) 108 (90 Base) MCG/ACT inhaler Inhale 2 puffs into the lungs every 6 hours as needed (Patient not taking: Reported on 5/28/2024)       No current facility-administered medications for this visit.       PHYSICAL EXAM:    VITAL SIGNS:  See BP & pulse above.  Weight 57.4 kg (126 lb 8 oz), SpO2 98%.. Body mass index is 21.05 kg/m .    GENERAL:  Ms. Zavala is a pleasant  patient who is well-groomed, well-developed, and thin sitting comfortably in the exam room without any distress.  Affect is appropriate.    MOVEMENT DISORDERS ASSESSMENT: MDS UPDRS III (Score range: 0-132) (Last Sinemet was at  10:50 am)      5/28/2024    12:00 PM   UPDRS Motor Scale   Time: 12:22   Medication On   R Brain DBS: None   L Brain DBS: None   Dyskinesia (LID) Yes   Did  LID interfere No   Speech 0   Facial Expression 3   Rigidity Neck 1   Rigidity RUE 1   Rigidity LUE 1   Rigidity RLE 0   Rigidity LLE 0   Finger Taps R 1   Finger Taps L 1   Hand Mvt R 1   Hand Mvt L 1   Pron-/Supinate R 2   Pron-/Supinate L 1   Toe Tap R 1   Toe Tap L 1   Leg Agility R 0   Leg Agility L 0   Arise From Chair 0   Gait 0   Gait Freezing 0   Postural Stability 2   Posture 1   Global Spont Mvt 0   Postural Tremor RUE 1   Postural Tremor LUE 1   Kinetic Tremor RUE 1   Kinetic Tremor LUE 1   Rest Tremor RUE 0   Rest Tremor LUE 0   Rest Tremor RLE 0   Rest Tremor LLE 0   Rest Tremor Lip/Jaw 0   Rest Tremor Constancy 0   Total Right 8   Total Left 7   Axial Total 7   Total 22     Today I spent 35 minutes caring for the patient. Time was spent with reviewing records, meeting with the patient, answering questions, examining, refilling/ordering medication, and documentation.    The longitudinal plan of care for the diagnosis(es)/condition(s) as documented were addressed during this visit. Due to the added complexity in care, I will continue to support Alina in the subsequent management and with ongoing continuity of care.        Again, thank you for allowing me to participate in the care of your patient.      Sincerely,    ADAM Dumont CNP

## 2024-05-28 NOTE — PATIENT INSTRUCTIONS
Dear Ms. Alina NERI Riteshrita,      Thank you for coming today.  During your visit, we have discussed the following:     __  Stay on the same antiparkinsonian medication.  I have refilled your Sinemet and Amantadine Prescriptions.      __  Continue on the Rivastigmine patch.    __  Continue using Tizanidine to help with cramping.      __  Continue with Midodrine to help with dizziness and low Blood pressure. Continue to follow up with your cardiologist.     __  Continue with the Big and Loud Therapy exercises.     __  Continue seeing Dr. Caro, Pharm.D.    __ Return in 3 - 4 months to see Dr. Hearn.  You may return sooner as needed.       For questions, you may send us a [employer] message or call [phone]    Fax number: [phone]        Zeynep Valle DNP, ADAM  [facility]

## 2024-05-28 NOTE — PROGRESS NOTES
"  ASSESSMENT:    Parkinson's Disease:      Dyskinesias Due to PD:      Orthostatic Hypotension:       Memory Problems:     Leg Cramping:       PLAN:    __  The following patient instructions provided: -     __  Stay on the same antiparkinsonian medication.  I have refilled your Sinemet and Amantadine Prescriptions.      __  Continue on the Rivastigmine patch.    __  Continue using Tizanidine to help with cramping.      __  Continue with Midodrine to help with dizziness and low Blood pressure. Continue to follow up with your cardiologist.     __  Continue with the Big and Loud Therapy exercises.     __  Continue seeing Dr. Caro, Pharm.D.    __ Return in 3 - 4 months to see Dr. Hearn.  You may return sooner as needed.               MOVEMENT DISORDERS CLINIC           PATIENT: Alina Zavala    : 1960    DAHLIA: May 28, 2024    REASON FOR VISIT: Parkinson's disease (PD) follow up.    HPI: Ms. Alina Zavala is a 63 year old who came to the [facility] neurology clinic accompanied by her  for a follow up visit.      She was last seen in the clinic on 2024 by Dr. Hearn for a routine follow up visit.      Today, she asked the rooming staff to check her orthostatic vitals. She wasn't feeling dizzy. \"Just wanted to see.\" She has taken her 9 am Midodrine 7.5 mg. She usually takes Midodrine 5 mg 1.5 TID -- 9 am, 1 pm, & 5 pm, which is managed by her cardiologist.  Her last visit was in 2024.    Orthostatic Vitals  24 1156 164/90 72 Supine Left arm Adult Regular   24 1154 82/56 -- Standing Right arm Adult Regular   24 1152 125/73 84 Sitting Right arm Adult Regular      She does the weekly Big and Loud Therapy. However, she is not exercising.    She takes her meds at variable times depending on what she has planned for the day. Generally, the 1st dose is in the middle of the night at 3 -4 am, then the subsequent doses are every 4 - 6 hrs.     She has dyskinesias. The Amantadine 100 mg has " helped. However, due to vision changes, she has reduced the dose to 1/2 tab BID. Morning and early afternoon. Dyskinesias are not bothersome.     She has leg pain at night.  She also has cramping. Tizanidine helps.  asked if she should take Sinemet to help with cramping.    Pt is on Rivastigmine 13.3 mg patch, and per  it has made a big difference on her memory.     MDS UPDRS PART II - Score range: 0-52      5/28/2024    12:26 AM   UPDRS EDL Scale   2.1  Speech 1   2.2  Salivation 0   2.3  Chew & Swallow 0   2.4  Eating                  0   2.5  Dressing                0   2.6  Hygiene                 0   2.7  Handwriting             2   2.8  Hobbies etc.            2   2.9  Turn in bed             0   2.10 Tremor                  1   2.11 Stand from chair        2   2.12 Walking & Balance  0   2.13 Freezing                0   MDS-UPDRS II Total Score 8         MEDICATIONS:   Current Outpatient Medications   Medication Sig Dispense Refill    acetaminophen (TYLENOL) 500 MG tablet 3 x 500mg during day, 2 x 500mg at night      amantadine HCl 100 MG TABS Take half-tablet (50 mg) by mouth 2 times daily 90 tablet 3    B Complex-C (VITAMIN B COMPLEX W/VITAMIN C) TABS tablet Take 1 tablet by mouth daily      buPROPion (WELLBUTRIN SR) 200 MG 12 hr tablet 200mg tab by mouth twice daily at 10am and 10pm  0    calcium carbonate (OS-TENA) 600 MG tablet 1 tab by mouth nightly at 10pm      carbidopa-levodopa (SINEMET)  MG tablet 2 -2.5 tabs by mouth every 4-5 hours = 12/day 1080 tablet 3    cholecalciferol 25 MCG (1000 UT) TABS 1000 units (25 mcg) by mouth daily at 10am      co-enzyme Q-10 100 MG CAPS capsule 100mg capsule by mouth every evening at 11pm      gabapentin (NEURONTIN) 100 MG capsule Take 200 mg by mouth 2 times daily (200 mg in the morning and 500 mg in the evening) 180 capsule 3    gabapentin (NEURONTIN) 300 MG capsule Take 300 mg by mouth every evening (500 mg in the evening total) 90 capsule 3     midodrine (PROAMATINE) 5 MG tablet Take 1 tablet (5 mg) by mouth 3 times daily 270 tablet 3    NONFORMULARY Prebiotic twice daily      probiotic CAPS Prebiotic twice daily      propranolol (INDERAL) 10 MG tablet 1/2 of 10mg tab by mouth once daily (morning only) 180 tablet 3    rivastigmine (EXELON) 13.3 MG/24HR 24 hr patch Place 1 patch onto the skin daily 90 patch 3    sertraline (ZOLOFT) 100 MG tablet 100mg tab by mouth dailya t 10am 90 tablet 3    sodium fluoride dental gel (PREVIDENT) 1.1 % GEL topical gel APPLY A THIN BEAD OF GEL TO TOOTHBRUSH AND BRUSH TEETH ONCE DAILY AT BEDTIME FOR AT LEAST 1 MINUTE. EXPECTORATE THROROUGHLY.      tiZANidine (ZANAFLEX) 2 MG tablet Take 1-2 tablets by mouth 2 times daily as needed      vitamin C (ASCORBIC ACID) 500 MG tablet 500mg tab by mouth daily at 10am      albuterol (PROAIR HFA/PROVENTIL HFA/VENTOLIN HFA) 108 (90 Base) MCG/ACT inhaler Inhale 2 puffs into the lungs every 6 hours as needed (Patient not taking: Reported on 5/28/2024)       No current facility-administered medications for this visit.       PHYSICAL EXAM:    VITAL SIGNS:  See BP & pulse above.  Weight 57.4 kg (126 lb 8 oz), SpO2 98%.. Body mass index is 21.05 kg/m .    GENERAL:  Ms. Zavala is a pleasant  patient who is well-groomed, well-developed, and thin sitting comfortably in the exam room without any distress.  Affect is appropriate.    MOVEMENT DISORDERS ASSESSMENT: MDS UPDRS III (Score range: 0-132) (Last Sinemet was at  10:50 am)      5/28/2024    12:00 PM   UPDRS Motor Scale   Time: 12:22   Medication On   R Brain DBS: None   L Brain DBS: None   Dyskinesia (LID) Yes   Did LID interfere No   Speech 0   Facial Expression 3   Rigidity Neck 1   Rigidity RUE 1   Rigidity LUE 1   Rigidity RLE 0   Rigidity LLE 0   Finger Taps R 1   Finger Taps L 1   Hand Mvt R 1   Hand Mvt L 1   Pron-/Supinate R 2   Pron-/Supinate L 1   Toe Tap R 1   Toe Tap L 1   Leg Agility R 0   Leg Agility L 0   Arise From  Chair 0   Gait 0   Gait Freezing 0   Postural Stability 2   Posture 1   Global Spont Mvt 0   Postural Tremor RUE 1   Postural Tremor LUE 1   Kinetic Tremor RUE 1   Kinetic Tremor LUE 1   Rest Tremor RUE 0   Rest Tremor LUE 0   Rest Tremor RLE 0   Rest Tremor LLE 0   Rest Tremor Lip/Jaw 0   Rest Tremor Constancy 0   Total Right 8   Total Left 7   Axial Total 7   Total 22     Today I spent 35 minutes caring for the patient. Time was spent with reviewing records, meeting with the patient, answering questions, examining, refilling/ordering medication, and documentation.    Zeynep Valle, KARTHIKEYAN, APRN  [facility]    The longitudinal plan of care for the diagnosis(es)/condition(s) as documented were addressed during this visit. Due to the added complexity in care, I will continue to support Alina in the subsequent management and with ongoing continuity of care.

## 2024-07-14 ENCOUNTER — HEALTH MAINTENANCE LETTER (OUTPATIENT)
Age: 64
End: 2024-07-14

## 2024-07-17 ENCOUNTER — VIRTUAL VISIT (OUTPATIENT)
Dept: NEUROLOGY | Facility: CLINIC | Age: 64
End: 2024-07-17
Attending: PSYCHIATRY & NEUROLOGY
Payer: COMMERCIAL

## 2024-07-17 DIAGNOSIS — G20.B1 PARKINSON'S DISEASE WITH DYSKINESIA WITHOUT FLUCTUATING MANIFESTATIONS (H): Primary | ICD-10-CM

## 2024-07-17 PROCEDURE — 99207 PR NO BILLABLE SERVICE THIS VISIT: CPT | Mod: 95 | Performed by: PHARMACIST

## 2024-07-17 NOTE — PATIENT INSTRUCTIONS
"Recommendations from today's MTM visit:                                                      Continue current medications     Follow-up: 11/13/24 at 10 am video visit    It was great speaking with you today.  I value your experience and would be very thankful for your time in providing feedback in our clinic survey. In the next few days, you may receive an email or text message from [employer] with a link to a survey related to your  clinical pharmacist.\"     To schedule another MTM appointment, please call the clinic directly or you may call the MTM scheduling line at [phone].    My Clinical Pharmacist's contact information:                                                      Please feel free to contact me with any questions or concerns you have.      Wendy Caro, Pharm.D.  Medication Therapy Management Pharmacist  [facility]    "

## 2024-07-17 NOTE — Clinical Note
"7/17/2024       RE: Alina Zavala  [address] [place] [address]     Dear Colleague,    Thank you for referring your patient, Alina Zavala, to the [facility] [place] [facility]. Please see a copy of my visit note below.    Medication Therapy Management (MTM) Encounter    ASSESSMENT:                            Medication Adherence/Access: {adherencechoices:804585}    ***:   ***    PLAN:                            ***    Follow-up: {followuptest2:245516}    SUBJECTIVE/OBJECTIVE:                          Alina Zavala is a 64 year old female seen for {mtmvisit:830076}     Reason for visit: ***.    Allergies/ADRs: {1/2/3/4/5:401442}  Past Medical History: {1/2/3/4/5:953434}  Tobacco: She reports that she has never smoked. She has never used smokeless tobacco.  Alcohol: {ALCOHOL CONSUMPTION HX:198788}  {Social and Goals:573785}  Medication Adherence/Access: {fumedadherence:689300}      Parkinson's Disease:     - carbidopa-levodopa  mg, 2 tablets 4 times daily (about every 5-6 hours)  - amantadine 50 mg twice daily   - Rivastigmine 13.3 mg daily   - Midodrine 7.5 mg in the morning, 5 mg mid-day, 5 mg in the evening     Today's Vitals: There were no vitals taken for this visit.  ----------------  {ARTHUR?:810342}    I spent {mtm total time 3:302977} with this patient today{MTMpartdbillingquestion:410644}. { :208154}. A copy of the visit note was provided to the patient's provider(s).    A summary of these recommendations {GIVEN/NOT GIVEN:075319}.    ***    Telemedicine Visit Details  Type of service:  {telemedvisitmtm:283516::\"Telephone visit\"}  Start Time: {video/phone visit start time:152948}  End Time: {video/phone visit end time:152948}     Medication Therapy Recommendations  No medication therapy recommendations to display     Medication Therapy Management (MTM) Encounter    ASSESSMENT:                        "     Medication Adherence/Access: {adherencechoices:083239}    ***:   ***    PLAN:                            ***    Follow-up: {followuptest2:278747}    SUBJECTIVE/OBJECTIVE:                          Alina Zavala is a 64 year old female seen for a follow-up visit.       Reason for visit: follow up on medications.    Allergies/ADRs: Reviewed in chart  Past Medical History: Reviewed in chart  Tobacco: She reports that she has never smoked. She has never used smokeless tobacco.  Alcohol: {ALCOHOL CONSUMPTION HX:231674}  Daughter is closing on a house and pregnant with Twin girls.     Medication Adherence/Access: no issues reported  Hard to stay on medication schedule. Uses an alarm to remember medications but this doesn't always work well.     Parkinson's Disease:     - carbidopa-levodopa  mg, 2 tablets about every 4 hours - 6 am, 10 am, 2 pm, 6 pm  - amantadine 50 mg twice daily   - Rivastigmine 13.3 mg daily   - Midodrine 7.5 mg in the morning, 5 mg mid-day, 5 mg in the evening     Low back and hip pain is ongoing. She has a new recliner. She is taking tizanidine occasionally- makes her tired.     Pretty good about taking carbidopa-levodopa on time- can feel if late to take a dose.   She is working on taking carbidopa-levodopa apart from meal times.     Blood pressure can go to 70/40. One day was 58/30- lowest ever.    Last few weeks are fine. Has to make sure taking medications in a timely manner.     Today's Vitals: There were no vitals taken for this visit.  ----------------    I spent *** minutes with this patient today. All changes were made via collaborative practice agreement with ***. A copy of the visit note was provided to the patient's provider(s).    A summary of these recommendations {GIVEN/NOT GIVEN:991269}.    Wendy Caro, Pharm.D.  Medication Therapy Management Pharmacist  [facility]    Telemedicine Visit Details  Type of service:  Video Conference via [employer]  Start Time:  {video/phone visit start time:152948}  End Time: {video/phone visit end time:152948}     Medication Therapy Recommendations  No medication therapy recommendations to display       Again, thank you for allowing me to participate in the care of your patient.      Sincerely,    Wendy Caro [facility]

## 2024-07-17 NOTE — PROGRESS NOTES
Medication Therapy Management (MTM) Encounter    ASSESSMENT:                            Medication Adherence/Access: No issues identified    Parkinson's Disease:   Stable.      PLAN:                            Continue current medications     Follow-up: 11/13/24 at 10 am video visit    SUBJECTIVE/OBJECTIVE:                          Alina Zavala is a 64 year old female seen for a follow-up visit.       Reason for visit: follow up on medications.    Allergies/ADRs: Reviewed in chart  Past Medical History: Reviewed in chart  Tobacco: She reports that she has never smoked. She has never used smokeless tobacco.  Alcohol: not currently using  Daughter is closing on a house and pregnant with Twin girls.     Medication Adherence/Access: patient is using an alarm to help remember medications but states she still is sometimes late or will shut off alarm and forget to take a dose.  She always brings her medications with her when leaving the house.    Parkinson's Disease:     - carbidopa-levodopa  mg, 2 tablets about every 4 hours (typically 5 doses/day)   - amantadine 50 mg twice daily   - Rivastigmine 13.3 mg daily   - Midodrine 7.5 mg in the morning, 7.5 mg mid-day, 5-7.5 mg in the evening   - propranolol 5 mg every morning    Patient states low back and hip pain is ongoing. She got a new recliner with different settings and none of the settings are comfortable. Pain is worse when sitting or standing. Gets better when lying down or walking. She is doing Big and Loud exercises regularly. She occassionally takes tizanidine for pain but it makes her tired.    Dyskinesia is about the same. She would like to try more amantadine but had side effects from higher doses in the past. She tries to take carbidopa-levodopa on time and can feel if she is late to take a dose. Takes carbidopa-levodopa apart from meal times.     Blood pressure has been better the last few weeks. Occasionally will be low such as 70/40. She has been  working with cardiology closely to adjust midodrine. She prefers to take 5 mg of midodrine in the evening to avoid elevated blood pressure readings when lying down.      notes more trouble with keeping track of appts or calling people back. Patient is cognizant to avoid multi tasking. Her  manages their calendar.     Today's Vitals: There were no vitals taken for this visit.  ----------------    I spent 23 minutes with this patient today. All changes were made via collaborative practice agreement with Dr. Hearn. A copy of the visit note was provided to the patient's provider(s).    A summary of these recommendations was sent via [employer].    Wendy Caro, Pharm.D.  Medication Therapy Management Pharmacist  [facility]    Telemedicine Visit Details  Type of service:  Video Conference via [employer]  Start Time:  10:01 AM  End Time: 10:24 AM     Medication Therapy Recommendations  No medication therapy recommendations to display

## 2024-07-31 NOTE — PROGRESS NOTES
Diagnosis/Summary/Recommendations:    PATIENT: Alina Zavala  64 year old female     : 1960    DAHLIA: Aug 29, 2024     MRN: [id number]  [place] [address]   [email or web address]  [phone] (M) -iphone  [phone] (H) -not an iphone  Tejinder Connerbarber LOYD  [phone]     Venkat Cooney, mallory Soriano.       PCP is Dr. HESHAM Melendrez  CArdiologist Dr. Reyna Dick  Eye doctor  Dentist  Chiropractor     [email or web address]      Assessment:  (G20) Parkinson disease (H)  (primary encounter diagnosis)  (G20) Parkinson disease (H)  (primary encounter diagnosis)  Sinemet 25/100 2 tabs 5/day  Entacapone comtan 200mg - not taking     Review of diagnosis    Parkinson disease     Avoidance of dopamine blockers   Not taking     Motor complication review   wearing off. At 4-5 hours gets tingling of her toes/fingers  Legs - cramping, clawing - behind her knees  Wearing off afternoon; has to be attentive to afternoon dosing     Has nausea with comtan and not eating as much   Weight has reduced to 144 from 162 #      Review of Impulse control disorders         Review of surgical or medication options   reviewed     May consider a trial of amantadine (symmetrel) in 1-3 weeks after her back situation is better. Amantadine may help in her wearing off and dyskinesias.      Gait/Balance/Falls   Fell and kind of caught herself yesterday - was on her knees  Discussion about multi tasking - using only one hand and not two hands to carry things.      Exercise/Therapy performed/offered     Getting up at 4 or 5am and have to let dogs in and out and is run down              Taty - yellow lab and red wright lab 65 #              Cat              Other dog   Not feeding till a     Doing big and loud every day     [facility] perez physical therapy  Also doing occupational therapy      Silver sneaker discussion - [place]   Can use facilities free  Pool assessment at [facility]         She has  seen therapists in the past at [facility] and if needed may need to see orthopedics, PMR or sports medicine for her back symptoms which may improve with conservative management.      For RLS -  Gabapentin neurontin 100mg @10am and sometimes @8/11pm extra dose   Gabapentin neurontin 300mg @8/11pm     may consider taking second dose of gabapentin earlier than 10/11pm        Cognitive/Driving   Short distances only; not of freeway  Cognitive problems - recalling names takes longer  Problems with spelling        Would recommend a baseline neuropsychological evaluation 5/9/2022  brianna belle     Generally moderate to severe impairments identified in nonverbal skills (basic judgment, reasoning, planning and organization) as well as aspects of executive functioning (complex attention, visuospatial planning and organization)  Subtle weaknesses (low average) were evident on additional nonverbal (learning and memory) and executive (working memory) measures as well as in cognitive efficiency  Performance was intact in most verbal skills including verbal learning and memory, and aspects of executive functioning (phonemic fluency, deductive reasoning, inhibition).   She states that spatial skills have always been an area of weaknesse, but her  has observed a decline in this area        Mood   Counselling - working with counsellor   Bupropion wellbutrin SR 200mg 12 hr tablet twice daily  Sertraline zoloft 100mg daily     Mood could be better  Has been unable to go off wellbutrin in the past  Has wearing off related mood issues  Not sure how much the sertraline has helped.     fatigue  Emotional   Missing family for thanksgiving  Mother with c diff and is 85 and frail   with covid      Problems handling the cold  Needs to get full spectrum light     Daughter moving back with her dog   Renuka     Acceptance issues with the parkinson  Not driving       Hallucinations/delusions   denies     Sleep   Gets up to take her first  dose of medication @330/530am and then goes back to bed; possibly till 930am  Variable time of wakening up.    Falls asleep relatively easily   Sleeping a lot - exhausted     Sleep deprived with dogs and affects pill cycle     Bladder/Renal/Prostate/Gyn/Other  Urinary urgency  Variable nocturia depending on hydration, etc.   Discussed timing of water intake     GI/Constipation/GERD   Magnesium oxide 500mg twice daily at 10am and 10pm  Probiotic twice daily smarty pants at 10am and 10pm   Doing well     ENDO/Lipid/DM/Bone density/Thyroid  Calcium carbonate at night at 10pm  Cholecalciferol vitamin D3 1000 units at 10am     Cardio/heart/Hyper or Hypotensive   POTS    Blood pressure is better with medication change  She thinks that the propranolol helps her tremor but worried   Fludrocortisone florinef 0.1mg - not taking  Midodrine proamatine 5mg 3/day   Propranolol 10mg 1/2 of 10mg daily at 10am      Cardiology  Kimber,pan Constantino, Marlena Dick, Reyna - cardiologist     water and salt intake     Dr. Reyna Dick     Vision/Dry Eyes/Cataracts/Glaucoma/Macular   Less contrast sensitivity  Tree vs person  Will see Oumar Wade on 3/2/2023     Heme/Anticoagulation/Antiplatelet/Anemia/Other  Not on aspirin     ENT/Resp  Breathing  Albuterol proair as needed   Asmanex 120 metered doses 220mcg/inh inhaler at night  - not taking   Mometasone asmanex - not taking  Taste and smell has not improved since covid     Skin/Cancer/Seborrhea/other  denies     Musculoskeletal/Pain/Headache  Denies - other than shoulders      Other:        Ongoing nausea that may be related to her comtan/entacapone or other factors  She has some weight loss     Discussed timing of meals and the sinemet and anti nausea medications  Has seen Wendy Caro 3/2022  Has not been on rytary and has not been on amantadine  She has had wearing off related leg symptoms - numbness -      Has not had an emg to evaluate for neuropathy  And she also has wearing  off related neuropathy type of leg symptoms  Blood work and emg could be done     Discussion about report of the neuropsychological findings and terminology.   She does not have lewy body disease.      Left trigeminal neuralgia     Prior history of lyme disease     Sweating episodes, not able to take hot shower      Acetaminophen tylenol 500mg scheduled.   b complex   Coenzyme Q10 daily   Cyclobenzaprine flexeril as needed 1/2 tab  Gabapentin neurontin 100mg twice daily   Gabapentin neurontin 300mg at night   Vitamin C ascorbic acid 500mg      Visual issues; seeing dr Wade  Seeing Red and green squares and has a reduction in amantadine   Discussion about lab work to check kidney function as is taking amantadine (symmetrel)   Last renal labs in our chart are from 2020.   Not driving  Depth perception problems      Wearing off at 4/5 hours - wears off 45 minutes before dose and has cramping, aching  Has delayed on periods   Cramping at night - toes and feet and walks around till pills work   This may occur at 9pm or so   Has cramping in the car/juan a horse      Last visit was summer 2022     Think that rytary was too expensive  Did not benefit from comtan and had side effects  Has not been on gocovri     Consider reducing the interval to 4.5 hours of the sinemet.   Has had some slow off periods and some sudden off periods.      Bowel movements are still there but may try some things such as the miralax, which she says she has tried in the past.      Cognitive problems  Did not tolerate donepezil (aricept)  Trial of rivastigmine excelon patch for cognition, monitor for blood pressure or heart rate changes or other side effects.   4.6mg patch daily for 4 weeks then 9.5mg patch daily for 4 weeks then increase to the 13.3mg patch daily  Hs problems with  Multi tasking  Discussion about attention changes and focusing changes      Blood pressure being monitored and managed with medications for her orthostatic symptoms.       Urinary urgency      Pharmacy consultation  Pharmacy (MTM) consultation and medication management  Please call the scheduling number @ [phone] to set up an appointment with pharmacists Wendy Caro or Fatemeh Richey.      Ongoing sleep issues - dogs are there  3 dogs and living in the family room  - rashi is in the bedroom  2 y ear old dog, middle age dog and older dog (has problems climbing the stairs)     In summary - will need to work on intervals 2 tabs 4 or 4.5 hours vs 2.5 pills every 5 hours   Work on constipation   Review price of the rivastigmine patch but don't start yet.      Return back in 3-6 months.      Parkinson's Disease:       Orthostatic Hypotension:  Today's BP showed significant SBP drop. Most likely, this is why pt was feeling dizzy, not moving well.   gave her the midday dose after her orthostatic BP/HR was completed.      Memory Problems: Ongoing pt. Pt had difficulty follow the instructions to perform tasks when motor assessment was completed. On Rivastigmine 4.6 mg patch & hasn't increased the dose yet. No side effects from the current dose.      __  Stay on the same antiparkinsonian medication.      __  Check your BP regularly. Today, your standing BP dropped.  Slowly stand up and wait before walking. Monitor dizziness.  Continue taking the Midodrine.       5/17/2023  1:23 PM 5/17/2023  1:25 PM   Vital Signs       Systolic 146 !  89 (L)    Diastolic 82 !  55 !    Pulse 58  58       __  Increase the Rivastigmine patch to 9.6 mg today.       __  See Wendy Caro, Pharm.D. on May 25th.      Weight loss - encouraged to increase calories and don't take protein within 1-2 hours of carbidopa/levodopa (sinemet)  Stress of planning a wedding in 4 weeks hair done in the morning, wedding      Will switch to gocovri tomorrow and last dose of regular amantadine will be today   Can use as needed sinemet to help     Has had falls and tripped over the dog  Had a fall trying to get  "dressed and stand on one leg  May drop something if doing two things at one time - using both hands     9/13/2023 visit with Wendy Caro      Return back in 3 months with Zeynep     Discussion about blood pressure - continue on midodrine 5mg 3/day     Doing exercise classes      Using the patch rivastigmine 13.3mg - and has helped a bit      Discussion about bowel habits  Using prebiotic twice daily      May want to talk with Dr Ishan Melendrez about repeat dexa scan - l ast one was 2018 I think  May need bone enhancing medication  And continue vitamin d and calcium     Very brief trial of gocovri 8/2023 but stopped as not sure if helped and was very expensive. She continues on amantadine 1/2 of 100mg twice daily     Has not had a recent bone density. May want to talk with pcp  Taking calcium and vitamin D     Bowel habits are stable - not using miralax  May have a few bowel movements per day  Using prebiotic  Incomplete evacuation.      Discussion about blood pressure - continue on midodrine 5mg 3/day  Still on midodrine  Bp has been lower than today's readings.   Has had bp in the 70/40 range.   Has had dull headache and loss of sight and hearing and has feeling of fainting  Rare in the morning every few months  May be more common if misses the pill.   Refilled the midodrine  However encouraged her to see her cardiologist and review bp etc. Dr. Reyna Dick     Has pain on the edges of her tongue - it is intermittent  Worse problem more talking.   Visually her tongue looks okay and is not cracked.   \"The most common vitamin or nutrient deficiencies that cause cracked tongue are vitamin B12 deficiency, low iron, zinc or biotin. Glossodynia      Urinary urgency variable  Has nocturia 2/noc   She is trying to take more water in the day and lessen water at night.   Mirabegron/myrbetriq or gemtesa/ubegron are reasonable options as they are beta3 agonists and may increase blood pressure (good thing) but may be " expensive.      Review bone issues with pcp - bone density -   Consider glossodynia tests: checking vitamins with pcp:  iron/ferritin, zinc, biotin, B12/mma  PDgeneration testing - mother with tremor, maternal aunt with possible parkinson (lee ann mendoza Edu)     See cardiologist   GENETIC CONSULTATION FOR muscular dystrophy (Duchene?), parkinson and tremor, etc. See family tree that I filled out. Grandparents first cousin on mother's side - [place].   Return appointment with Zeynep Valle   No changes in medications  Therapy orders to fax to [facility] speech and PD         Medications     4/430 9a 10a 1p 4p 6p 8p 10p   Acetaminophen tylenol 500mg      2          2   Albuterol proair proventil      prn              Amantadine gocovri 68.5mg 24hr  off                  Amantadine symmetrel 100mg 1/2         1/2       b complex                1    Bupropion wellbutrin SR 200mg 12      1         1    Calcium carbonate 600mg               1    Carbidopa/levodopa Sinemet  25/100  2   2.5   2     2.5   Cholecalciferol vit d3 25 1000      1             Coenzyme Q10 100mg               1   Gabapentin neurontin 100mg     2         2   Gabapentin neurontin 300mg               1    Midodrine proamatine 5mg   1.5   1.5  1.5       Nonformulary prebiotic  - below below                 Polyethylene glycol miralax prn                 Probiotic prebiotic bacillus subtilis 1              1    Propranolol inderal 10mg     1/2             Rivastigmine exelon 13.3mg  patch 1                 Sertraline zoloft 100mg     1             Sodium fluoride dental gel                    Tizanidine zanaflex 2mg prn                 Vitamin C 500mg      1                                                        Plan:    Midodrine is now 1.5 x 5mg or 7.5mg  3 times per day  She has not fainted but has had low blood pressures  Refilled the midodrine/proamatine    Variable bladder issues  Wearing a pad    Daughter will have twins due around  lexx.   Mother and father - 88 and 92    She has dyskinesias today   She had side effects with more amantadine so does not want more of that medication  The sinemet is working if she is on time taking the medication.     She has ongoing back pain or leg pain or cramping in her leg in the evening  It is probably related to her parkinson as the pain is better when her medication is working  She has some leaning off one side when out shopping.   She has some hip discomfort  Renamed it rise - exercise class.   Has to sign in.   Pain is 60% parkinson from her point   She is taking tizanidine for pain as needed and not taking every day.   She needs to take     She is using olypop    She has not done her genetic testing     Discussion about longer acting sinemet carbidopa/levodopa  Crexont  Rytary     Discussion about tornadoes which she wanted to study till she realize could not do the physics and ended up being a .    She is using the patch 13.3 daily and is better than probably otherwise.     Cognitive testing was 5/2022  Consider another neuropsychological evaluation  Not sure dbs or focused ultrasound is going to be an option going forward    There has been weight loss   Bowels are better with her probiotic  She is balance weight with her timing of medications.         Coding statement:   Medical Decision Making:  #  Chronic progressive medical conditions addressed  - see above --   Review and/or interpretation of unique test or documentation from a provider outside of neurology no   Independent historian provided additional details  yes I  Prescription drug management and review of potential side effects and/or monitoring for side effects  -- see above ---  Health impacted by social determinants of health  no    I have reviewed the note as documented above.  This accurately captures the substance of my conversation with the patient and total time spent preparing for visit, executing visit and  completing visit on the day of the visit:  30 minutes.  The portion of this total time included face to face time     The longitudinal plan of care for Alina Zavala was addressed during this visit. Due to the added complexity in care, I will continue to support Alina Zavala in the subsequent management of this condition(s) and with the ongoing continuity of care of this condition(s).      Ishan Hearn MD     ______________________________________    Last visit date and details:             ______________________________________      Patient was asked about 14 Review of systems including changes in vision (dry eyes, double vision), hearing, heart, lungs, musculoskeletal, depression, anxiety, snoring, RBD, insomnia, urinary frequency, urinary urgency, constipation, swallowing problems, hematological, ID, allergies, skin problems: seborrhea, endocrinological: thyroid, diabetes, cholesterol; balance, weight changes, and other neurological problems and these were not significant at this time except for   Patient Active Problem List   Diagnosis    Asthma    Benign essential hypertension    Adenoma of colon    Anxiety    DDD (degenerative disc disease), lumbar    Depression    Dizziness    Labile blood pressure    Hypotension    Near syncope    Osteopenia    Parkinson disease (H)    POTS (postural orthostatic tachycardia syndrome)    Trigeminal neuralgia    Family history of muscular dystrophy          Allergies   Allergen Reactions    Prednisone Anxiety, Palpitations and Shortness Of Breath    Baclofen Other (See Comments)     Agitation, confusion, brain fog, sedation    Donepezil      Insomnia, nausea, dizziness/somnolence     Entacapone     Penicillins      Unknown    Prednisone      Other reaction(s): *Unknown    Sulfa Antibiotics      Unknown     Past Surgical History:   Procedure Laterality Date    D & C       Past Medical History:   Diagnosis Date    Anxiety     Depression     Family history of muscular  "dystrophy 02/22/2024    Hyperlipidemia     Hypertension     Lyme disease     Trigeminal neuralgia      Social History     Socioeconomic History    Marital status:      Spouse name: Not on file    Number of children: Not on file    Years of education: Not on file    Highest education level: Not on file   Occupational History    Not on file   Tobacco Use    Smoking status: Never    Smokeless tobacco: Never   Substance and Sexual Activity    Alcohol use: No    Drug use: Not on file    Sexual activity: Not on file   Other Topics Concern    Not on file   Social History Narrative    . Lives in [place]. Tejinder Zavala spouse. They have three children. Their names are Eros, Venkat, and Christie. She is a homemaker. Studied library science and theology. She was a  for over 16 years. 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[place], focusing on castro - can see on [place] \"Castro and Backroads.\"        [email or web address]     Social Determinants of Health     Financial Resource Strain: High Risk (12/23/2021)    [facility]    Financial Resource Strain     Difficulty of Paying Living Expenses: Not on file     Difficulty of Paying Living Expenses: Not on file   Food Insecurity: Not on file   Transportation Needs: Not on file   Physical Activity: Not on file   Stress: Not on file   Social Connections: Unknown (12/23/2021)    [facility], [facility]    Social Connections     Frequency of Communication with Friends and Family: Not on file   Interpersonal Safety: Not on file   Housing Stability: Not on file       Drug and lactation database from the [employer] of " Medicine:  http://toxnet.nlm.nih.gov/cgi-bin/sis/htmlgen?LACT      B/P: Data Unavailable, T: Data Unavailable, P: Data Unavailable, R: Data Unavailable 0 lbs 0 oz  There were no vitals taken for this visit., There is no height or weight on file to calculate BMI.  Medications and Vitals not listed above were documented in the cart and reviewed by me.     Current Outpatient Medications   Medication Sig Dispense Refill    acetaminophen (TYLENOL) 500 MG tablet 3 x 500mg during day, 2 x 500mg at night      albuterol (PROAIR HFA/PROVENTIL HFA/VENTOLIN HFA) 108 (90 Base) MCG/ACT inhaler Inhale 2 puffs into the lungs every 6 hours as needed (Patient not taking: Reported on 5/28/2024)      amantadine HCl 100 MG TABS Take half-tablet (50 mg) by mouth 2 times daily 90 tablet 3    B Complex-C (VITAMIN B COMPLEX W/VITAMIN C) TABS tablet Take 1 tablet by mouth daily      buPROPion (WELLBUTRIN SR) 200 MG 12 hr tablet 200mg tab by mouth twice daily at 10am and 10pm  0    calcium carbonate (OS-TENA) 600 MG tablet 1 tab by mouth nightly at 10pm      carbidopa-levodopa (SINEMET)  MG tablet 2 -2.5 tabs by mouth every 4-5 hours = 12/day 1080 tablet 3    cholecalciferol 25 MCG (1000 UT) TABS 1000 units (25 mcg) by mouth daily at 10am      co-enzyme Q-10 100 MG CAPS capsule 100mg capsule by mouth every evening at 11pm      gabapentin (NEURONTIN) 100 MG capsule Take 200 mg by mouth 2 times daily (200 mg in the morning and 500 mg in the evening) 180 capsule 3    gabapentin (NEURONTIN) 300 MG capsule Take 300 mg by mouth every evening (500 mg in the evening total) 90 capsule 3    midodrine (PROAMATINE) 5 MG tablet Take 1 tablet (5 mg) by mouth 3 times daily (Patient taking differently: Take 7.5 mg by mouth 3 times daily) 270 tablet 3    probiotic CAPS Prebiotic twice daily      propranolol (INDERAL) 10 MG tablet 1/2 of 10mg tab by mouth once daily (morning only) 180 tablet 3    rivastigmine (EXELON) 13.3 MG/24HR 24 hr patch Place 1  patch onto the skin daily 90 patch 3    sertraline (ZOLOFT) 100 MG tablet 100mg tab by mouth dailya t 10am 90 tablet 3    sodium fluoride dental gel (PREVIDENT) 1.1 % GEL topical gel APPLY A THIN BEAD OF GEL TO TOOTHBRUSH AND BRUSH TEETH ONCE DAILY AT BEDTIME FOR AT LEAST 1 MINUTE. EXPECTORATE THROROUGHLY.      tiZANidine (ZANAFLEX) 2 MG tablet Take 1-2 tablets by mouth 2 times daily as needed      vitamin C (ASCORBIC ACID) 500 MG tablet 500mg tab by mouth daily at 10am           Ishan Hearn MD

## 2024-08-07 NOTE — PROGRESS NOTES
Diagnosis/Summary/Recommendations:    PATIENT: Alina Zavala  60 year old female     : 1960    DAHLIA:  [facility] [address]   [email or web address]  [phone] (M)   [phone] (H)   Tejinder Miller  [phone]     Venkat Cooney, and Christie.      PCP is Dr. HESHMA Melendrez  CArdiologist Dr. Reyna Dick  Eye doctor  Dentist  chiropractor      Assessment:  G20) Parkinson disease (H)  (primary encounter diagnosis)  Carbidopa/levodopa Sinemet 25/100 1.5 tabs 4/day at 3:30 - 5:30 am, 10 am, 4-5 pm, and 10-11 pm.  She has some dyskinesias  She has some wearing off.   She has foot and leg spasms that respond to sinemet  Spasms behind her knees are troublesome     [facility] Therapy ordered in the past  Im[facility] in [facility]    Cognitive Function  Has to focus on one thing at a time  Has a ustep   Does piano playing daily  Word finding daily  Was able to make a roast dinner and peeling potatoes - trying to keep this skill  Reading less articles on parkinson - too much  Sometimes look at support group information.     For RLS -  Gabapentin neurontin 100mg @10am and sometimes @10/11pm extra dose   Gabapentin neurontin 300mg @10/11pm     may consider taking second dose of gabapentin earlier than 10/11pm    POTS    Has had low blood pressure 80/40 and 60/40  She thinks that the propranolol helps he tremor but worried   Fludrocortisone florinef 0.1mg - still taking - was going to go off.   Midodrine 2.5mg 2/day - has not yet started   Propranolol 10mg 2/day - on this now  Propranolol 10mg 3/day - was on this in the past.   water and salt intake  Propranolol ER Inderal LA 60mg 24 hr capsule - not taking      Urinary urgency     Sleep   Gets up to take her first dose of medication @330/530am and then goes back to bed; possibly till 930am    Constipation and diarrhea  Magnesium oxide 500mg twice daily  Probiotic twice daily      Depression/anxiety    Bupropion Wellbutrin SR 200mg 12 hr tablet twice daily  Sertraline zoloft 100mg daily  Work stress - spouse unemployed - masters   Starting a new business  Adult son living with them  Adult daughter moving  Back to the [place] [id number] stress  Parents are not vaccinated.     Left trigeminal neuralgia  Prior history of lyme disease  Sweating episodes, not able to take hot shower.      Breathing  Albuterol proair as needed   Asmanex at night   Mometasone asmanex - as above     Endo  Calcium carbonate at night   Cholecalciferol vitamin D3 1000 units     Acetaminophen tylenol 500mg scheduled.   Coenzyme Q10 daily   Cyclobenzaprine flexeril as needed 1/2 tab  Turmeric 300mg  Vitamin C ascorbic acid 500mg      Medications     330-530am 10am 4-5pm 10-11pm   Acetaminophen tylenol 500mg    2    2   Albuterol proair hfa/proventil hfa/ventolin hfa 108 (90 base)mcg/act inhaler   As needed        Asmanex 120 metered doses 220mcg/inh inhaler        1 puff    b complex        1    Bupropion wellbutrin XL 300mg 24 hr tablet  not using         Bupropion wellbutrin SR 200mg 12 hr tablet   1   1    Calcium carbonate 600mg       1    Carbidopa/levodopa Sinemet 25/100 1.5 1.5 1.5 1.5   Cholecalciferol vitamin D3 25mcg 1000 units    1       Coenzyme Q10 100mg       1   Cyclobenzaprine flexeril 10mg   As needed       Fludrocortisone florinef 0.1mg  1     Gabapentin neurontin 100mg   1       Gabapentin neurontin 300mg       1    L Tyrosine 500 mg and 10mg vitamin B6   Not taking       Magnesium oxide antacid 500mg   1   1    Midodrine proamatine 2.5mg 1 1     Mometasone fumorate asmanex See above      See above   Probiotic 1     1    Propranolol inderal 10mg  1  1   Propranolol inderal 10mg   Was on this     Sertraline zoloft 100mg  1     Sertraline zoloft 50mg Not using      Turmeric 300mg and dicalcium phosphate 65mg   Not taking       Vitamin C 500mg    1                                    Plan:    Ongoing blood pressure  issues - should be transitioning from florinef to proamatine (MIDODRINE)  May consider cutting her fludrocortisone from 1 tab to 1/2 tablet  She may start her midodrine 2.5mg @930/10a and 4/5pm  Will have to  Have ongoing blood pressure monitoring.  Discussed having pharmacist Wendy Cárdenas help review this as well has have her new cardiologist updated on this note  Dr. Samaria Dick    Discussed covid19 vaccination    Return visit     Discussion about wearing off and management  1. Probably cannot be on a mao b inhibitor like rasagiline because of her antidepressant regimen  2. Consider add on dopamine agonist but may affect blood pressure more than just fiddling with sinemet  3. Sinemet timing options or use of comt inhibitor or rytary.   4. Amantadine (symmetrel) consideration    Stress  Psychology referral - that can be virtual   She is getting support so may not need this. But here are some numbers I needed    [facility]  [address] [place] [address]      Veronika Phillips, Ph.D.,   [phone]    Adeline Noe,Ph.D.,  [phone] (inpatient)    Ilya Yo,Ph.D.,  [phone]    Katy Martinez, Ph.D.  [phone]    Vanessa Munoz, Ph.D.,   [phone]    Nixon Wilkinson, Ph.D., [facility]  [phone]    Юлия Spencer,Ph.D.,   [phone]        Coding statement:   Medical Decision Making:  #  Chronic progressive medical conditions addressed  Yes BP  Review and/or interpretation of unique test or documentation from a provider outside of neurology  No    Independent historian provided additional details  Yes = spouseI  Prescription drug management and review of potential side effects and/or monitoring for side effects  Yes    Health impacted by social determinants of health  No     I have reviewed the note as documented above.  This accurately captures the substance of my conversation with the patient and total time spent preparing for visit,  executing visit and completing visit on the day of the visit:  40 minutes.      Ishan Hearn MD     ______________________________________    Last visit date and details:             ______________________________________      Patient was asked about 14 Review of systems including changes in vision (dry eyes, double vision), hearing, heart, lungs, musculoskeletal, depression, anxiety, snoring, RBD, insomnia, urinary frequency, urinary urgency, constipation, swallowing problems, hematological, ID, allergies, skin problems: seborrhea, endocrinological: thyroid, diabetes, cholesterol; balance, weight changes, and other neurological problems and these were not significant at this time except for   Patient Active Problem List   Diagnosis     Asthma     Benign essential hypertension     Adenoma of colon     Anxiety     DDD (degenerative disc disease), lumbar     Depression     Dizziness     Labile blood pressure     Hypotension     Near syncope     Osteopenia     Parkinson disease (H)     POTS (postural orthostatic tachycardia syndrome)     Trigeminal neuralgia          Allergies   Allergen Reactions     Prednisone Anxiety, Palpitations and Shortness Of Breath     Donepezil      Insomnia, nausea, dizziness/somnolence      Penicillins      Unknown     Sulfa Drugs      Unknown     Past Surgical History:   Procedure Laterality Date     D & C       Past Medical History:   Diagnosis Date     Anxiety      Depression      Hyperlipidemia      Hypertension      Lyme disease      Trigeminal neuralgia      Social History     Socioeconomic History     Marital status:      Spouse name: Not on file     Number of children: Not on file     Years of education: Not on file     Highest education level: Not on file   Occupational History     Not on file   Social Needs     Financial resource strain: Not on file     Food insecurity     Worry: Not on file     Inability: Not on file     Transportation needs     Medical: Not on file      "Non-medical: Not on file   Tobacco Use     Smoking status: Never Smoker     Smokeless tobacco: Never Used   Substance and Sexual Activity     Alcohol use: No     Frequency: Never     Drug use: Not on file     Sexual activity: Not on file   Lifestyle     Physical activity     Days per week: Not on file     Minutes per session: Not on file     Stress: Not on file   Relationships     Social connections     Talks on phone: Not on file     Gets together: Not on file     Attends Bahai service: Not on file     Active member of club or organization: Not on file     Attends meetings of clubs or organizations: Not on file     Relationship status: Not on file     Intimate partner violence     Fear of current or ex partner: Not on file     Emotionally abused: Not on file     Physically abused: Not on file     Forced sexual activity: Not on file   Other Topics Concern     Not on file   Social History Narrative    . Lives in [place]. Tejinder Zavala spouse. They have three children. Their names are Eros, Venkat, and Christie. She is a homemaker. Studied library science and theology. She was a  for over 16 years. 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  Medications and Vitals not listed above were documented in the cart and reviewed by me.     Current Outpatient Medications   Medication Sig Dispense Refill     acetaminophen (TYLENOL) 500 MG " tablet 2 x 500mg tab by mouth twice daily @ 10am and 11pm  = 4/day       albuterol (PROAIR HFA/PROVENTIL HFA/VENTOLIN HFA) 108 (90 Base) MCG/ACT inhaler Inhale 2 puffs into the lungs every 6 hours as needed       ASMANEX 120 METERED DOSES 220 MCG/INH inhaler Inhale 1 puff into the lungs At Bedtime   1     B Complex-C (VITAMIN B COMPLEX W/VITAMIN C) TABS tablet Take 1 tablet by mouth daily       buPROPion (WELLBUTRIN SR) 200 MG 12 hr tablet 200mg tab by mouth twice daily at 9 am and 9 pm  0     buPROPion (WELLBUTRIN XL) 300 MG 24 hr tablet 300mg tab by mouth daily       calcium carbonate (CALCIUM CARBONATE) 600 MG tablet Take 1 tablet by mouth At Bedtime        carbidopa-levodopa (SINEMET)  MG tablet May increase to 1.5 x 25/100 tab by mouth 4/day @ 4-530am, 10am, 430-5pm, and 10pm = 6/day 540 tablet 11     Cholecalciferol (VITAMIN D3) 50 MCG (2000 UT) TABS 2000 units by mouth daily @ 10am       co-enzyme Q-10 100 MG CAPS capsule 100mg capsule by mouth every evening at 11pm       cyclobenzaprine (FLEXERIL) 10 MG tablet Take 10 mg by mouth as needed (for back pain - very sparingly)       fludrocortisone (FLORINEF) 0.1 MG tablet 0.1mg tab by mouth daily @ 10am       gabapentin (NEURONTIN) 100 MG capsule 100mg capsule by mouth daily @ 10 AM 90 capsule 3     gabapentin (NEURONTIN) 300 MG capsule 300mg capsule by mouth nightly @ 11pm 90 capsule 3     l-tyrosine 500 MG TABS L-tyrosine 500mg by mouth daily       Magnesium Oxide, Antacid, 500 MG CAPS Take 1 tablet by mouth 2 times daily @10am and 11pm       mometasone (ASMANEX, 120 METERED DOSES,) 220 MCG/INH inhaler Inhale 1 puff into the lungs At Bedtime       Probiotic Product (PROBIOTIC PO) Take 2 capsules by mouth daily (Smarty Pants Adult Probiotic Complete)       propranolol (INDERAL) 10 MG tablet Take 10 mg by mouth 3 times daily        sertraline (ZOLOFT) 100 MG tablet 100mg tab by mouth daily       sertraline (ZOLOFT) 50 MG tablet 2 x 50mg tab by mouth daily        Sharps Container (SHARPS-A-GATOR LOCKING BRACKET) MISC Per Dr. Do wear compression stockings daily. Level of 20-30 mmHg thigh high. Take off one hour 2-3 times daily.       Sharps Container (SHARPS-A-GATOR LOCKING BRACKET) MISC As directed. Per Dr. Do use compression stockings 20-30 mmHg. Remove for one hour 2-3 times daily       TURMERIC PO Take 300 mg by mouth daily as needed        vitamin C (ASCORBIC ACID) 500 MG tablet Take 500 mg by mouth daily           Medications                                                                                                                                                  Ishan Hearn MD     Statement Selected

## 2024-08-29 ENCOUNTER — OFFICE VISIT (OUTPATIENT)
Dept: NEUROLOGY | Facility: CLINIC | Age: 64
End: 2024-08-29
Payer: COMMERCIAL

## 2024-08-29 VITALS
WEIGHT: 123.5 LBS | BODY MASS INDEX: 20.55 KG/M2 | HEART RATE: 63 BPM | OXYGEN SATURATION: 100 % | SYSTOLIC BLOOD PRESSURE: 113 MMHG | DIASTOLIC BLOOD PRESSURE: 49 MMHG

## 2024-08-29 DIAGNOSIS — G20.B1 PARKINSON'S DISEASE WITH DYSKINESIA WITHOUT FLUCTUATING MANIFESTATIONS (H): Primary | ICD-10-CM

## 2024-08-29 DIAGNOSIS — I95.1 ORTHOSTATIC HYPOTENSION: ICD-10-CM

## 2024-08-29 DIAGNOSIS — G20.A1 PARKINSON'S DISEASE WITHOUT DYSKINESIA OR FLUCTUATING MANIFESTATIONS (H): ICD-10-CM

## 2024-08-29 PROCEDURE — 99214 OFFICE O/P EST MOD 30 MIN: CPT | Performed by: PSYCHIATRY & NEUROLOGY

## 2024-08-29 RX ORDER — GABAPENTIN 100 MG/1
CAPSULE ORAL
Qty: 180 CAPSULE | Refills: 3 | Status: SHIPPED | OUTPATIENT
Start: 2024-08-29

## 2024-08-29 RX ORDER — RIVASTIGMINE 13.3 MG/24H
1 PATCH, EXTENDED RELEASE TRANSDERMAL DAILY
Qty: 90 PATCH | Refills: 3 | Status: SHIPPED | OUTPATIENT
Start: 2024-08-29

## 2024-08-29 RX ORDER — MIDODRINE HYDROCHLORIDE 5 MG/1
TABLET ORAL
Qty: 410 TABLET | Refills: 3 | Status: SHIPPED | OUTPATIENT
Start: 2024-08-29

## 2024-08-29 RX ORDER — GABAPENTIN 300 MG/1
300 CAPSULE ORAL EVERY EVENING
Qty: 90 CAPSULE | Refills: 3 | Status: SHIPPED | OUTPATIENT
Start: 2024-08-29

## 2024-08-29 RX ORDER — AMANTADINE HYDROCHLORIDE 100 MG/1
TABLET ORAL
Qty: 90 TABLET | Refills: 3 | Status: SHIPPED | OUTPATIENT
Start: 2024-08-29

## 2024-08-29 RX ORDER — CARBIDOPA AND LEVODOPA 25; 100 MG/1; MG/1
TABLET ORAL
Qty: 1080 TABLET | Refills: 3 | Status: SHIPPED | OUTPATIENT
Start: 2024-08-29

## 2024-08-29 NOTE — LETTER
2024       RE: Alina Zavala   [facility] [address]     Dear Colleague,    Thank you for referring your patient, Alina Zavala, to the [facility]UR[facility] [place] at [facility]. Please see a copy of my visit note below.      Diagnosis/Summary/Recommendations:    PATIENT: Alina Zavala  64 year old female     : 1960    DAHLIA: Aug 29, 2024     MRN: [id number]  [facility] [address]   [email or web address]  [phone] (M) -iphone  [phone] (H) -not an iphone  Tejinder HARP  [phone]     Venkat Cooney and Marissa.       PCP is Dr. HESHAM Melendrez  CArdiologist Dr. Reyna Dick  Eye doctor  Dentist  Chiropractor     [email or web address]      Assessment:  (G20) Parkinson disease (H)  (primary encounter diagnosis)  (G20) Parkinson disease (H)  (primary encounter diagnosis)  Sinemet 25/100 2 tabs 5/day  Entacapone comtan 200mg - not taking     Review of diagnosis    Parkinson disease     Avoidance of dopamine blockers   Not taking     Motor complication review   wearing off. At 4-5 hours gets tingling of her toes/fingers  Legs - cramping, clawing - behind her knees  Wearing off afternoon; has to be attentive to afternoon dosing     Has nausea with comtan and not eating as much   Weight has reduced to 144 from 162 #      Review of Impulse control disorders         Review of surgical or medication options   reviewed     May consider a trial of amantadine (symmetrel) in 1-3 weeks after her back situation is better. Amantadine may help in her wearing off and dyskinesias.      Gait/Balance/Falls   Fell and kind of caught herself yesterday - was on her knees  Discussion about multi tasking - using only one hand and not two hands to carry things.      Exercise/Therapy performed/offered     Getting up at 4 or 5am and have to let dogs in and out and is run down              Taty - yellow  lab and red wright lab 65 #              Cat              Other dog   Not feeding till 7/730a     Doing big and loud every day     [facility] perez physical therapy  Also doing occupational therapy      Silver sneaker discussion - [place]   Can use facilities free  Pool assessment at [facility]         She has seen therapists in the past at [facility] and if needed may need to see orthopedics, PMR or sports medicine for her back symptoms which may improve with conservative management.      For RLS -  Gabapentin neurontin 100mg @10am and sometimes @8/11pm extra dose   Gabapentin neurontin 300mg @8/11pm     may consider taking second dose of gabapentin earlier than 10/11pm        Cognitive/Driving   Short distances only; not of freeway  Cognitive problems - recalling names takes longer  Problems with spelling        Would recommend a baseline neuropsychological evaluation 5/9/2022  brianna belle     Generally moderate to severe impairments identified in nonverbal skills (basic judgment, reasoning, planning and organization) as well as aspects of executive functioning (complex attention, visuospatial planning and organization)  Subtle weaknesses (low average) were evident on additional nonverbal (learning and memory) and executive (working memory) measures as well as in cognitive efficiency  Performance was intact in most verbal skills including verbal learning and memory, and aspects of executive functioning (phonemic fluency, deductive reasoning, inhibition).   She states that spatial skills have always been an area of weaknesse, but her  has observed a decline in this area        Mood   Counselling - working with counsellor   Bupropion wellbutrin SR 200mg 12 hr tablet twice daily  Sertraline zoloft 100mg daily     Mood could be better  Has been unable to go off wellbutrin in the past  Has wearing off related mood issues  Not sure how much the sertraline has helped.     fatigue  Emotional   Missing family  for thanksgiving  Mother with c diff and is 85 and frail   with covid      Problems handling the cold  Needs to get full spectrum light     Daughter moving back with her dog   Renuka     Acceptance issues with the parkinson  Not driving       Hallucinations/delusions   denies     Sleep   Gets up to take her first dose of medication @330/530am and then goes back to bed; possibly till 930am  Variable time of wakening up.    Falls asleep relatively easily   Sleeping a lot - exhausted     Sleep deprived with dogs and affects pill cycle     Bladder/Renal/Prostate/Gyn/Other  Urinary urgency  Variable nocturia depending on hydration, etc.   Discussed timing of water intake     GI/Constipation/GERD   Magnesium oxide 500mg twice daily at 10am and 10pm  Probiotic twice daily smarty pants at 10am and 10pm   Doing well     ENDO/Lipid/DM/Bone density/Thyroid  Calcium carbonate at night at 10pm  Cholecalciferol vitamin D3 1000 units at 10am     Cardio/heart/Hyper or Hypotensive   POTS    Blood pressure is better with medication change  She thinks that the propranolol helps her tremor but worried   Fludrocortisone florinef 0.1mg - not taking  Midodrine proamatine 5mg 3/day   Propranolol 10mg 1/2 of 10mg daily at 10am      Cardiology  Kimber,pan Constantino, Marlena Dick, Reyna - cardiologist     water and salt intake     Dr. Reyna Dick     Vision/Dry Eyes/Cataracts/Glaucoma/Macular   Less contrast sensitivity  Tree vs person  Will see Oumar Wade on 3/2/2023     Heme/Anticoagulation/Antiplatelet/Anemia/Other  Not on aspirin     ENT/Resp  Breathing  Albuterol proair as needed   Asmanex 120 metered doses 220mcg/inh inhaler at night  - not taking   Mometasone asmanex - not taking  Taste and smell has not improved since covid     Skin/Cancer/Seborrhea/other  denies     Musculoskeletal/Pain/Headache  Denies - other than shoulders      Other:        Ongoing nausea that may be related to her comtan/entacapone or other  factors  She has some weight loss     Discussed timing of meals and the sinemet and anti nausea medications  Has seen Wendy Caro 3/2022  Has not been on rytary and has not been on amantadine  She has had wearing off related leg symptoms - numbness -      Has not had an emg to evaluate for neuropathy  And she also has wearing off related neuropathy type of leg symptoms  Blood work and emg could be done     Discussion about report of the neuropsychological findings and terminology.   She does not have lewy body disease.      Left trigeminal neuralgia     Prior history of lyme disease     Sweating episodes, not able to take hot shower      Acetaminophen tylenol 500mg scheduled.   b complex   Coenzyme Q10 daily   Cyclobenzaprine flexeril as needed 1/2 tab  Gabapentin neurontin 100mg twice daily   Gabapentin neurontin 300mg at night   Vitamin C ascorbic acid 500mg      Visual issues; seeing dr Wade  Seeing Red and green squares and has a reduction in amantadine   Discussion about lab work to check kidney function as is taking amantadine (symmetrel)   Last renal labs in our chart are from 2020.   Not driving  Depth perception problems      Wearing off at 4/5 hours - wears off 45 minutes before dose and has cramping, aching  Has delayed on periods   Cramping at night - toes and feet and walks around till pills work   This may occur at 9pm or so   Has cramping in the car/juan a horse      Last visit was summer 2022     Think that rytary was too expensive  Did not benefit from comtan and had side effects  Has not been on gocovri     Consider reducing the interval to 4.5 hours of the sinemet.   Has had some slow off periods and some sudden off periods.      Bowel movements are still there but may try some things such as the miralax, which she says she has tried in the past.      Cognitive problems  Did not tolerate donepezil (aricept)  Trial of rivastigmine excelon patch for cognition, monitor for blood pressure or  heart rate changes or other side effects.   4.6mg patch daily for 4 weeks then 9.5mg patch daily for 4 weeks then increase to the 13.3mg patch daily  Hs problems with  Multi tasking  Discussion about attention changes and focusing changes      Blood pressure being monitored and managed with medications for her orthostatic symptoms.      Urinary urgency      Pharmacy consultation  Pharmacy (MTM) consultation and medication management  Please call the scheduling number @ [phone] to set up an appointment with pharmacists Wendy Caro or Fatemeh Richey.      Ongoing sleep issues - dogs are there  3 dogs and living in the family room  - rashi is in the bedroom  2 y ear old dog, middle age dog and older dog (has problems climbing the stairs)     In summary - will need to work on intervals 2 tabs 4 or 4.5 hours vs 2.5 pills every 5 hours   Work on constipation   Review price of the rivastigmine patch but don't start yet.      Return back in 3-6 months.      Parkinson's Disease:       Orthostatic Hypotension:  Today's BP showed significant SBP drop. Most likely, this is why pt was feeling dizzy, not moving well.   gave her the midday dose after her orthostatic BP/HR was completed.      Memory Problems: Ongoing pt. Pt had difficulty follow the instructions to perform tasks when motor assessment was completed. On Rivastigmine 4.6 mg patch & hasn't increased the dose yet. No side effects from the current dose.      __  Stay on the same antiparkinsonian medication.      __  Check your BP regularly. Today, your standing BP dropped.  Slowly stand up and wait before walking. Monitor dizziness.  Continue taking the Midodrine.       5/17/2023  1:23 PM 5/17/2023  1:25 PM   Vital Signs       Systolic 146 !  89 (L)    Diastolic 82 !  55 !    Pulse 58  58       __  Increase the Rivastigmine patch to 9.6 mg today.       __  See Wendy Caro, Pharm.D. on May 25th.      Weight loss - encouraged to increase calories and  "don't take protein within 1-2 hours of carbidopa/levodopa (sinemet)  Stress of planning a wedding in 4 weeks hair done in the morning, wedding      Will switch to gocovri tomorrow and last dose of regular amantadine will be today   Can use as needed sinemet to help     Has had falls and tripped over the dog  Had a fall trying to get dressed and stand on one leg  May drop something if doing two things at one time - using both hands     9/13/2023 visit with Wendy Caro      Return back in 3 months with Zeynep     Discussion about blood pressure - continue on midodrine 5mg 3/day     Doing exercise classes      Using the patch rivastigmine 13.3mg - and has helped a bit      Discussion about bowel habits  Using prebiotic twice daily      May want to talk with Dr Ishan Melendrez about repeat dexa scan - l ast one was 2018 I think  May need bone enhancing medication  And continue vitamin d and calcium     Very brief trial of gocovri 8/2023 but stopped as not sure if helped and was very expensive. She continues on amantadine 1/2 of 100mg twice daily     Has not had a recent bone density. May want to talk with pcp  Taking calcium and vitamin D     Bowel habits are stable - not using miralax  May have a few bowel movements per day  Using prebiotic  Incomplete evacuation.      Discussion about blood pressure - continue on midodrine 5mg 3/day  Still on midodrine  Bp has been lower than today's readings.   Has had bp in the 70/40 range.   Has had dull headache and loss of sight and hearing and has feeling of fainting  Rare in the morning every few months  May be more common if misses the pill.   Refilled the midodrine  However encouraged her to see her cardiologist and review bp etc. Dr. Reyna Dick     Has pain on the edges of her tongue - it is intermittent  Worse problem more talking.   Visually her tongue looks okay and is not cracked.   \"The most common vitamin or nutrient deficiencies that cause cracked tongue are " vitamin B12 deficiency, low iron, zinc or biotin. Glossodynia      Urinary urgency variable  Has nocturia 2/noc   She is trying to take more water in the day and lessen water at night.   Mirabegron/myrbetriq or gemtesa/ubegron are reasonable options as they are beta3 agonists and may increase blood pressure (good thing) but may be expensive.      Review bone issues with pcp - bone density -   Consider glossodynia tests: checking vitamins with pcp:  iron/ferritin, zinc, biotin, B12/mma  PDgeneration testing - mother with tremor, maternal aunt with possible parkinson (lee ann Galeano kelly Sorensen)     See cardiologist   GENETIC CONSULTATION FOR muscular dystrophy (Duchene?), parkinson and tremor, etc. See family tree that I filled out. Grandparents first cousin on mother's side - david.   Return appointment with Zeynep Valle   No changes in medications  Therapy orders to fax to [facility] speech and PD         Medications     4/430 9a 10a 1p 4p 6p 8p 10p   Acetaminophen tylenol 500mg      2          2   Albuterol proair proventil      prn              Amantadine gocovri 68.5mg 24hr  off                  Amantadine symmetrel 100mg 1/2         1/2       b complex                1    Bupropion wellbutrin SR 200mg 12      1         1    Calcium carbonate 600mg               1    Carbidopa/levodopa Sinemet  25/100  2   2.5   2     2.5   Cholecalciferol vit d3 25 1000      1             Coenzyme Q10 100mg               1   Gabapentin neurontin 100mg     2         2   Gabapentin neurontin 300mg               1    Midodrine proamatine 5mg   1.5   1.5  1.5       Nonformulary prebiotic  - below below                 Polyethylene glycol miralax prn                 Probiotic prebiotic bacillus subtilis 1              1    Propranolol inderal 10mg     1/2             Rivastigmine exelon 13.3mg  patch 1                 Sertraline zoloft 100mg     1             Sodium fluoride dental gel                    Tizanidine zanaflex  2mg prn                 Vitamin C 500mg      1                                                        Plan:    Midodrine is now 1.5 x 5mg or 7.5mg  3 times per day  She has not fainted but has had low blood pressures  Refilled the midodrine/proamatine    Variable bladder issues  Wearing a pad    Daughter will have twins due around thanksgiving.   Mother and father - 88 and 92    She has dyskinesias today   She had side effects with more amantadine so does not want more of that medication  The sinemet is working if she is on time taking the medication.     She has ongoing back pain or leg pain or cramping in her leg in the evening  It is probably related to her parkinson as the pain is better when her medication is working  She has some leaning off one side when out shopping.   She has some hip discomfort  Renamed it rise - exercise class.   Has to sign in.   Pain is 60% parkinson from her point   She is taking tizanidine for pain as needed and not taking every day.   She needs to take     She is using olypop    She has not done her genetic testing     Discussion about longer acting sinemet carbidopa/levodopa  Crexont  Rytary     Discussion about tornadoes which she wanted to study till she realize could not do the physics and ended up being a .    She is using the patch 13.3 daily and is better than probably otherwise.     Cognitive testing was 5/2022  Consider another neuropsychological evaluation  Not sure dbs or focused ultrasound is going to be an option going forward    There has been weight loss   Bowels are better with her probiotic  She is balance weight with her timing of medications.         Coding statement:   Medical Decision Making:  #  Chronic progressive medical conditions addressed  - see above --   Review and/or interpretation of unique test or documentation from a provider outside of neurology no   Independent historian provided additional details  yes I  Prescription drug management and  review of potential side effects and/or monitoring for side effects  -- see above ---  Health impacted by social determinants of health  no    I have reviewed the note as documented above.  This accurately captures the substance of my conversation with the patient and total time spent preparing for visit, executing visit and completing visit on the day of the visit:  30 minutes.  The portion of this total time included face to face time     The longitudinal plan of care for Alina Zavala was addressed during this visit. Due to the added complexity in care, I will continue to support Alina Zavala in the subsequent management of this condition(s) and with the ongoing continuity of care of this condition(s).      Ishan Hearn MD     ______________________________________    Last visit date and details:             ______________________________________      Patient was asked about 14 Review of systems including changes in vision (dry eyes, double vision), hearing, heart, lungs, musculoskeletal, depression, anxiety, snoring, RBD, insomnia, urinary frequency, urinary urgency, constipation, swallowing problems, hematological, ID, allergies, skin problems: seborrhea, endocrinological: thyroid, diabetes, cholesterol; balance, weight changes, and other neurological problems and these were not significant at this time except for   Patient Active Problem List   Diagnosis    Asthma    Benign essential hypertension    Adenoma of colon    Anxiety    DDD (degenerative disc disease), lumbar    Depression    Dizziness    Labile blood pressure    Hypotension    Near syncope    Osteopenia    Parkinson disease (H)    POTS (postural orthostatic tachycardia syndrome)    Trigeminal neuralgia    Family history of muscular dystrophy          Allergies   Allergen Reactions    Prednisone Anxiety, Palpitations and Shortness Of Breath    Baclofen Other (See Comments)     Agitation, confusion, brain fog, sedation    Donepezil      Insomnia,  "nausea, dizziness/somnolence     Entacapone     Penicillins      Unknown    Prednisone      Other reaction(s): *Unknown    Sulfa Antibiotics      Unknown     Past Surgical History:   Procedure Laterality Date    D & C       Past Medical History:   Diagnosis Date    Anxiety     Depression     Family history of muscular dystrophy 02/22/2024    Hyperlipidemia     Hypertension     Lyme disease     Trigeminal neuralgia      Social History     Socioeconomic History    Marital status:      Spouse name: Not on file    Number of children: Not on file    Years of education: Not on file    Highest education level: Not on file   Occupational History    Not on file   Tobacco Use    Smoking status: Never    Smokeless tobacco: Never   Substance and Sexual Activity    Alcohol use: No    Drug use: Not on file    Sexual activity: Not on file   Other Topics Concern    Not on file   Social History Narrative    . Lives in [place]. Tejinder Zavala spouse. They have three children. Their names are Eros, Venkat, and Christie. She is a homemaker. Studied library science and theology. She was a  for over 16 years. 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[place], focusing on castro - can see on [employer] \"[place].\"        [email or web address]     Social Determinants of Health     Financial Resource Strain: High Risk (12/23/2021)    [facility]    Financial Resource Strain     Difficulty of Paying Living Expenses: Not on file     Difficulty of Paying Living Expenses: Not on file   Food Insecurity: Not on file   Transportation Needs: Not on file   Physical Activity: Not on file   Stress: Not on file   Social Connections: Unknown (12/23/2021)    Received from [employer] & " [facility], [facility]    Social Connections     Frequency of Communication with Friends and Family: Not on file   Interpersonal Safety: Not on file   Housing Stability: Not on file       Drug and lactation database from the [place] National Library of Medicine:  http://toxnet.nlm.nih.gov/cgi-bin/sis/htmlgen?LACT      B/P: Data Unavailable, T: Data Unavailable, P: Data Unavailable, R: Data Unavailable 0 lbs 0 oz  There were no vitals taken for this visit., There is no height or weight on file to calculate BMI.  Medications and Vitals not listed above were documented in the cart and reviewed by me.     Current Outpatient Medications   Medication Sig Dispense Refill    acetaminophen (TYLENOL) 500 MG tablet 3 x 500mg during day, 2 x 500mg at night      albuterol (PROAIR HFA/PROVENTIL HFA/VENTOLIN HFA) 108 (90 Base) MCG/ACT inhaler Inhale 2 puffs into the lungs every 6 hours as needed (Patient not taking: Reported on 5/28/2024)      amantadine HCl 100 MG TABS Take half-tablet (50 mg) by mouth 2 times daily 90 tablet 3    B Complex-C (VITAMIN B COMPLEX W/VITAMIN C) TABS tablet Take 1 tablet by mouth daily      buPROPion (WELLBUTRIN SR) 200 MG 12 hr tablet 200mg tab by mouth twice daily at 10am and 10pm  0    calcium carbonate (OS-TENA) 600 MG tablet 1 tab by mouth nightly at 10pm      carbidopa-levodopa (SINEMET)  MG tablet 2 -2.5 tabs by mouth every 4-5 hours = 12/day 1080 tablet 3    cholecalciferol 25 MCG (1000 UT) TABS 1000 units (25 mcg) by mouth daily at 10am      co-enzyme Q-10 100 MG CAPS capsule 100mg capsule by mouth every evening at 11pm      gabapentin (NEURONTIN) 100 MG capsule Take 200 mg by mouth 2 times daily (200 mg in the morning and 500 mg in the evening) 180 capsule 3    gabapentin (NEURONTIN) 300 MG capsule Take 300 mg by mouth every evening (500 mg in the evening total) 90 capsule 3    midodrine (PROAMATINE) 5 MG tablet Take 1 tablet (5 mg) by  mouth 3 times daily (Patient taking differently: Take 7.5 mg by mouth 3 times daily) 270 tablet 3    probiotic CAPS Prebiotic twice daily      propranolol (INDERAL) 10 MG tablet 1/2 of 10mg tab by mouth once daily (morning only) 180 tablet 3    rivastigmine (EXELON) 13.3 MG/24HR 24 hr patch Place 1 patch onto the skin daily 90 patch 3    sertraline (ZOLOFT) 100 MG tablet 100mg tab by mouth dailya t 10am 90 tablet 3    sodium fluoride dental gel (PREVIDENT) 1.1 % GEL topical gel APPLY A THIN BEAD OF GEL TO TOOTHBRUSH AND BRUSH TEETH ONCE DAILY AT BEDTIME FOR AT LEAST 1 MINUTE. EXPECTORATE THROROUGHLY.      tiZANidine (ZANAFLEX) 2 MG tablet Take 1-2 tablets by mouth 2 times daily as needed      vitamin C (ASCORBIC ACID) 500 MG tablet 500mg tab by mouth daily at 10am           Ishan Hearn MD

## 2024-08-29 NOTE — PATIENT INSTRUCTIONS
Medications     4/430 9a 10a 1p 4p 6p 8p 10p   Acetaminophen tylenol 500mg      2          2   Albuterol proair proventil      prn              Amantadine gocovri 68.5mg 24hr  off                  Amantadine symmetrel 100mg 1/2         1/2       b complex                1    Bupropion wellbutrin SR 200mg 12      1         1    Calcium carbonate 600mg               1    Carbidopa/levodopa Sinemet  25/100  2   2.5   2     2.5   Cholecalciferol vit d3 25 1000      1             Coenzyme Q10 100mg               1   Gabapentin neurontin 100mg     2         2   Gabapentin neurontin 300mg               1    Midodrine proamatine 5mg   1.5   1.5  1.5       Nonformulary prebiotic  - below below                 Polyethylene glycol miralax prn                 Probiotic prebiotic bacillus subtilis 1              1    Propranolol inderal 10mg     1/2             Rivastigmine exelon 13.3mg  patch 1                 Sertraline zoloft 100mg     1             Sodium fluoride dental gel                    Tizanidine zanaflex 2mg prn                 Vitamin C 500mg      1                                                        Plan:    Midodrine is now 1.5 x 5mg or 7.5mg  3 times per day  She has not fainted but has had low blood pressures  Refilled the midodrine/proamatine    Variable bladder issues  Wearing a pad    Daughter will have twins due around thanksgiving.   Mother and father - 88 and 92    She has dyskinesias today   She had side effects with more amantadine so does not want more of that medication  The sinemet is working if she is on time taking the medication.     She has ongoing back pain or leg pain or cramping in her leg in the evening  It is probably related to her parkinson as the pain is better when her medication is working  She has some leaning off one side when out shopping.   She has some hip discomfort  Renamed it rise - exercise class.   Has to sign in.   Pain is 60% parkinson from her point   She is taking  tizanidine for pain as needed and not taking every day.   She needs to take     She is using olypop    She has not done her genetic testing     Discussion about longer acting sinemet carbidopa/levodopa  Crexont  Rytary     Discussion about tornadoes which she wanted to study till she realize could not do the physics and ended up being a .    She is using the patch 13.3 daily and is better than probably otherwise.     Cognitive testing was 5/2022  Consider another neuropsychological evaluation  Not sure dbs or focused ultrasound is going to be an option going forward    There has been weight loss   Bowels are better with her probiotic  She is balance weight with her timing of medications.

## 2024-09-19 ENCOUNTER — TELEPHONE (OUTPATIENT)
Dept: NEUROLOGY | Facility: CLINIC | Age: 64
End: 2024-09-19
Payer: COMMERCIAL

## 2024-09-22 ENCOUNTER — HEALTH MAINTENANCE LETTER (OUTPATIENT)
Age: 64
End: 2024-09-22

## 2024-09-24 NOTE — TELEPHONE ENCOUNTER
Spoke to the patient and she stated she would like the writer to call her back on Thursday. The patient stated she needed to talk to her . The patient at first thought this was to schedule an appointment with a neurosurgeon but the writer clarified with her that it is for a neuropsychological evaluation. The patient stated she had one done about 2 years ago and it last about 2-3 hours. The patient asked if this would be the same test. Informed the patient yes and that it would take about 4 hours. The patient verbalized her understanding and had no further questions at this time.

## 2024-09-25 NOTE — TELEPHONE ENCOUNTER
Spoke to the patient thinking it was Thursday. Apologized to the patient for calling her sooner than planned. The patient stated she has a lot of anxiety around this because she feels she will be judged on how well she does. The patient did compare it to the standardized testing she had to do in grade school since she had the neuropsychological evaluation done 2 years ago. The patient stated for the last neuropsychological evaluation she had, she felt so anxious the night before that she could not sleep.    The patient was reassured that the evaluation is not meant to  her but to help form a diagnosis and to see where she is in her disease progression or to see if there is anything of concern that needs to address. The patient stated she will talk to the writer tomorrow and will have an answer for the writer.

## 2024-09-26 NOTE — TELEPHONE ENCOUNTER
The patient was called and scheduled with Dr. Lunsford on 1/9/25 at 12:30 PM. The patient is aware to come on medications and be well-rested.

## 2024-11-13 ENCOUNTER — VIRTUAL VISIT (OUTPATIENT)
Dept: PHARMACY | Facility: CLINIC | Age: 64
End: 2024-11-13
Attending: PSYCHIATRY & NEUROLOGY
Payer: COMMERCIAL

## 2024-11-13 DIAGNOSIS — G20.B1 PARKINSON'S DISEASE WITH DYSKINESIA WITHOUT FLUCTUATING MANIFESTATIONS (H): Primary | ICD-10-CM

## 2024-11-13 DIAGNOSIS — I95.1 ORTHOSTATIC HYPOTENSION: ICD-10-CM

## 2024-11-13 RX ORDER — AMANTADINE HYDROCHLORIDE 100 MG/1
TABLET ORAL
Qty: 135 TABLET | Refills: 3 | Status: SHIPPED | OUTPATIENT
Start: 2024-11-13

## 2024-11-13 NOTE — PROGRESS NOTES
Medication Therapy Management (MTM) Encounter    ASSESSMENT:                            Medication Adherence/Access: No issues identified.    Parkinson's Disease/orthostatic hypotension:     Patient is most bothered by dyskinesia at this time so we opted to try increasing amantadine to 3 doses of 50 mg/day. Higher doses of amantadine previously caused orthostatic hypotension so we will monitor closely for this. I also encouraged her to take the carbidopa-levodopa, amantadine, and midodrine all at the same time to improve adherence and hopefully offset the orthostatic hypotension effect  of the Parkinson's disease medications.    PLAN:                            Take midodrine with the first 3 doses of carbidopa-levodopa during the day. Midodrine will help off-set the low blood pressure effect  of carbidopa-levodopa and amantadine.   We will try increasing the amantadine to 3 doses/day and you may take this with carbidopa-levodopa for first 3 doses of the day as indicated below. If this makes you feel too dizzy, we can go back to 2 doses of amantadine per day.      1st dose (around 8-9 am) 2nd dose (around 12-1 pm)  3rd dose (around 4-5 pm) 4th dose (late evening) 5th dose (middle of the night)   Carbidopa-levodopa   mg  2 2 2 2 2   Amantadine 100 mg  0.5 0.5 0.5     Midodrine 5 mg  1.5 1.5 1.5         Follow-up: 1/14/25 at 10:30 am video visit    SUBJECTIVE/OBJECTIVE:                          Alina Zavala is a 64 year old female seen for a follow-up visit. Patient was accompanied by .      Reason for visit: follow up on medications.    Allergies/ADRs: Reviewed in chart  Past Medical History: Reviewed in chart  Tobacco: She reports that she has never smoked. She has never used smokeless tobacco.  Alcohol: not currently using  Social: daughter will be having twin daughters on Dec 4     Medication Adherence/Access: she is not taking carbidopa-levodopa consistently because she tries to take the medication  4 hours apart rather than on a particular schedule. She tries to take the medication apart from meals but this is not always possible.    Parkinson's Disease/orthostatic hypotension:     - carbidopa-levodopa  mg, 2 tablets about every 4 hours (typically 5 doses/day)   - amantadine 50 mg twice daily   - Rivastigmine 13.3 mg daily   - Midodrine 7.5 mg in the morning, 7.5 mg mid-day, 7.5 mg in the evening   - propranolol 5 mg every morning    Patient states she is noticing some progression of her symptoms. Reports she should exercise more. She has more dyskinesia. Taking the carbidopa-levodopa on time is helpful. If she sleeps 2 hours past her dose of medication she can tell she is late. Carbidopa-levodopa wears off about 15 min before 4 hours; however, she wants to avoid increasing the medication at this time.   She is still dealing with lower back and hip pain. She finds that tizanidine is helpful but she tries not to take it because it definitely makes her tired and foggy. If she has cramping in her legs down to her feet overnight she walks around and takes tizanidine which helps.   Blood pressure is being managed by cardiology. She states the midodrine usually works for her but occasionally she misses a dose. She is more likely to have blood pressure drop after eating a meal. This week blood pressure was 70/40 after eating a meal and felt like she was going to pass out. After drinking liquid IV her blood pressure went to 128/38.  Prior to this experience she didn't have issues with dizziness for months.     Today's Vitals: There were no vitals taken for this visit.  ----------------    I spent 26 minutes with this patient today. All changes were made via collaborative practice agreement with Dr. Hearn. A copy of the visit note was provided to the patient's provider(s).    A summary of these recommendations was sent via [employer].    Wendy Caro, Pharm.D.  Medication Therapy Management Pharmacist  [facility]    Telemedicine Visit Details  The patient's medications can be safely assessed via a telemedicine encounter.  Type of service:  Video Conference via [employer]  Originating Location (pt. Location): Home    Distant Location (provider location):  Off-site  Start Time:  10:01 AM  End Time: 10:27 AM     Medication Therapy Recommendations  Parkinson disease (H)   1 Current Medication: amantadine HCl 100 MG TABS   Current Medication Sig: Take half-tablet (50 mg) by mouth 3 times daily   Rationale: Dose too low - Dosage too low - Effectiveness   Recommendation: Increase Frequency   Status: Accepted per CPA   Identified Date: 11/13/2024 Completed Date: 11/13/2024

## 2024-11-13 NOTE — PATIENT INSTRUCTIONS
"Recommendations from today's MTM visit:                                                      Take midodrine with the first 3 doses of carbidopa-levodopa during the day. Midodrine will help off-set the low blood pressure effect  of carbidopa-levodopa and amantadine.   We will try increasing the amantadine to 3 doses/day and you may take this with carbidopa-levodopa for first 3 doses of the day as indicated below. If this makes you feel too dizzy, we can go back to 2 doses of amantadine per day.      1st dose (around 8-9 am) 2nd dose (around 12-1 pm)  3rd dose (around 4-5 pm) 4th dose (late evening) 5th dose (middle of the night)   Carbidopa-levodopa   mg  2 2 2 2 2   Amantadine 100 mg  0.5 0.5 0.5     Midodrine 5 mg  1.5 1.5 1.5         Follow-up: 1/14/25 at 10:30 am video visit    It was great speaking with you today.  I value your experience and would be very thankful for your time in providing feedback in our clinic survey. In the next few days, you may receive an email or text message from [employer] with a link to a survey related to your  clinical pharmacist.\"     To schedule another MTM appointment, please call the clinic directly or you may call the MTM scheduling line at [phone].    My Clinical Pharmacist's contact information:                                                      Please feel free to contact me with any questions or concerns you have.      Wendy Caro, Pharm.D.  Medication Therapy Management Pharmacist  [facility]     "

## 2025-01-03 ENCOUNTER — TRANSFERRED RECORDS (OUTPATIENT)
Dept: HEALTH INFORMATION MANAGEMENT | Facility: CLINIC | Age: 65
End: 2025-01-03
Payer: COMMERCIAL

## 2025-01-14 ENCOUNTER — TELEPHONE (OUTPATIENT)
Dept: NEUROLOGY | Facility: CLINIC | Age: 65
End: 2025-01-14
Payer: COMMERCIAL

## 2025-01-14 ENCOUNTER — VIRTUAL VISIT (OUTPATIENT)
Dept: PHARMACY | Facility: CLINIC | Age: 65
End: 2025-01-14
Attending: PSYCHIATRY & NEUROLOGY
Payer: COMMERCIAL

## 2025-01-14 DIAGNOSIS — I95.1 ORTHOSTATIC HYPOTENSION: ICD-10-CM

## 2025-01-14 DIAGNOSIS — G20.B1 PARKINSON'S DISEASE WITH DYSKINESIA WITHOUT FLUCTUATING MANIFESTATIONS (H): Primary | ICD-10-CM

## 2025-01-14 RX ORDER — ALBUTEROL SULFATE 90 UG/1
2 INHALANT RESPIRATORY (INHALATION) EVERY 4 HOURS PRN
COMMUNITY
Start: 2024-12-31

## 2025-01-14 NOTE — PATIENT INSTRUCTIONS
"Recommendations from today's MTM visit:                                                      We talked about timing your carbidopa-levodopa with your amantadine and midodrine as indicated below. This will help your medications work better together and minimize how many times per day you are taking medication doses. We also increased your amantadine to 3 doses per day.       1st dose (around 8-9 am) 2nd dose (around 12-1 pm)  3rd dose (around 4-5 pm) 4th dose (late evening) 5th dose (middle of the night)   Carbidopa-  levodopa   mg  2 2 2 2 2   Amantadine 100 mg  0.5 0.5 0.5       Midodrine 5 mg  1.5 1.5 1.5         Here are some ideas of pill box organizers you could check out at [employer]:     1) https://www.[employer]/Pill-Large-Weekly-Pill-Organizer/dp/[email or web address]    2) https://www.[employer]/Organizer-Medication-Schedule-Lifetime-Subscription/dp/C4QIK6CBKQ/ref=sr_1_5?crid=[email or web address]&luz=wzJ8RrzxBRZ1.cIoWwUCrzHJRfiG6Z5S6umbf9snvG9nvqI8lDbcDRB0b7YMKKs29_nA4uUFMPbIx7rqKsI-1DYb0iDSIY2CeIpKTgeKdzYe7HSHoBqBQFq9Unu2yjhsxIDAuzxmTSA9y7YZzsGI8jUkF8NULfwYcdYMMsRW3xGISNXc3o4XRQJQL5DA3-OvPxT68PcqOwYi7kn89xX-u5Oy1iC1d3bJPYv5Rr5X9v658Gwy_QOQHhPxErF4HMnH3YMq493ffz3muMpvhtMrViUx49NgxS2hK5-2fxfvRGhr0g00s2H-DoebBfAEwh6vZL5wKVLYeRFRtog8ugBgjaW0_AyRrCeZIO9IP0thg9wT9PA7YvoesOU0.AKhM6oc71uydyjvkQc6qv7J8Lr5DtHxiGavaEOHcqeW&dib_tag=se&keywords=pill+box+5+times+daily&jqp=[id number]&s=hpc&sprefix=pill+box+5+times+daily%2Chpc%2C130&sr=1-5    Follow-up: 2/11/25 at 11:30 am video visit    It was great speaking with you today.  I value your experience and would be very thankful for your time in providing feedback in our clinic survey. In the next few days, you may receive an email or text message from [employer] with a link to a survey related to your  clinical pharmacist.\"     To schedule another MTM appointment, please call the clinic directly or you may call the MTM scheduling line at [phone].    My Clinical Pharmacist's contact " information:                                                      Please feel free to contact me with any questions or concerns you have.      Wendy Caro, Pharm.D.  Medication Therapy Management Pharmacist  [facility]

## 2025-01-14 NOTE — TELEPHONE ENCOUNTER
The patient called in and stated she thew out her back cleaning up for guests at home, so she will need to reschedule her neuropsychological evaluation on 1/16/25 at 12:30 PM with Dr. Lunsford. The patient stated she is not able to sit for long before her back starts to hurt. The patient stated she is planning on seeing her chiropractor. The patient was rescheduled to 2/7/25 at 12:15 PM for a video visit and in-person testing on 2/13/25 at 8 AM. The patient had no further questions at this time.

## 2025-01-14 NOTE — PROGRESS NOTES
Medication Therapy Management (MTM) Encounter    ASSESSMENT:                            Medication Adherence/Access: patient would benefit from aligning her medication times and using a different pill box     Parkinson's Disease/orthostatic hypotension:   Patient would benefit from trying the new medication regimen we came up with last time, including an increase in amantadine to 3 doses/day to help with dyskinesias.      PLAN:                            We talked about timing your carbidopa-levodopa with your amantadine and midodrine as indicated below. This will help your medications work better together and minimize how many times per day you are taking medication doses. We also increased your amantadine to 3 doses per day.       1st dose (around 8-9 am) 2nd dose (around 12-1 pm)  3rd dose (around 4-5 pm) 4th dose (late evening) 5th dose (middle of the night)   Carbidopa-  levodopa   mg  2 2 2 2 2   Amantadine 100 mg  0.5 0.5 0.5       Midodrine 5 mg  1.5 1.5 1.5         Here are some ideas of pill box organizers you could check out at [employer]:     1) https://www.[employer]/Pill-Large-Weekly-Pill-Organizer/dp/[email or web address]    2) https://www.[employer]/Organizer-Medication-Schedule-Lifetime-Subscription/dp/C8VRX2ATGH/ref=sr_1_5?crid=[email or web address]&luz=twE6CkdeWNF8.xVuQeFVgzXARxtK7H2V1emba8eekU8wvgK7eQlqZRU4g4MBJHo95_bB0oVPPXeXq9reYhT-9KFo4pIWTH9WaZxCCuxExeTn1GMOmFsATOa9Dia3thpwcFDVhicaXVH9f2USewJN2iXtN7BGWrfRmfWNLuEO7kWZAVVy7d7HVVUXM3SF4-SyWuY43MdvJfTp2fr53uZ-u5Oy1iC1d3bJPYv5Rr5X9v658Gwy_QOQHhPxErF4HMnH3YMq493ffz3muMpvhtMrViUx49NgxS2hK5-2fxfvRGhr0g00s2H-DoebBfAEwh6vZL5wKVLYeRFRtog8ugBgjaW0_AyRrCeZIO9IP0thg9wT9PA7YvoesOU0.KMiM4tw39zjmotlvMo1rq3U2Pi2YoGhgPifhSQRqisQ&dib_tag=se&keywords=pill+box+5+times+daily&ank=[id number]&s=hpc&sprefix=pill+box+5+times+daily%2Chpc%2C130&sr=1-5    Follow-up: 2/11/25 at 11:30 am video visit    SUBJECTIVE/OBJECTIVE:                          Alina Zavala is a 64 year old female seen  for a follow-up visit. Patient was accompanied by .      Reason for visit: follow up on medications.    Allergies/ADRs: Reviewed in chart  Past Medical History: Reviewed in chart  Tobacco: She reports that she has never smoked. She has never used smokeless tobacco.  Alcohol: not currently using    Medication Adherence/Access: reports she is still taking carbidopa-levodopa 4 hours apart. She is using multiple pill boxes and her  helps organize her medications other than carbidopa-levodopa. Would be open to using a different pill box.      Parkinson's Disease/orthostatic hypotension :    - carbidopa-levodopa  mg, 2 tablets about every 4 hours (typically 5 doses/day)   - amantadine 50 mg twice daily   - Rivastigmine 13.3 mg daily   - Midodrine 7.5 mg at 9 am, 1 pm, 5 pm  - propranolol 5 mg every morning    Patient reports that she did not try the new medication regimen we came up with last time to align her carbidopa-levodopa doses with midodrine doses. She states she forgot we discussed this. She also did not increase amantadine as we had discussed last time.  Patient states she is trying to take carbidopa-levodopa every 4 hours scheduled but often will take doses late. During the night if she is 1 hour late to take carbidopa-levodopa she can tell because her legs will start cramping.  She uses tizanidine as needed for severe muscle cramping but states it makes her very sleepy so she takes it sparingly. She is planning to have a MRI of her lower back and hips and hoping to start physical therapy. She is aware exercise may help with some of the pain she is dealing with.  Patient states blood pressure has been better overall but she did have a recent hypotensive episode at her parent's house after eating (temperature was warm too): 79/44        medication regimen we discussed last visit but patient hasn't tried:     1st dose (around 8-9 am) 2nd dose (around 12-1 pm)  3rd dose (around 4-5 pm) 4th dose  (late evening) 5th dose (middle of the night)   Carbidopa-  levodopa   mg  2 2 2 2 2   Amantadine 100 mg  0.5 0.5 0.5       Midodrine 5 mg  1.5 1.5 1.5           Today's Vitals: There were no vitals taken for this visit.  ----------------    I spent 18 minutes with this patient today. All changes were made via collaborative practice agreement with Dr. Hearn.     A summary of these recommendations was sent via [employer].    Wendy Caro, Pharm.D.  Medication Therapy Management Pharmacist  [facility]    Telemedicine Visit Details  The patient's medications can be safely assessed via a telemedicine encounter.  Type of service:  Video Conference via [employer]  Originating Location (pt. Location): Home    Distant Location (provider location):  Off-site  Start Time:  10:30 AM  End Time: 10:48 AM     Medication Therapy Recommendations  Parkinson disease (H)   1 Current Medication: amantadine HCl 100 MG TABS   Current Medication Sig: Take half-tablet (50 mg) by mouth 3 times daily   Rationale: Does not understand instructions - Adherence - Adherence   Recommendation: Provide Education   Status: Patient Agreed - Adherence/Education   Identified Date: 2025 Completed Date: 2025         2 Current Medication: carbidopa-levodopa (SINEMET)  MG tablet   Current Medication Si -2.5 tabs by mouth every 4-5 hours = 12/day   Rationale: Patient forgets to take - Adherence - Adherence   Recommendation: Provide Adherence Intervention   Status: Patient Agreed - Adherence/Education   Identified Date: 2025 Completed Date: 2025

## 2025-02-04 NOTE — PROGRESS NOTES
"SUBJECTIVE/OBJECTIVE:                           Alina Zavala is a 58 year old female called for an initial visit for Medication Therapy Management.  She was referred to me from Dr. Hearn. , Jusitn, was also present for the visit today.     Chief Complaint: Initial MTM visit     Allergies/ADRs: Reviewed in Epic  Tobacco: No tobacco use  Alcohol: none  Caffeine: very little  Activity: not discussed  PMH: Reviewed in Epic    Medication Adherence/Access:  Patient takes medications directly from bottles.  Patient takes medications 2 time(s) per day.   Per patient, misses medication 0 times per week.   Medication barriers: none.     Parkinsonism: Not currently taking any medications.  Patient states that she had the dopamine ([employer]) done which showed that it was likely she has parkinsonism.  States that she is probably going to be doing [employer] therapy but is not interested in taking any medications at this time.    Cognitive impairment: Currently taking donepezil 5 mg daily in the morning.  This medication was started last week by movement disorder specialist.  Patient states that she was confused about whether she should be taking it in the morning or the evening.  A nurse called and told her to take it in the morning and she would like this to be clarified with Dr. Hearn/Dr. Landaverde. Patient has had neuropsych testing done.  The first few days of being on donepezil she states that she is \"maybe a little dizzy and spaced\" but otherwise denies side effects.    Anxiety/depression: Currently taking bupropion  mg twice daily at 8-9 am and 9 pm.  Patient states that this medication was started after she had a miscarriage in April 2003.  States that she does not know what the effects of the medication are any longer.  She continues to feel down, depressed, and quite anxious.  States that the anxiety has worsened over the past 6 months.  She denies being on any other antidepressants in the past.  States that " "her sleep varies but sometimes she falls asleep very fast and other times she is awake for hours before falling asleep.  Patient states that she is a \"Night owl\" most nights.    Trigeminal neuralgia: Currently taking 100 mg of gabapentin in the morning and 300 mg at night.  States this medication is \"very necessary.\" When she had Lyme disease a few years ago she couldn't talk because the trigeminal neuralgia \"acted up\" but this resolved over time. it acted up. Resolved over time.     Hypertension: Current medications include hydrochlorothiazide 12. 5 mg daily.  States that this was recently switched from lisinopril.  Denies monitoring blood pressure or any side effects.  BP Readings from Last 3 Encounters:   02/12/19 116/73     Asthma: Current asthma medications: Asthmanex 1 puff once nightly; not much albuterol needed (only a few times over the winter). States she covers herself up when she goes outside - cold, heat and humidity are triggers.     Back pain: Currently taking acetaminophen 1000 mg twice daily.  She also takes Cyclobenzaprine 10 mg PRN; states she can go months without taking it though.    Osteopenia: Current therapy includes: calcium carbonate 600 mg/Vitamin D 400 units once daily and Vitamin D supplement 50 mcg daily. She also takes magnesium 250 mg with the calcium. Patient does experience some constipation.   Last vitamin D level: unknown  DEXA History: April 2018, lowest T score was -1.2  Risk factors: post-menopausal    Supplements/vitamins:     L-tyrosine 1000 mg for thinking/cognition, has been on for a month and no changes in symptoms    CoQ10 100 mg for cholesterol     Vitamin C 500 mg for general health    B6 100 mg for unknown reason    ASSESSMENT:                             Current medications were reviewed today.     Medication Adherence: excellent, no issues identified    Parkinsonism: Stable.  Patient not interested in taking a medication at this time.    Cognitive impairment: " [9] : 9 Improving.  Patient would benefit from longer trial on donepezil.  Will follow up with the movement disorders team regarding dosing donepezil in the morning or the evening.    Anxiety/depression: Needs improvement.  Encourage patient to take the second dose of bupropion earlier in the evening to see if this will interfere less with her sleep.  Could consider an alternate antidepressant as discussed with Dr. Landaverde, at next visit.     Trigeminal neuralgia: Stable.     Hypertension: Stable.     Asthma: Stable.     Back pain: Stable.     Osteopenia: Needs improvement.  Patient may benefit from taking a second dose of calcium and magnesium for bone health. Magnesium may off-set the constipating effects of calcium.     Supplements/vitamins: Needs improvement.  Patient may benefit from discontinuing vitamin C, co-Q10, and vitamin B6 as these do not have significant medical indications at this time and patient would like to reduce medications. Hasn't been on L-tyrosine long enough to assess the efficacy.    PLAN:                            1. Will discuss with Dr. Landaverde/Dr. Hearn regarding appropriate timing of donepezil; I would recommend evening.  Addendum 2/19/19: Dr. Hearn agreed to move donepezil to bedtime. Called patient to inform her of this.  2. Patient to move up second dose of bupropion to dinnertime.  3. Educated patient to add a second dose of calcium/magnesium at dinner.   4. Stop the following supplements: Vitamin C, CoQ10, Vitamin B6    I spent 50 minutes with this patient today. I offer these suggestions for consideration by Dr. Hearn/Dr. Landaverde. A copy of the visit note was provided to the patient's referring provider.    Will follow up in one year or sooner if needed.    The patient was mailed a summary of these recommendations as an after visit summary.     Chart documentation was completed in part with Dragon voice-recognition software. Even though reviewed, some grammatical, spelling, and word errors  [7] : 7 [Dull/Aching] : dull/aching may remain.    Wendy Cárdenas, Pharm.D.  Medication Therapy Management Pharmacist  Phone: [phone]   [Leisure] : leisure [Meds] : meds [Standing] : standing [Walking] : walking [de-identified] : 02/04/2025: Patient here for follow up of right hand. States that pain is about the same since she first came for this issue. Previous injection only offered ~ 1 week of relief.   01/07/2025: Patient is here for MRI results for her Rt hand. Pt has noted minimal pain relief s/p medrol dose pack and bracing.  MRI wrist 12/26/2025 Images reviewed by me today. My independent interpretation of this test diffuse carpal patchy marrow edema with mild to moderate wrist effusion (possible underlying inflammatory process). There is no acute fracture or ligament tear. mild FCR tendinitis was noted.    12/17/24: persistent pain in hands/wrists persists- has a small mass volar surface of right wrist. also with pain in the right thumb and index fingers. naproxen not helping  11/26/24: RHD 49 yo pt is here for pain in her Rt hand. Pt states that she has been feeling pain for about 2 weeks. States that her fingers are stiff and pain in her hand. Pain is worse in the morning. Denies numbness/ tingling. Denies trauma.  RHD  PHMx: HTN, HLD [] : no [FreeTextEntry1] : Rt hand [FreeTextEntry7] : Rt wrist [FreeTextEntry9] : Ibuprofen

## 2025-02-11 ENCOUNTER — VIRTUAL VISIT (OUTPATIENT)
Dept: PHARMACY | Facility: CLINIC | Age: 65
End: 2025-02-11
Attending: PSYCHIATRY & NEUROLOGY
Payer: COMMERCIAL

## 2025-02-11 DIAGNOSIS — G20.B1 PARKINSON'S DISEASE WITH DYSKINESIA WITHOUT FLUCTUATING MANIFESTATIONS (H): Primary | ICD-10-CM

## 2025-02-11 DIAGNOSIS — I95.1 ORTHOSTATIC HYPOTENSION: ICD-10-CM

## 2025-02-11 NOTE — PROGRESS NOTES
Medication Therapy Management (MTM) Encounter    ASSESSMENT:                            Medication Adherence/Access: No issues identified.    Parkinson's Disease/orthostatic hypotension:   Patient would benefit from taking her medications on schedule as we've previously discussed. It is difficult to assess if her dyskinesias correlate with medication times so we are not going to change her dosing at this time.   Regarding the muscle relaxant, we discussed that tizanidine can cause orthostatic hypotension and drowsiness so alternative options could including baclofen or methocarbamol and she may discuss these with her PCP if she'd like to try other options.       PLAN:                            Try to take your medications on time to help prevent muscle cramping and dyskinesias   We discussed that tizanidine can cause drowsiness and low blood pressure so you could discuss with Dr. Melendrez about some alternative muscle relaxants such as baclofen or methocarbamol       1st dose (around 8-9 am) 2nd dose (around 12-1 pm)  3rd dose (around 4-5 pm) 4th dose (late evening) 5th dose (middle of the night)   Carbidopa-  levodopa   mg  2 2 2 2 2   Amantadine 100 mg  0.5 0.5 0.5       Midodrine 5 mg  1.5 1.5 1.5           Follow-up: 3/25/25 at 11 am video visit    SUBJECTIVE/OBJECTIVE:                          Alina Zavala is a 64 year old female seen for a follow-up visit.       Reason for visit: follow up on medications.    Allergies/ADRs: Reviewed in chart  Past Medical History: Reviewed in chart  Tobacco: She reports that she has never smoked. She has never used smokeless tobacco.  Alcohol: not currently using    Medication Adherence/Access: no issues reported.    Parkinson's Disease/orthostatic hypotension:    - Medication regimen listed below    She has been trying to take her medications on a more strict scheduled but states that this is still a challenge for her. Today she started taking her medications at 9:50  "am. At night she can get into a deep sleep and will sometimes be late to take the overnight dose of medications. She is now consistently taking midodrine with carbidopa-levodopa. Patient reports she is avoiding laying down after taking midodrine to prevent high blood pressure. Blood pressure can vary- low systolics in the 70s, high systolics in the 170s. But she states dizziness has been less of an issue lately so she hasn't been monitoring blood pressure as often.     Since adding a 3rd dose of amantadine, she is not noticing improvement in her dyskinesias. She has hd more dyskinesias the last few months and cannot tell if it's a wearing off effect or peak-dose effect. She states her dyskinesias feel \"constant.\" She did not notice more dizziness with increased dose of amantadine.     She has noticed decreased appetite over the last several months. Wonders if rivastigmine could be contributing. She states that she has lost some weight, maybe 35 pounds over the last several years. She realizes once she is eating that she is actually hungry. She states that her medication schedule can make it difficult to eat meals and her life is busy so it's hard to stay on schedule. They bought some supplemental protein drinks but she hasn't started trying these yet. Also notes she has had some hair loss in recent months and wondering if this is a medication side effect.     Patient states she takes tizanidine occasionally at night for leg cramping. She can get leg cramping when carbidopa-levodopa wears off and usually she will take a dose of carbidopa-levodopa at that time but if she isn't due for carbidopa-levodopa may take tizanidine instead. She avoids taking tizanidine during the day as it makes her very sleepy.  She is planning to have a MRI for her back pain but this has been done yet.    She has neuropsych evaluation on Thursday.       1st dose (around 8-9 am) 2nd dose (around 12-1 pm)  3rd dose (around 4-5 pm) 4th dose " (late evening) 5th dose (middle of the night)   Carbidopa-  levodopa   mg  2 2 2 2 2   Amantadine 100 mg  0.5 0.5 0.5       Midodrine 5 mg  1.5 1.5 1.5           Today's Vitals: There were no vitals taken for this visit.  ----------------      I spent 24 minutes with this patient today. All changes were made via collaborative practice agreement with Dr. Hearn.     A summary of these recommendations was sent via [employer].    Wendy Caro, Pharm.D.  Medication Therapy Management Pharmacist  [facility]    Telemedicine Visit Details  The patient's medications can be safely assessed via a telemedicine encounter.  Type of service:  Telephone visit  Originating Location (pt. Location): Home    Distant Location (provider location):  Off-site  Start Time:  11:32 AM  End Time: 11:56 AM     Medication Therapy Recommendations  No medication therapy recommendations to display

## 2025-02-11 NOTE — PATIENT INSTRUCTIONS
"Recommendations from today's MTM visit:                                                      Try to take your medications on time to help prevent muscle cramping and dyskinesias   We discussed that tizanidine can cause drowsiness and low blood pressure so you could discuss with Dr. Melendrez about some alternative muscle relaxants such as baclofen or methocarbamol       1st dose (around 8-9 am) 2nd dose (around 12-1 pm)  3rd dose (around 4-5 pm) 4th dose (late evening) 5th dose (middle of the night)   Carbidopa-  levodopa   mg  2 2 2 2 2   Amantadine 100 mg  0.5 0.5 0.5       Midodrine 5 mg  1.5 1.5 1.5           Follow-up: 3/25/25 at 11 am video visit    It was great speaking with you today.  I value your experience and would be very thankful for your time in providing feedback in our clinic survey. In the next few days, you may receive an email or text message from [employer] with a link to a survey related to your  clinical pharmacist.\"     To schedule another MTM appointment, please call the clinic directly or you may call the MTM scheduling line at [phone].    My Clinical Pharmacist's contact information:                                                      Please feel free to contact me with any questions or concerns you have.      Wendy Caro, Pharm.D.  Medication Therapy Management Pharmacist  [facility]     "

## 2025-02-13 ENCOUNTER — OFFICE VISIT (OUTPATIENT)
Dept: NEUROPSYCHOLOGY | Facility: CLINIC | Age: 65
End: 2025-02-13
Attending: PSYCHIATRY & NEUROLOGY
Payer: COMMERCIAL

## 2025-02-13 DIAGNOSIS — G20.B1 PARKINSON'S DISEASE WITH DYSKINESIA WITHOUT FLUCTUATING MANIFESTATIONS (H): Primary | ICD-10-CM

## 2025-02-13 DIAGNOSIS — G31.84 MILD COGNITIVE IMPAIRMENT: ICD-10-CM

## 2025-02-13 ASSESSMENT — SLEEP AND FATIGUE QUESTIONNAIRES
HOW LIKELY ARE YOU TO NOD OFF OR FALL ASLEEP WHEN YOU ARE A PASSENGER IN A CAR FOR AN HOUR WITHOUT A BREAK: WOULD NEVER DOZE
HOW LIKELY ARE YOU TO NOD OFF OR FALL ASLEEP WHILE LYING DOWN TO REST IN THE AFTERNOON WHEN CIRCUMSTANCES PERMIT: SLIGHT CHANCE OF DOZING
HOW LIKELY ARE YOU TO NOD OFF OR FALL ASLEEP WHILE WATCHING TV: SLIGHT CHANCE OF DOZING
HOW LIKELY ARE YOU TO NOD OFF OR FALL ASLEEP WHILE SITTING QUIETLY AFTER LUNCH WITHOUT ALCOHOL: SLIGHT CHANCE OF DOZING
HOW LIKELY ARE YOU TO NOD OFF OR FALL ASLEEP IN A CAR, WHILE STOPPED FOR A FEW MINUTES IN TRAFFIC: WOULD NEVER DOZE
HOW LIKELY ARE YOU TO NOD OFF OR FALL ASLEEP WHILE SITTING AND TALKING TO SOMEONE: SLIGHT CHANCE OF DOZING
HOW LIKELY ARE YOU TO NOD OFF OR FALL ASLEEP WHILE SITTING INACTIVE IN A PUBLIC PLACE: WOULD NEVER DOZE
HOW LIKELY ARE YOU TO NOD OFF OR FALL ASLEEP WHILE SITTING AND READING: SLIGHT CHANCE OF DOZING

## 2025-02-13 NOTE — LETTER
2/13/2025      RE: Alina Zavala  1971 [facility] [address]     NEUROPSYCHOLOGICAL EVALUATION    RELEVANT HISTORY AND REASON FOR REFERRAL    Alina Zavala is a 64 year old left handed woman with 16 years of formal education. Information was obtained via interview with the patient and review of her medical records, including a prior neuropsychological evaluation. Records indicate a history of Parkinson's disease diagnosed in 2019. She has had cognitive changes. This neuropsychological evaluation was undertaken at the request of Ishan Hearn M.D.,  for further characterization of any cognitive difficulties and to evaluate mood.    Ms. Zavala first underwent a neuropsychological evaluation under the direction of Sakshi Perez, PhD, LP, ABPP on 5/9/2022. Full details regarding her history and the findings of the evaluation are in the report from that date. Briefly, results indicated impairments in nonverbal abilities and executive functioning.  Dr. Perez noted possible Lewy Body Disease, and diagnosed a Mild Neurocognitive Disorder.     The interview portion of this evaluation was conducted remotely via video 2/7/2025.  She was seen in the clinic today to complete the testing portion of the evaluation.    CLINICAL INTERVIEW FINDINGS    Upon interview, Ms. Zavala stated that she has had some progression in cognitive decline since her 2022 evaluation.  It takes her longer to remember names, as well as specific words.  If she reads several articles, she cannot recall any of them, unless she gets a reminder word in which case she can remember the details.  She has to make more of a conscious effort to remember where she has put things.  She may defer decisions to her  more than she used to.    Ms. Zavala lives with her , who has managed their finances for several years.  She manages her own medications, although her  has made a color-coded chart for her.  She sets an alarm on  "her watch, but still occasionally forgets to take the medication until later if she is outside of her normal routine.  If she is late with her Parkinson's medications she experiences cramping and pain in the back of her legs, knees, and thighs, and she is more shaky.  She stopped driving a couple of years ago because of concerns about safety.  She cooks some, but has physical pain with cooking and lifting so she is somewhat limited.  She once forgot to turn off the stove. She handles her personal cares independently.    Ms. Zavala has a history of depression and anxiety.  She has been prescribed bupropion and Zoloft for many years.  She is meeting weekly with her counselor.  When asked to describe her mood, she stated that she feels things more intensely than she used to.  She is more sensitive.  Her feelings are hurt more easily.  She sometimes feels down, and she has some anxiety, in part about health issues of family members.  She and her siblings provide some care to their elderly parents.  Her interest level is good.  Her daughter recently had twins, and she enjoys spending time with them.  Her appetite varies but is generally lower.  She has lost 35 pounds since she was diagnosed with Parkinson's disease, unintentionally.  At night, she needs to wake up every 4 hours to take carbidopa levodopa.  She sleeps a total of 8 hours.  She has intense dreams, which she acts out.  She will occasionally nap for an hour during the day, which allows her to keep going throughout the day although it is not necessarily refreshing.  She has significant fatigue.  She exercises, doing a Rise program at [facility] once a week, and then doing the exercises at home.  She denied suicidal ideation or any history of suicide attempts.  She denied visual hallucinations, stating that if her eyes are tired she can get \"white orbs\" in her peripheral vision.  She has also found that when reading it is harder for her to move from one " line to the next.  Her vision was recently checked and has not changed.  She denied auditory hallucinations.    Ms. Zavala stated that she stopped drinking alcohol at the age of 25.  She denied illicit drug use or tobacco use.    Ms. Zavala completed Bachelor's degrees in library science and theology.  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  She was  briefly in the past and had 2 sons, and then  her current  in 2001, who had a [id number] daughter who she raised.    Ms. Zavala denied any history of seizure, stroke, or head injury resulting in loss of consciousness.  Her balance has generally been good.  She fell last week when she turned on snow and ice, but otherwise has not been falling.  She has not had unilateral weakness or numbness.  Pain is the most bothersome symptom of her Parkinson's disease currently.  She has more tremor now and wonders whether she needs to increase her medications.  The tremor has increased particularly in the last couple of months.  It seems to be about equal on both sides.  It is hard for her to write her name now, a change over the last two years.  The size of her handwriting is the same.  She experiences lower back pain.    Past Medical History  Past Medical History:   Diagnosis Date    Anxiety     Depression     Depressive disorder     Family history of muscular dystrophy 02/22/2024    Hyperlipidemia     Hypertension     Lyme disease     Trigeminal neuralgia      Current Medications    She has a current medication list which includes the following prescription(s): acetaminophen, albuterol, amantadine hcl, vitamin b complex w/vitamin c, bupropion, calcium carbonate, carbidopa-levodopa, cholecalciferol, co-enzyme q-10, gabapentin, gabapentin, midodrine, probiotic, propranolol, rivastigmine, sertraline, sodium fluoride dental gel, tizanidine, and vitamin c.    Family History  Family History   Problem Relation Age of Onset  "   Tremor Mother 67        \"ESSENTIAL\" LONG STANDING    Polymyalgia rheumatica Mother     Arthritis Mother     Hypertension Mother     Cerebrovascular Disease Father     Hypertension Father     Heart Disease Father     Tremor Maternal Grandmother     Lung Cancer Maternal Grandmother     Coronary Artery Disease Maternal Grandfather     Cardiac Sudden Death Maternal Grandfather     Cerebrovascular Disease Paternal Grandmother     Dementia Paternal Grandfather     Muscular Dystrophy Maternal Aunt     Heart Disease Maternal Aunt     Lupus Maternal Aunt     Neurologic Disorder Maternal Aunt         WHEELCHAIR    Parkinsonism Maternal Aunt         SMALL HANDWRITING    Seizure Disorder Maternal Aunt     Tremor Maternal Aunt     Rashes/Skin Problems Maternal Aunt         lupus    Muscular Dystrophy Maternal Uncle         ADULT ONSET    Muscular Dystrophy Maternal Uncle         ADULT ONSET    Muscular Dystrophy Maternal Uncle         ADULT ONSET    Muscular Disorder Maternal Cousin          YOUNG - YOUNG ONSET, dIAGNOSED AS A CHILD    Cancer Maternal Cousin     Muscular Dystrophy Other     Parkinsonism Other      BEHAVIORAL OBSERVATIONS    During the evaluation, Ms. Zavala was pleasant, cooperative, and seemed to understand the instructions. She was alert and oriented to person, place, and time. Behaviorally, she was somewhat impulsive, sometimes starting tasks before the instructions were complete. Tremors were observed clinically in both hands, as well as dyskinesia in her upper body. Gait was rapid. At one point while she was waiting for the technician, she took four laps around the clinic area. Mood was neutral although she reported feeling anxious. Speech was fluent, with normal articulation, volume, and rate. She presented her thoughts in a clear, logical manner. Internal performance validity measures fell within normal limits. The results are believed to accurately reflect her current level of " functioning.    MEASURES ADMINISTERED    The following measures were administered by a trained psychometrist, under the direct supervision of a licensed psychologist.    Subtests of the Wechsler Adult Intelligence Scale - 4; Reading subtest of the Wide Range Achievement Test - 4; subtests of the Wechsler Memory Scale - 4; Nava Verbal Learning Test - Revised; Americo-Osterrieth Complex Figure; Alicia Naming Test; Controlled Oral Word Association Test; Semantic Fluency; Clock Drawing; Trail Making Test; Stroop; Arce Judgment of Line Orientation; [place] Card Sorting Test; Dementia Rating Scale - 2 (DRS-2) Alternate Form; Alva Depression Inventory - 2 (BDI-2); Apathy Scale.    RESULTS AND INTERPRETATION    Overall intellectual functioning was estimated to fall in the average range, consistent with premorbid estimates based on single word reading abilities.  Performance on a screening measure of dementia was average (DRS-2 Total Score = 139/144).    Confrontation naming was average for her age and level of education.  Ability to comprehend and articulate responses to complex social situations was above average.  Letter fluency was high average.  Generative naming to category was high average.    Attention span was average for age.  Divided attention was below average.  Performance on a measure of distractibility was average for her age and level of education.  Psychomotor processing speed was low average.    Basic visual perception, including matching lines and angles, was below average for her age and level of education.  Construction of a clock was notable for difficulty with planning and conceptualization.  It took her 3 attempts to draw the clock to command and to include both hands.  Construction of a complex design was exceptionally low, and was characterized by difficulty with planning and organization, and some distortions in the details of the design and perseverative placement of details.  Assembly of  visual material was below average.  Nonverbal deductive reasoning was average.    Novel problem-solving, including the ability to generate strategies and solutions, fell within normal limits for her age and level of education.    Immediate recall of verbal narrative material was high average, with high average recall following a 30-minute delay.  On a multiple trial list learning task, immediate recall was average, with average recall following a 25-minute delay.  Immediate recall of visual material was average, with high average recall following a 25-minute delay.    On the BDI-2, self-report questionnaire, she endorsed mild depressive symptomatology.  She did not endorse significant apathy on a scale.    IMPRESSIONS AND RECOMMENDATIONS    Alina Zavala is a 64 year old woman with a history of Parkinson's disease, diagnosed in 2019. A neuropsychological evaluation in 2022 was notable for impairments in spatial skills and executive functioning, with emerging weaknesses in speeded processing. She was noted to have Mild Neurocognitive Disorder with Lewy Bodies, as well as Unspecified Anxiety Disorder.     Results of today's evaluation indicate impairments in visual processing, including visual-spatial abilities and visual construction.  There is evidence of executive dysfunction including difficulty with planning and organization.  Psychomotor processing speed is relatively slowed. Learning and memory, language, and basic attention fall within normal limits.  She endorses mild depressive symptomatology.    Compared to her evaluation in 2022, while the overall pattern of performance is quite similar with impairments in visual processing and executive functioning, she has had improvements across a number of measures, including memory, some aspects of executive functioning, fluency, and visual processing. There were no significant declines compared to 2022.    While there have been some changes, she continues to manage  her instrumental activities of daily living largely independently. The findings appear to reflect a non-amnestic, multidomain Mild Cognitive Impairment (MCI). Improvements in cognition over time, with no evidence of decline, argues against the presence of dementia with Lewy Bodies, although the pattern of performance is consistent with her history of Parkinson's disease. Mild depressive symptomatology, anxiety, and nonrestorative sleep may exacerbate her cognitive difficulties, but do not appear to be the sole contributors.     In terms of daily functioning, Ms. Zavala may find that she has difficulty managing large, complex tasks. Others may assist by breaking down such tasks into smaller, more manageable parts. She will likely benefit from structure and routine. She stopped driving a couple of years ago. She is endorsing mild symptoms of depression, and may benefit from a review of her medications. She is also meeting weekly with her counselor.     Information regarding local resources related to Parkinson's disease can be found here:    American Parkinson's Disease Association - [place] Chapter  [email or web address]/community/[place]/local-resources-support/     Parkinson's Foundation  [email or web address]    Additional resources can be found here:    APDA Resource Library  [email or web address]/resources    APDA Listing of Events  [email or web address]    It may be helpful to continue to follow Ms. Zavala over time. The evaluation may be repeated in the future for comparison, should a change in mental status occur.      Luz Lunsford, Ph.D., ABPP  Licensed Psychologist, [place] [id number]  Board Certified in Clinical Neuropsychology      Time spent: One unit of professional time, including interview (CPT 72821). 60 minutes (1 unit) neuropsychological testing evaluation by licensed and board-certified neuropsychologist,  including integration of patient data, interpretation of standardized test results and clinical data, clinical decision-making, treatment planning, report, and interactive feedback to the patient, first hour (CPT 78777). 97 minutes (2 units) of neuropsychological testing evaluation by licensed and board-certified neuropsychologist, including integration of patient data, interpretation of standardized test results and clinical data, clinical decision-making, treatment planning, report, and interactive feedback to the patient, subsequent hours (CPT 03708). 30 minutes of neuropsychological test administration and scoring by technician, first 30 minutes (CPT 95680). 190 additional minutes (6 units) neuropsychological test administration and scoring by technician, subsequent 30 minutes (CPT 54021). ICD-10 Diagnoses: G20.B1; G31.84.

## 2025-02-13 NOTE — PROGRESS NOTES
NEUROPSYCHOLOGICAL EVALUATION    RELEVANT HISTORY AND REASON FOR REFERRAL    Alina Zavala is a 64 year old left handed woman with 16 years of formal education. Information was obtained via interview with the patient and review of her medical records, including a prior neuropsychological evaluation. Records indicate a history of Parkinson's disease diagnosed in 2019. She has had cognitive changes. This neuropsychological evaluation was undertaken at the request of Ishan Hearn M.D.,  for further characterization of any cognitive difficulties and to evaluate mood.    Ms. Zavala first underwent a neuropsychological evaluation under the direction of Sakshi Perez, PhD, LP, ABPP on 5/9/2022. Full details regarding her history and the findings of the evaluation are in the report from that date. Briefly, results indicated impairments in nonverbal abilities and executive functioning.  Dr. Perez noted possible Lewy Body Disease, and diagnosed a Mild Neurocognitive Disorder.     The interview portion of this evaluation was conducted remotely via video 2/7/2025.  She was seen in the clinic today to complete the testing portion of the evaluation.    CLINICAL INTERVIEW FINDINGS    Upon interview, Ms. Zavala stated that she has had some progression in cognitive decline since her 2022 evaluation.  It takes her longer to remember names, as well as specific words.  If she reads several articles, she cannot recall any of them, unless she gets a reminder word in which case she can remember the details.  She has to make more of a conscious effort to remember where she has put things.  She may defer decisions to her  more than she used to.    Ms. Zavala lives with her , who has managed their finances for several years.  She manages her own medications, although her  has made a color-coded chart for her.  She sets an alarm on her watch, but still occasionally forgets to take the medication until later if she is  "outside of her normal routine.  If she is late with her Parkinson's medications she experiences cramping and pain in the back of her legs, knees, and thighs, and she is more shaky.  She stopped driving a couple of years ago because of concerns about safety.  She cooks some, but has physical pain with cooking and lifting so she is somewhat limited.  She once forgot to turn off the stove. She handles her personal cares independently.    Ms. Zavala has a history of depression and anxiety.  She has been prescribed bupropion and Zoloft for many years.  She is meeting weekly with her counselor.  When asked to describe her mood, she stated that she feels things more intensely than she used to.  She is more sensitive.  Her feelings are hurt more easily.  She sometimes feels down, and she has some anxiety, in part about health issues of family members.  She and her siblings provide some care to their elderly parents.  Her interest level is good.  Her daughter recently had twins, and she enjoys spending time with them.  Her appetite varies but is generally lower.  She has lost 35 pounds since she was diagnosed with Parkinson's disease, unintentionally.  At night, she needs to wake up every 4 hours to take carbidopa levodopa.  She sleeps a total of 8 hours.  She has intense dreams, which she acts out.  She will occasionally nap for an hour during the day, which allows her to keep going throughout the day although it is not necessarily refreshing.  She has significant fatigue.  She exercises, doing a Rise program at [facility] once a week, and then doing the exercises at home.  She denied suicidal ideation or any history of suicide attempts.  She denied visual hallucinations, stating that if her eyes are tired she can get \"white orbs\" in her peripheral vision.  She has also found that when reading it is harder for her to move from one line to the next.  Her vision was recently checked and has not changed.  She denied " "auditory hallucinations.    Ms. Zavala stated that she stopped drinking alcohol at the age of 25.  She denied illicit drug use or tobacco use.    Ms. Zavala completed Bachelor's degrees in library science and theology.  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  She was  briefly in the past and had 2 sons, and then  her current  in 2001, who had a 67-umprc-wnd daughter who she raised.    Ms. Zavala denied any history of seizure, stroke, or head injury resulting in loss of consciousness.  Her balance has generally been good.  She fell last week when she turned on snow and ice, but otherwise has not been falling.  She has not had unilateral weakness or numbness.  Pain is the most bothersome symptom of her Parkinson's disease currently.  She has more tremor now and wonders whether she needs to increase her medications.  The tremor has increased particularly in the last couple of months.  It seems to be about equal on both sides.  It is hard for her to write her name now, a change over the last two years.  The size of her handwriting is the same.  She experiences lower back pain.    Past Medical History  Past Medical History:   Diagnosis Date    Anxiety     Depression     Depressive disorder     Family history of muscular dystrophy 02/22/2024    Hyperlipidemia     Hypertension     Lyme disease     Trigeminal neuralgia      Current Medications    She has a current medication list which includes the following prescription(s): acetaminophen, albuterol, amantadine hcl, vitamin b complex w/vitamin c, bupropion, calcium carbonate, carbidopa-levodopa, cholecalciferol, co-enzyme q-10, gabapentin, gabapentin, midodrine, probiotic, propranolol, rivastigmine, sertraline, sodium fluoride dental gel, tizanidine, and vitamin c.    Family History  Family History   Problem Relation Age of Onset    Tremor Mother 67        \"ESSENTIAL\" LONG STANDING    Polymyalgia rheumatica " Mother     Arthritis Mother     Hypertension Mother     Cerebrovascular Disease Father     Hypertension Father     Heart Disease Father     Tremor Maternal Grandmother     Lung Cancer Maternal Grandmother     Coronary Artery Disease Maternal Grandfather     Cardiac Sudden Death Maternal Grandfather     Cerebrovascular Disease Paternal Grandmother     Dementia Paternal Grandfather     Muscular Dystrophy Maternal Aunt     Heart Disease Maternal Aunt     Lupus Maternal Aunt     Neurologic Disorder Maternal Aunt         WHEELCHAIR    Parkinsonism Maternal Aunt         SMALL HANDWRITING    Seizure Disorder Maternal Aunt     Tremor Maternal Aunt     Rashes/Skin Problems Maternal Aunt         lupus    Muscular Dystrophy Maternal Uncle         ADULT ONSET    Muscular Dystrophy Maternal Uncle         ADULT ONSET    Muscular Dystrophy Maternal Uncle         ADULT ONSET    Muscular Disorder Maternal Cousin          YOUNG - YOUNG ONSET, dIAGNOSED AS A CHILD    Cancer Maternal Cousin     Muscular Dystrophy Other     Parkinsonism Other      BEHAVIORAL OBSERVATIONS    During the evaluation, Ms. Zavala was pleasant, cooperative, and seemed to understand the instructions. She was alert and oriented to person, place, and time. Behaviorally, she was somewhat impulsive, sometimes starting tasks before the instructions were complete. Tremors were observed clinically in both hands, as well as dyskinesia in her upper body. Gait was rapid. At one point while she was waiting for the technician, she took four laps around the clinic area. Mood was neutral although she reported feeling anxious. Speech was fluent, with normal articulation, volume, and rate. She presented her thoughts in a clear, logical manner. Internal performance validity measures fell within normal limits. The results are believed to accurately reflect her current level of functioning.    MEASURES ADMINISTERED    The following measures were administered by a trained  psychometrist, under the direct supervision of a licensed psychologist.    Subtests of the Wechsler Adult Intelligence Scale - 4; Reading subtest of the Wide Range Achievement Test - 4; subtests of the Wechsler Memory Scale - 4; Nava Verbal Learning Test - Revised; Americo-Osterrieth Complex Figure; [place] Naming Test; Controlled Oral Word Association Test; Semantic Fluency; Clock Drawing; Trail Making Test; Stroop; Arce Judgment of Line Orientation; [place] Card Sorting Test; Dementia Rating Scale - 2 (DRS-2) Alternate Form; Alva Depression Inventory - 2 (BDI-2); Apathy Scale.    RESULTS AND INTERPRETATION    Overall intellectual functioning was estimated to fall in the average range, consistent with premorbid estimates based on single word reading abilities.  Performance on a screening measure of dementia was average (DRS-2 Total Score = 139/144).    Confrontation naming was average for her age and level of education.  Ability to comprehend and articulate responses to complex social situations was above average.  Letter fluency was high average.  Generative naming to category was high average.    Attention span was average for age.  Divided attention was below average.  Performance on a measure of distractibility was average for her age and level of education.  Psychomotor processing speed was low average.    Basic visual perception, including matching lines and angles, was below average for her age and level of education.  Construction of a clock was notable for difficulty with planning and conceptualization.  It took her 3 attempts to draw the clock to command and to include both hands.  Construction of a complex design was exceptionally low, and was characterized by difficulty with planning and organization, and some distortions in the details of the design and perseverative placement of details.  Assembly of visual material was below average.  Nonverbal deductive reasoning was average.    Novel  problem-solving, including the ability to generate strategies and solutions, fell within normal limits for her age and level of education.    Immediate recall of verbal narrative material was high average, with high average recall following a 30-minute delay.  On a multiple trial list learning task, immediate recall was average, with average recall following a 25-minute delay.  Immediate recall of visual material was average, with high average recall following a 25-minute delay.    On the BDI-2, self-report questionnaire, she endorsed mild depressive symptomatology.  She did not endorse significant apathy on a scale.    IMPRESSIONS AND RECOMMENDATIONS    Alina Zavala is a 64 year old woman with a history of Parkinson's disease, diagnosed in 2019. A neuropsychological evaluation in 2022 was notable for impairments in spatial skills and executive functioning, with emerging weaknesses in speeded processing. She was noted to have Mild Neurocognitive Disorder with Lewy Bodies, as well as Unspecified Anxiety Disorder.     Results of today's evaluation indicate impairments in visual processing, including visual-spatial abilities and visual construction.  There is evidence of executive dysfunction including difficulty with planning and organization.  Psychomotor processing speed is relatively slowed. Learning and memory, language, and basic attention fall within normal limits.  She endorses mild depressive symptomatology.    Compared to her evaluation in 2022, while the overall pattern of performance is quite similar with impairments in visual processing and executive functioning, she has had improvements across a number of measures, including memory, some aspects of executive functioning, fluency, and visual processing. There were no significant declines compared to 2022.    While there have been some changes, she continues to manage her instrumental activities of daily living largely independently. The findings appear  to reflect a non-amnestic, multidomain Mild Cognitive Impairment (MCI). Improvements in cognition over time, with no evidence of decline, argues against the presence of dementia with Lewy Bodies, although the pattern of performance is consistent with her history of Parkinson's disease. Mild depressive symptomatology, anxiety, and nonrestorative sleep may exacerbate her cognitive difficulties, but do not appear to be the sole contributors.     In terms of daily functioning, Ms. Zavala may find that she has difficulty managing large, complex tasks. Others may assist by breaking down such tasks into smaller, more manageable parts. She will likely benefit from structure and routine. She stopped driving a couple of years ago. She is endorsing mild symptoms of depression, and may benefit from a review of her medications. She is also meeting weekly with her counselor.     Information regarding local resources related to Parkinson's disease can be found here:    American Parkinson's Disease Association - [place] Chapter  www.apdaparkinson.org/community/[place]/local-resources-support/     Parkinson's Foundation  www.parkinson.org[place]    Additional resources can be found here:    APDA Resource Library  www.Shwr[place]m.org/resources    APDA Listing of Events  www.[place]wr[place]m.org/upcoming-events/?eType=EmailBlastContent&sTo=[email or web address]    It may be helpful to continue to follow Ms. Zavala over time. The evaluation may be repeated in the future for comparison, should a change in mental status occur.      Luz Lunsford, Ph.D., ABPP  Licensed Psychologist, LP [id number]  Board Certified in Clinical Neuropsychology      Time spent: One unit of professional time, including interview (CPT 76142). 60 minutes (1 unit) neuropsychological testing evaluation by licensed and board-certified neuropsychologist, including integration of patient data, interpretation of standardized test results and  clinical data, clinical decision-making, treatment planning, report, and interactive feedback to the patient, first hour (CPT 83348). 97 minutes (2 units) of neuropsychological testing evaluation by licensed and board-certified neuropsychologist, including integration of patient data, interpretation of standardized test results and clinical data, clinical decision-making, treatment planning, report, and interactive feedback to the patient, subsequent hours (CPT 09732). 30 minutes of neuropsychological test administration and scoring by technician, first 30 minutes (CPT 67589). 190 additional minutes (6 units) neuropsychological test administration and scoring by technician, subsequent 30 minutes (CPT 23829). ICD-10 Diagnoses: G20.B1; G31.84.

## 2025-02-13 NOTE — PROGRESS NOTES
Pt was seen for neuropsychological evaluation at the request of Dr. Ishan Hearn for the purposes of diagnostic clarification and treatment planning. 220 minutes of test administration and scoring were provided by this writer. Please see Dr. Luz Lunsford's report for a full interpretation of the findings.    Sourav Bridges MA

## 2025-02-19 NOTE — CONFIDENTIAL NOTE
"NAME  Alina Zavala    MRN  [id number]      [birth year]     AGE  64     SEX  Female     HANDEDNESS Left     EDUCATION 16     HANDLEY  25     PROVIDER  [facility]     STATION  OP            WMS-R       Orientation 14            CLOCK DRAWING      Command  BORDERLINE    Copy  NORMAL            ZAID-O COMPLEX FIGURE        Raw T %ile   Copy  22.5  <1   Time to Copy 582\"  <1           WMS-IV LOGICAL MEMORY YA    Raw ScS/%ile     LM I 33 13     LM II 27 12     LM Rec. 28 >75             HVLT   3     Raw T    Trial 1  6     Trial 2  9     Trial 3  11     Learning  5     Total Recall 26 49    Delayed Recall 10 53    Percent Retention 91% 50    True Positives 12     False Positives 0     Disc. Index  12 56            BVMT   2     Raw T/%ile    Trial 1  2     Trial 2  9     Trial 3  9     Learning  20     Total Recall 46 7    Delayed Recall 68 10    Percent Retention 58% 111    Recognition Hits 6     Recognition F.P 0     Disc. Index  6 >16            BOSTON NAMING TEST     Raw 53 /60     MAS 8      %ile       # Stimulus Cues 0     # Phonemic Cues 5             COWAT   CFL   Raw 51      MAS 13                    SEMANTIC FLUENCY      Raw 55      MAS 12                    TRAIL MAKING TEST       Time Errors MAS %ile   A 30 0 11    B 137 0 5           STROOP        Raw MAS T    Word 90 8     Color 63 8     C/W 33 9     Interference -4  45            WCST   128     Raw %ile T   # Categories 6 >16    % Perseveative Err. 9 56 63   % Concept Resp. 82 55 70   FTMS:  1            WRAT    5     SS %ile GE   Word Reading 103 58 >12.9          WAIS-IV         Raw ScS    Block Design 16 5    Comprehension 31 14    Digit Span  23 8    Coding  39 6    Matrix Reasoning 12 8            DEMENTIA RATING SCALE - 2 ALTERNATE     Raw MAS    Attention  35 10    Initiation/Persev. 37 11    Construction 6 10    Concept  37 10    Memory  24 10    Total /144  139 10            TAMMY   H   Raw 14      MAS 4             BDI-II       Raw 16    "   Interp. MILD             APATHY       Raw 9             ESS       Raw 6      Interp. Higher Normal Daytime Sleepiness

## 2025-03-03 ENCOUNTER — DOCUMENTATION ONLY (OUTPATIENT)
Dept: NEUROLOGY | Facility: CLINIC | Age: 65
End: 2025-03-03
Payer: COMMERCIAL

## 2025-03-03 NOTE — PROGRESS NOTES
Received requested KEITH from [facility].   Date of service notes 2/28/25 faxed to [phone]. KEITH sent to scanning into the patient's chart. Maggie Patel CMA

## 2025-03-25 ENCOUNTER — VIRTUAL VISIT (OUTPATIENT)
Dept: PHARMACY | Facility: CLINIC | Age: 65
End: 2025-03-25
Attending: PSYCHIATRY & NEUROLOGY
Payer: COMMERCIAL

## 2025-03-25 DIAGNOSIS — F32.A DEPRESSION, UNSPECIFIED DEPRESSION TYPE: ICD-10-CM

## 2025-03-25 DIAGNOSIS — I95.1 ORTHOSTATIC HYPOTENSION: ICD-10-CM

## 2025-03-25 DIAGNOSIS — G20.B1 PARKINSON'S DISEASE WITH DYSKINESIA WITHOUT FLUCTUATING MANIFESTATIONS (H): Primary | ICD-10-CM

## 2025-03-25 NOTE — PROGRESS NOTES
Medication Therapy Management (MTM) Encounter    ASSESSMENT:                            Medication Adherence/Access: No issues identified.    Parkinson's Disease/orthostatic hypotension   Patient advised to try taking her medication with a couple crackers to lessen the nausea and help maintain enough calories throughout the day.   Advised patient to follow up with PCP for evaluation/treatment of possible yeast infection post antibiotic use for UTI.   Patient is planning to start monitoring her blood pressure more regularly again given recent highs and lows.     Mental Health   Patient would benefit from more time on the higher dose of sertraline to assess if it is beneficial for her mood     PLAN:                            Continue the higher dose of sertraline (Zoloft) 150 mg daily. It can take 6 weeks for a dose change to fully take effect.  Try taking your carbidopa-levodopa/amantadine/midodrine with a few crackers to help prevent nausea.   Start monitoring your blood pressure a few times per day since you've had some highs and lows lately. Check your blood pressure and different times and write down the blood pressure numbers, time of day, and how you feel in your journal.     Follow-up: 4/29/25 at 10 am video visit    SUBJECTIVE/OBJECTIVE:                          Alina Zavala is a 64 year old female seen for a follow-up visit.       Reason for visit: follow up on medications.    Allergies/ADRs: Reviewed in chart  Past Medical History: Reviewed in chart  Tobacco: She reports that she has never smoked. She has never used smokeless tobacco.  Alcohol: not currently using    Medication Adherence/Access: no issues reported.    Parkinson's Disease/orthostatic hypotension   - Medication regimen listed below  Patient states she is still having a lot of dyskinesias and is unsure if the amantadine helps. She has been trying to take the carbidopa-levodopa more on time lately but states she occasionally sleeps an hour  "over. Otherwise is aiming for every 4 hours on the medication doses. She is working on eating more as she has lost weight. She gets nauseated when she takes the medications on an empty stomach sometimes.   Patient states she is recovering from a UTI. She had not had one in a long time. She is now having itchiness and wonders if she has a yeast infection.   She is staying busy seeing her parents and daughter (with Twin grandbabies). She states she is going to an exercise class once a week but  is encouraging her to exercise more often.   Patient states her words get slurred sometimes, worse if she is tired.   Blood pressure continues to fluctuate. Last week she had a day when her head was \"buzzing\" and she had a headache so she checked her blood pressure and systolic was 176 then 250 but it was time for medication so she states her blood pressure came back down pretty quickly. She had a few episodes of low blood pressure recently, such as 79/52. She will have Liquid IV and cheese and crackers and blood pressure will come up eventually. She is planning to start monitoring her blood pressure again more regularly since she's has highs and lows lately.       1st dose (around 8-9 am) 2nd dose (around 12-1 pm)  3rd dose (around 4-5 pm) 4th dose (late evening) 5th dose (middle of the night)   Carbidopa-  levodopa   mg  2 2 2 2 2   Amantadine 100 mg  0.5 0.5 0.5       Midodrine 5 mg  1.5 1.5 1.5         Mental Health   - sertraline 150 mg daily  - bupropion  mg twice daily   Patient's sertraline dose was increased about 1 month ago and so far she noticed her moods have been less intense. She was feeling more extreme emotions like happiness or sadness. Her  is not noticing a difference in her mood with the higher dose of sertraline.        Today's Vitals: There were no vitals taken for this visit.  ----------------      I spent 22 minutes with this patient today. All changes were made via " collaborative practice agreement with Dr. Hearn.     A summary of these recommendations was sent via [employer] [place].    Wendy Caro, Pharm.D.  Medication Therapy Management Pharmacist  [facility]    Telemedicine Visit Details  The patient's medications can be safely assessed via a telemedicine encounter.  Type of service:  Video Conference via [employer]  Originating Location (pt. Location): Home    Distant Location (provider location):  Off-site  Start Time:  11:03 AM  End Time:  11:25 AM     Medication Therapy Recommendations  No medication therapy recommendations to display

## 2025-03-25 NOTE — PATIENT INSTRUCTIONS
"Recommendations from today's MTM visit:                                                      Continue the higher dose of sertraline (Zoloft) 150 mg daily. It can take 6 weeks for a dose change to fully take effect.  Try taking your carbidopa-levodopa/amantadine/midodrine with a few crackers to help prevent nausea.   Start monitoring your blood pressure a few times per day since you've had some highs and lows lately. Check your blood pressure and different times and write down the blood pressure numbers, time of day, and how you feel in your journal.     Follow-up: 4/29/25 at 10 am video visit    It was great speaking with you today.  I value your experience and would be very thankful for your time in providing feedback in our clinic survey. In the next few days, you may receive an email or text message from [employer] with a link to a survey related to your  clinical pharmacist.\"     To schedule another MTM appointment, please call the clinic directly or you may call the MTM scheduling line at [phone].    My Clinical Pharmacist's contact information:                                                      Please feel free to contact me with any questions or concerns you have.      Wendy Caro, Pharm.D.  Medication Therapy Management Pharmacist  [facility]     "

## 2025-05-01 ENCOUNTER — VIRTUAL VISIT (OUTPATIENT)
Dept: PHARMACY | Facility: CLINIC | Age: 65
End: 2025-05-01
Attending: PSYCHIATRY & NEUROLOGY
Payer: COMMERCIAL

## 2025-05-01 DIAGNOSIS — I95.1 ORTHOSTATIC HYPOTENSION: ICD-10-CM

## 2025-05-01 DIAGNOSIS — F32.A DEPRESSION, UNSPECIFIED DEPRESSION TYPE: ICD-10-CM

## 2025-05-01 DIAGNOSIS — G20.B1 PARKINSON'S DISEASE WITH DYSKINESIA WITHOUT FLUCTUATING MANIFESTATIONS (H): Primary | ICD-10-CM

## 2025-05-01 NOTE — PROGRESS NOTES
Medication Therapy Management (MTM) Encounter    ASSESSMENT:                            Medication Adherence/Access: No issues identified.    Parkinson's Disease/orthostatic hypotension   Patient advised to try artifical tears for dry eyes and continue using Biotene for dry mouth. She will be seeing cardiology soon and plans to discuss with them about the low blood pressure issues. I wonder if we should stop propranolol at this time as it is likely contributing to orthostatic hypotension.     Mental Health   Stable     PLAN:                            You could try using artifical tears (eye drops) a few times per day to see if this helps with your vision. Sometimes dry eyes can cause blurry vision.   You can use the Biotene (artificial saliva) several times per day for dry mouth and also can use sour candies.   Keep a log of your blood pressure readings and bring to the cardiology clinic for your next appointment. If you continue to have frequent low blood pressures, we may need to stop the propranolol as this medication can lower blood pressure.     Follow-up: 7/9/25 at 9:30 am video visit     SUBJECTIVE/OBJECTIVE:                          Alina Zavala is a 64 year old female seen for a follow-up visit. Patient was accompanied by , Justin.      Reason for visit: follow up on medications.    Allergies/ADRs: Reviewed in chart  Past Medical History: Reviewed in chart  Tobacco: She reports that she has never smoked. She has never used smokeless tobacco.  Alcohol: not currently using    Medication Adherence/Access: no issues reported.    Parkinson's Disease/orthostatic hypotension   - Medication regimen listed below  Since our last visit, patient states she is noticing more issues with balance. She had a fall during an exercise class recently because she got dizzy. She is starting physical therapy this week but the appt was cancelled today. She has more issues with blood pressure when she is at her parents house  because the temperature is warmer and she usually eats a bigger meal. She is meeting with her cardiologist soon and will review blood pressure with them. Her blood pressure fluctuates- very low some days (eg 70/40) and occasionally quite high. When her blood pressure drops she usually drinks Liquid IV and eats cheese/crackers and this helps.  Patient also reports having issues with her eyesight but vision was normal per eye doctor. She has trouble with contrast. She states she has dry eyes and dry mouth. She is not using eye drops but she is using Biotene before bed.        1st dose (around 8-9 am)  2nd dose (around 12-1 pm) 3rd dose (around 4-5 pm) 4th dose (late evening) 5th dose (middle of the night)   Carbidopa-  levodopa   mg  2 2 2 2 2   Amantadine 100 mg  0.5 0.5 0.5       Midodrine 5 mg  1.5 1.5 1.5       Propranolol 10 mg  0.5         Mental Health   - sertraline 150 mg daily  - bupropion  mg twice daily   Patient's sertraline dose was increased about 2 months ago. She is noticing less emotional variability and no issues with side effects. Reports she does get run down and tired more frequently.          Today's Vitals: There were no vitals taken for this visit.  ----------------    I spent 23 minutes with this patient today. All changes were made via collaborative practice agreement with Dr. Hearn.     A summary of these recommendations was sent via [employer].    Wendy Caro, Pharm.D.  Medication Therapy Management Pharmacist  [facility]    Telemedicine Visit Details  The patient's medications can be safely assessed via a telemedicine encounter.  Type of service:  Video Conference via [employer]  Originating Location (pt. Location): Home    Distant Location (provider location):  On-site  Start Time:  9:27 AM  End Time: 9:50 AM     Medication Therapy Recommendations  No medication therapy recommendations to display

## 2025-05-01 NOTE — PATIENT INSTRUCTIONS
"Recommendations from today's MTM visit:                                                      You could try using artifical tears (eye drops) a few times per day to see if this helps with your vision. Sometimes dry eyes can cause blurry vision.   You can use the Biotene (artificial saliva) several times per day for dry mouth and also can use sour candies.   Keep a log of your blood pressure readings and bring to the cardiology clinic for your next appointment. If you continue to have frequent low blood pressures, we may need to stop the propranolol as this medication can lower blood pressure.     Follow-up: 7/9/25 at 9:30 am video visit     It was great speaking with you today.  I value your experience and would be very thankful for your time in providing feedback in our clinic survey. In the next few days, you may receive an email or text message from [employer] with a link to a survey related to your  clinical pharmacist.\"     To schedule another MTM appointment, please call the clinic directly or you may call the MTM scheduling line at [phone].    My Clinical Pharmacist's contact information:                                                      Please feel free to contact me with any questions or concerns you have.      Wendy Caro, Pharm.D.  Medication Therapy Management Pharmacist  [facility]    "

## 2025-06-11 ENCOUNTER — OFFICE VISIT (OUTPATIENT)
Dept: NEUROLOGY | Facility: CLINIC | Age: 65
End: 2025-06-11

## 2025-06-11 VITALS — SYSTOLIC BLOOD PRESSURE: 110 MMHG | HEART RATE: 96 BPM | DIASTOLIC BLOOD PRESSURE: 61 MMHG

## 2025-06-11 DIAGNOSIS — R26.89 BALANCE PROBLEMS: ICD-10-CM

## 2025-06-11 DIAGNOSIS — G20.B1 PARKINSON'S DISEASE WITH DYSKINESIA WITHOUT FLUCTUATING MANIFESTATIONS (H): Primary | ICD-10-CM

## 2025-06-11 DIAGNOSIS — R41.3 MEMORY PROBLEM: ICD-10-CM

## 2025-06-11 DIAGNOSIS — R49.0 DYSPHONIA: ICD-10-CM

## 2025-06-11 DIAGNOSIS — R25.2 LEG CRAMPING: ICD-10-CM

## 2025-06-11 RX ORDER — GABAPENTIN 100 MG/1
CAPSULE ORAL
Qty: 180 CAPSULE | Refills: 3 | Status: SHIPPED | OUTPATIENT
Start: 2025-06-11

## 2025-06-11 RX ORDER — GABAPENTIN 300 MG/1
300 CAPSULE ORAL EVERY EVENING
Qty: 90 CAPSULE | Refills: 3 | Status: SHIPPED | OUTPATIENT
Start: 2025-06-11

## 2025-06-11 RX ORDER — RIVASTIGMINE 13.3 MG/24H
1 PATCH, EXTENDED RELEASE TRANSDERMAL DAILY
Qty: 90 PATCH | Refills: 3 | Status: SHIPPED | OUTPATIENT
Start: 2025-06-11

## 2025-06-11 RX ORDER — CARBIDOPA AND LEVODOPA 25; 100 MG/1; MG/1
TABLET ORAL
Qty: 1080 TABLET | Refills: 3 | Status: SHIPPED | OUTPATIENT
Start: 2025-06-11

## 2025-06-11 ASSESSMENT — UNIFIED PARKINSONS DISEASE RATING SCALE (UPDRS)
LEG_AGILITY_LEFT: (1) SLIGHT: ANY OF THE FOLLOWING: A) THE REGULAR RHYTHM IS BROKEN WITH ONE WITH ONE OR TWO INTERRUPTIONS OR HESITATIONS OF THE MOVEMENT  B) SLIGHT SLOWING  C) THE AMPLITUDE DECREMENTS NEAR THE END OF THE 10 MOVEMENTS.
HANDMOVEMENTS_RIGHT: (1) SLIGHT: ANY OF THE FOLLOWING: A) THE REGULAR RHYTHM IS BROKEN WITH ONE WITH ONE OR TWO INTERRUPTIONS OR HESITATIONS OF THE MOVEMENT  B) SLIGHT SLOWING  C) THE AMPLITUDE DECREMENTS NEAR THE END OF THE 10 MOVEMENTS.
AMPLITUDE_RUE: (0) NORMAL: NO TREMOR.
TOETAPPING_RIGHT: (0) NORMAL: NO PROBLEMS.
ARISING_CHAIR: (1) SLIGHT: ARISING IS SLOWER THAN NORMAL, OR MAY NEED MORE THAN ONE ATTEMPT, OR MAY NEED TO MOVE FORWARD IN THE CHAIR TO ARISE. NO NEED TO USE THE ARMS OF THE CHAIR.
FACIAL_EXPRESSION: (3) MASKED FACIES WITH LIPS PARTED SOME OF THE TIME WHEN THE MOUTH IS AT REST.
HANDMOVEMENTS_LEFT: (1) SLIGHT: ANY OF THE FOLLOWING: A) THE REGULAR RHYTHM IS BROKEN WITH ONE WITH ONE OR TWO INTERRUPTIONS OR HESITATIONS OF THE MOVEMENT  B) SLIGHT SLOWING  C) THE AMPLITUDE DECREMENTS NEAR THE END OF THE 10 MOVEMENTS.
GAIT: (1) SLIGHT: INDEPENDENT WALKING WITH MINOR GAIT IMPAIRMENT.
FINGER_TAPPING_RIGHT: (1) SLIGHT: ANY OF THE FOLLOWING: A) THE REGULAR RHYTHM IS BROKEN WITH ONE WITH ONE OR TWO INTERRUPTIONS OR HESITATIONS OF THE MOVEMENT  B) SLIGHT SLOWING  C) THE AMPLITUDE DECREMENTS NEAR THE END OF THE 10 MOVEMENTS.
CONSTANCY_TREMOR_ATREST: (0) NORMAL: NO TREMOR.
TOETAPPING_LEFT: (1) SLIGHT: ANY OF THE FOLLOWING: A) THE REGULAR RHYTHM IS BROKEN WITH ONE WITH ONE OR TWO INTERRUPTIONS OR HESITATIONS OF THE MOVEMENT  B) SLIGHT SLOWING  C) THE AMPLITUDE DECREMENTS NEAR THE END OF THE 10 MOVEMENTS.
LEG_AGILITY_RIGHT: (0) NORMAL: NO PROBLEMS.
DYSKINESIAS_PRESENT: YES
PRONATION_SUPINATION_RIGHT: (1) SLIGHT: ANY OF THE FOLLOWING: A) THE REGULAR RHYTHM IS BROKEN WITH ONE WITH ONE OR TWO INTERRUPTIONS OR HESITATIONS OF THE MOVEMENT  B) SLIGHT SLOWING  C) THE AMPLITUDE DECREMENTS NEAR THE END OF THE 10 MOVEMENTS.
AMPLITUDE_LLE: (0) NORMAL: NO TREMOR.
SPONTANEITY_OF_MOVEMENT: (0) NORMAL: NO PROBLEMS.
FINGER_TAPPING_LEFT: (1) SLIGHT: ANY OF THE FOLLOWING: A) THE REGULAR RHYTHM IS BROKEN WITH ONE WITH ONE OR TWO INTERRUPTIONS OR HESITATIONS OF THE MOVEMENT  B) SLIGHT SLOWING  C) THE AMPLITUDE DECREMENTS NEAR THE END OF THE 10 MOVEMENTS.
AMPLITUDE_LUE: (0) NORMAL: NO TREMOR.
AMPLITUDE_RLE: (0) NORMAL: NO TREMOR.
PARKINSONS_MEDS: ON
FREEZING_GAIT: (0) NORMAL: NO FREEZING.
PRONATION_SUPINATION_LEFT: (1) SLIGHT: ANY OF THE FOLLOWING: A) THE REGULAR RHYTHM IS BROKEN WITH ONE WITH ONE OR TWO INTERRUPTIONS OR HESITATIONS OF THE MOVEMENT  B) SLIGHT SLOWING  C) THE AMPLITUDE DECREMENTS NEAR THE END OF THE 10 MOVEMENTS.
MOVEMENTS_INTERFERE_WITH_RATINGS: NO
POSTURE: (3) MODERATE: STOOPED POSTURE, SCOLIOSIS, OR LEANING TO ONE SIDE THAT CANNOT BE CORRECTED VOLITIONALLY TO A NORMAL POSTURE BY THE PATIENT.
SPEECH: (0) NORMAL: NO SPEECH PROBLEMS.
AMPLITUDE_LIP_JAW: (0) NORMAL: NO TREMOR.

## 2025-06-11 NOTE — LETTER
2025      Alina Zavala  [address] [place] [id number]      Dear Colleague,    Thank you for referring your patient, Alina Zavala, to the [facility] NEUROLOGY CLINIC [facility]. Please see a copy of my visit note below.      ASSESSMENT:    Parkinson's Disease:      Dyskinesias Due to PD:      Memory Problems:     Balance Problems:       PLAN:    __  The following patient instructions provided: -     __  Stay on the same antiparkinsonian medication.       __  Continue on the Rivastigmine patch.    __  Continue using Tizanidine to help with cramping.      __  Referral to PT, Speech Therapy, & OT - to [facility].  Call to make the appointment.     __  You have a follow up with Dr. Caro, Pharm.D. in July.      __ Return to see Dr. Hearn in 3 - 4 months.  You may return sooner as needed.           MOVEMENT DISORDERS CLINIC  [facility] LOCATION             PATIENT: Alina Zavala    : 1960    DAHLIA: 2025     REASON FOR VISIT: Parkinson's disease (PD) follow up.    HPI: Ms. Alina Zavala is a 65 year old who came to the [facility] neurology clinic accompanied by her  for a follow up visit.      She was last seen in the clinic on 2025 by Dr. Hearn for a routine follow up visit.  I saw her last on May 28, 2024.     Pertinent PD Hx: Her presenting symptoms were slowness, stiffness, difficulty walking, and cognitive slowness.  She had a dopamine scan that showed a presynaptic dopaminergic deficit and decreased uptake in bilateral putamen confirming the diagnosis of parkinsonism.     19: DaTscan: A presynaptic dopaminergic deficit is present. Findings consistent with Parkinsonian syndrome.      This morning at 2 am and when going down the steps holding her laundary cloths, she missed that last 2 steps and steped to the floor hard on the Rt leg. No falls. She took tizanidine.  She had PT this morning who used TENS unit. She is having some hip pain in  the clinic when getting up, which gets better after walking a few times.     Overall PD motor symptoms have progressed.    She has chronic back and hip pain. She was recently diagnosed with scoliosis.     She has dyskinesias, that is bothersome. She is taking Amantading 1/2 tab TID. When taking 1 tab TID, she had vision changes. She takes Sinemet inconsistently. She tries to take it every 4 hrs. When she takes Sinemet, she takes 2 tabs at a time.   asked if she could take Sinemet 3 tabs 3 x a day.    Balance is off. She would like to [facility] back [facility] for [facility], OT, & Speech Therapy.     Memory is improved on rivastigmine.     Their daughter had identical twins 6 months ago, which pt and her  are enjoying.       MEDICATIONS:   Current Outpatient Medications   Medication Sig Dispense Refill     acetaminophen (TYLENOL) 500 MG tablet 3 x 500mg during day, 2 x 500mg at night       albuterol (PROAIR HFA/PROVENTIL HFA/VENTOLIN HFA) 108 (90 Base) MCG/ACT inhaler Inhale 2 puffs into the lungs every 4 hours as needed.       amantadine HCl 100 MG TABS Take half-tablet (50 mg) by mouth 3 times daily 135 tablet 3     B Complex-C (VITAMIN B COMPLEX W/VITAMIN C) TABS tablet Take 1 tablet by mouth daily       buPROPion (WELLBUTRIN SR) 200 MG 12 hr tablet 200mg tab by mouth twice daily at 10am and 10pm  0     calcium carbonate (OS-TENA) 600 MG tablet 1 tab by mouth nightly at 10pm       carbidopa-levodopa (SINEMET)  MG tablet 2 -2.5 tabs by mouth every 4-5 hours = 12/day 1080 tablet 3     cholecalciferol 25 MCG (1000 UT) TABS 1000 units (25 mcg) by mouth daily at 10am       co-enzyme Q-10 100 MG CAPS capsule 100mg capsule by mouth every evening at 11pm       gabapentin (NEURONTIN) 100 MG capsule 2 x 100mg in the morning and 2 x 100mg in the evening along with a 300mg tablet so 200mg in morning and 500mg in evennig 180 capsule 3     gabapentin (NEURONTIN) 300 MG capsule Take 1 capsule (300 mg) by mouth every  evening. (500 mg in the evening total) 90 capsule 3     midodrine (PROAMATINE) 5 MG tablet 1.5 tabs 3/day 410 tablet 3     NONFORMULARY Calcium 1000mg/[id number]/zinc 25mg       probiotic CAPS Prebiotic twice daily - Florajen Bacillus subtillis       propranolol (INDERAL) 10 MG tablet 1/2 of 10mg tab by mouth once daily (morning only) 180 tablet 3     rivastigmine (EXELON) 13.3 MG/24HR 24 hr patch Place 1 patch onto the skin daily. 90 patch 3     sertraline (ZOLOFT) 100 MG tablet 1.5 x 100mg tab by mouth daily at 10am 135 tablet 3     sodium fluoride dental gel (PREVIDENT) 1.1 % GEL topical gel APPLY A THIN BEAD OF GEL TO TOOTHBRUSH AND BRUSH TEETH ONCE DAILY AT BEDTIME FOR AT LEAST 1 MINUTE. EXPECTORATE THROROUGHLY.       tiZANidine (ZANAFLEX) 2 MG tablet Take 1-2 tablets by mouth 2 times daily as needed       vitamin C (ASCORBIC ACID) 500 MG tablet 500mg tab by mouth daily at 10am       No current facility-administered medications for this visit.       PHYSICAL EXAM:    VITAL SIGNS:  Blood pressure 110/61, pulse 96..     GENERAL:  Ms. Zavala is a pleasant  patient who is well-groomed, well-developed, and thin sitting comfortably in the exam room without any distress.  Affect is appropriate.    MOVEMENT DISORDERS ASSESSMENT: MDS UPDRS III (Score range: 0-132)       5/28/2024    12:00 PM 6/11/2025    12:00 PM   UPDRS Motor Scale   Time: 12:22 12:32   Medication On On       Last dose of Sinemet 25/100 mg 2 tabs was at 10:20 am   R Brain DBS: None None   L Brain DBS: None None   Dyskinesia (LID) Yes Yes       At the beggining of the visit, pt had mild dyskinesias.   Did LID interfere No No   Speech 0 0   Facial Expression 3 3   Rigidity Neck 1    Rigidity RUE 1    Rigidity LUE 1    Rigidity RLE 0    Rigidity LLE 0    Finger Taps R 1 1   Finger Taps L 1 1   Hand Mvt R 1 1   Hand Mvt L 1 1   Pron-/Supinate R 2 1   Pron-/Supinate L 1 1   Toe Tap R 1 0   Toe Tap L 1 1   Leg Agility R 0 0   Leg Agility L 0 1    Arise From Chair 0 1   Gait 0 1   Gait Freezing 0 0   Postural Stability 2    Posture 1 3   Global Spont Mvt 0 0   Postural Tremor RUE 1 1   Postural Tremor LUE 1 1   Kinetic Tremor RUE 1 2   Kinetic Tremor LUE 1 2   Rest Tremor RUE 0 0   Rest Tremor LUE 0 0   Rest Tremor RLE 0 0   Rest Tremor LLE 0 0   Rest Tremor Lip/Jaw 0 0   Rest Tremor Constancy 0 0   Total Right 8    Total Left 7    Axial Total 7    Total 22      Today I spent 45 minutes caring for the patient. Time was spent with reviewing records, meeting with the patient, answering questions, examining, refilling/ordering medication, and documentation.    Zeynep Valle, KARTHIKEYAN, APRN  [facility]    The longitudinal plan of care for the diagnosis(es)/condition(s) as documented were addressed during this visit. Due to the added complexity in care, I will continue to support Alina in the subsequent management and with ongoing continuity of care.      Again, thank you for allowing me to participate in the care of your patient.        Sincerely,        ADAM Dumont CNP    Electronically signed

## 2025-06-11 NOTE — PATIENT INSTRUCTIONS
Dear Ms. Alina Zavala,    Thank you for coming today.  During your visit, we have discussed the following:     __  Stay on the same antiparkinsonian medication.        __  Continue on the Rivastigmine patch.    __  Continue using Tizanidine to help with cramping.      __  Referral to PT, Speech Therapy, & OT - to [facility].  Call to make the appointment.     __  You have a follow up with Dr. Caro, Pharm.D. in July.      __ Return to see Dr. Hearn in 3 - 4 months.  You may return sooner as needed.       PD Resources:    [facility] Resource:  [email or web address]  Taking charge of your Health and Wellbing.     Parkinson's Foundation: https://www.parkinson.org/    American Parkinson Disease Association: https://www.apdaparkinson.org/    Loud Therapy Speech Exercises:   https://www.[employer]ube.com/watch?v=C6ybhea2FJl     Big Therapy Physical Therapy Exercises:   https://www.[employer].com/watch?v=pgtGOgVIhqc    Dance for PD  https://danceforIdeagenons.org/     Research that has shown motor and non-motor PD symptoms Improvement with Dancing: -    https://www.[employer].com/0173-6934/11/7/895/htm    Power for PD  https://www.powerforparkinsons.org/   Has several exercises - Balance, Strength, Rhythm Cognitive, Speech, Seated, Standing . . . .     For questions, you may send us a [employer] message or call [phone]    Fax number: [phone]    To make an appointment with one of our pharmacists, (Dr. Caro, Pharm.D., Dr. Richey, PharmD, or Dr. Waldrop, Pharm.D.), call [phone].    Zeynep Valle, KARTHIKEYAN, APRN  [facility]

## 2025-06-11 NOTE — PROGRESS NOTES
ASSESSMENT:    Parkinson's Disease:      Dyskinesias Due to PD:      Memory Problems:     Balance Problems:       PLAN:    __  The following patient instructions provided: -     __  Stay on the same antiparkinsonian medication.       __  Continue on the Rivastigmine patch.    __  Continue using Tizanidine to help with cramping.      __  Referral to PT, Speech Therapy, & OT - to [facility].  Call to make the appointment.     __  You have a follow up with Dr. Caro, Pharm.D. in July.      __ Return to see Dr. Hearn in 3 - 4 months.  You may return sooner as needed.           MOVEMENT DISORDERS CLINIC  [facility] LOCATION             PATIENT: Alina Zavala    : 1960    DAHLIA: 2025     REASON FOR VISIT: Parkinson's disease (PD) follow up.    HPI: Ms. Alina Zavala is a 65 year old who came to the [facility] neurology clinic accompanied by her  for a follow up visit.      She was last seen in the clinic on 2025 by Dr. Hearn for a routine follow up visit.  I saw her last on May 28, 2024.     Pertinent PD Hx: Her presenting symptoms were slowness, stiffness, difficulty walking, and cognitive slowness.  She had a dopamine scan that showed a presynaptic dopaminergic deficit and decreased uptake in bilateral putamen confirming the diagnosis of parkinsonism.     19: DaTscan: A presynaptic dopaminergic deficit is present. Findings consistent with Parkinsonian syndrome.      This morning at 2 am and when going down the steps holding her laundary cloths, she missed that last 2 steps and steped to the floor hard on the Rt leg. No falls. She took tizanidine.  She had PT this morning who used TENS unit. She is having some hip pain in the clinic when getting up, which gets better after walking a few times.     Overall PD motor symptoms have progressed.    She has chronic back and hip pain. She was recently diagnosed with scoliosis.     She has dyskinesias, that is bothersome. She is taking  Amantading 1/2 tab TID. When taking 1 tab TID, she had vision changes. She takes Sinemet inconsistently. She tries to take it every 4 hrs. When she takes Sinemet, she takes 2 tabs at a time.   asked if she could take Sinemet 3 tabs 3 x a day.    Balance is off. She would like to [facility] back [facility], OT, & Speech Therapy.     Memory is improved on rivastigmine.     Their daughter had identical twins 6 months ago, which pt and her  are enjoying.       MEDICATIONS:   Current Outpatient Medications   Medication Sig Dispense Refill    acetaminophen (TYLENOL) 500 MG tablet 3 x 500mg during day, 2 x 500mg at night      albuterol (PROAIR HFA/PROVENTIL HFA/VENTOLIN HFA) 108 (90 Base) MCG/ACT inhaler Inhale 2 puffs into the lungs every 4 hours as needed.      amantadine HCl 100 MG TABS Take half-tablet (50 mg) by mouth 3 times daily 135 tablet 3    B Complex-C (VITAMIN B COMPLEX W/VITAMIN C) TABS tablet Take 1 tablet by mouth daily      buPROPion (WELLBUTRIN SR) 200 MG 12 hr tablet 200mg tab by mouth twice daily at 10am and 10pm  0    calcium carbonate (OS-TENA) 600 MG tablet 1 tab by mouth nightly at 10pm      carbidopa-levodopa (SINEMET)  MG tablet 2 -2.5 tabs by mouth every 4-5 hours = 12/day 1080 tablet 3    cholecalciferol 25 MCG (1000 UT) TABS 1000 units (25 mcg) by mouth daily at 10am      co-enzyme Q-10 100 MG CAPS capsule 100mg capsule by mouth every evening at 11pm      gabapentin (NEURONTIN) 100 MG capsule 2 x 100mg in the morning and 2 x 100mg in the evening along with a 300mg tablet so 200mg in morning and 500mg in evennig 180 capsule 3    gabapentin (NEURONTIN) 300 MG capsule Take 1 capsule (300 mg) by mouth every evening. (500 mg in the evening total) 90 capsule 3    midodrine (PROAMATINE) 5 MG tablet 1.5 tabs 3/day 410 tablet 3    NONFORMULARY Calcium 1000mg/[id number]/zinc 25mg      probiotic CAPS Prebiotic twice daily - Florajen Bacillus subtillis      propranolol (INDERAL)  10 MG tablet 1/2 of 10mg tab by mouth once daily (morning only) 180 tablet 3    rivastigmine (EXELON) 13.3 MG/24HR 24 hr patch Place 1 patch onto the skin daily. 90 patch 3    sertraline (ZOLOFT) 100 MG tablet 1.5 x 100mg tab by mouth daily at 10am 135 tablet 3    sodium fluoride dental gel (PREVIDENT) 1.1 % GEL topical gel APPLY A THIN BEAD OF GEL TO TOOTHBRUSH AND BRUSH TEETH ONCE DAILY AT BEDTIME FOR AT LEAST 1 MINUTE. EXPECTORATE THROROUGHLY.      tiZANidine (ZANAFLEX) 2 MG tablet Take 1-2 tablets by mouth 2 times daily as needed      vitamin C (ASCORBIC ACID) 500 MG tablet 500mg tab by mouth daily at 10am       No current facility-administered medications for this visit.       PHYSICAL EXAM:    VITAL SIGNS:  Blood pressure 110/61, pulse 96..     GENERAL:  Ms. Zavala is a pleasant  patient who is well-groomed, well-developed, and thin sitting comfortably in the exam room without any distress.  Affect is appropriate.    MOVEMENT DISORDERS ASSESSMENT: MDS UPDRS III (Score range: 0-132)       5/28/2024    12:00 PM 6/11/2025    12:00 PM   UPDRS Motor Scale   Time: 12:22 12:32   Medication On On       Last dose of Sinemet 25/100 mg 2 tabs was at 10:20 am   R Brain DBS: None None   L Brain DBS: None None   Dyskinesia (LID) Yes Yes       At the beggining of the visit, pt had mild dyskinesias.   Did LID interfere No No   Speech 0 0   Facial Expression 3 3   Rigidity Neck 1    Rigidity RUE 1    Rigidity LUE 1    Rigidity RLE 0    Rigidity LLE 0    Finger Taps R 1 1   Finger Taps L 1 1   Hand Mvt R 1 1   Hand Mvt L 1 1   Pron-/Supinate R 2 1   Pron-/Supinate L 1 1   Toe Tap R 1 0   Toe Tap L 1 1   Leg Agility R 0 0   Leg Agility L 0 1   Arise From Chair 0 1   Gait 0 1   Gait Freezing 0 0   Postural Stability 2    Posture 1 3   Global Spont Mvt 0 0   Postural Tremor RUE 1 1   Postural Tremor LUE 1 1   Kinetic Tremor RUE 1 2   Kinetic Tremor LUE 1 2   Rest Tremor RUE 0 0   Rest Tremor LUE 0 0   Rest Tremor RLE 0 0    Rest Tremor LLE 0 0   Rest Tremor Lip/Jaw 0 0   Rest Tremor Constancy 0 0   Total Right 8    Total Left 7    Axial Total 7    Total 22      Today I spent 45 minutes caring for the patient. Time was spent with reviewing records, meeting with the patient, answering questions, examining, refilling/ordering medication, and documentation.    Zeynep Valle, KARTHIKEYAN, APRN  [facility]    The longitudinal plan of care for the diagnosis(es)/condition(s) as documented were addressed during this visit. Due to the added complexity in care, I will continue to support Alina in the subsequent management and with ongoing continuity of care.

## 2025-06-26 ENCOUNTER — TELEPHONE (OUTPATIENT)
Dept: NEUROLOGY | Facility: CLINIC | Age: 65
End: 2025-06-26
Payer: COMMERCIAL

## 2025-06-26 DIAGNOSIS — R26.89 BALANCE PROBLEMS: ICD-10-CM

## 2025-06-26 DIAGNOSIS — R52 PAIN: ICD-10-CM

## 2025-06-26 DIAGNOSIS — G20.B1 PARKINSON'S DISEASE WITH DYSKINESIA WITHOUT FLUCTUATING MANIFESTATIONS (H): Primary | ICD-10-CM

## 2025-06-26 NOTE — TELEPHONE ENCOUNTER
[facility] Call Center    Phone Message    May a detailed message be left on voicemail: yes     Reason for Call: Symptoms or Concerns     If patient has red-flag symptoms, warm transfer to triage line    Current symptom or concern: More frequent falls, balance concerns, difficulty moving in smaller spaces     Symptoms have been present for:  2 week(s)    Has patient previously been seen for this? No    Patient states she has not brought this issue up before with her provider.     Are there any new or worsening symptoms? Patient cannot determine if symptoms are worsening or not.    Patient requests to speak with Yamilet or someone from her care team regarding her concerns.    Patient is wondering if Dr. Hearn has seen her recent Physical Therapy records.    Please review.    Action Taken: Message routed to:  [facility] Neurology    Travel Screening: Not Applicable     Date of Service:

## 2025-06-27 PROBLEM — F41.8 MIXED ANXIETY AND DEPRESSIVE DISORDER: Status: ACTIVE | Noted: 2025-06-27

## 2025-06-27 PROBLEM — A69.20 LYME DISEASE: Status: ACTIVE | Noted: 2025-06-27

## 2025-06-27 PROBLEM — M54.30 SCIATICA: Status: ACTIVE | Noted: 2025-06-27

## 2025-06-27 PROBLEM — F34.1 DYSTHYMIA: Status: ACTIVE | Noted: 2025-06-27

## 2025-06-27 PROBLEM — J30.9 ALLERGIC RHINITIS: Status: ACTIVE | Noted: 2025-06-27

## 2025-06-27 PROBLEM — F32.9 MAJOR DEPRESSION: Status: ACTIVE | Noted: 2025-06-27

## 2025-06-27 PROBLEM — E78.5 HYPERLIPIDEMIA: Status: ACTIVE | Noted: 2025-06-27

## 2025-06-27 PROBLEM — E88.40: Status: ACTIVE | Noted: 2025-06-27

## 2025-06-27 PROBLEM — J45.909 ASTHMA WITHOUT STATUS ASTHMATICUS: Status: ACTIVE | Noted: 2025-06-27

## 2025-06-27 PROBLEM — I10 HYPERTENSION: Status: ACTIVE | Noted: 2025-06-27

## 2025-06-27 PROBLEM — B27.90 INFECTIOUS MONONUCLEOSIS: Status: ACTIVE | Noted: 2025-06-27

## 2025-06-27 NOTE — TELEPHONE ENCOUNTER
Background:  6/25 PT note:  Patient reproted 4 falls in the last week 2 of falls happened in bathroom, one of which she fell backwards into bathtub. Other falls were just being unable to maneuver difficult posiitons in home. Back pain due to falls is 5-6/10    Medications: Confirmed with Alina and Justin  Medications 9AM 1PM 5PM 9PM 5AM   CD/LD  mg 2 2 2 2 2   Amantadine 100 mg 1/2 1/2 1/2     Rivastigmine 13.3 mg    1    Tizanidine 2 mg   PRN 1/2-2     Midodrine 5 mg 2 2 2      Gabapentin 100 mg 2 2 2     Gabapentin 300 mg    2    -Gabapentin was increased last week from 100 mg q4 to 200 mg q4 and the bedtime dose was increased from 500 mg to 600mg    Assessment:  Fell 3 times in the bathroom, once in the kitchen, once in her card room. More likely to occur narrow or small spaces. Bathroom is very small. No falls outside the home. Feet start going side ways, her steps get really tiny and she is not able to stop sometimes. Loses sense of balance when bending over with things like changing the toilet paper roll. Hard to stand up straight without pain (was told some of it is scoliosis, dx a few months ago). Leans to the left. Falls/near falls happen during the day at night. Does not use a walker or cane, PT tried a walker and did not recommend it at this time but noted dyskinesia's may be causing her to fall. She describes her dyskinesia's as swaying, shaking, Justin describes them as flailing. She says movements are all day and are moderate-severe.    Gabapentin increased 100 mg q4 during the day and 600 mg at bedtime since last week. Justin notes he thinks she had more falls since the gabapentin.    Denies dizziness, sleepiness maybe occurs once in a while.  Not using a cane or walker. Justin thinks since gabapentin was increase she maybe has had a slight increase in falls. Alina is not sure about that.    PT tried a walker with her, did not recommend she get it at this time. PT noted dyskinesia's cause her to fall. They  are present all day. Will spill what she's drinking due to dyskinesia. Justin describes it as flailing. She is not sure amantadine is helping much but notes she had side effects on higher doses.    Plan/recommendation:  Alina and Justin will download the [employer] marci and upload a <15 second video of her movements. Justin wrote down instructions on how to do this however they noted they are not adept with technology.  Nursing will connect with the team and call Alina back    26 minute phone call

## 2025-07-01 NOTE — TELEPHONE ENCOUNTER
LVM asking Alina to call me back. Asked her to have her insurance card ready and ask the call center staff to update this as well while she is calling back.

## 2025-07-05 ENCOUNTER — APPOINTMENT (OUTPATIENT)
Dept: RADIOLOGY | Facility: HOSPITAL | Age: 65
End: 2025-07-05
Attending: STUDENT IN AN ORGANIZED HEALTH CARE EDUCATION/TRAINING PROGRAM
Payer: COMMERCIAL

## 2025-07-05 ENCOUNTER — APPOINTMENT (OUTPATIENT)
Dept: CT IMAGING | Facility: HOSPITAL | Age: 65
End: 2025-07-05
Attending: STUDENT IN AN ORGANIZED HEALTH CARE EDUCATION/TRAINING PROGRAM
Payer: COMMERCIAL

## 2025-07-05 ENCOUNTER — HOSPITAL ENCOUNTER (EMERGENCY)
Facility: HOSPITAL | Age: 65
Discharge: ANOTHER HOSPITAL | End: 2025-07-05
Attending: STUDENT IN AN ORGANIZED HEALTH CARE EDUCATION/TRAINING PROGRAM | Admitting: STUDENT IN AN ORGANIZED HEALTH CARE EDUCATION/TRAINING PROGRAM
Payer: COMMERCIAL

## 2025-07-05 VITALS
BODY MASS INDEX: 18.78 KG/M2 | HEART RATE: 101 BPM | WEIGHT: 110 LBS | SYSTOLIC BLOOD PRESSURE: 144 MMHG | HEIGHT: 64 IN | RESPIRATION RATE: 19 BRPM | DIASTOLIC BLOOD PRESSURE: 80 MMHG | OXYGEN SATURATION: 100 % | TEMPERATURE: 98 F

## 2025-07-05 DIAGNOSIS — S32.009A CLOSED FRACTURE OF TRANSVERSE PROCESS OF LUMBAR VERTEBRA, INITIAL ENCOUNTER (H): ICD-10-CM

## 2025-07-05 DIAGNOSIS — S22.41XA CLOSED FRACTURE OF MULTIPLE RIBS OF RIGHT SIDE, INITIAL ENCOUNTER: ICD-10-CM
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
## 2025-07-05 DIAGNOSIS — J93.9 PNEUMOTHORAX ON RIGHT: ICD-10-CM

## 2025-07-05 DIAGNOSIS — S22.009A CLOSED FRACTURE OF TRANSVERSE PROCESS OF THORACIC VERTEBRA, INITIAL ENCOUNTER (H): ICD-10-CM

## 2025-07-05 LAB — RADIOLOGIST FLAGS: ABNORMAL

## 2025-07-05 PROCEDURE — 250N000011 HC RX IP 250 OP 636: Performed by: STUDENT IN AN ORGANIZED HEALTH CARE EDUCATION/TRAINING PROGRAM

## 2025-07-05 PROCEDURE — 32551 INSERTION OF CHEST TUBE: CPT

## 2025-07-05 PROCEDURE — 250N000013 HC RX MED GY IP 250 OP 250 PS 637: Performed by: STUDENT IN AN ORGANIZED HEALTH CARE EDUCATION/TRAINING PROGRAM

## 2025-07-05 PROCEDURE — 73060 X-RAY EXAM OF HUMERUS: CPT | Mod: LT

## 2025-07-05 PROCEDURE — 73080 X-RAY EXAM OF ELBOW: CPT | Mod: RT

## 2025-07-05 PROCEDURE — 72131 CT LUMBAR SPINE W/O DYE: CPT

## 2025-07-05 PROCEDURE — 96374 THER/PROPH/DIAG INJ IV PUSH: CPT | Mod: 59

## 2025-07-05 PROCEDURE — 250N000009 HC RX 250: Performed by: STUDENT IN AN ORGANIZED HEALTH CARE EDUCATION/TRAINING PROGRAM

## 2025-07-05 PROCEDURE — 72125 CT NECK SPINE W/O DYE: CPT

## 2025-07-05 PROCEDURE — 96375 TX/PRO/DX INJ NEW DRUG ADDON: CPT

## 2025-07-05 PROCEDURE — 999N000104 CT THORACIC SPINE RECONSTRUCTED

## 2025-07-05 PROCEDURE — 96376 TX/PRO/DX INJ SAME DRUG ADON: CPT | Mod: 59

## 2025-07-05 PROCEDURE — 71045 X-RAY EXAM CHEST 1 VIEW: CPT

## 2025-07-05 PROCEDURE — 71250 CT THORAX DX C-: CPT

## 2025-07-05 PROCEDURE — 70450 CT HEAD/BRAIN W/O DYE: CPT

## 2025-07-05 PROCEDURE — 99285 EMERGENCY DEPT VISIT HI MDM: CPT | Mod: 25

## 2025-07-05 RX ORDER — TIZANIDINE 2 MG/1
2 TABLET ORAL ONCE
Status: COMPLETED | OUTPATIENT
Start: 2025-07-05 | End: 2025-07-05

## 2025-07-05 RX ORDER — GABAPENTIN 100 MG/1
200 CAPSULE ORAL ONCE
Status: COMPLETED | OUTPATIENT
Start: 2025-07-05 | End: 2025-07-05

## 2025-07-05 RX ORDER — FENTANYL CITRATE 50 UG/ML
50 INJECTION, SOLUTION INTRAMUSCULAR; INTRAVENOUS ONCE
Refills: 0 | Status: COMPLETED | OUTPATIENT
Start: 2025-07-05 | End: 2025-07-05

## 2025-07-05 RX ORDER — MORPHINE SULFATE 4 MG/ML
4 INJECTION, SOLUTION INTRAMUSCULAR; INTRAVENOUS ONCE
Refills: 0 | Status: COMPLETED | OUTPATIENT
Start: 2025-07-05 | End: 2025-07-05

## 2025-07-05 RX ORDER — KETAMINE HYDROCHLORIDE 10 MG/ML
7.5 INJECTION, SOLUTION INTRAMUSCULAR; INTRAVENOUS ONCE
Status: COMPLETED | OUTPATIENT
Start: 2025-07-05 | End: 2025-07-05

## 2025-07-05 RX ORDER — CARBIDOPA AND LEVODOPA 25; 100 MG/1; MG/1
2 TABLET ORAL ONCE
Status: COMPLETED | OUTPATIENT
Start: 2025-07-05 | End: 2025-07-05

## 2025-07-05 RX ADMIN — FENTANYL CITRATE 50 MCG: 50 INJECTION, SOLUTION INTRAMUSCULAR; INTRAVENOUS at 07:47

## 2025-07-05 RX ADMIN — FENTANYL CITRATE 50 MCG: 50 INJECTION, SOLUTION INTRAMUSCULAR; INTRAVENOUS at 06:05

## 2025-07-05 RX ADMIN — FENTANYL CITRATE 50 MCG: 50 INJECTION INTRAMUSCULAR; INTRAVENOUS at 04:20

## 2025-07-05 RX ADMIN — TIZANIDINE 2 MG: 2 TABLET ORAL at 03:22

## 2025-07-05 RX ADMIN — KETAMINE HYDROCHLORIDE 7.5 MG: 10 INJECTION INTRAMUSCULAR; INTRAVENOUS at 06:53

## 2025-07-05 RX ADMIN — CARBIDOPA AND LEVODOPA 2 TABLET: 25; 100 TABLET ORAL at 06:03

## 2025-07-05 RX ADMIN — GABAPENTIN 200 MG: 100 CAPSULE ORAL at 03:25

## 2025-07-05 RX ADMIN — MORPHINE SULFATE 4 MG: 4 INJECTION, SOLUTION INTRAMUSCULAR; INTRAVENOUS at 03:16

## 2025-07-05 ASSESSMENT — ACTIVITIES OF DAILY LIVING (ADL)
ADLS_ACUITY_SCORE: 41

## 2025-07-05 NOTE — ED PROVIDER NOTES
"EMERGENCY DEPARTMENT ENCOUNTER      NAME: Alina Zavala  AGE: 65 year old female  YOB: 1960  MRN: [id number]  EVALUATION DATE & TIME: 7/5/2025  2:43 AM    PCP: Ishan Melendrez    ED PROVIDER: Gustavo Rojas MD      Chief Complaint   Patient presents with    Fall           Rib Pain         FINAL IMPRESSION:  1. Closed fracture of multiple ribs of right side, initial encounter    2. Pneumothorax on right    3. Fall down stairs, initial encounter    4. Closed fracture of transverse process of lumbar vertebra, initial encounter (H)    5. Closed fracture of transverse process of thoracic vertebra, initial encounter (H)          ED COURSE & MEDICAL DECISION MAKING:    Pertinent Labs & Imaging studies reviewed. (See chart for details)  65 year old female presents to the Emergency Department for evaluation of fall, rib pain    ED Course as of 07/05/25 0757   Sat Jul 05, 2025   0300 Patient is a 65-year-old female with a past medical history significant for Parkinson's, significant dyskinesia, and presents to the emergency department with mechanical fall down 5 steps at home just prior to arrival.  Patient states she did not hit her head or lose consciousness.  She is not on blood thinners.  She endorses pain to her right rib cage and back as well as pain in her right elbow.  On exam she is very uncomfortable, but maintaining good oxygen saturation, with clear breath sounds.  She has tenderness of her T and L-spine as well as right posterior and lateral rib cage.  No flail chest or crepitus.  Plan for pain control, and imaging of the head, C-spine, T-spine, L-spine, chest.  As this appears mechanical in nature based on history, I do not feel that further lab work is indicated   0556 Right elbow plain film negative for acute traumatic pathology   0557 Left humerus plain film negative for acute traumatic pathology.   0557 Patient still in a considerable amount of pain \"12 out of 10\".  Will provide her home " dose of carbidopa levodopa as well as another 50 mcg fentanyl bolus.   0612 Radiology called to report multiple rib fractures on the right as well as a large pneumothorax on the right.  Paging general surgery, and will get set up for a chest tube.   0616 Discussed with general surgery, Dr. Madera, who thinks the patient should be sent to [facility] for further trauma care.   0634 CT head negative for acute intracranial pathology.   0642 CT C-spine negative for acute traumatic pathology.  CT T-spine shows acute fracture of the right T1 transverse process as well as the multiple rib fractures seen on CT chest.  CT L-spine shows acute fractures of left L1 and L2 transverse processes.  Typically for transverse process fractures there is no acute intervention to be done.     Patient accepted for transfer to [facility] ED by Dr. Mejia. Chest tube placed as described below.    Medical Decision Making  Care impacted by Parkinson's  I independently interpreted the CT chest and note multiple right sided rib fractures, large pneumothorax. See radiology report for final interpretation.  I discussed the care with another health care provider: [facility] ER doctor, Dr. Mejia  Transfer    MIPS (CTPE, Dental pain, [place], Sinusitis, Asthma/COPD, Head Trauma): Adult Minor Head Trauma:Age 65 years or older    SEPSIS: None            At the conclusion of the encounter I discussed the results of all of the tests and the disposition. The questions were answered. The patient or family acknowledged understanding and was agreeable with the care plan.     35 minutes of critical care time     MEDICATIONS GIVEN IN THE EMERGENCY:  Medications   gabapentin (NEURONTIN) capsule 200 mg (200 mg Oral $Given 7/5/25 0325)   morphine (PF) injection 4 mg (4 mg Intravenous $Given 7/5/25 0316)   tiZANidine (ZANAFLEX) tablet 2 mg (2 mg Oral $Given 7/5/25 0322)   fentaNYL (PF) (SUBLIMAZE) injection 50 mcg (50 mcg Intravenous $Given 7/5/25 0420)   carbidopa-levodopa  (SINEMET)  MG per tablet 2 tablet (2 tablets Oral $Given 7/5/25 0603)   fentaNYL (PF) (SUBLIMAZE) injection 50 mcg (50 mcg Intravenous $Given 7/5/25 0605)   ketamine (KETALAR) injection 7.5 mg (7.5 mg Intravenous $Given 7/5/25 0653)   fentaNYL (PF) (SUBLIMAZE) injection 50 mcg (50 mcg Intravenous $Given 7/5/25 0747)       NEW PRESCRIPTIONS STARTED AT TODAY'S ER VISIT  New Prescriptions    No medications on file          =================================================================    HPI    Patient information was obtained from: Patient and significant other    Use of : N/A        Alina Zavala is a 65 year old female with a pertinent history of hypertension, asthma, Parkinson;s disease, POTS, hyperlipidemia, and trigeminal neuralgia who presents to this ED via private vehicle for evaluation of rib pain after a fall.     Patient comes in today after sustaining a mechanical fall from the 5th step to the floor after losing her balance. Fall witnessed by . This is a known issue for the patient. She denies head injury or loss of consciousness. Patient is not anticoagulated. She currently complains of right-sided rib pain and mid-back pain. No medication for pain taken prior to arrival, however, she did take her Levodopa and gabapentin 600 mg at 9:30 PM.     Patient denies abdominal pain, hip pain, leg pain, or any other complaints at this time.      PAST MEDICAL HISTORY:  Past Medical History:   Diagnosis Date    Anxiety     Depression     Depressive disorder     Family history of muscular dystrophy 02/22/2024    Hyperlipidemia     Hypertension     Lyme disease     Trigeminal neuralgia        PAST SURGICAL HISTORY:  Past Surgical History:   Procedure Laterality Date    COLONOSCOPY  July 12 2018    D & C             CURRENT MEDICATIONS:    acetaminophen (TYLENOL) 500 MG tablet  albuterol (PROAIR HFA/PROVENTIL HFA/VENTOLIN HFA) 108 (90 Base) MCG/ACT inhaler  amantadine HCl 100 MG TABS  B  "Complex-C (VITAMIN B COMPLEX W/VITAMIN C) TABS tablet  buPROPion (WELLBUTRIN SR) 200 MG 12 hr tablet  calcium carbonate (OS-TENA) 600 MG tablet  carbidopa-levodopa (SINEMET)  MG tablet  cholecalciferol 25 MCG (1000 UT) TABS  co-enzyme Q-10 100 MG CAPS capsule  estradiol (ESTRACE) 0.1 MG/GM vaginal cream  gabapentin (NEURONTIN) 100 MG capsule  gabapentin (NEURONTIN) 300 MG capsule  midodrine (PROAMATINE) 10 MG tablet  midodrine (PROAMATINE) 5 MG tablet  NONFORMULARY  phenazopyridine (PYRIDIUM) 100 MG tablet  probiotic CAPS  propranolol (INDERAL) 10 MG tablet  rivastigmine (EXELON) 13.3 MG/24HR 24 hr patch  sertraline (ZOLOFT) 100 MG tablet  sodium fluoride dental gel (PREVIDENT) 1.1 % GEL topical gel  tiZANidine (ZANAFLEX) 2 MG tablet  vitamin C (ASCORBIC ACID) 500 MG tablet  vortioxetine (TRINTELLIX) 10 MG tablet        ALLERGIES:  Allergies   Allergen Reactions    Prednisone Anxiety, Palpitations and Shortness Of Breath    Baclofen Other (See Comments)     Agitation, confusion, brain fog, sedation    Donepezil      Insomnia, nausea, dizziness/somnolence     Entacapone     Penicillin G Hives    Penicillins      Unknown    Prednisolone      Other Reaction(s): heart palpatations, anxiety    Prednisone      Other reaction(s): *Unknown    Sulfa Antibiotics      Unknown    Other Reaction(s): throat closing       FAMILY HISTORY:  Family History   Problem Relation Age of Onset    Tremor Mother 67        \"ESSENTIAL\" LONG STANDING    Polymyalgia rheumatica Mother     Arthritis Mother     Hypertension Mother     Cerebrovascular Disease Father     Hypertension Father     Heart Disease Father     Other - See Comments Sister     Other - See Comments Sister     Other - See Comments Sister     Other - See Comments Brother     Other - See Comments Maternal Grandmother         SMOKER    Tremor Maternal Grandmother     Lung Cancer Maternal Grandmother     Other - See Comments Maternal Grandfather         SMOKER    Coronary " "Artery Disease Maternal Grandfather     Cardiac Sudden Death Maternal Grandfather     Other - See Comments Paternal Grandmother     Cerebrovascular Disease Paternal Grandmother     Other - See Comments Paternal Grandfather     Dementia Paternal Grandfather     Other - See Comments Daughter     Other - See Comments Son     Other - See Comments Son     Muscular Dystrophy Maternal Aunt     Other - See Comments Maternal Aunt     Heart Disease Maternal Aunt     Other - See Comments Maternal Aunt     Lupus Maternal Aunt     Neurologic Disorder Maternal Aunt         WHEELCHAIR    Parkinsonism Maternal Aunt         SMALL HANDWRITING    Seizure Disorder Maternal Aunt     Tremor Maternal Aunt     Rashes/Skin Problems Maternal Aunt         lupus    Muscular Dystrophy Maternal Uncle         ADULT ONSET    Muscular Dystrophy Maternal Uncle         ADULT ONSET    Muscular Dystrophy Maternal Uncle         ADULT ONSET    Muscular Disorder Maternal Cousin          YOUNG - YOUNG ONSET, dIAGNOSED AS A CHILD    Cancer Maternal Cousin     Muscular Dystrophy Other     Parkinsonism Other        SOCIAL HISTORY:   Social History     Socioeconomic History    Marital status:    Tobacco Use    Smoking status: Never    Smokeless tobacco: Never   Substance and Sexual Activity    Alcohol use: No    Drug use: No    Sexual activity: Not Currently     Partners: Male     Birth control/protection: Post-menopausal   Other Topics Concern    Parent/sibling w/ CABG, MI or angioplasty before 65F 55M? No   Social History Narrative    . Lives in [place]. Tejinder Awade spouse. They have three children. Their names are Eros, Venkat, and Christie. She is a homemaker. Studied library science and theology. She was a  for over 16 years. 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 Alina's mother is reported to have a tremor.    2. Alina's maternal aunt  at 76 with Parkinson disease.    3. Alina's maternal uncles had a muscular dystrophy. She believes that it may have been Duchenne muscular dystrophy, but she also thought that one of her uncles may have had an affected son- which would not be consistent. She also believes that her mother had a test and was told that she was 'not a carrier.' I indicated that in order to answer specific questions about the history, I would need more specific details about the exact diagnosis in the family.    4. Alina's maternal grandmother's brother had Parkinson disease and he had two daughters who had Parkinson disease.    5. Alina reported a paternal first cousin with a tremor who has 'suspicious symptoms' for Parkinson disease.     Social Drivers of Health      Received from [facility]    Financial Resource Strain    Received from [facility]    Social Connections       VITALS:  /80   Pulse 97   Temp 98  F (36.7  C) (Temporal)   Resp 24   Ht 1.626 m (5' 4\")   Wt 49.9 kg (110 lb)   SpO2 98%   BMI 18.88 kg/m      PHYSICAL EXAM    Physical Exam  Vitals and nursing note reviewed.   Constitutional:       General: She is not in acute distress.     Appearance: Normal appearance. She is normal weight. She is not ill-appearing.   HENT:      Head: Normocephalic and atraumatic.      Nose: Nose normal.      Mouth/Throat:      Mouth: Mucous membranes are moist.   Eyes:      Conjunctiva/sclera: Conjunctivae normal.   Cardiovascular:      Rate and Rhythm: Normal rate and regular rhythm.      Pulses: Normal pulses.   Pulmonary:      Effort: Pulmonary effort is normal. No respiratory distress.      Breath sounds: Normal breath sounds.   Abdominal:      General: Abdomen is flat. There is no distension.      " "Palpations: Abdomen is soft.      Tenderness: There is no abdominal tenderness.   Musculoskeletal:         General: Tenderness (Right posterior and lateral rib cage.  No crepitus, flail chest, or instability.  Also tender in the midline T and L-spine.  Tender with bruising of the right elbow.) present. No deformity. Normal range of motion.      Cervical back: Normal range of motion.      Right lower leg: No edema.      Left lower leg: No edema.   Skin:     General: Skin is warm and dry.   Neurological:      General: No focal deficit present.      Mental Status: She is alert and oriented to person, place, and time. Mental status is at baseline.   Psychiatric:         Mood and Affect: Mood normal.         Behavior: Behavior normal.         Thought Content: Thought content normal.         Judgment: Judgment normal.            LAB:  All pertinent labs reviewed and interpreted.  Results for orders placed or performed during the hospital encounter of 07/05/25   Chest CT w/o contrast   Result Value Ref Range    Radiologist flags Pneumothorax (AA)     Impression    IMPRESSION:   1.  Moderate right-sided pneumothorax (roughly 30% by volume).  2.  Extensive right-sided acute rib fractures involving ribs 1 through 12, with nearly all ribs experiencing fractures at multiple sites. This predisposes the patient to \"flail chest\".  3.  Indeterminate left lung nodules measuring up to 10 mm. Consider follow-up noncontrast chest CT in 3-6 months or PET/CT.      [Critical Result: Pneumothorax]    Finding was identified on 07/05/2025, 6:07 AM CDT.     1.  Dr. Rojas was contacted by me on 07/05/2025, 6:17 AM CDT and verbalized understanding of the critical result.     CT Head w/o Contrast    Impression    IMPRESSION:  1.  No acute intracranial process.     CT Cervical Spine w/o Contrast    Impression    IMPRESSION:  CERVICAL SPINE CT:  1.  No fracture or posttraumatic subluxation.  2.  Spondylosis with foraminal stenoses as " detailed.    THORACIC SPINE CT:  1.  Acute fracture right T1 transverse process.  2.  Acute fractures of the right posterior medial 2nd through 12th ribs.  3.  Small right pneumothorax. See dedicated chest CT regarding intrathoracic findings.    LUMBAR SPINE CT:  1.  Acute fractures left L1 and L2 transverse processes.  2.  No vertebral body compression fracture.  3.  Scoliosis and spondylosis with foraminal stenoses as detailed.     CT Thoracic Spine Reconstructed    Impression    IMPRESSION:  CERVICAL SPINE CT:  1.  No fracture or posttraumatic subluxation.  2.  Spondylosis with foraminal stenoses as detailed.    THORACIC SPINE CT:  1.  Acute fracture right T1 transverse process.  2.  Acute fractures of the right posterior medial 2nd through 12th ribs.  3.  Small right pneumothorax. See dedicated chest CT regarding intrathoracic findings.    LUMBAR SPINE CT:  1.  Acute fractures left L1 and L2 transverse processes.  2.  No vertebral body compression fracture.  3.  Scoliosis and spondylosis with foraminal stenoses as detailed.     CT Lumbar Spine w/o Contrast    Impression    IMPRESSION:  CERVICAL SPINE CT:  1.  No fracture or posttraumatic subluxation.  2.  Spondylosis with foraminal stenoses as detailed.    THORACIC SPINE CT:  1.  Acute fracture right T1 transverse process.  2.  Acute fractures of the right posterior medial 2nd through 12th ribs.  3.  Small right pneumothorax. See dedicated chest CT regarding intrathoracic findings.    LUMBAR SPINE CT:  1.  Acute fractures left L1 and L2 transverse processes.  2.  No vertebral body compression fracture.  3.  Scoliosis and spondylosis with foraminal stenoses as detailed.     Elbow XR, G/E 3 views, right    Impression    IMPRESSION: Normal joint spaces and alignment. No fracture or joint effusion.   Humerus XR,  G/E 2 views, left    Impression    IMPRESSION: Within normal limits. No fracture.       RADIOLOGY:  Reviewed all pertinent imaging. Please see official  "radiology report.  CT Lumbar Spine w/o Contrast   Final Result   IMPRESSION:   CERVICAL SPINE CT:   1.  No fracture or posttraumatic subluxation.   2.  Spondylosis with foraminal stenoses as detailed.      THORACIC SPINE CT:   1.  Acute fracture right T1 transverse process.   2.  Acute fractures of the right posterior medial 2nd through 12th ribs.   3.  Small right pneumothorax. See dedicated chest CT regarding intrathoracic findings.      LUMBAR SPINE CT:   1.  Acute fractures left L1 and L2 transverse processes.   2.  No vertebral body compression fracture.   3.  Scoliosis and spondylosis with foraminal stenoses as detailed.         CT Thoracic Spine Reconstructed   Final Result   IMPRESSION:   CERVICAL SPINE CT:   1.  No fracture or posttraumatic subluxation.   2.  Spondylosis with foraminal stenoses as detailed.      THORACIC SPINE CT:   1.  Acute fracture right T1 transverse process.   2.  Acute fractures of the right posterior medial 2nd through 12th ribs.   3.  Small right pneumothorax. See dedicated chest CT regarding intrathoracic findings.      LUMBAR SPINE CT:   1.  Acute fractures left L1 and L2 transverse processes.   2.  No vertebral body compression fracture.   3.  Scoliosis and spondylosis with foraminal stenoses as detailed.         Chest CT w/o contrast   Final Result   Abnormal   IMPRESSION:    1.  Moderate right-sided pneumothorax (roughly 30% by volume).   2.  Extensive right-sided acute rib fractures involving ribs 1 through 12, with nearly all ribs experiencing fractures at multiple sites. This predisposes the patient to \"flail chest\".   3.  Indeterminate left lung nodules measuring up to 10 mm. Consider follow-up noncontrast chest CT in 3-6 months or PET/CT.         [Critical Result: Pneumothorax]      Finding was identified on 07/05/2025, 6:07 AM CDT.       1.  Dr. Rojas was contacted by me on 07/05/2025, 6:17 AM CDT and verbalized understanding of the critical result.         CT Cervical " Spine w/o Contrast   Final Result   IMPRESSION:   CERVICAL SPINE CT:   1.  No fracture or posttraumatic subluxation.   2.  Spondylosis with foraminal stenoses as detailed.      THORACIC SPINE CT:   1.  Acute fracture right T1 transverse process.   2.  Acute fractures of the right posterior medial 2nd through 12th ribs.   3.  Small right pneumothorax. See dedicated chest CT regarding intrathoracic findings.      LUMBAR SPINE CT:   1.  Acute fractures left L1 and L2 transverse processes.   2.  No vertebral body compression fracture.   3.  Scoliosis and spondylosis with foraminal stenoses as detailed.         CT Head w/o Contrast   Final Result   IMPRESSION:   1.  No acute intracranial process.         Humerus XR,  G/E 2 views, left   Final Result   IMPRESSION: Within normal limits. No fracture.      Elbow XR, G/E 3 views, right   Final Result   IMPRESSION: Normal joint spaces and alignment. No fracture or joint effusion.      XR Chest Port 1 View    (Results Pending)       PROCEDURES:     PROCEDURE: Tube Thoracostomy   TYPE: Pig Tail, Josh chest tube   INDICATIONS: It is medically necessary to place a chest tube for pneumothorax   PROCEDURE PROVIDER: Dr Gustavo Rojas   CONSENT: Risks, benefits and alternatives were discussed with and Written consent was obtained from Patient.   TIME OUT: Universal protocol was followed. TIME OUT conducted just prior to starting procedure confirmed patient identity, site/side, procedure, patient position, and availability of correct equipment. Yes   SITE: R sided, mid axillary   MEDICATIONS 5 mLs of 1% Lidocaine without epinephrine    Ketamine, 7.5 mg, IV   NOTE: Patient was placed in a semirecumbent position with the head of the bed at 30 degrees.  The right prepped with chlorhexidine. Patient was medicated as above.  Seldinger technique was used in sterile fashion.  A 14' Armenian Josh pigtail catheter chest tube was placed and connected to Pleurovac on continuous suction. Tube  was sutured in place with 2-0 silk, and all connections banded.    There was air rush, but no liquid drainage.     Post procedure X-ray showing partial reexpansion of the lung.     COMPLICATIONS: Patient tolerated procedure well, without complication           [facility] Documentation:   CMS Diagnoses: None             I, Eros Engel, am serving as a scribe to document services personally performed by Gustavo Rojas MD based on my observation and the provider's statements to me. I, Gustavo Rojas MD, attest that Eros Engel is acting in a scribe capacity, has observed my performance of the services and has documented them in accordance with my direction.    Gustavo Rojas MD  [facility] EMERGENCY DEPARTMENT  [facility] [facility] [id number] [phone]       Gustavo Rojas MD  07/05/25 3598

## 2025-07-05 NOTE — ED TRIAGE NOTES
Patient had mechanical fall in house. Reports right sided rib pain, has abrasion to right elbow. Patient reporting it is difficult to breath. Hx of parkinson's     Triage Assessment (Adult)       Row Name 07/05/25 0250          Triage Assessment    Airway WDL WDL        Respiratory WDL    Respiratory WDL X;rhythm/pattern     Rhythm/Pattern, Respiratory shortness of breath        Peripheral/Neurovascular WDL    Peripheral Neurovascular WDL WDL

## 2025-07-07 ENCOUNTER — MYC MEDICAL ADVICE (OUTPATIENT)
Dept: NEUROLOGY | Facility: CLINIC | Age: 65
End: 2025-07-07
Payer: COMMERCIAL

## 2025-07-19 ENCOUNTER — HEALTH MAINTENANCE LETTER (OUTPATIENT)
Age: 65
End: 2025-07-19

## 2025-08-06 ENCOUNTER — VIRTUAL VISIT (OUTPATIENT)
Dept: PHARMACY | Facility: CLINIC | Age: 65
End: 2025-08-06
Attending: PSYCHIATRY & NEUROLOGY
Payer: COMMERCIAL

## 2025-08-06 DIAGNOSIS — R52 PAIN: ICD-10-CM

## 2025-08-06 DIAGNOSIS — F32.A DEPRESSION, UNSPECIFIED DEPRESSION TYPE: ICD-10-CM

## 2025-08-06 DIAGNOSIS — Z78.9 TAKES DIETARY SUPPLEMENTS: ICD-10-CM

## 2025-08-06 DIAGNOSIS — I95.1 ORTHOSTATIC HYPOTENSION: ICD-10-CM

## 2025-08-06 DIAGNOSIS — G20.B1 PARKINSON'S DISEASE WITH DYSKINESIA WITHOUT FLUCTUATING MANIFESTATIONS (H): Primary | ICD-10-CM

## 2025-08-06 RX ORDER — METHOCARBAMOL 500 MG/1
500 TABLET, FILM COATED ORAL 3 TIMES DAILY
COMMUNITY
Start: 2025-07-30

## 2025-08-06 RX ORDER — CARBIDOPA AND LEVODOPA 25; 100 MG/1; MG/1
2 TABLET ORAL
Qty: 1080 TABLET | Refills: 3 | Status: SHIPPED | OUTPATIENT
Start: 2025-08-06

## 2025-08-06 RX ORDER — OXYCODONE HYDROCHLORIDE 5 MG/1
2.5 TABLET ORAL 3 TIMES DAILY
COMMUNITY
Start: 2025-07-30

## 2025-08-06 RX ORDER — LIDOCAINE 4 G/G
1 PATCH TOPICAL EVERY 24 HOURS
COMMUNITY
Start: 2025-07-30

## 2025-08-06 RX ORDER — GABAPENTIN 300 MG/1
300 CAPSULE ORAL 3 TIMES DAILY
COMMUNITY